# Patient Record
Sex: FEMALE | Race: WHITE | Employment: OTHER | URBAN - METROPOLITAN AREA
[De-identification: names, ages, dates, MRNs, and addresses within clinical notes are randomized per-mention and may not be internally consistent; named-entity substitution may affect disease eponyms.]

---

## 2017-06-16 ENCOUNTER — HOSPITAL ENCOUNTER (OUTPATIENT)
Dept: MAMMOGRAPHY | Age: 72
Discharge: HOME | End: 2017-06-16
Payer: MEDICARE

## 2017-06-16 DIAGNOSIS — Z12.31 VISIT FOR SCREENING MAMMOGRAM: ICD-10-CM

## 2017-06-16 PROCEDURE — G0202 SCR MAMMO BI INCL CAD: HCPCS

## 2017-11-01 ENCOUNTER — HOSPITAL ENCOUNTER (OUTPATIENT)
Dept: GENERAL RADIOLOGY | Age: 72
Discharge: HOME | End: 2017-11-01
Payer: MEDICARE

## 2017-11-01 DIAGNOSIS — M54.9 BACK PAIN: ICD-10-CM

## 2017-11-01 PROCEDURE — 72110 X-RAY EXAM L-2 SPINE 4/>VWS: CPT

## 2018-12-03 ENCOUNTER — HOSPITAL ENCOUNTER (OUTPATIENT)
Dept: GENERAL RADIOLOGY | Age: 73
Discharge: HOME | End: 2018-12-03
Payer: MEDICARE

## 2018-12-03 DIAGNOSIS — M20.091 ULNAR DEVIATION OF FINGER OF RIGHT HAND: ICD-10-CM

## 2018-12-03 DIAGNOSIS — M65.9 SYNOVITIS OF WRIST: ICD-10-CM

## 2018-12-03 DIAGNOSIS — M11.20 CALCIUM PYROPHOSPHATE DEPOSITION DISEASE: ICD-10-CM

## 2018-12-03 PROCEDURE — 73130 X-RAY EXAM OF HAND: CPT

## 2018-12-03 PROCEDURE — 73110 X-RAY EXAM OF WRIST: CPT

## 2019-04-30 ENCOUNTER — HOSPITAL ENCOUNTER (OUTPATIENT)
Dept: MAMMOGRAPHY | Age: 74
Discharge: HOME | End: 2019-04-30
Payer: MEDICARE

## 2019-04-30 ENCOUNTER — HOSPITAL ENCOUNTER (OUTPATIENT)
Dept: GENERAL RADIOLOGY | Age: 74
Discharge: HOME | End: 2019-04-30
Payer: MEDICARE

## 2019-04-30 DIAGNOSIS — R06.02 SOB (SHORTNESS OF BREATH): ICD-10-CM

## 2019-04-30 DIAGNOSIS — Z12.39 SCREENING FOR BREAST CANCER: ICD-10-CM

## 2019-04-30 PROCEDURE — 71046 X-RAY EXAM CHEST 2 VIEWS: CPT

## 2019-04-30 PROCEDURE — 77067 SCR MAMMO BI INCL CAD: CPT

## 2021-01-18 ENCOUNTER — HOSPITAL ENCOUNTER (OUTPATIENT)
Dept: GENERAL RADIOLOGY | Age: 76
Discharge: HOME | End: 2021-01-18
Payer: MEDICARE

## 2021-01-18 ENCOUNTER — TRANSCRIBE ORDER (OUTPATIENT)
Dept: GENERAL RADIOLOGY | Age: 76
End: 2021-01-18

## 2021-01-18 DIAGNOSIS — M25.551 RIGHT HIP PAIN: Primary | ICD-10-CM

## 2021-01-18 DIAGNOSIS — M25.551 RIGHT HIP PAIN: ICD-10-CM

## 2021-01-18 PROCEDURE — 73502 X-RAY EXAM HIP UNI 2-3 VIEWS: CPT | Performed by: FAMILY MEDICINE

## 2021-02-08 ENCOUNTER — APPOINTMENT (OUTPATIENT)
Dept: CT IMAGING | Age: 76
DRG: 202 | End: 2021-02-08
Attending: EMERGENCY MEDICINE
Payer: MEDICARE

## 2021-02-08 ENCOUNTER — HOSPITAL ENCOUNTER (INPATIENT)
Age: 76
LOS: 7 days | Discharge: HOME HEALTH | DRG: 202 | End: 2021-02-15
Attending: EMERGENCY MEDICINE | Admitting: INTERNAL MEDICINE
Payer: MEDICARE

## 2021-02-08 ENCOUNTER — APPOINTMENT (OUTPATIENT)
Dept: GENERAL RADIOLOGY | Age: 76
DRG: 202 | End: 2021-02-08
Attending: EMERGENCY MEDICINE
Payer: MEDICARE

## 2021-02-08 DIAGNOSIS — J45.901 MODERATE ASTHMA WITH ACUTE EXACERBATION, UNSPECIFIED WHETHER PERSISTENT: Primary | ICD-10-CM

## 2021-02-08 DIAGNOSIS — R05.9 COUGH: ICD-10-CM

## 2021-02-08 DIAGNOSIS — R06.02 SOB (SHORTNESS OF BREATH): ICD-10-CM

## 2021-02-08 DIAGNOSIS — R09.02 HYPOXIA: ICD-10-CM

## 2021-02-08 PROBLEM — J45.41 MODERATE PERSISTENT ASTHMA WITH ACUTE EXACERBATION: Status: ACTIVE | Noted: 2021-02-08

## 2021-02-08 PROBLEM — J45.902 STATUS ASTHMATICUS: Status: ACTIVE | Noted: 2021-02-08

## 2021-02-08 LAB
ALBUMIN SERPL-MCNC: 3.3 G/DL (ref 3.5–5)
ALBUMIN/GLOB SERPL: 0.9 {RATIO} (ref 1.1–2.2)
ALP SERPL-CCNC: 146 U/L (ref 45–117)
ALT SERPL-CCNC: 43 U/L (ref 12–78)
ANION GAP SERPL CALC-SCNC: 11 MMOL/L (ref 5–15)
AST SERPL-CCNC: 36 U/L (ref 15–37)
BASOPHILS # BLD: 0 K/UL (ref 0–0.1)
BASOPHILS NFR BLD: 0 % (ref 0–1)
BILIRUB SERPL-MCNC: 0.6 MG/DL (ref 0.2–1)
BUN SERPL-MCNC: 16 MG/DL (ref 6–20)
BUN/CREAT SERPL: 15 (ref 12–20)
CALCIUM SERPL-MCNC: 10.2 MG/DL (ref 8.5–10.1)
CHLORIDE SERPL-SCNC: 97 MMOL/L (ref 97–108)
CO2 SERPL-SCNC: 28 MMOL/L (ref 21–32)
CREAT SERPL-MCNC: 1.1 MG/DL (ref 0.55–1.02)
CRP SERPL-MCNC: 11.48 MG/DL
DIFFERENTIAL METHOD BLD: ABNORMAL
EOSINOPHIL # BLD: 1.2 K/UL (ref 0–0.4)
EOSINOPHIL NFR BLD: 9 % (ref 0–7)
ERYTHROCYTE [DISTWIDTH] IN BLOOD BY AUTOMATED COUNT: 14.2 % (ref 11.5–14.5)
GLOBULIN SER CALC-MCNC: 3.5 G/DL (ref 2–4)
GLUCOSE SERPL-MCNC: 134 MG/DL (ref 65–100)
HCT VFR BLD AUTO: 40.3 % (ref 35–47)
HGB BLD-MCNC: 12.8 G/DL (ref 11.5–16)
IMM GRANULOCYTES # BLD AUTO: 0 K/UL
IMM GRANULOCYTES NFR BLD AUTO: 0 %
LYMPHOCYTES # BLD: 1.4 K/UL (ref 0.8–3.5)
LYMPHOCYTES NFR BLD: 10 % (ref 12–49)
MCH RBC QN AUTO: 29.8 PG (ref 26–34)
MCHC RBC AUTO-ENTMCNC: 31.8 G/DL (ref 30–36.5)
MCV RBC AUTO: 93.9 FL (ref 80–99)
MONOCYTES # BLD: 1.1 K/UL (ref 0–1)
MONOCYTES NFR BLD: 8 % (ref 5–13)
NEUTS SEG # BLD: 10 K/UL (ref 1.8–8)
NEUTS SEG NFR BLD: 73 % (ref 32–75)
NRBC # BLD: 0 K/UL (ref 0–0.01)
NRBC BLD-RTO: 0 PER 100 WBC
PLATELET # BLD AUTO: 246 K/UL (ref 150–400)
PMV BLD AUTO: 10.4 FL (ref 8.9–12.9)
POTASSIUM SERPL-SCNC: 3.7 MMOL/L (ref 3.5–5.1)
PROCALCITONIN SERPL-MCNC: 0.14 NG/ML
PROT SERPL-MCNC: 6.8 G/DL (ref 6.4–8.2)
RBC # BLD AUTO: 4.29 M/UL (ref 3.8–5.2)
RBC MORPH BLD: ABNORMAL
SARS-COV-2, COV2: NORMAL
SODIUM SERPL-SCNC: 136 MMOL/L (ref 136–145)
TROPONIN I SERPL-MCNC: <0.05 NG/ML
WBC # BLD AUTO: 13.7 K/UL (ref 3.6–11)

## 2021-02-08 PROCEDURE — 94640 AIRWAY INHALATION TREATMENT: CPT

## 2021-02-08 PROCEDURE — 86140 C-REACTIVE PROTEIN: CPT

## 2021-02-08 PROCEDURE — 84145 PROCALCITONIN (PCT): CPT

## 2021-02-08 PROCEDURE — 71275 CT ANGIOGRAPHY CHEST: CPT

## 2021-02-08 PROCEDURE — 65270000032 HC RM SEMIPRIVATE

## 2021-02-08 PROCEDURE — 84484 ASSAY OF TROPONIN QUANT: CPT

## 2021-02-08 PROCEDURE — 74011000250 HC RX REV CODE- 250: Performed by: EMERGENCY MEDICINE

## 2021-02-08 PROCEDURE — 74011636637 HC RX REV CODE- 636/637: Performed by: EMERGENCY MEDICINE

## 2021-02-08 PROCEDURE — 99285 EMERGENCY DEPT VISIT HI MDM: CPT

## 2021-02-08 PROCEDURE — 85025 COMPLETE CBC W/AUTO DIFF WBC: CPT

## 2021-02-08 PROCEDURE — 81001 URINALYSIS AUTO W/SCOPE: CPT

## 2021-02-08 PROCEDURE — 74011250636 HC RX REV CODE- 250/636: Performed by: EMERGENCY MEDICINE

## 2021-02-08 PROCEDURE — 80053 COMPREHEN METABOLIC PANEL: CPT

## 2021-02-08 PROCEDURE — 71045 X-RAY EXAM CHEST 1 VIEW: CPT

## 2021-02-08 PROCEDURE — 74011000636 HC RX REV CODE- 636: Performed by: EMERGENCY MEDICINE

## 2021-02-08 PROCEDURE — 36415 COLL VENOUS BLD VENIPUNCTURE: CPT

## 2021-02-08 PROCEDURE — A9270 NON-COVERED ITEM OR SERVICE: HCPCS | Performed by: EMERGENCY MEDICINE

## 2021-02-08 PROCEDURE — U0005 INFEC AGEN DETEC AMPLI PROBE: HCPCS

## 2021-02-08 PROCEDURE — 93005 ELECTROCARDIOGRAM TRACING: CPT

## 2021-02-08 RX ORDER — ALBUTEROL SULFATE 0.83 MG/ML
5 SOLUTION RESPIRATORY (INHALATION)
Status: COMPLETED | OUTPATIENT
Start: 2021-02-08 | End: 2021-02-08

## 2021-02-08 RX ORDER — HEPARIN SODIUM 5000 [USP'U]/ML
5000 INJECTION, SOLUTION INTRAVENOUS; SUBCUTANEOUS EVERY 8 HOURS
Status: DISCONTINUED | OUTPATIENT
Start: 2021-02-09 | End: 2021-02-15 | Stop reason: HOSPADM

## 2021-02-08 RX ORDER — POLYETHYLENE GLYCOL 3350 17 G/17G
17 POWDER, FOR SOLUTION ORAL DAILY PRN
Status: DISCONTINUED | OUTPATIENT
Start: 2021-02-08 | End: 2021-02-15 | Stop reason: HOSPADM

## 2021-02-08 RX ORDER — SODIUM CHLORIDE 9 MG/ML
50 INJECTION, SOLUTION INTRAVENOUS ONCE
Status: COMPLETED | OUTPATIENT
Start: 2021-02-08 | End: 2021-02-08

## 2021-02-08 RX ORDER — IPRATROPIUM BROMIDE AND ALBUTEROL SULFATE 2.5; .5 MG/3ML; MG/3ML
9 SOLUTION RESPIRATORY (INHALATION)
Status: COMPLETED | OUTPATIENT
Start: 2021-02-08 | End: 2021-02-08

## 2021-02-08 RX ORDER — IPRATROPIUM BROMIDE AND ALBUTEROL SULFATE 2.5; .5 MG/3ML; MG/3ML
3 SOLUTION RESPIRATORY (INHALATION)
Status: DISCONTINUED | OUTPATIENT
Start: 2021-02-08 | End: 2021-02-08 | Stop reason: CLARIF

## 2021-02-08 RX ORDER — PROMETHAZINE HYDROCHLORIDE 25 MG/1
12.5 TABLET ORAL
Status: DISCONTINUED | OUTPATIENT
Start: 2021-02-08 | End: 2021-02-15 | Stop reason: HOSPADM

## 2021-02-08 RX ORDER — ALBUTEROL SULFATE 90 UG/1
1 AEROSOL, METERED RESPIRATORY (INHALATION)
Status: DISCONTINUED | OUTPATIENT
Start: 2021-02-08 | End: 2021-02-15 | Stop reason: HOSPADM

## 2021-02-08 RX ORDER — TRAZODONE HYDROCHLORIDE 50 MG/1
75 TABLET ORAL
Status: DISCONTINUED | OUTPATIENT
Start: 2021-02-09 | End: 2021-02-15 | Stop reason: HOSPADM

## 2021-02-08 RX ORDER — GABAPENTIN 300 MG/1
300 CAPSULE ORAL
Status: DISCONTINUED | OUTPATIENT
Start: 2021-02-09 | End: 2021-02-15 | Stop reason: HOSPADM

## 2021-02-08 RX ORDER — LOSARTAN POTASSIUM 25 MG/1
25 TABLET ORAL DAILY
Status: DISCONTINUED | OUTPATIENT
Start: 2021-02-09 | End: 2021-02-15 | Stop reason: HOSPADM

## 2021-02-08 RX ORDER — GUAIFENESIN 100 MG/5ML
81 LIQUID (ML) ORAL
Status: DISCONTINUED | OUTPATIENT
Start: 2021-02-09 | End: 2021-02-09

## 2021-02-08 RX ORDER — BUPROPION HYDROCHLORIDE 100 MG/1
200 TABLET, EXTENDED RELEASE ORAL 2 TIMES DAILY
Status: DISCONTINUED | OUTPATIENT
Start: 2021-02-09 | End: 2021-02-15 | Stop reason: HOSPADM

## 2021-02-08 RX ORDER — ACETAMINOPHEN 650 MG/1
650 SUPPOSITORY RECTAL
Status: DISCONTINUED | OUTPATIENT
Start: 2021-02-08 | End: 2021-02-15 | Stop reason: HOSPADM

## 2021-02-08 RX ORDER — SODIUM CHLORIDE 0.9 % (FLUSH) 0.9 %
5-40 SYRINGE (ML) INJECTION AS NEEDED
Status: DISCONTINUED | OUTPATIENT
Start: 2021-02-08 | End: 2021-02-15 | Stop reason: HOSPADM

## 2021-02-08 RX ORDER — ONDANSETRON 2 MG/ML
4 INJECTION INTRAMUSCULAR; INTRAVENOUS
Status: DISCONTINUED | OUTPATIENT
Start: 2021-02-08 | End: 2021-02-15 | Stop reason: HOSPADM

## 2021-02-08 RX ORDER — LEVOTHYROXINE SODIUM 125 UG/1
125 TABLET ORAL EVERY OTHER DAY
Status: DISCONTINUED | OUTPATIENT
Start: 2021-02-09 | End: 2021-02-09

## 2021-02-08 RX ORDER — SODIUM CHLORIDE 0.9 % (FLUSH) 0.9 %
10 SYRINGE (ML) INJECTION ONCE
Status: COMPLETED | OUTPATIENT
Start: 2021-02-08 | End: 2021-02-08

## 2021-02-08 RX ORDER — MELATONIN
1000 DAILY
Status: DISCONTINUED | OUTPATIENT
Start: 2021-02-09 | End: 2021-02-15 | Stop reason: HOSPADM

## 2021-02-08 RX ORDER — SODIUM CHLORIDE 0.9 % (FLUSH) 0.9 %
5-40 SYRINGE (ML) INJECTION EVERY 8 HOURS
Status: DISCONTINUED | OUTPATIENT
Start: 2021-02-09 | End: 2021-02-15 | Stop reason: HOSPADM

## 2021-02-08 RX ORDER — ACETAMINOPHEN 325 MG/1
650 TABLET ORAL
Status: DISCONTINUED | OUTPATIENT
Start: 2021-02-08 | End: 2021-02-15 | Stop reason: HOSPADM

## 2021-02-08 RX ADMIN — IOPAMIDOL 80 ML: 755 INJECTION, SOLUTION INTRAVENOUS at 18:49

## 2021-02-08 RX ADMIN — SODIUM CHLORIDE 50 ML/HR: 900 INJECTION, SOLUTION INTRAVENOUS at 18:50

## 2021-02-08 RX ADMIN — PREDNISONE 50 MG: 20 TABLET ORAL at 19:58

## 2021-02-08 RX ADMIN — Medication 10 ML: at 18:50

## 2021-02-08 RX ADMIN — IPRATROPIUM BROMIDE AND ALBUTEROL SULFATE 9 ML: .5; 3 SOLUTION RESPIRATORY (INHALATION) at 17:22

## 2021-02-08 RX ADMIN — ALBUTEROL SULFATE 5 MG: 2.5 SOLUTION RESPIRATORY (INHALATION) at 19:58

## 2021-02-08 NOTE — ED PROVIDER NOTES
68-year-old female with history of asthma and fibromyalgia and GERD and hypertension presents to the emergency department today with chief complaint of shortness of breath. She completed a 12-day prednisone taper approximately 1-1/2 weeks ago. She denies any fever but endorses significant cough with shortness of breath. She has been using her home albuterol without significant relief. She denies any contact with Covid. The history is provided by the patient and medical records. Shortness of Breath  This is a recurrent problem. The current episode started more than 2 days ago. The problem has been gradually worsening. Associated symptoms include cough and wheezing. Pertinent negatives include no fever, no headaches, no sore throat, no neck pain, no sputum production, no hemoptysis, no chest pain, no syncope, no vomiting, no abdominal pain, no rash, no leg pain, no leg swelling and no claudication. She has tried beta-agonist inhalers for the symptoms. Associated medical issues include asthma. Past Medical History:   Diagnosis Date    Asthma     Depression     Fibromyalgia     GERD (gastroesophageal reflux disease)     Hypertension     Hypothyroid     Menopause 1995    Rotator cuff tear        Past Surgical History:   Procedure Laterality Date    HX CHOLECYSTECTOMY      HX GYN      HX ORTHOPAEDIC      HX TONSIL AND ADENOIDECTOMY           History reviewed. No pertinent family history.     Social History     Socioeconomic History    Marital status:      Spouse name: Not on file    Number of children: Not on file    Years of education: Not on file    Highest education level: Not on file   Occupational History    Not on file   Social Needs    Financial resource strain: Not on file    Food insecurity     Worry: Not on file     Inability: Not on file    Transportation needs     Medical: Not on file     Non-medical: Not on file   Tobacco Use    Smoking status: Former Smoker Packs/day: 1.00     Years: 15.00     Pack years: 15.00    Smokeless tobacco: Never Used   Substance and Sexual Activity    Alcohol use: No    Drug use: No    Sexual activity: Not on file   Lifestyle    Physical activity     Days per week: Not on file     Minutes per session: Not on file    Stress: Not on file   Relationships    Social connections     Talks on phone: Not on file     Gets together: Not on file     Attends Mosque service: Not on file     Active member of club or organization: Not on file     Attends meetings of clubs or organizations: Not on file     Relationship status: Not on file    Intimate partner violence     Fear of current or ex partner: Not on file     Emotionally abused: Not on file     Physically abused: Not on file     Forced sexual activity: Not on file   Other Topics Concern    Not on file   Social History Narrative    Not on file         ALLERGIES: Pcn [penicillins]    Review of Systems   Constitutional: Negative for fatigue and fever. HENT: Negative for sneezing and sore throat. Respiratory: Positive for cough, shortness of breath and wheezing. Negative for hemoptysis and sputum production. Cardiovascular: Negative for chest pain, claudication, leg swelling and syncope. Gastrointestinal: Negative for abdominal pain, diarrhea, nausea and vomiting. Genitourinary: Negative for difficulty urinating and dysuria. Musculoskeletal: Negative for arthralgias, myalgias and neck pain. Skin: Negative for color change and rash. Neurological: Negative for weakness and headaches. Psychiatric/Behavioral: Negative for agitation and behavioral problems. Vitals:    02/08/21 1700   BP: 131/82   Pulse: (!) 114   Resp: 24   Temp: 98.2 °F (36.8 °C)   SpO2: 91%   Weight: 74 kg (163 lb 2.3 oz)   Height: 5' (1.524 m)            Physical Exam  Vitals signs and nursing note reviewed. Constitutional:       General: She is not in acute distress.      Appearance: Normal appearance. She is normal weight. She is not ill-appearing, toxic-appearing or diaphoretic. HENT:      Head: Normocephalic and atraumatic. Nose: Nose normal.      Mouth/Throat:      Mouth: Mucous membranes are moist.      Pharynx: Oropharynx is clear. Neck:      Musculoskeletal: Normal range of motion and neck supple. No muscular tenderness. Cardiovascular:      Rate and Rhythm: Regular rhythm. Tachycardia present. Pulses: Normal pulses. Heart sounds: Normal heart sounds. Pulmonary:      Effort: Pulmonary effort is normal.      Breath sounds: Wheezing present. Chest:      Chest wall: No tenderness. Abdominal:      General: There is no distension. Palpations: Abdomen is soft. Tenderness: There is no abdominal tenderness. There is no guarding or rebound. Musculoskeletal: Normal range of motion. General: No swelling, tenderness, deformity or signs of injury. Right lower leg: No edema. Left lower leg: No edema. Skin:     General: Skin is warm and dry. Capillary Refill: Capillary refill takes less than 2 seconds. Neurological:      General: No focal deficit present. Mental Status: She is alert and oriented to person, place, and time. Psychiatric:         Mood and Affect: Mood normal.         Behavior: Behavior normal.          MDM  Number of Diagnoses or Management Options  Cough  Moderate asthma with acute exacerbation, unspecified whether persistent  SOB (shortness of breath)  Diagnosis management comments: 64-year-old female presents as above with shortness of breath in setting of asthma. She is hypoxic despite treatment in the emergency department and will be admitted for further management.        Amount and/or Complexity of Data Reviewed  Clinical lab tests: reviewed  Tests in the radiology section of CPT®: reviewed  Tests in the medicine section of CPT®: reviewed           Procedures                 ED EKG interpretation:  Rhythm: Sinus tachycardia at a of rate (approx.): 110. Axis: normal.  ST segment:  No concerning ST elevations or depressions. This EKG was interpreted by Surendra Orantes MD,ED Provider. 1930: Patient reexamined with significantly improved airflow with some persistent wheezes. She has ambulatory hypoxia despite treatment. Perfect Serve Consult for Admission  7:39 PM    ED Room Number: SER01/01  Patient Name and age:  Karolina Pina 66 y.o.  female  Working Diagnosis:   1. Moderate asthma with acute exacerbation, unspecified whether persistent    2. SOB (shortness of breath)    3. Cough    4.  Hypoxia        COVID-19 Suspicion:  yes  Sepsis present:  no  Reassessment needed: N/A  Code Status:  Full Code  Readmission: no  Isolation Requirements:  yes  Recommended Level of Care:  telemetry  Department:[facility] - [phone]  Other: Likely asthma exacerbation instead of Covid, will swab

## 2021-02-09 LAB
ALBUMIN SERPL-MCNC: 3.5 G/DL (ref 3.5–5)
ALBUMIN/GLOB SERPL: 0.9 {RATIO} (ref 1.1–2.2)
ALP SERPL-CCNC: 160 U/L (ref 45–117)
ALT SERPL-CCNC: 41 U/L (ref 12–78)
ANION GAP SERPL CALC-SCNC: 11 MMOL/L (ref 5–15)
APPEARANCE UR: ABNORMAL
AST SERPL-CCNC: 29 U/L (ref 15–37)
ATRIAL RATE: 110 BPM
BACTERIA URNS QL MICRO: NEGATIVE /HPF
BASOPHILS # BLD: 0 K/UL (ref 0–0.1)
BASOPHILS NFR BLD: 0 % (ref 0–1)
BILIRUB SERPL-MCNC: 0.5 MG/DL (ref 0.2–1)
BILIRUB UR QL: NEGATIVE
BNP SERPL-MCNC: 112 PG/ML
BUN SERPL-MCNC: 13 MG/DL (ref 6–20)
BUN/CREAT SERPL: 14 (ref 12–20)
CALCIUM SERPL-MCNC: 9.9 MG/DL (ref 8.5–10.1)
CALCULATED P AXIS, ECG09: 33 DEGREES
CALCULATED R AXIS, ECG10: -2 DEGREES
CALCULATED T AXIS, ECG11: -2 DEGREES
CAOX CRY URNS QL MICRO: ABNORMAL
CHLORIDE SERPL-SCNC: 98 MMOL/L (ref 97–108)
CO2 SERPL-SCNC: 26 MMOL/L (ref 21–32)
COLOR UR: ABNORMAL
CREAT SERPL-MCNC: 0.93 MG/DL (ref 0.55–1.02)
CRP SERPL-MCNC: 11.8 MG/DL (ref 0–0.6)
DIAGNOSIS, 93000: NORMAL
DIFFERENTIAL METHOD BLD: ABNORMAL
EOSINOPHIL # BLD: 0 K/UL (ref 0–0.4)
EOSINOPHIL NFR BLD: 0 % (ref 0–7)
EPITH CASTS URNS QL MICRO: ABNORMAL /LPF
ERYTHROCYTE [DISTWIDTH] IN BLOOD BY AUTOMATED COUNT: 13.9 % (ref 11.5–14.5)
EST. AVERAGE GLUCOSE BLD GHB EST-MCNC: 103 MG/DL
GLOBULIN SER CALC-MCNC: 3.9 G/DL (ref 2–4)
GLUCOSE SERPL-MCNC: 186 MG/DL (ref 65–100)
GLUCOSE UR STRIP.AUTO-MCNC: NEGATIVE MG/DL
HBA1C MFR BLD: 5.2 % (ref 4–5.6)
HCT VFR BLD AUTO: 38.9 % (ref 35–47)
HGB BLD-MCNC: 12.6 G/DL (ref 11.5–16)
HGB UR QL STRIP: ABNORMAL
IMM GRANULOCYTES # BLD AUTO: 0.1 K/UL (ref 0–0.04)
IMM GRANULOCYTES NFR BLD AUTO: 1 % (ref 0–0.5)
KETONES UR QL STRIP.AUTO: ABNORMAL MG/DL
LEUKOCYTE ESTERASE UR QL STRIP.AUTO: ABNORMAL
LYMPHOCYTES # BLD: 0.4 K/UL (ref 0.8–3.5)
LYMPHOCYTES NFR BLD: 3 % (ref 12–49)
MAGNESIUM SERPL-MCNC: 1.9 MG/DL (ref 1.6–2.4)
MCH RBC QN AUTO: 30.1 PG (ref 26–34)
MCHC RBC AUTO-ENTMCNC: 32.4 G/DL (ref 30–36.5)
MCV RBC AUTO: 92.8 FL (ref 80–99)
MONOCYTES # BLD: 0.1 K/UL (ref 0–1)
MONOCYTES NFR BLD: 1 % (ref 5–13)
NEUTS SEG # BLD: 12.1 K/UL (ref 1.8–8)
NEUTS SEG NFR BLD: 95 % (ref 32–75)
NITRITE UR QL STRIP.AUTO: NEGATIVE
NRBC # BLD: 0 K/UL (ref 0–0.01)
NRBC BLD-RTO: 0 PER 100 WBC
P-R INTERVAL, ECG05: 156 MS
PH UR STRIP: 6 [PH] (ref 5–8)
PHOSPHATE SERPL-MCNC: 3.2 MG/DL (ref 2.6–4.7)
PLATELET # BLD AUTO: 275 K/UL (ref 150–400)
PMV BLD AUTO: 10.2 FL (ref 8.9–12.9)
POTASSIUM SERPL-SCNC: 3.9 MMOL/L (ref 3.5–5.1)
PROCALCITONIN SERPL-MCNC: 0.21 NG/ML
PROT SERPL-MCNC: 7.4 G/DL (ref 6.4–8.2)
PROT UR STRIP-MCNC: ABNORMAL MG/DL
Q-T INTERVAL, ECG07: 324 MS
QRS DURATION, ECG06: 80 MS
QTC CALCULATION (BEZET), ECG08: 438 MS
RBC # BLD AUTO: 4.19 M/UL (ref 3.8–5.2)
RBC #/AREA URNS HPF: ABNORMAL /HPF (ref 0–5)
RBC MORPH BLD: ABNORMAL
SARS-COV-2, XPLCVT: NOT DETECTED
SODIUM SERPL-SCNC: 135 MMOL/L (ref 136–145)
SOURCE, COVRS: NORMAL
SP GR UR REFRACTOMETRY: 1.02 (ref 1–1.03)
TROPONIN I SERPL-MCNC: <0.05 NG/ML
TSH SERPL DL<=0.05 MIU/L-ACNC: 0.66 UIU/ML (ref 0.36–3.74)
UR CULT HOLD, URHOLD: NORMAL
UROBILINOGEN UR QL STRIP.AUTO: 1 EU/DL (ref 0.2–1)
VENTRICULAR RATE, ECG03: 110 BPM
WBC # BLD AUTO: 12.7 K/UL (ref 3.6–11)
WBC URNS QL MICRO: ABNORMAL /HPF (ref 0–4)

## 2021-02-09 PROCEDURE — 83036 HEMOGLOBIN GLYCOSYLATED A1C: CPT

## 2021-02-09 PROCEDURE — 94664 DEMO&/EVAL PT USE INHALER: CPT

## 2021-02-09 PROCEDURE — 77010033678 HC OXYGEN DAILY

## 2021-02-09 PROCEDURE — 80053 COMPREHEN METABOLIC PANEL: CPT

## 2021-02-09 PROCEDURE — 94760 N-INVAS EAR/PLS OXIMETRY 1: CPT

## 2021-02-09 PROCEDURE — 65660000000 HC RM CCU STEPDOWN

## 2021-02-09 PROCEDURE — 84484 ASSAY OF TROPONIN QUANT: CPT

## 2021-02-09 PROCEDURE — 74011000250 HC RX REV CODE- 250: Performed by: INTERNAL MEDICINE

## 2021-02-09 PROCEDURE — 85025 COMPLETE CBC W/AUTO DIFF WBC: CPT

## 2021-02-09 PROCEDURE — 84100 ASSAY OF PHOSPHORUS: CPT

## 2021-02-09 PROCEDURE — 77030012341 HC CHMB SPCR OPTC MDI VYRM -A

## 2021-02-09 PROCEDURE — 84443 ASSAY THYROID STIM HORMONE: CPT

## 2021-02-09 PROCEDURE — 74011250636 HC RX REV CODE- 250/636: Performed by: INTERNAL MEDICINE

## 2021-02-09 PROCEDURE — 84145 PROCALCITONIN (PCT): CPT

## 2021-02-09 PROCEDURE — 83735 ASSAY OF MAGNESIUM: CPT

## 2021-02-09 PROCEDURE — 83880 ASSAY OF NATRIURETIC PEPTIDE: CPT

## 2021-02-09 PROCEDURE — 36415 COLL VENOUS BLD VENIPUNCTURE: CPT

## 2021-02-09 PROCEDURE — 74011250637 HC RX REV CODE- 250/637: Performed by: INTERNAL MEDICINE

## 2021-02-09 PROCEDURE — 86140 C-REACTIVE PROTEIN: CPT

## 2021-02-09 PROCEDURE — 74011000250 HC RX REV CODE- 250: Performed by: NURSE PRACTITIONER

## 2021-02-09 PROCEDURE — 74011250636 HC RX REV CODE- 250/636: Performed by: NURSE PRACTITIONER

## 2021-02-09 PROCEDURE — 94640 AIRWAY INHALATION TREATMENT: CPT

## 2021-02-09 RX ORDER — IPRATROPIUM BROMIDE AND ALBUTEROL SULFATE 2.5; .5 MG/3ML; MG/3ML
3 SOLUTION RESPIRATORY (INHALATION)
Status: COMPLETED | OUTPATIENT
Start: 2021-02-09 | End: 2021-02-09

## 2021-02-09 RX ORDER — ALBUTEROL SULFATE 0.83 MG/ML
1 SOLUTION RESPIRATORY (INHALATION)
COMMUNITY
Start: 2020-12-01

## 2021-02-09 RX ORDER — IPRATROPIUM BROMIDE AND ALBUTEROL SULFATE 2.5; .5 MG/3ML; MG/3ML
3 SOLUTION RESPIRATORY (INHALATION)
Status: DISCONTINUED | OUTPATIENT
Start: 2021-02-09 | End: 2021-02-09

## 2021-02-09 RX ORDER — MONTELUKAST SODIUM 10 MG/1
10 TABLET ORAL AT BEDTIME
COMMUNITY

## 2021-02-09 RX ORDER — LEVOTHYROXINE SODIUM 125 UG/1
62.5 TABLET ORAL EVERY OTHER DAY
Status: DISCONTINUED | OUTPATIENT
Start: 2021-02-10 | End: 2021-02-15 | Stop reason: HOSPADM

## 2021-02-09 RX ORDER — LOSARTAN POTASSIUM 25 MG/1
25 TABLET ORAL DAILY
COMMUNITY
Start: 2021-01-08 | End: 2021-12-01

## 2021-02-09 RX ORDER — LOSARTAN POTASSIUM 50 MG/1
50 TABLET ORAL DAILY
Status: CANCELLED | OUTPATIENT
Start: 2021-02-10

## 2021-02-09 RX ORDER — DIPHENHYDRAMINE HCL 25 MG
25 CAPSULE ORAL
Status: DISCONTINUED | OUTPATIENT
Start: 2021-02-10 | End: 2021-02-15 | Stop reason: HOSPADM

## 2021-02-09 RX ORDER — IPRATROPIUM BROMIDE AND ALBUTEROL SULFATE 2.5; .5 MG/3ML; MG/3ML
3 SOLUTION RESPIRATORY (INHALATION)
Status: DISCONTINUED | OUTPATIENT
Start: 2021-02-10 | End: 2021-02-10

## 2021-02-09 RX ORDER — DIPHENHYDRAMINE HCL 25 MG
25 CAPSULE ORAL
Status: DISCONTINUED | OUTPATIENT
Start: 2021-02-09 | End: 2021-02-09

## 2021-02-09 RX ORDER — TRAZODONE HYDROCHLORIDE 50 MG/1
50 TABLET ORAL
COMMUNITY
Start: 2020-12-02 | End: 2021-12-01

## 2021-02-09 RX ADMIN — LOSARTAN POTASSIUM 25 MG: 50 TABLET, FILM COATED ORAL at 12:30

## 2021-02-09 RX ADMIN — BUPROPION HYDROCHLORIDE 200 MG: 100 TABLET, FILM COATED, EXTENDED RELEASE ORAL at 12:30

## 2021-02-09 RX ADMIN — AZITHROMYCIN MONOHYDRATE 500 MG: 500 INJECTION, POWDER, LYOPHILIZED, FOR SOLUTION INTRAVENOUS at 01:19

## 2021-02-09 RX ADMIN — IPRATROPIUM BROMIDE AND ALBUTEROL SULFATE 3 ML: .5; 3 SOLUTION RESPIRATORY (INHALATION) at 21:45

## 2021-02-09 RX ADMIN — Medication 1000 UNITS: at 09:01

## 2021-02-09 RX ADMIN — METHYLPREDNISOLONE SODIUM SUCCINATE 60 MG: 40 INJECTION, POWDER, FOR SOLUTION INTRAMUSCULAR; INTRAVENOUS at 21:14

## 2021-02-09 RX ADMIN — ASPIRIN 81 MG: 81 TABLET, CHEWABLE ORAL at 01:18

## 2021-02-09 RX ADMIN — ALBUTEROL SULFATE 1 PUFF: 90 AEROSOL, METERED RESPIRATORY (INHALATION) at 12:31

## 2021-02-09 RX ADMIN — Medication 10 ML: at 01:31

## 2021-02-09 RX ADMIN — Medication 10 ML: at 14:36

## 2021-02-09 RX ADMIN — GABAPENTIN 300 MG: 300 CAPSULE ORAL at 22:35

## 2021-02-09 RX ADMIN — HEPARIN SODIUM 5000 UNITS: 5000 INJECTION INTRAVENOUS; SUBCUTANEOUS at 16:21

## 2021-02-09 RX ADMIN — Medication 10 ML: at 22:44

## 2021-02-09 RX ADMIN — Medication 10 ML: at 06:00

## 2021-02-09 RX ADMIN — TRAZODONE HYDROCHLORIDE 75 MG: 50 TABLET ORAL at 22:35

## 2021-02-09 RX ADMIN — ACETAMINOPHEN 650 MG: 325 TABLET ORAL at 02:53

## 2021-02-09 RX ADMIN — HEPARIN SODIUM 5000 UNITS: 5000 INJECTION INTRAVENOUS; SUBCUTANEOUS at 01:19

## 2021-02-09 RX ADMIN — BUPROPION HYDROCHLORIDE 200 MG: 100 TABLET, FILM COATED, EXTENDED RELEASE ORAL at 16:21

## 2021-02-09 RX ADMIN — GABAPENTIN 300 MG: 300 CAPSULE ORAL at 01:18

## 2021-02-09 RX ADMIN — TRAZODONE HYDROCHLORIDE 75 MG: 50 TABLET ORAL at 01:18

## 2021-02-09 RX ADMIN — IPRATROPIUM BROMIDE AND ALBUTEROL SULFATE 3 ML: .5; 3 SOLUTION RESPIRATORY (INHALATION) at 14:11

## 2021-02-09 RX ADMIN — LEVOTHYROXINE SODIUM 125 MCG: 0.12 TABLET ORAL at 06:00

## 2021-02-09 NOTE — CONSULTS
Cardiology Consult Note    CC: SOB  Reason for consult:  SOB  Requesting MD:  Dr. Christine Joel     Subjective:      Date of  Admission: 2/8/2021  4:55 PM     Admission type:Emergency    Jose Welch is a 66 y.o. female admitted for Status asthmaticus [J45.902]. I was asked to see her as she is my patient that I follow for HTN and her family asked me to check her over from cardiac perspective. I reviewed her chart and especially her recent asthma exacerbation and treatment with steroid. She is now admitted with more SOB/wheezes/cough. Her COVID test just came back negative. Her troponin and BNP were negative. No CP or palpitations. Her past cardiac data include HTN and h/o syncope and some perior of orthostatic hypotension in 2011 due to dehydration. She is a former smoker. Her stress test and echo were negative in 2011 at my office. Patient Active Problem List    Diagnosis Date Noted    Status asthmaticus 02/08/2021    Moderate persistent asthma with acute exacerbation 02/08/2021    IBS (irritable bowel syndrome) 07/08/2011    HTN (hypertension) 07/08/2011    Asthma 07/08/2011    Unspecified hypothyroidism 07/08/2011    Syncope 07/07/2011      Sanjay Mars MD  Past Medical History:   Diagnosis Date    Asthma     Depression     Fibromyalgia     GERD (gastroesophageal reflux disease)     Hypertension     Hypothyroid     Menopause 1995    Rotator cuff tear       Past Surgical History:   Procedure Laterality Date    HX CHOLECYSTECTOMY      HX GYN      HX ORTHOPAEDIC      HX TONSIL AND ADENOIDECTOMY       Allergies   Allergen Reactions    Pcn [Penicillins] Hives      History reviewed. No pertinent family history.    Current Facility-Administered Medications   Medication Dose Route Frequency    [START ON 2/10/2021] levothyroxine (SYNTHROID) tablet 62.5 mcg  62.5 mcg Oral EVERY OTHER DAY    [START ON 2/10/2021] diphenhydrAMINE (BENADRYL) capsule 25 mg  25 mg Oral QHS PRN    albuterol-ipratropium (DUO-NEB) 2.5 MG-0.5 MG/3 ML  3 mL Nebulization Q6H PRN    aspirin chewable tablet 81 mg  81 mg Oral QHS    buPROPion SR (WELLBUTRIN SR) tablet 200 mg  200 mg Oral BID    cholecalciferol (VITAMIN D3) (1000 Units /25 mcg) tablet 1,000 Units  1,000 Units Oral DAILY    gabapentin (NEURONTIN) capsule 300 mg  300 mg Oral QHS    levothyroxine (SYNTHROID) tablet 125 mcg  125 mcg Oral EVERY OTHER DAY    losartan (COZAAR) tablet 25 mg  25 mg Oral DAILY    traZODone (DESYREL) tablet 75 mg  75 mg Oral QHS    sodium chloride (NS) flush 5-40 mL  5-40 mL IntraVENous Q8H    sodium chloride (NS) flush 5-40 mL  5-40 mL IntraVENous PRN    acetaminophen (TYLENOL) tablet 650 mg  650 mg Oral Q6H PRN    Or    acetaminophen (TYLENOL) suppository 650 mg  650 mg Rectal Q6H PRN    polyethylene glycol (MIRALAX) packet 17 g  17 g Oral DAILY PRN    promethazine (PHENERGAN) tablet 12.5 mg  12.5 mg Oral Q6H PRN    Or    ondansetron (ZOFRAN) injection 4 mg  4 mg IntraVENous Q6H PRN    heparin (porcine) injection 5,000 Units  5,000 Units SubCUTAneous Q8H    azithromycin (ZITHROMAX) 500 mg in 0.9% sodium chloride 250 mL (VIAL-MATE)  500 mg IntraVENous Q24H    albuterol (PROVENTIL HFA, VENTOLIN HFA, PROAIR HFA) inhaler 1 Puff  1 Puff Inhalation Q4H PRN    ipratropium (ATROVENT HFA) 17 mcg inhaler  1 Puff Inhalation Q4H PRN        Prior to Admission Medications:  Prior to Admission medications    Medication Sig Start Date End Date Taking? Authorizing Provider   HYDROcodone-acetaminophen (NORCO) 5-325 mg per tablet Take 1-2 Tabs by mouth every six (6) hours as needed for Pain. 9/14/12   Brayden Gottlieb PA-C   LOSARTAN PO Take 25 mg by mouth nightly. [place], MD Marlyn   gabapentin (NEURONTIN) 100 mg capsule Take 300 mg by mouth nightly. 7/8/11   Provider, Historical   levothyroxine (SYNTHROID) 125 mcg tablet Take 125 mcg by mouth every other day.  7/8/11   Provider, Historical   levothyroxine (SYNTHROID) 125 mcg tablet Take 62.5 mcg by mouth every other day. 7/8/11   Provider, Historical   albuterol (PROVENTIL HFA) 90 mcg/Actuation inhaler Take 1 Puff by inhalation two (2) times a day. 7/7/11   Provider, Historical   aspirin 81 mg tablet Take 81 mg by mouth nightly. 7/7/11   Provider, Historical   CALCIUM CARBONATE (CALCIUM 600 PO) Take 2 Tabs by mouth daily. 7/7/11   Provider, Historical   CHOLECALCIFEROL, VITAMIN D3, (VITAMIN D-3 PO) Take 1,200 Units by mouth daily. 7/7/11   Provider, Historical   cholecalciferol, vitamin d3, (VITAMIN D) 1,000 unit tablet Take 1,000 Units by mouth daily. Provider, Historical   VITAMIN B COMPLEX PO Take  by mouth daily. 7/7/11   Provider, Historical   polyethylene glycol (MIRALAX) 17 gram packet Take 8.5 g by mouth daily. Provider, Historical   ACETAMINOPHEN (TYLENOL PO) Take 2 g by mouth as needed. 7/7/11   Provider, Historical   PSEUDOEPHEDRINE HCL (SUDOPHED PO) Take 2 Tabs by mouth as needed. 7/7/11   Provider, Historical   pyridoxine (VITAMIN B-6) 200 mg tablet Take 200 mg by mouth daily. Provider, Historical   LOPERAMIDE HCL (IMODIUM PO) Take 3 Tabs by mouth as needed. 7/7/11   Provider, Historical   fexofenadine (ALLEGRA ALLERGY) 60 mg tablet Take 60 mg by mouth two (2) times a day. 6/10/11   Marlyn Linn MD   lansoprazole (PREVACID) 30 mg disintegrating tablet Take 30 mg by mouth daily. Marlyn Linn MD   BUPROPION HCL (WELLBUTRIN XL PO) Take 450 mg by mouth daily. 6/10/11   Marlyn Linn MD   TRAZODONE HCL (TRAZODONE PO) Take 75 mg by mouth nightly. 6/10/11   Marlyn Linn MD   zolpidem (AMBIEN) 5 mg tablet Take 5 mg by mouth nightly as needed. Marlyn Linn MD   budesonide-formoterol (SYMBICORT) 160-4.5 mcg/Actuation HFA inhaler Take 2 Puffs by inhalation two (2) times a day. Marlyn Linn MD   montelukast (SINGULAIR) 10 mg tablet Take 10 mg by mouth nightly. Marlyn Linn MD   cyclosporine (RESTASIS) 0.05 % ophthalmic emulsion Administer 1 Drop to both eyes two (2) times a day. Other, MD Marlyn        Review of Symptoms:  Except as noted in HPI, patient denies recent fever or chills, nausea, vomiting, diarrhea, hemoptysis, hematemesis, dysuria, myalgias, focal neurologic symptoms, ecchymosis, angioedema, odynophagia, dysphagia, sore throat, earache,rash, melena, hematochezia, depression, GERD, cold intolerance, petechia, bleeding gums, or significant weight loss. A comprehensive review of systems was negative except for that written in the HPI. Subjective:    24 hr VS reviewed, overall VSSAF  Temp (24hrs), Av.3 °F (36.8 °C), Min:98.2 °F (36.8 °C), Max:98.4 °F (36.9 °C)    Patient Vitals for the past 8 hrs:   Pulse   21 1230 98   21 0756 (!) 101    Patient Vitals for the past 8 hrs:   Resp   21 0756 18    Patient Vitals for the past 8 hrs:   BP   21 1230 (!) 154/96   21 0756 138/76        No intake or output data in the 24 hours ending 21 1353      Physical Exam (complete single organ system exam)    Cons: The patient is no distress. Appears stated age. HEENT: Normal conjunctivae and palate. No xanthelasma. Neck: Flat JVP without appreciable HJR. Resp: Normal respiratory effort with clear lungs bilaterally. CV: Regular rate and rhythm. PMI not palpated. Normal S1,S2  No gallop or rubs appreciated. No murmur apprciated. Intact carotid upstroke bilaterally without appreciated bruits. Abdominal aorta not palpated; no abdominal bruit noted. Normal femoral pulses without bruits. Intact pedal pulses. No peripheral edema. GI: No abd mass noted, soft; no organomegaly noted. Bowel sounds present. Muscular:  No significant kyphosis. Strength WNL for age. Ext: No cyanosis, clubbing, or stigmata of peripheral embolization. Derm: No ulcers or stasis dermatitis of lower extremities. Neuro: Alert and oriented x 3;  Grossly non-focal. Normal mood and affect.        Visit Vitals  BP (!) 154/96   Pulse 98   Temp 98.4 °F (36.9 °C)   Resp 18   Ht 5' (1.524 m)   Wt 74 kg (163 lb 2.3 oz)   SpO2 94%   BMI 31.86 kg/m²     General Appearance:  Well developed, well nourished,alert and oriented x 3, and individual in no acute distress. Ears/Nose/Mouth/Throat:   Hearing grossly normal.         Neck: Supple. Chest:   Lungs with rhonchi and scattered wheezes. Cardiovascular:  Regular rate and rhythm, S1, S2 normal, no murmur. Abdomen:   Soft, non-tender, bowel sounds are active. Extremities: No edema bilaterally. Skin: Warm and dry. Cardiographics    Telemetry: normal sinus rhythm  ECG: normal EKG, normal sinus rhythm, unchanged from previous tracings  Echocardiogram: Not done    Labs:   Recent Results (from the past 24 hour(s))   EKG, 12 LEAD, INITIAL    Collection Time: 02/08/21  5:08 PM   Result Value Ref Range    Ventricular Rate 110 BPM    Atrial Rate 110 BPM    P-R Interval 156 ms    QRS Duration 80 ms    Q-T Interval 324 ms    QTC Calculation (Bezet) 438 ms    Calculated P Axis 33 degrees    Calculated R Axis -2 degrees    Calculated T Axis -2 degrees    Diagnosis       Sinus tachycardia  Otherwise normal ECG  When compared with ECG of 07-JUL-2011 15:53,  No significant change was found  Confirmed by Misa Casillas M.D., Rudolph Becerra ([id number]) on 2/9/2021 7:42:58 AM     CBC WITH AUTOMATED DIFF    Collection Time: 02/08/21  5:13 PM   Result Value Ref Range    WBC 13.7 (H) 3.6 - 11.0 K/uL    RBC 4.29 3.80 - 5.20 M/uL    HGB 12.8 11.5 - 16.0 g/dL    HCT 40.3 35.0 - 47.0 %    MCV 93.9 80.0 - 99.0 FL    MCH 29.8 26.0 - 34.0 PG    MCHC 31.8 30.0 - 36.5 g/dL    RDW 14.2 11.5 - 14.5 %    PLATELET 735 922 - 318 K/uL    MPV 10.4 8.9 - 12.9 FL    NRBC 0.0 0  WBC    ABSOLUTE NRBC 0.00 0.00 - 0.01 K/uL    NEUTROPHILS 73 32 - 75 %    LYMPHOCYTES 10 (L) 12 - 49 %    MONOCYTES 8 5 - 13 %    EOSINOPHILS 9 (H) 0 - 7 %    BASOPHILS 0 0 - 1 %    IMMATURE GRANULOCYTES 0 %    ABS. NEUTROPHILS 10.0 (H) 1.8 - 8.0 K/UL    ABS.  LYMPHOCYTES 1.4 0.8 - 3.5 K/UL ABS. MONOCYTES 1.1 (H) 0.0 - 1.0 K/UL    ABS. EOSINOPHILS 1.2 (H) 0.0 - 0.4 K/UL    ABS. BASOPHILS 0.0 0.0 - 0.1 K/UL    ABS. IMM. GRANS. 0.0 K/UL    DF MANUAL      RBC COMMENTS NORMOCYTIC, NORMOCHROMIC     TROPONIN I    Collection Time: 02/08/21  5:13 PM   Result Value Ref Range    Troponin-I, Qt. <0.05 <8.58 ng/mL   METABOLIC PANEL, COMPREHENSIVE    Collection Time: 02/08/21  5:13 PM   Result Value Ref Range    Sodium 136 136 - 145 mmol/L    Potassium 3.7 3.5 - 5.1 mmol/L    Chloride 97 97 - 108 mmol/L    CO2 28 21 - 32 mmol/L    Anion gap 11 5 - 15 mmol/L    Glucose 134 (H) 65 - 100 mg/dL    BUN 16 6 - 20 MG/DL    Creatinine 1.10 (H) 0.55 - 1.02 MG/DL    BUN/Creatinine ratio 15 12 - 20      GFR est AA 58 (L) >60 ml/min/1.73m2    GFR est non-AA 48 (L) >60 ml/min/1.73m2    Calcium 10.2 (H) 8.5 - 10.1 MG/DL    Bilirubin, total 0.6 0.2 - 1.0 MG/DL    ALT (SGPT) 43 12 - 78 U/L    AST (SGOT) 36 15 - 37 U/L    Alk.  phosphatase 146 (H) 45 - 117 U/L    Protein, total 6.8 6.4 - 8.2 g/dL    Albumin 3.3 (L) 3.5 - 5.0 g/dL    Globulin 3.5 2.0 - 4.0 g/dL    A-G Ratio 0.9 (L) 1.1 - 2.2     PROCALCITONIN    Collection Time: 02/08/21  5:13 PM   Result Value Ref Range    Procalcitonin 0.14 ng/mL   C REACTIVE PROTEIN, QT    Collection Time: 02/08/21  5:13 PM   Result Value Ref Range    C-Reactive protein 11.48 (H) <0.60 mg/dL   SARS-COV-2    Collection Time: 02/08/21  8:05 PM   Result Value Ref Range    SARS-CoV-2 Please find results under separate order     SARS-COV-2    Collection Time: 02/08/21  8:05 PM   Result Value Ref Range    Specimen source Nasopharyngeal      SARS-CoV-2 Not detected NOTD     PROCALCITONIN    Collection Time: 02/09/21  2:49 AM   Result Value Ref Range    Procalcitonin 0.21 ng/mL   C REACTIVE PROTEIN, QT    Collection Time: 02/09/21  2:49 AM   Result Value Ref Range    C-Reactive protein 11.80 (H) 0.00 - 9.22 mg/dL   METABOLIC PANEL, COMPREHENSIVE    Collection Time: 02/09/21  2:49 AM   Result Value Ref Range    Sodium 135 (L) 136 - 145 mmol/L    Potassium 3.9 3.5 - 5.1 mmol/L    Chloride 98 97 - 108 mmol/L    CO2 26 21 - 32 mmol/L    Anion gap 11 5 - 15 mmol/L    Glucose 186 (H) 65 - 100 mg/dL    BUN 13 6 - 20 MG/DL    Creatinine 0.93 0.55 - 1.02 MG/DL    BUN/Creatinine ratio 14 12 - 20      GFR est AA >60 >60 ml/min/1.73m2    GFR est non-AA 58 (L) >60 ml/min/1.73m2    Calcium 9.9 8.5 - 10.1 MG/DL    Bilirubin, total 0.5 0.2 - 1.0 MG/DL    ALT (SGPT) 41 12 - 78 U/L    AST (SGOT) 29 15 - 37 U/L    Alk. phosphatase 160 (H) 45 - 117 U/L    Protein, total 7.4 6.4 - 8.2 g/dL    Albumin 3.5 3.5 - 5.0 g/dL    Globulin 3.9 2.0 - 4.0 g/dL    A-G Ratio 0.9 (L) 1.1 - 2.2     CBC WITH AUTOMATED DIFF    Collection Time: 02/09/21  2:49 AM   Result Value Ref Range    WBC 12.7 (H) 3.6 - 11.0 K/uL    RBC 4.19 3.80 - 5.20 M/uL    HGB 12.6 11.5 - 16.0 g/dL    HCT 38.9 35.0 - 47.0 %    MCV 92.8 80.0 - 99.0 FL    MCH 30.1 26.0 - 34.0 PG    MCHC 32.4 30.0 - 36.5 g/dL    RDW 13.9 11.5 - 14.5 %    PLATELET 180 205 - 155 K/uL    MPV 10.2 8.9 - 12.9 FL    NRBC 0.0 0  WBC    ABSOLUTE NRBC 0.00 0.00 - 0.01 K/uL    NEUTROPHILS 95 (H) 32 - 75 %    LYMPHOCYTES 3 (L) 12 - 49 %    MONOCYTES 1 (L) 5 - 13 %    EOSINOPHILS 0 0 - 7 %    BASOPHILS 0 0 - 1 %    IMMATURE GRANULOCYTES 1 (H) 0.0 - 0.5 %    ABS. NEUTROPHILS 12.1 (H) 1.8 - 8.0 K/UL    ABS. LYMPHOCYTES 0.4 (L) 0.8 - 3.5 K/UL    ABS. MONOCYTES 0.1 0.0 - 1.0 K/UL    ABS. EOSINOPHILS 0.0 0.0 - 0.4 K/UL    ABS. BASOPHILS 0.0 0.0 - 0.1 K/UL    ABS. IMM.  GRANS. 0.1 (H) 0.00 - 0.04 K/UL    DF SMEAR SCANNED      RBC COMMENTS NORMOCYTIC, NORMOCHROMIC     TSH 3RD GENERATION    Collection Time: 02/09/21  2:49 AM   Result Value Ref Range    TSH 0.66 0.36 - 3.74 uIU/mL   MAGNESIUM    Collection Time: 02/09/21  2:49 AM   Result Value Ref Range    Magnesium 1.9 1.6 - 2.4 mg/dL   PHOSPHORUS    Collection Time: 02/09/21  2:49 AM   Result Value Ref Range    Phosphorus 3.2 2.6 - 4.7 MG/DL   TROPONIN I Collection Time: 02/09/21  2:49 AM   Result Value Ref Range    Troponin-I, Qt. <0.05 <0.05 ng/mL   NT-PRO BNP    Collection Time: 02/09/21  2:49 AM   Result Value Ref Range    NT pro- <450 PG/ML   HEMOGLOBIN A1C WITH EAG    Collection Time: 02/09/21  2:49 AM   Result Value Ref Range    Hemoglobin A1c 5.2 4.0 - 5.6 %    Est. average glucose 103 mg/dL        Assessment:     Assessment:   SOB; due to asthma exacerbation   Asthma; with moderate exacerbation   Leukocytosis; due to recent steroid  HTN; uncontrolled     Plan:   Tele  No further cardiac evaluation needed  Will increase Losartan to 50 mg a day for better BP control.   Pulmonary care per hospitalist      Rasheed Lagunas MD

## 2021-02-09 NOTE — ED NOTES
Pt walked around the bed in her room and when she returned to bed her O2 was 88%, MD aware. Pt states that she continues to feel like she cant breathe.

## 2021-02-09 NOTE — ED NOTES
TRANSFER - OUT REPORT:    Verbal report given to DOMITILA Baumann RN(name) on Alan Castillo  being transferred to [facility](unit) for routine progression of care       Report consisted of patients Situation, Background, Assessment and   Recommendations(SBAR). Information from the following report(s) SBAR, Kardex, ED Summary, OR Summary, Procedure Summary and Intake/Output was reviewed with the receiving nurse. Lines:   Peripheral IV 02/08/21 Right Antecubital (Active)   Site Assessment Clean, dry, & intact 02/08/21 1712   Phlebitis Assessment 0 02/08/21 1712   Infiltration Assessment 0 02/08/21 1712   Dressing Status Clean, dry, & intact 02/08/21 1712   Dressing Type Transparent 02/08/21 1712   Hub Color/Line Status Pink 02/08/21 1712   Action Taken Blood drawn 02/08/21 1712        Opportunity for questions and clarification was provided.       Patient transported with:   O2 @ 2 liters

## 2021-02-09 NOTE — ROUTINE PROCESS
Bedside and Verbal shift change report given to Luisito (oncoming nurse) by [employer] (offgoing nurse). Report included the following information SBAR and ED Summary.

## 2021-02-09 NOTE — PROGRESS NOTES
MAE:    1. RUR: 13%   2. Patient may benefit from physical therapy. Pending with MD rounds for the need of therapy order. 3. Upon discharge, family (daughter) vs. BLS  4. Patient receiving breathing treatments and medications for asthma. Care Management Interventions  PCP Verified by CM: Yes(Last  Visit in January 2021)  Palliative Care Criteria Met (RRAT>21 & CHF Dx)?: No  Mode of Transport at Discharge: Other (see comment)(family vs bls)  MyChart Signup: Yes  Discharge Durable Medical Equipment: No  Health Maintenance Reviewed: Yes  Physical Therapy Consult: No  Occupational Therapy Consult: No  Speech Therapy Consult: No  Current Support Network: Lives Alone  Confirm Follow Up Transport: Family(family vs bls)  The Patient and/or Patient Representative was Provided with a Choice of Provider and Agrees with the Discharge Plan?: Yes  [employer] Information Provided?: No  Discharge Location  Discharge Placement: Unable to determine at this time     Reason for Admission:   Status asthmaticus                   RUR Score:  13%                    Plan for utilizing home health:   No indication at this time. PCP: First and Last name:  Dr. Rian Lazaro. [phone]   Name of Practice:    Are you a current patient: Yes/No: Yes   Approximate date of last visit: January 2021   Can you participate in a virtual visit with your PCP:  No                    Current Advanced Directive/Advance Care Plan: Yes                         Transition of Care Plan:                      CM spoke to patient via telephone. Patient is alert and oriented x4, lives alone at [facility] [address] Patient uses rollator walker in and out of home and is open to rehab servies if needed. Emergency contact is her daughter: Allison Barreto, phone: [phone]. Upon discharge, the daughter would be available to transport.     Joana Carcamo RN/MARVEL

## 2021-02-09 NOTE — H&P
[address]  HISTORY AND PHYSICAL    Name:  Catrachito Roberts  MR#:  [id number]  :  1945  ACCOUNT #:  [de-identified]  ADMIT DATE:  2021      The patient was seen, evaluated, and admitted by me on 2021. PRIMARY CARE PHYSICIAN:  Ben Alaniz MD    SOURCE OF INFORMATION:  Patient and review of ED and old electronic medical records. CHIEF COMPLAINT:  Shortness of breath. HISTORY OF PRESENT ILLNESS:  This is a 61-year-old woman with a past medical history significant for asthma, hypertension, hypothyroidism, fibromyalgia, depression, who was in her usual state of health until about 2 days ago when the patient developed shortness of breath which is progressive and getting worse. The shortness of breath is worse with exertion such as walking. No relief with home breathing treatment. The patient stated that she has had asthma for many years, and in 2021, she was started on a 12-day course of prednisone taper and she finished the course of the prednisone about 1-1/2 weeks ago and her symptoms got worse 2 days ago. The patient is not on oxygen at home. The shortness of breath is associated with cough which is nonproductive. The patient denies fever, rigors, or chills. The patient also denies sick contact or contact with any person with COVID-19 virus infection. She was taken to [facility] emergency room at [facility] for further evaluation. When the patient arrived at the emergency room, the patient received breathing treatment with improvement in her shortness of breath but the patient has significant desaturation in her oxygen level when an attempt was made to ambulate the patient in the emergency room. Because of that, the patient was referred to the hospitalist service for evaluation for admission. The patient was last admitted to the hospital.  She was admitted overnight from 2011 to 2011.   The patient was admitted with syncope attributed to dehydration. The patient was rehydrated and discharged to home. The patient has not been intubated or admitted to intensive care unit in the past as a result of asthma exacerbation. The patient denies history of congestive heart failure. The CTA of the chest done on arrival at the emergency room was negative for pulmonary embolism. PAST MEDICAL HISTORY:  Asthma, hypertension, hypothyroidism, depression, fibromyalgia. ALLERGIES:  THE PATIENT IS ALLERGIC TO PENICILLIN. MEDICATIONS:  Tylenol 2 g as needed, albuterol 90 mcg inhalation twice daily, aspirin 81 mg daily, Symbicort 160/4.5 two puffs by inhalation twice daily, Wellbutrin  mg daily, calcium carbonate 2 tablets daily, Allegra 60 mg twice daily, Neurontin 300 mg daily at bedtime, Norco 5/325 one to two tablets every 6 hours as needed for pain, Prevacid 30 mg daily, Synthroid dosage as directed, Imodium 3 tablets as needed for constipation, losartan dosage is not known, Singulair 10 mg daily, MiraLax 8.5 g daily, trazodone 75 mg daily at bedtime, and Ambien 5 mg daily as needed for sleep. FAMILY HISTORY:  This was reviewed. Mother had hypertension. PAST SURGICAL HISTORY:  This is significant for cholecystectomy, tonsillectomy. SOCIAL HISTORY:  The patient is a former smoker. No history of alcohol abuse. REVIEW OF SYSTEMS:  HEAD, EYES, EARS, NOSE, AND THROAT:  No headache, no dizziness, no blurring of vision, no photophobia. RESPIRATORY:  This is positive for cough and shortness of breath, no hemoptysis. CARDIOVASCULAR:  No chest pain, no orthopnea, no palpitation. GASTROINTESTINAL:  No nausea or vomiting, no diarrhea, no constipation. GENITOURINARY:  No dysuria, no urgency, and no frequency. All other systems are reviewed and they are negative. PHYSICAL EXAMINATION:  GENERAL APPEARANCE:  The patient appeared ill, in moderate distress.   VITAL SIGNS:  On arrival at the emergency room, temperature 98.2, pulse 114, respiratory rate 24, blood pressure 131/82, and oxygen saturation 91% on room air. HEAD:  Normocephalic, atraumatic. EYES:  Normal eye movement. No redness, no drainage, no discharge. EARS:  Normal external ears with no evidence of drainage. NOSE:  No deformity and no drainage. MOUTH AND THROAT:  No visible oral lesion. NECK:  Neck is supple. No JVD, no thyromegaly. CHEST:  Diffuse expiratory wheezing. No crackles. HEART:  Normal S1 and S2, regular. No clinically appreciable murmur. ABDOMEN:  Soft, obese, nontender. Normal bowel sounds. CENTRAL NERVOUS SYSTEM:  Alert and oriented x3. No gross focal neurological deficit. EXTREMITIES:  No edema. Pulses 2+ bilaterally. MUSCULOSKELETAL:  No evidence of joint deformity or swelling. SKIN:  No active skin lesions seen in the exposed part of the body. PSYCHIATRIC:  Normal mood and affect. LYMPHATIC:  No cervical lymphadenopathy. DIAGNOSTIC DATA:  EKG shows sinus tachycardia. No significant ST or T-wave abnormalities. Chest x-ray, no acute cardiopulmonary disease. CTA of the chest, negative for pulmonary embolism. LABORATORY DATA:  Chemistry, sodium 136, potassium 3.7, chloride 97, CO2 of 28, glucose 134, BUN 16, creatinine 1.10, calcium 10.2. Total bilirubin 0.6, ALT 43, AST 36, alkaline phosphatase 146, total protein 6.8, albumin level 3.3, globulin 3.5. Cardiac profile, troponin less than 0.05. Hematology, WBC 13.7, hemoglobin 12.8, hematocrit 40.3, platelets 375. ASSESSMENT:  1.  Moderate persistent asthma with acute exacerbation. 2.  Hypertension. 3.  Hypothyroidism. 4.  Fibromyalgia. 5.  Depression. 6.  Hyperglycemia. PLAN:  1. Moderate persistent asthma with acute exacerbation. We will admit the patient for further evaluation and treatment. We will start the patient on short course of prednisone, Zithromax for any underlying chest infection. We will continue with supplemental oxygen.   CTA of the chest is negative for pulmonary embolism. Pro-BNP level is within normal limits. We will check cardiac markers to rule out acute myocardial infarction as another possible cause of shortness of breath but the patient's shortness of breath is as a result of the asthma exacerbation. 2.  Hypertension. We will continue to monitor the patient's blood pressure closely. We will check a TSH level. The patient is not on any antihypertensive medication prior to admission. According to the patient, the blood pressure is well controlled off medication. 3.  Hypothyroidism. We will continue with Synthroid. We will check a TSH level. 4.  Fibromyalgia. We will continue supportive treatment and preadmission medication. 5.  Depression. We will also continue with preadmission medication. 6.  Hyperglycemia. We will check hemoglobin A1c level. The patient has no history of diabetes. 7.  Other issues:  Code status, the patient is a full code. We will place the patient on heparin for DVT prophylaxis. FUNCTIONAL STATUS PRIOR TO ADMISSION:  The patient came from home. The patient is ambulatory with a rollator. COVID PRECAUTION:  The patient was wearing a face mask. I was wearing a face mask and gloves for this patient's encounter. The patient presented with shortness of breath. The patient will be tested for COVID-19 virus infection.         Bezty Mercedes MD      [id number]  D:  02/09/2021 5:16  T:  02/09/2021 6:05  JOB #:  [id number]  CC:  Aria Godinez MD

## 2021-02-10 LAB
ANION GAP SERPL CALC-SCNC: 6 MMOL/L (ref 5–15)
BASOPHILS # BLD: 0 K/UL (ref 0–0.1)
BASOPHILS NFR BLD: 0 % (ref 0–1)
BUN SERPL-MCNC: 15 MG/DL (ref 6–20)
BUN/CREAT SERPL: 16 (ref 12–20)
CALCIUM SERPL-MCNC: 10.1 MG/DL (ref 8.5–10.1)
CHLORIDE SERPL-SCNC: 101 MMOL/L (ref 97–108)
CO2 SERPL-SCNC: 28 MMOL/L (ref 21–32)
COMMENT, HOLDF: NORMAL
CREAT SERPL-MCNC: 0.96 MG/DL (ref 0.55–1.02)
CRP SERPL-MCNC: 6.27 MG/DL (ref 0–0.6)
DIFFERENTIAL METHOD BLD: ABNORMAL
EOSINOPHIL # BLD: 0 K/UL (ref 0–0.4)
EOSINOPHIL NFR BLD: 0 % (ref 0–7)
ERYTHROCYTE [DISTWIDTH] IN BLOOD BY AUTOMATED COUNT: 14.1 % (ref 11.5–14.5)
GLUCOSE BLD STRIP.AUTO-MCNC: 130 MG/DL (ref 65–100)
GLUCOSE BLD STRIP.AUTO-MCNC: 136 MG/DL (ref 65–100)
GLUCOSE BLD STRIP.AUTO-MCNC: 168 MG/DL (ref 65–100)
GLUCOSE BLD STRIP.AUTO-MCNC: 184 MG/DL (ref 65–100)
GLUCOSE SERPL-MCNC: 173 MG/DL (ref 65–100)
HCT VFR BLD AUTO: 35.5 % (ref 35–47)
HGB BLD-MCNC: 11.4 G/DL (ref 11.5–16)
IMM GRANULOCYTES # BLD AUTO: 0.1 K/UL (ref 0–0.04)
IMM GRANULOCYTES NFR BLD AUTO: 1 % (ref 0–0.5)
LYMPHOCYTES # BLD: 0.4 K/UL (ref 0.8–3.5)
LYMPHOCYTES NFR BLD: 5 % (ref 12–49)
MCH RBC QN AUTO: 30.2 PG (ref 26–34)
MCHC RBC AUTO-ENTMCNC: 32.1 G/DL (ref 30–36.5)
MCV RBC AUTO: 93.9 FL (ref 80–99)
MONOCYTES # BLD: 0.1 K/UL (ref 0–1)
MONOCYTES NFR BLD: 1 % (ref 5–13)
NEUTS SEG # BLD: 7.4 K/UL (ref 1.8–8)
NEUTS SEG NFR BLD: 93 % (ref 32–75)
NRBC # BLD: 0 K/UL (ref 0–0.01)
NRBC BLD-RTO: 0 PER 100 WBC
PLATELET # BLD AUTO: 227 K/UL (ref 150–400)
PMV BLD AUTO: 10.3 FL (ref 8.9–12.9)
POTASSIUM SERPL-SCNC: 4.4 MMOL/L (ref 3.5–5.1)
RBC # BLD AUTO: 3.78 M/UL (ref 3.8–5.2)
RBC MORPH BLD: ABNORMAL
SAMPLES BEING HELD,HOLD: NORMAL
SERVICE CMNT-IMP: ABNORMAL
SODIUM SERPL-SCNC: 135 MMOL/L (ref 136–145)
WBC # BLD AUTO: 8 K/UL (ref 3.6–11)

## 2021-02-10 PROCEDURE — 86140 C-REACTIVE PROTEIN: CPT

## 2021-02-10 PROCEDURE — 74011000250 HC RX REV CODE- 250: Performed by: NURSE PRACTITIONER

## 2021-02-10 PROCEDURE — 74011250637 HC RX REV CODE- 250/637: Performed by: STUDENT IN AN ORGANIZED HEALTH CARE EDUCATION/TRAINING PROGRAM

## 2021-02-10 PROCEDURE — 74011250637 HC RX REV CODE- 250/637: Performed by: NURSE PRACTITIONER

## 2021-02-10 PROCEDURE — 77010033678 HC OXYGEN DAILY

## 2021-02-10 PROCEDURE — 74011250637 HC RX REV CODE- 250/637: Performed by: INTERNAL MEDICINE

## 2021-02-10 PROCEDURE — 85025 COMPLETE CBC W/AUTO DIFF WBC: CPT

## 2021-02-10 PROCEDURE — 94760 N-INVAS EAR/PLS OXIMETRY 1: CPT

## 2021-02-10 PROCEDURE — 74011636637 HC RX REV CODE- 636/637: Performed by: NURSE PRACTITIONER

## 2021-02-10 PROCEDURE — 86003 ALLG SPEC IGE CRUDE XTRC EA: CPT

## 2021-02-10 PROCEDURE — 36415 COLL VENOUS BLD VENIPUNCTURE: CPT

## 2021-02-10 PROCEDURE — 94664 DEMO&/EVAL PT USE INHALER: CPT

## 2021-02-10 PROCEDURE — 82785 ASSAY OF IGE: CPT

## 2021-02-10 PROCEDURE — 74011000250 HC RX REV CODE- 250: Performed by: FAMILY MEDICINE

## 2021-02-10 PROCEDURE — 86200 CCP ANTIBODY: CPT

## 2021-02-10 PROCEDURE — 82962 GLUCOSE BLOOD TEST: CPT

## 2021-02-10 PROCEDURE — 94640 AIRWAY INHALATION TREATMENT: CPT

## 2021-02-10 PROCEDURE — 65660000000 HC RM CCU STEPDOWN

## 2021-02-10 PROCEDURE — 87077 CULTURE AEROBIC IDENTIFY: CPT

## 2021-02-10 PROCEDURE — 87086 URINE CULTURE/COLONY COUNT: CPT

## 2021-02-10 PROCEDURE — 82164 ANGIOTENSIN I ENZYME TEST: CPT

## 2021-02-10 PROCEDURE — 74011250636 HC RX REV CODE- 250/636: Performed by: NURSE PRACTITIONER

## 2021-02-10 PROCEDURE — 87040 BLOOD CULTURE FOR BACTERIA: CPT

## 2021-02-10 PROCEDURE — 74011250636 HC RX REV CODE- 250/636: Performed by: INTERNAL MEDICINE

## 2021-02-10 PROCEDURE — 94762 N-INVAS EAR/PLS OXIMTRY CONT: CPT

## 2021-02-10 PROCEDURE — 80048 BASIC METABOLIC PNL TOTAL CA: CPT

## 2021-02-10 RX ORDER — FAMOTIDINE 20 MG/1
20 TABLET, FILM COATED ORAL DAILY
Status: DISCONTINUED | OUTPATIENT
Start: 2021-02-10 | End: 2021-02-15 | Stop reason: HOSPADM

## 2021-02-10 RX ORDER — INSULIN LISPRO 100 [IU]/ML
INJECTION, SOLUTION INTRAVENOUS; SUBCUTANEOUS
Status: DISCONTINUED | OUTPATIENT
Start: 2021-02-10 | End: 2021-02-15 | Stop reason: HOSPADM

## 2021-02-10 RX ORDER — METFORMIN HYDROCHLORIDE 500 MG/1
500 TABLET ORAL 2 TIMES DAILY WITH MEALS
Status: DISCONTINUED | OUTPATIENT
Start: 2021-02-10 | End: 2021-02-14

## 2021-02-10 RX ORDER — MAGNESIUM SULFATE 100 %
4 CRYSTALS MISCELLANEOUS AS NEEDED
Status: DISCONTINUED | OUTPATIENT
Start: 2021-02-10 | End: 2021-02-15 | Stop reason: HOSPADM

## 2021-02-10 RX ORDER — DEXTROSE 50 % IN WATER (D50W) INTRAVENOUS SYRINGE
25-50 AS NEEDED
Status: DISCONTINUED | OUTPATIENT
Start: 2021-02-10 | End: 2021-02-15 | Stop reason: HOSPADM

## 2021-02-10 RX ORDER — SODIUM CHLORIDE 9 MG/ML
50 INJECTION, SOLUTION INTRAVENOUS CONTINUOUS
Status: DISCONTINUED | OUTPATIENT
Start: 2021-02-10 | End: 2021-02-12

## 2021-02-10 RX ORDER — IPRATROPIUM BROMIDE AND ALBUTEROL SULFATE 2.5; .5 MG/3ML; MG/3ML
3 SOLUTION RESPIRATORY (INHALATION)
Status: DISCONTINUED | OUTPATIENT
Start: 2021-02-10 | End: 2021-02-11

## 2021-02-10 RX ORDER — LANOLIN ALCOHOL/MO/W.PET/CERES
6 CREAM (GRAM) TOPICAL
Status: DISCONTINUED | OUTPATIENT
Start: 2021-02-10 | End: 2021-02-15 | Stop reason: HOSPADM

## 2021-02-10 RX ADMIN — METHYLPREDNISOLONE SODIUM SUCCINATE 80 MG: 125 INJECTION, POWDER, FOR SOLUTION INTRAMUSCULAR; INTRAVENOUS at 21:52

## 2021-02-10 RX ADMIN — Medication 10 ML: at 16:38

## 2021-02-10 RX ADMIN — BUPROPION HYDROCHLORIDE 200 MG: 100 TABLET, FILM COATED, EXTENDED RELEASE ORAL at 07:13

## 2021-02-10 RX ADMIN — HEPARIN SODIUM 5000 UNITS: 5000 INJECTION INTRAVENOUS; SUBCUTANEOUS at 00:58

## 2021-02-10 RX ADMIN — SODIUM CHLORIDE 50 ML/HR: 9 INJECTION, SOLUTION INTRAVENOUS at 04:47

## 2021-02-10 RX ADMIN — IPRATROPIUM BROMIDE AND ALBUTEROL SULFATE 3 ML: .5; 3 SOLUTION RESPIRATORY (INHALATION) at 12:38

## 2021-02-10 RX ADMIN — Medication 1000 UNITS: at 08:56

## 2021-02-10 RX ADMIN — GUAIFENESIN AND DEXTROMETHORPHAN HYDROBROMIDE 1 TABLET: 600; 30 TABLET, EXTENDED RELEASE ORAL at 10:21

## 2021-02-10 RX ADMIN — AZITHROMYCIN MONOHYDRATE 500 MG: 500 INJECTION, POWDER, LYOPHILIZED, FOR SOLUTION INTRAVENOUS at 00:59

## 2021-02-10 RX ADMIN — GABAPENTIN 300 MG: 300 CAPSULE ORAL at 21:51

## 2021-02-10 RX ADMIN — HEPARIN SODIUM 5000 UNITS: 5000 INJECTION INTRAVENOUS; SUBCUTANEOUS at 07:12

## 2021-02-10 RX ADMIN — METHYLPREDNISOLONE SODIUM SUCCINATE 80 MG: 125 INJECTION, POWDER, FOR SOLUTION INTRAMUSCULAR; INTRAVENOUS at 04:48

## 2021-02-10 RX ADMIN — METHYLPREDNISOLONE SODIUM SUCCINATE 80 MG: 125 INJECTION, POWDER, FOR SOLUTION INTRAMUSCULAR; INTRAVENOUS at 15:15

## 2021-02-10 RX ADMIN — IPRATROPIUM BROMIDE AND ALBUTEROL SULFATE 3 ML: .5; 3 SOLUTION RESPIRATORY (INHALATION) at 21:06

## 2021-02-10 RX ADMIN — HEPARIN SODIUM 5000 UNITS: 5000 INJECTION INTRAVENOUS; SUBCUTANEOUS at 16:38

## 2021-02-10 RX ADMIN — LOSARTAN POTASSIUM 25 MG: 50 TABLET, FILM COATED ORAL at 08:56

## 2021-02-10 RX ADMIN — ACETAMINOPHEN 650 MG: 325 TABLET ORAL at 00:58

## 2021-02-10 RX ADMIN — FAMOTIDINE 20 MG: 20 TABLET, FILM COATED ORAL at 08:56

## 2021-02-10 RX ADMIN — BUPROPION HYDROCHLORIDE 200 MG: 100 TABLET, FILM COATED, EXTENDED RELEASE ORAL at 16:38

## 2021-02-10 RX ADMIN — IPRATROPIUM BROMIDE AND ALBUTEROL SULFATE 3 ML: .5; 3 SOLUTION RESPIRATORY (INHALATION) at 08:43

## 2021-02-10 RX ADMIN — GUAIFENESIN AND DEXTROMETHORPHAN HYDROBROMIDE 1 TABLET: 600; 30 TABLET, EXTENDED RELEASE ORAL at 18:26

## 2021-02-10 RX ADMIN — TRAZODONE HYDROCHLORIDE 75 MG: 50 TABLET ORAL at 21:51

## 2021-02-10 RX ADMIN — LEVOTHYROXINE SODIUM 62.5 MCG: 0.12 TABLET ORAL at 07:13

## 2021-02-10 NOTE — CONSULTS
Name: Vera Human: Dimitry Hurst   : 1945 Admit Date: 2021   Phone: [phone]  Room: [facility]   PCP: Daniella Alicia MD  MRN: [id number]   Date: 2/10/2021  Code: Full Code        HPI:    11:15 AM       History was obtained from patient. I was asked by Tahmina So MD to see Marion Feliz in consultation for a chief complaint of cough. History of Present Illness: 66year old female with past medical history of asthma followed in our office by Dr Joanie Leal presented to [facility] with increased cough productive of clear to light yellow sputum for the past of 2 days duration along with some wheezing. She denies chest pain. She denied contact with a COVD-19 patient. She lives by herself. Recently treated in 2021 with Prednisone taper by PCP for similar complaints. In the short pump Ed her breathing got a little better after bronchodilator treatment but desaturated on ambulation. CTa chest - no pe.calcified nodules and hilar LN. COVID-19 negative. Office records reviewed:  Normal kamille  Symbicort 160/4.5  Has hx of allergies. Takes prn meds. Past Medical History:   Diagnosis Date    Asthma     Depression     Fibromyalgia     GERD (gastroesophageal reflux disease)     Hypertension     Hypothyroid     Menopause     Rotator cuff tear        Past Surgical History:   Procedure Laterality Date    HX CHOLECYSTECTOMY      HX GYN      HX ORTHOPAEDIC      HX TONSIL AND ADENOIDECTOMY         History reviewed. No pertinent family history.     Social History     Tobacco Use    Smoking status: Former Smoker     Packs/day: 1.00     Years: 15.00     Pack years: 15.00    Smokeless tobacco: Never Used   Substance Use Topics    Alcohol use: No       Allergies   Allergen Reactions    Pcn [Penicillins] Hives       Current Facility-Administered Medications   Medication Dose Route Frequency    famotidine (PEPCID) tablet 20 mg  20 mg Oral DAILY    0.9% sodium chloride infusion 50 mL/hr IntraVENous CONTINUOUS    methylPREDNISolone (PF) (SOLU-MEDROL) injection 80 mg  80 mg IntraVENous Q8H    metFORMIN (GLUCOPHAGE) tablet 500 mg  500 mg Oral BID WITH MEALS    glucose chewable tablet 16 g  4 Tab Oral PRN    dextrose (D50W) injection syrg 12.5-25 g  25-50 mL IntraVENous PRN    glucagon (GLUCAGEN) injection 1 mg  1 mg IntraMUSCular PRN    insulin lispro (HUMALOG) injection   SubCUTAneous AC&HS    melatonin tablet 6 mg  6 mg Oral QHS PRN    guaiFENesin-dextromethorphan SR (HUMIBID DM) 600-30 mg tablet 1 Tab  1 Tab Oral BID    levothyroxine (SYNTHROID) tablet 62.5 mcg  62.5 mcg Oral EVERY OTHER DAY    diphenhydrAMINE (BENADRYL) capsule 25 mg  25 mg Oral QHS PRN    [Held by provider] arformoterol 15 mcg/budesonide 0.5 mg neb solution   Nebulization BID RT    albuterol-ipratropium (DUO-NEB) 2.5 MG-0.5 MG/3 ML  3 mL Nebulization Q4H RT    azithromycin (ZITHROMAX) 500 mg in 0.9% sodium chloride 250 mL (VIAL-MATE)  500 mg IntraVENous Q24H    buPROPion SR (WELLBUTRIN SR) tablet 200 mg  200 mg Oral BID    cholecalciferol (VITAMIN D3) (1000 Units /25 mcg) tablet 1,000 Units  1,000 Units Oral DAILY    gabapentin (NEURONTIN) capsule 300 mg  300 mg Oral QHS    losartan (COZAAR) tablet 25 mg  25 mg Oral DAILY    traZODone (DESYREL) tablet 75 mg  75 mg Oral QHS    sodium chloride (NS) flush 5-40 mL  5-40 mL IntraVENous Q8H    sodium chloride (NS) flush 5-40 mL  5-40 mL IntraVENous PRN    acetaminophen (TYLENOL) tablet 650 mg  650 mg Oral Q6H PRN    Or    acetaminophen (TYLENOL) suppository 650 mg  650 mg Rectal Q6H PRN    polyethylene glycol (MIRALAX) packet 17 g  17 g Oral DAILY PRN    promethazine (PHENERGAN) tablet 12.5 mg  12.5 mg Oral Q6H PRN    Or    ondansetron (ZOFRAN) injection 4 mg  4 mg IntraVENous Q6H PRN    heparin (porcine) injection 5,000 Units  5,000 Units SubCUTAneous Q8H    albuterol (PROVENTIL HFA, VENTOLIN HFA, PROAIR HFA) inhaler 1 Puff  1 Puff Inhalation Q4H PRN    ipratropium (ATROVENT HFA) 17 mcg inhaler  1 Puff Inhalation Q4H PRN         REVIEW OF SYSTEMS   Negative except as stated in the HPI. Physical Exam:   Visit Vitals  BP (!) 148/81 (BP 1 Location: Right upper arm, BP Patient Position: At rest)   Pulse 100   Temp 98.5 °F (36.9 °C)   Resp 17   Ht 5' (1.524 m)   Wt 74 kg (163 lb 2.3 oz)   SpO2 90%   BMI 31.86 kg/m²       General:  Alert. Head:  Normocephalic. Eyes:  Conjunctivae/corneas clear. Nose: Nares normal.   Throat: Lips, mucosa, and tongue normal.           Lungs:   Decreased air entry bilaterally. Heart:  Regular rate and rhythm. Abdomen:   Soft, non-tender. Extremities: Extremities normal, atraumatic, no cyanosis or edema. Pulses: 2+ and symmetric all extremities.    Skin: Skin color, texture, turgor normal. No rashes or lesions   Lymph nodes: Cervical, supraclavicular, and axillary nodes normal.   Neurologic: Grossly nonfocal       Lab Results   Component Value Date/Time    Sodium 135 (L) 02/10/2021 02:47 AM    Potassium 4.4 02/10/2021 02:47 AM    Chloride 101 02/10/2021 02:47 AM    CO2 28 02/10/2021 02:47 AM    BUN 15 02/10/2021 02:47 AM    Creatinine 0.96 02/10/2021 02:47 AM    Glucose 173 (H) 02/10/2021 02:47 AM    Calcium 10.1 02/10/2021 02:47 AM    Magnesium 1.9 02/09/2021 02:49 AM    Phosphorus 3.2 02/09/2021 02:49 AM       Lab Results   Component Value Date/Time    WBC 8.0 02/10/2021 02:47 AM    HGB 11.4 (L) 02/10/2021 02:47 AM    PLATELET 005 99/11/1681 02:47 AM    MCV 93.9 02/10/2021 02:47 AM       Lab Results   Component Value Date/Time    C-Reactive protein 6.27 (H) 02/10/2021 02:47 AM    TSH 0.66 02/09/2021 02:49 AM       Lab Results   Component Value Date/Time    Culture result: NO GROWTH AFTER 1 HOUR 02/10/2021 02:47 AM     Lab Results   Component Value Date/Time    Color YELLOW/STRAW 02/08/2021 06:45 PM    Appearance CLOUDY (A) 02/08/2021 06:45 PM    Specific gravity 1.020 02/08/2021 06:45 PM    pH (UA) 6.0 02/08/2021 06:45 PM    Protein TRACE (A) 02/08/2021 06:45 PM    Glucose Negative 02/08/2021 06:45 PM    Ketone TRACE (A) 02/08/2021 06:45 PM    Bilirubin Negative 02/08/2021 06:45 PM    Blood TRACE (A) 02/08/2021 06:45 PM    Urobilinogen 1.0 02/08/2021 06:45 PM    Nitrites Negative 02/08/2021 06:45 PM    Leukocyte Esterase SMALL (A) 02/08/2021 06:45 PM    WBC 0-4 02/08/2021 06:45 PM    RBC 0-5 02/08/2021 06:45 PM    Bacteria Negative 02/08/2021 06:45 PM       IMPRESSION:  ===========  -asthma exacerbation.  -acute bronchitis - likely viral.  -eosinophilia with elevated CRP. -Hypoxemic respiratory failure - on 2 lpm by nc. PLAN:  =====  -agree with solumedrol.  -check ige and rast.  -send anti ccp ab.  -O2 supplementation to keep [place] above 92%. -Bronchodilator.  -atypical abx coverage. Thank you for the consult.     Parul Valenzuela MD

## 2021-02-10 NOTE — CONSULTS
Palliative Medicine Consult  [place]: [phone] ([phone])            Consult received and chart reviewed. Will follow up with patient tomorrow for initial palliative medicine consult. MAUREEN AmezcuaC

## 2021-02-10 NOTE — PROGRESS NOTES
Hospitalist Progress Note          Randall Barrientos NP  Please call  and page for questions. Call physician on-call through the  7pm-7am    Daily Progress Note: 2/10/2021    Primary care provider:Lali Hull MD    Date of admission: 2/8/2021  4:55 PM    Admission Summary and Hospital Course:      From H&P 02/08/2021:  \" This is a 47-year-old woman with a past medical history significant for asthma, hypertension, hypothyroidism, fibromyalgia, depression, who was in her usual state of health until about 2 days ago when the patient developed shortness of breath which is progressive and getting worse. The shortness of breath is worse with exertion such as walking. No relief with home breathing treatment. The patient stated that she has had asthma for many years, and in 01/2021, she was started on a 12-day course of prednisone taper and she finished the course of the prednisone about 1-1/2 weeks ago and her symptoms got worse 2 days ago. The patient is not on oxygen at home. The shortness of breath is associated with cough which is nonproductive. The patient denies fever, rigors, or chills. The patient also denies sick contact or contact with any person with COVID-19 virus infection. She was taken to [facility] emergency room at [facility] for further evaluation. When the patient arrived at the emergency room, the patient received breathing treatment with improvement in her shortness of breath but the patient has significant desaturation in her oxygen level when an attempt was made to ambulate the patient in the emergency room. Because of that, the patient was referred to the hospitalist service for evaluation for admission. The patient was last admitted to the hospital.  She was admitted overnight from 07/07/2011 to 07/08/2011. The patient was admitted with syncope attributed to dehydration. The patient was rehydrated and discharged to home.   The patient has not been intubated or admitted to intensive care unit in the past as a result of asthma exacerbation. The patient denies history of congestive heart failure. The CTA of the chest done on arrival at the emergency room was negative for pulmonary embolism. \"    Subjective:     Pt seen in bed in NAD upon my arrival. Once patient noticed me, she appeared to start using some accessory muscles and coughing. Other than SOB, weakness, cough and general malaise, she denies any concerns or complaints. Assessment/Plan:        Mod-persistent asthma: w/ acute exacerbation  -Improving  -Cont solumedrol 125mg q8h  -De-escalate nebs to q6h  -Cont PRN nebs and inhaler  -Cont Azithromycin  -CXR and CT chest (02/08) neg for PE; w/ clear lungs other than left side calcified granuloma and right lobe nodule (chronic per pt)  -O2 to keep sats greater than 90%  -Appreciate pulm input  -Awaiting palliative care consult  -Supportive care  -Hold off on PT/OT until resp status stable; will need eval for HH    Leukocytosis  -Resolved    Steroid induced hyperglycemia: A1c 5.2  -sImproving  -Cont on IV steroids; likely further steroids as OP  -Cont metformin 500mg BID while on steroids  -Cont accuchecks AC/HS w/ SSI - change to insulin sensitive scale  -Monitor BS    LOREN: POA creat/gfr 1.10/48 (baseline normal renal fx)  -Resolved    Hyponatremia  -Mild, suspect 2/2 poor PO intake w/ sob  -Cont gentle IVF  -Monitor labs    HTN  -Stable, continue home losartan  -Monitor VS, trend BP    Hypothyroidism  -TSH normal limit  -Cont home dose levothyroxine: 62.5mcg daily    Fibromyalgia  -Cont home gabapentin    Depression  -Stable, continue home wellbutrin, trazodone    DVTppx: SCDs, heparin  Gippx: Pepcid  Code Status: Full code  Diet: Cardiac  Activity: OOB to chair TID and PRN  Discharge: Plan to discharge home with [place]; likely greater than 48h           Review of Systems:     Full ROS complete with pertinent positives and negatives as per HPI, otherwise negative  Objective:   Physical Exam:     Visit Vitals  BP (!) 148/81 (BP 1 Location: Right upper arm, BP Patient Position: At rest)   Pulse 100   Temp 98.5 °F (36.9 °C)   Resp 17   Ht 5' (1.524 m)   Wt 74 kg (163 lb 2.3 oz)   SpO2 90%   BMI 31.86 kg/m²    O2 Flow Rate (L/min): 2 l/min O2 Device: Nasal cannula    Temp (24hrs), Av.4 °F (36.9 °C), Min:98.1 °F (36.7 °C), Max:98.7 °F (37.1 °C)    No intake/output data recorded. No intake/output data recorded. General:  Alert, cooperative, mod resp distress, appears stated age, obese   Lungs:   Improved aeration b/l; exp wheeze b/l, +rhonchi R>L; some accessory muscle use when talking   Heart:  Regular rate and rhythm, S1, S2 normal, no murmur, click, rub or gallop. Abdomen:   Soft, non-tender, non-distended. Bowel sounds normal.    Extremities: Extremities normal, atraumatic, no cyanosis or edema. Skin: Skin color, texture, turgor normal. No rashes or lesions   Neurologic: CNII-XII intact. VILLALBA, A&Ox4     Data Review:       Recent Days:  Recent Labs     02/10/21  02421  02421  1713   WBC 8.0 12.7* 13.7*   HGB 11.4* 12.6 12.8   HCT 35.5 38.9 40.3    275 246     Recent Labs     02/10/21  02421  02421  1713   * 135* 136   K 4.4 3.9 3.7    98 97   CO2 28 26 28   * 186* 134*   BUN 15 13 16   CREA 0.96 0.93 1.10*   CA 10.1 9.9 10.2*   MG  --  1.9  --    PHOS  --  3.2  --    ALB  --  3.5 3.3*   ALT  --  41 43     No results for input(s): PH, PCO2, PO2, HCO3, FIO2 in the last 72 hours.     24 Hour Results:  Recent Results (from the past 24 hour(s))   C REACTIVE PROTEIN, QT    Collection Time: 02/10/21  2:47 AM   Result Value Ref Range    C-Reactive protein 6.27 (H) 0.00 - 0.60 mg/dL   CBC WITH AUTOMATED DIFF    Collection Time: 02/10/21  2:47 AM   Result Value Ref Range    WBC 8.0 3.6 - 11.0 K/uL    RBC 3.78 (L) 3.80 - 5.20 M/uL    HGB 11.4 (L) 11.5 - 16.0 g/dL    HCT 35.5 35.0 - 47.0 % MCV 93.9 80.0 - 99.0 FL    MCH 30.2 26.0 - 34.0 PG    MCHC 32.1 30.0 - 36.5 g/dL    RDW 14.1 11.5 - 14.5 %    PLATELET 889 862 - 296 K/uL    MPV 10.3 8.9 - 12.9 FL    NRBC 0.0 0  WBC    ABSOLUTE NRBC 0.00 0.00 - 0.01 K/uL    NEUTROPHILS 93 (H) 32 - 75 %    LYMPHOCYTES 5 (L) 12 - 49 %    MONOCYTES 1 (L) 5 - 13 %    EOSINOPHILS 0 0 - 7 %    BASOPHILS 0 0 - 1 %    IMMATURE GRANULOCYTES 1 (H) 0.0 - 0.5 %    ABS. NEUTROPHILS 7.4 1.8 - 8.0 K/UL    ABS. LYMPHOCYTES 0.4 (L) 0.8 - 3.5 K/UL    ABS. MONOCYTES 0.1 0.0 - 1.0 K/UL    ABS. EOSINOPHILS 0.0 0.0 - 0.4 K/UL    ABS. BASOPHILS 0.0 0.0 - 0.1 K/UL    ABS. IMM. GRANS. 0.1 (H) 0.00 - 0.04 K/UL    DF SMEAR SCANNED      RBC COMMENTS ANISOCYTOSIS  1+       METABOLIC PANEL, BASIC    Collection Time: 02/10/21  2:47 AM   Result Value Ref Range    Sodium 135 (L) 136 - 145 mmol/L    Potassium 4.4 3.5 - 5.1 mmol/L    Chloride 101 97 - 108 mmol/L    CO2 28 21 - 32 mmol/L    Anion gap 6 5 - 15 mmol/L    Glucose 173 (H) 65 - 100 mg/dL    BUN 15 6 - 20 MG/DL    Creatinine 0.96 0.55 - 1.02 MG/DL    BUN/Creatinine ratio 16 12 - 20      GFR est AA >60 >60 ml/min/1.73m2    GFR est non-AA 56 (L) >60 ml/min/1.73m2    Calcium 10.1 8.5 - 10.1 MG/DL   CULTURE, BLOOD, PAIRED    Collection Time: 02/10/21  2:47 AM    Specimen: Blood   Result Value Ref Range    Special Requests: NO SPECIAL REQUESTS      Culture result: NO GROWTH AFTER 1 HOUR     SAMPLES BEING HELD    Collection Time: 02/10/21  4:28 AM   Result Value Ref Range    SAMPLES BEING HELD 1UA     COMMENT        Add-on orders for these samples will be processed based on acceptable specimen integrity and analyte stability, which may vary by analyte.    GLUCOSE, POC    Collection Time: 02/10/21  6:13 AM   Result Value Ref Range    Glucose (POC) 168 (H) 65 - 100 mg/dL    Performed by Maribeth WINTER    GLUCOSE, POC    Collection Time: 02/10/21 11:13 AM   Result Value Ref Range    Glucose (POC) 184 (H) 65 - 100 mg/dL    Performed by [facility]        Problem List:  Problem List as of 2/10/2021 Date Reviewed: 2/8/2021          Codes Class Noted - Resolved    Status asthmaticus ICD-10-CM: J45.902  ICD-9-CM: 493.91  2/8/2021 - Present        * (Principal) Moderate persistent asthma with acute exacerbation ICD-10-CM: J45.41  ICD-9-CM: 493.92  2/8/2021 - Present        IBS (irritable bowel syndrome) (Chronic) ICD-10-CM: K58.9  ICD-9-CM: 564.1  7/8/2011 - Present        HTN (hypertension) (Chronic) ICD-10-CM: I10  ICD-9-CM: 401.9  7/8/2011 - Present        Asthma (Chronic) ICD-10-CM: J45.909  ICD-9-CM: 493.90  7/8/2011 - Present        Unspecified hypothyroidism (Chronic) ICD-10-CM: E03.9  ICD-9-CM: 244.9  7/8/2011 - Present        Syncope ICD-10-CM: R55  ICD-9-CM: 780.2  7/7/2011 - Present        RESOLVED: Dehydration ICD-10-CM: E86.0  ICD-9-CM: 276.51  7/7/2011 - 7/8/2011              Medications reviewed  Current Facility-Administered Medications   Medication Dose Route Frequency    famotidine (PEPCID) tablet 20 mg  20 mg Oral DAILY    0.9% sodium chloride infusion  50 mL/hr IntraVENous CONTINUOUS    methylPREDNISolone (PF) (SOLU-MEDROL) injection 80 mg  80 mg IntraVENous Q8H    metFORMIN (GLUCOPHAGE) tablet 500 mg  500 mg Oral BID WITH MEALS    glucose chewable tablet 16 g  4 Tab Oral PRN    dextrose (D50W) injection syrg 12.5-25 g  25-50 mL IntraVENous PRN    glucagon (GLUCAGEN) injection 1 mg  1 mg IntraMUSCular PRN    insulin lispro (HUMALOG) injection   SubCUTAneous AC&HS    melatonin tablet 6 mg  6 mg Oral QHS PRN    guaiFENesin-dextromethorphan SR (HUMIBID DM) 600-30 mg tablet 1 Tab  1 Tab Oral BID    albuterol-ipratropium (DUO-NEB) 2.5 MG-0.5 MG/3 ML  3 mL Nebulization Q6H RT    levothyroxine (SYNTHROID) tablet 62.5 mcg  62.5 mcg Oral EVERY OTHER DAY    diphenhydrAMINE (BENADRYL) capsule 25 mg  25 mg Oral QHS PRN    [Held by provider] arformoterol 15 mcg/budesonide 0.5 mg neb solution   Nebulization BID RT    azithromycin (ZITHROMAX) 500 mg in 0.9% sodium chloride 250 mL (VIAL-MATE)  500 mg IntraVENous Q24H    buPROPion SR (WELLBUTRIN SR) tablet 200 mg  200 mg Oral BID    cholecalciferol (VITAMIN D3) (1000 Units /25 mcg) tablet 1,000 Units  1,000 Units Oral DAILY    gabapentin (NEURONTIN) capsule 300 mg  300 mg Oral QHS    losartan (COZAAR) tablet 25 mg  25 mg Oral DAILY    traZODone (DESYREL) tablet 75 mg  75 mg Oral QHS    sodium chloride (NS) flush 5-40 mL  5-40 mL IntraVENous Q8H    sodium chloride (NS) flush 5-40 mL  5-40 mL IntraVENous PRN    acetaminophen (TYLENOL) tablet 650 mg  650 mg Oral Q6H PRN    Or    acetaminophen (TYLENOL) suppository 650 mg  650 mg Rectal Q6H PRN    polyethylene glycol (MIRALAX) packet 17 g  17 g Oral DAILY PRN    promethazine (PHENERGAN) tablet 12.5 mg  12.5 mg Oral Q6H PRN    Or    ondansetron (ZOFRAN) injection 4 mg  4 mg IntraVENous Q6H PRN    heparin (porcine) injection 5,000 Units  5,000 Units SubCUTAneous Q8H    albuterol (PROVENTIL HFA, VENTOLIN HFA, PROAIR HFA) inhaler 1 Puff  1 Puff Inhalation Q4H PRN    ipratropium (ATROVENT HFA) 17 mcg inhaler  1 Puff Inhalation Q4H PRN       Care Plan discussed with: Patient/family, nurse      González Garcia NP  Hospitalist  Providers can reach me on Perfect[employer]erve

## 2021-02-10 NOTE — PROGRESS NOTES
TRANSFER - OUT REPORT:    Verbal report given to Aubree(name) on [employer]  being transferred to (unit) for routine progression of care       Report consisted of patients Situation, Background, Assessment and   Recommendations(SBAR). Information from the following report(s) SBAR, [facility], [facility] and Recent Results was reviewed with the receiving nurse. Lines:   Peripheral IV 02/09/21 Anterior;Proximal;Left Forearm (Active)   Site Assessment Clean, dry, & intact 02/09/21 1230   Phlebitis Assessment 0 02/09/21 1230   Infiltration Assessment 0 02/09/21 1230   Dressing Status Clean, dry, & intact 02/09/21 1230   Dressing Type Transparent 02/09/21 1230   Hub Color/Line Status Blue;Capped 02/09/21 1230   Action Taken Open ports on tubing capped 02/09/21 1230   Alcohol Cap Used Yes 02/09/21 1230        Opportunity for questions and clarification was provided.       Patient transported with:   O2 @ 2 liters

## 2021-02-10 NOTE — PROGRESS NOTES
Hospitalist Progress Note          Noreen President, NP  Please call  and page for questions. Call physician on-call through the  7pm-7am    Daily Progress Note: 2/9/2021    Primary care provider:Moe Hull MD    Date of admission: 2/8/2021  4:55 PM    Admission Summary and Hospital Course:      From H&P 02/08/2021:  \" This is a 61-year-old woman with a past medical history significant for asthma, hypertension, hypothyroidism, fibromyalgia, depression, who was in her usual state of health until about 2 days ago when the patient developed shortness of breath which is progressive and getting worse. The shortness of breath is worse with exertion such as walking. No relief with home breathing treatment. The patient stated that she has had asthma for many years, and in 01/2021, she was started on a 12-day course of prednisone taper and she finished the course of the prednisone about 1-1/2 weeks ago and her symptoms got worse 2 days ago. The patient is not on oxygen at home. The shortness of breath is associated with cough which is nonproductive. The patient denies fever, rigors, or chills. The patient also denies sick contact or contact with any person with COVID-19 virus infection. She was taken to [facility] emergency room at [facility] for further evaluation. When the patient arrived at the emergency room, the patient received breathing treatment with improvement in her shortness of breath but the patient has significant desaturation in her oxygen level when an attempt was made to ambulate the patient in the emergency room. Because of that, the patient was referred to the hospitalist service for evaluation for admission. The patient was last admitted to the hospital.  She was admitted overnight from 07/07/2011 to 07/08/2011. The patient was admitted with syncope attributed to dehydration. The patient was rehydrated and discharged to home.   The patient has not been intubated or admitted to intensive care unit in the past as a result of asthma exacerbation. The patient denies history of congestive heart failure. The CTA of the chest done on arrival at the emergency room was negative for pulmonary embolism. \"    Subjective:   Pt seen today in moderate respiratory distress with coughing. Oxygen via NC in place. Patient with notable SOB and coughing with talking. She had a 13 day course of prednisone that started 01/13 and she stated she felt extremely better after that, including her back pain which she feels was improved because of the prednisone. She has not been able to get nebulizers here as she was being r/o for COVID and nebulizers are contraindicated in this case. She was finally r/o for covid and can resume nebs. Patient reports SOB, cough, some discomfort to chest area with excessive coughing but otherwise has no complaints. Spoke with daughter via speaker phone with patient and updated her on current condition, POC including transfer off 2N. Assessment/Plan:        Mod-persistent asthma: w/ acute exacerbation  -Last dose steroids 01/13-01/26  -Rec'd Prednisone 50mg PO x1  -Start solumedrol 125mg q8h - pending pulmonary consult  -Start q4h duonebs  -Cont PRN nebs and inhaler  -Cont Azithromycin  -CXR and CT chest (02/08) neg for PE; w/ clear lungs other than left side calcified granuloma and right lobe nodule (chronic per pt)  -O2 to keep sats greater than 90%  -Pulmonary and palliative care consults for tomorrow    Leukocytosis  -improving  -less likely d/t steroids as she has been off since 01/26  -CRP 11.8  -check paired BC  -UA inconclusive-check urine culture  -Cont azithromycin IV    Steroid induced hyperglycemia: A1c 5.2  -suspect d/t multiple courses of steroids recently  -Currently on IV steroids; likely further steroids as OP  -Start metformin 500mg BID   -Start accuchecks AC/HS w/ SSI  -Monitor BS    LOREN: POA creat/gfr 1.10/48 (baseline normal renal fx)  -Resolved    Hyponatremia  -Mild, suspect 2/2 poor PO intake recently  -Start gentle IVF  -Monitor labs    HTN  -Stable, continue home losartan  -Monitor VS, trend BP    Hypothyroidism  TSH normal limit  -Cont home dose levothyroxine: now 62.5mcg daily    Fibromyalgia  -Cont home gabapentin    Depression  -Stable, continue home wellbutrin, trazodone    DVTppx: SCDs, heparin  Gippx: Pepcid  Code Status: Full code  Diet: Cardiac  Activity: OOB to chair TID and PRN  Discharge: Plan to discharge home with [facility]; likely greater than 48h           Review of Systems:     Full ROS complete with pertinent positives and negatives as per HPI, otherwise negative  Objective:   Physical Exam:     Visit Vitals  BP (!) 146/82 (BP 1 Location: Left upper arm, BP Patient Position: At rest)   Pulse 93   Temp 98.1 °F (36.7 °C)   Resp 18   Ht 5' (1.524 m)   Wt 74 kg (163 lb 2.3 oz)   SpO2 97%   BMI 31.86 kg/m²    O2 Flow Rate (L/min): 3 l/min O2 Device: Nasal cannula    Temp (24hrs), Av.3 °F (36.8 °C), Min:98.1 °F (36.7 °C), Max:98.4 °F (36.9 °C)    No intake/output data recorded. No intake/output data recorded. General:  Alert, cooperative, mod resp distress, appears stated age, obese   Lungs:   Scattered rhonchi and expiratory wheezing bilaterally; on O2, using accessory muscles   Heart:  Regular rate and rhythm, S1, S2 normal, no murmur, click, rub or gallop. Abdomen:   Soft, non-tender, non-distended. Bowel sounds normal.    Extremities: Extremities normal, atraumatic, no cyanosis or edema. Skin: Skin color, texture, turgor normal. No rashes or lesions   Neurologic: CNII-XII intact.  VILLALBA, A&Ox4     Data Review:       Recent Days:  Recent Labs     21  0249 21  1713   WBC 12.7* 13.7*   HGB 12.6 12.8   HCT 38.9 40.3    246     Recent Labs     21  0249 21  1713   * 136   K 3.9 3.7   CL 98 97   CO2 26 28   * 134*   BUN 13 16   CREA 0.93 1.10*   CA 9.9 10.2* MG 1.9  --    PHOS 3.2  --    ALB 3.5 3.3*   ALT 41 43     No results for input(s): PH, PCO2, PO2, HCO3, FIO2 in the last 72 hours. 24 Hour Results:  Recent Results (from the past 24 hour(s))   SARS-COV-2    Collection Time: 02/08/21  8:05 PM   Result Value Ref Range    SARS-CoV-2 Please find results under separate order     SARS-COV-2    Collection Time: 02/08/21  8:05 PM   Result Value Ref Range    Specimen source Nasopharyngeal      SARS-CoV-2 Not detected NOTD     PROCALCITONIN    Collection Time: 02/09/21  2:49 AM   Result Value Ref Range    Procalcitonin 0.21 ng/mL   C REACTIVE PROTEIN, QT    Collection Time: 02/09/21  2:49 AM   Result Value Ref Range    C-Reactive protein 11.80 (H) 0.00 - 2.21 mg/dL   METABOLIC PANEL, COMPREHENSIVE    Collection Time: 02/09/21  2:49 AM   Result Value Ref Range    Sodium 135 (L) 136 - 145 mmol/L    Potassium 3.9 3.5 - 5.1 mmol/L    Chloride 98 97 - 108 mmol/L    CO2 26 21 - 32 mmol/L    Anion gap 11 5 - 15 mmol/L    Glucose 186 (H) 65 - 100 mg/dL    BUN 13 6 - 20 MG/DL    Creatinine 0.93 0.55 - 1.02 MG/DL    BUN/Creatinine ratio 14 12 - 20      GFR est AA >60 >60 ml/min/1.73m2    GFR est non-AA 58 (L) >60 ml/min/1.73m2    Calcium 9.9 8.5 - 10.1 MG/DL    Bilirubin, total 0.5 0.2 - 1.0 MG/DL    ALT (SGPT) 41 12 - 78 U/L    AST (SGOT) 29 15 - 37 U/L    Alk.  phosphatase 160 (H) 45 - 117 U/L    Protein, total 7.4 6.4 - 8.2 g/dL    Albumin 3.5 3.5 - 5.0 g/dL    Globulin 3.9 2.0 - 4.0 g/dL    A-G Ratio 0.9 (L) 1.1 - 2.2     CBC WITH AUTOMATED DIFF    Collection Time: 02/09/21  2:49 AM   Result Value Ref Range    WBC 12.7 (H) 3.6 - 11.0 K/uL    RBC 4.19 3.80 - 5.20 M/uL    HGB 12.6 11.5 - 16.0 g/dL    HCT 38.9 35.0 - 47.0 %    MCV 92.8 80.0 - 99.0 FL    MCH 30.1 26.0 - 34.0 PG    MCHC 32.4 30.0 - 36.5 g/dL    RDW 13.9 11.5 - 14.5 %    PLATELET 378 942 - 727 K/uL    MPV 10.2 8.9 - 12.9 FL    NRBC 0.0 0  WBC    ABSOLUTE NRBC 0.00 0.00 - 0.01 K/uL    NEUTROPHILS 95 (H) 32 - 75 %    LYMPHOCYTES 3 (L) 12 - 49 %    MONOCYTES 1 (L) 5 - 13 %    EOSINOPHILS 0 0 - 7 %    BASOPHILS 0 0 - 1 %    IMMATURE GRANULOCYTES 1 (H) 0.0 - 0.5 %    ABS. NEUTROPHILS 12.1 (H) 1.8 - 8.0 K/UL    ABS. LYMPHOCYTES 0.4 (L) 0.8 - 3.5 K/UL    ABS. MONOCYTES 0.1 0.0 - 1.0 K/UL    ABS. EOSINOPHILS 0.0 0.0 - 0.4 K/UL    ABS. BASOPHILS 0.0 0.0 - 0.1 K/UL    ABS. IMM.  GRANS. 0.1 (H) 0.00 - 0.04 K/UL    DF SMEAR SCANNED      RBC COMMENTS NORMOCYTIC, NORMOCHROMIC     TSH 3RD GENERATION    Collection Time: 02/09/21  2:49 AM   Result Value Ref Range    TSH 0.66 0.36 - 3.74 uIU/mL   MAGNESIUM    Collection Time: 02/09/21  2:49 AM   Result Value Ref Range    Magnesium 1.9 1.6 - 2.4 mg/dL   PHOSPHORUS    Collection Time: 02/09/21  2:49 AM   Result Value Ref Range    Phosphorus 3.2 2.6 - 4.7 MG/DL   TROPONIN I    Collection Time: 02/09/21  2:49 AM   Result Value Ref Range    Troponin-I, Qt. <0.05 <0.05 ng/mL   NT-PRO BNP    Collection Time: 02/09/21  2:49 AM   Result Value Ref Range    NT pro- <450 PG/ML   HEMOGLOBIN A1C WITH EAG    Collection Time: 02/09/21  2:49 AM   Result Value Ref Range    Hemoglobin A1c 5.2 4.0 - 5.6 %    Est. average glucose 103 mg/dL       Problem List:  Problem List as of 2/9/2021 Date Reviewed: 2/8/2021          Codes Class Noted - Resolved    Status asthmaticus ICD-10-CM: J45.902  ICD-9-CM: 493.91  2/8/2021 - Present        * (Principal) Moderate persistent asthma with acute exacerbation ICD-10-CM: J45.41  ICD-9-CM: 493.92  2/8/2021 - Present        IBS (irritable bowel syndrome) (Chronic) ICD-10-CM: K58.9  ICD-9-CM: 564.1  7/8/2011 - Present        HTN (hypertension) (Chronic) ICD-10-CM: I10  ICD-9-CM: 401.9  7/8/2011 - Present        Asthma (Chronic) ICD-10-CM: J45.909  ICD-9-CM: 493.90  7/8/2011 - Present        Unspecified hypothyroidism (Chronic) ICD-10-CM: E03.9  ICD-9-CM: 244.9  7/8/2011 - Present        Syncope ICD-10-CM: R55  ICD-9-CM: 780.2  7/7/2011 - Present        RESOLVED: Dehydration ICD-10-CM: E86.0  ICD-9-CM: 276.51  7/7/2011 - 7/8/2011              Medications reviewed  Current Facility-Administered Medications   Medication Dose Route Frequency    [START ON 2/10/2021] levothyroxine (SYNTHROID) tablet 62.5 mcg  62.5 mcg Oral EVERY OTHER DAY    [START ON 2/10/2021] diphenhydrAMINE (BENADRYL) capsule 25 mg  25 mg Oral QHS PRN    albuterol-ipratropium (DUO-NEB) 2.5 MG-0.5 MG/3 ML  3 mL Nebulization Q6H PRN    aspirin chewable tablet 81 mg  81 mg Oral QHS    buPROPion SR (WELLBUTRIN SR) tablet 200 mg  200 mg Oral BID    cholecalciferol (VITAMIN D3) (1000 Units /25 mcg) tablet 1,000 Units  1,000 Units Oral DAILY    gabapentin (NEURONTIN) capsule 300 mg  300 mg Oral QHS    levothyroxine (SYNTHROID) tablet 125 mcg  125 mcg Oral EVERY OTHER DAY    losartan (COZAAR) tablet 25 mg  25 mg Oral DAILY    traZODone (DESYREL) tablet 75 mg  75 mg Oral QHS    sodium chloride (NS) flush 5-40 mL  5-40 mL IntraVENous Q8H    sodium chloride (NS) flush 5-40 mL  5-40 mL IntraVENous PRN    acetaminophen (TYLENOL) tablet 650 mg  650 mg Oral Q6H PRN    Or    acetaminophen (TYLENOL) suppository 650 mg  650 mg Rectal Q6H PRN    polyethylene glycol (MIRALAX) packet 17 g  17 g Oral DAILY PRN    promethazine (PHENERGAN) tablet 12.5 mg  12.5 mg Oral Q6H PRN    Or    ondansetron (ZOFRAN) injection 4 mg  4 mg IntraVENous Q6H PRN    heparin (porcine) injection 5,000 Units  5,000 Units SubCUTAneous Q8H    azithromycin (ZITHROMAX) 500 mg in 0.9% sodium chloride 250 mL (VIAL-MATE)  500 mg IntraVENous Q24H    albuterol (PROVENTIL HFA, VENTOLIN HFA, PROAIR HFA) inhaler 1 Puff  1 Puff Inhalation Q4H PRN    ipratropium (ATROVENT HFA) 17 mcg inhaler  1 Puff Inhalation Q4H PRN       Care Plan discussed with: Patient/family, nurse      Dawn Ortiz, NP  Hospitalist  Providers can reach me on PerfectServe

## 2021-02-10 NOTE — PROGRESS NOTES
Spiritual Care Assessment/Progress Note  [facility]      NAME: Mandy Morse      MRN: [id number]  AGE: 66 y.o.  SEX: female  Zoroastrian Affiliation: Presbyterian   Language: English     2/10/2021     Total Time (in minutes): 23     Spiritual Assessment begun in [facility] [phone] through conversation with:         [x]Patient        [] Family    [] Friend(s)        Reason for Consult: Palliative Care, Initial/Spiritual Assessment     Spiritual beliefs: (Please include comment if needed)     [x] Identifies with a jelani tradition: Not active currently       [] Supported by a jelani community:            [] Claims no spiritual orientation:           [] Seeking spiritual identity:                [] Adheres to an individual form of spirituality:           [] Not able to assess:                           Identified resources for coping:      [] Prayer                               [] Music                  [] Guided Imagery     [x] Family/friends                 [] Pet visits     [] Devotional reading                         [] Unknown     [] Other:                                              Interventions offered during this visit: (See comments for more details)    Patient Interventions: Affirmation of emotions/emotional suffering, Affirmation of jelani, Coping skills reviewed/reinforced, Normalization of emotional/spiritual concerns           Plan of Care:     [] Support spiritual and/or cultural needs    [] Support AMD and/or advance care planning process      [] Support grieving process   [] Coordinate Rites and/or Rituals    [] Coordination with community clergy   [] No spiritual needs identified at this time   [] Detailed Plan of Care below (See Comments)  [] Make referral to Music Therapy  [] Make referral to Pet Therapy     [] Make referral to Addiction services  [] Make referral to [facility]  [] Make referral to Spiritual Care Partner  [] No future visits requested        [x] Follow up visits as needed     At time of visit pt in bed. She presented as engaging and readily engaged conversation. She lives alone and appreciates her independence. Her daughter lives in the area and is supportive of her. Her daughter and family were leaving for a vacation in [place] yesterday. The daughter remained to be supportive of the pt and plans to be here to visit with her this afternoon.     Chaplain Caballero MDiv, MS, [facility]  [phone])

## 2021-02-10 NOTE — PROGRESS NOTES
Pt refuses to wear hospital socks due to the grippers on the bottom saying they can \"throw her off balance\". Pt did not want to take insulin or metformin this morning because they are not and do not have a history of diabetes. Pt also received insulin within the last 48 hours so metformin was held. Pt also said that they wanted to talk about it with their daughter today. Bedside shift change report given to Tin Cervantes (oncoming nurse) by Urvashi Rico RN (offgoing nurse). Report included the following information SBAR, Procedure Summary, Intake/Output, MAR, Recent Results and Med Rec Status.

## 2021-02-11 LAB
ACE SERPL-CCNC: 35 U/L (ref 14–82)
ANION GAP SERPL CALC-SCNC: 4 MMOL/L (ref 5–15)
BASOPHILS # BLD: 0 K/UL (ref 0–0.1)
BASOPHILS NFR BLD: 0 % (ref 0–1)
BUN SERPL-MCNC: 16 MG/DL (ref 6–20)
BUN/CREAT SERPL: 19 (ref 12–20)
CALCIUM SERPL-MCNC: 9.1 MG/DL (ref 8.5–10.1)
CCP IGA+IGG SERPL IA-ACNC: 5 UNITS (ref 0–19)
CHLORIDE SERPL-SCNC: 104 MMOL/L (ref 97–108)
CO2 SERPL-SCNC: 29 MMOL/L (ref 21–32)
CREAT SERPL-MCNC: 0.85 MG/DL (ref 0.55–1.02)
DIFFERENTIAL METHOD BLD: ABNORMAL
EOSINOPHIL # BLD: 0 K/UL (ref 0–0.4)
EOSINOPHIL NFR BLD: 0 % (ref 0–7)
ERYTHROCYTE [DISTWIDTH] IN BLOOD BY AUTOMATED COUNT: 13.9 % (ref 11.5–14.5)
GLUCOSE BLD STRIP.AUTO-MCNC: 111 MG/DL (ref 65–100)
GLUCOSE BLD STRIP.AUTO-MCNC: 127 MG/DL (ref 65–100)
GLUCOSE BLD STRIP.AUTO-MCNC: 137 MG/DL (ref 65–100)
GLUCOSE BLD STRIP.AUTO-MCNC: 196 MG/DL (ref 65–100)
GLUCOSE SERPL-MCNC: 146 MG/DL (ref 65–100)
HCT VFR BLD AUTO: 34.2 % (ref 35–47)
HGB BLD-MCNC: 11 G/DL (ref 11.5–16)
IMM GRANULOCYTES # BLD AUTO: 0.1 K/UL (ref 0–0.04)
IMM GRANULOCYTES NFR BLD AUTO: 1 % (ref 0–0.5)
LYMPHOCYTES # BLD: 0.7 K/UL (ref 0.8–3.5)
LYMPHOCYTES NFR BLD: 8 % (ref 12–49)
MCH RBC QN AUTO: 29.9 PG (ref 26–34)
MCHC RBC AUTO-ENTMCNC: 32.2 G/DL (ref 30–36.5)
MCV RBC AUTO: 92.9 FL (ref 80–99)
MONOCYTES # BLD: 0.3 K/UL (ref 0–1)
MONOCYTES NFR BLD: 3 % (ref 5–13)
NEUTS SEG # BLD: 7.9 K/UL (ref 1.8–8)
NEUTS SEG NFR BLD: 88 % (ref 32–75)
NRBC # BLD: 0 K/UL (ref 0–0.01)
NRBC BLD-RTO: 0 PER 100 WBC
PLATELET # BLD AUTO: 264 K/UL (ref 150–400)
PMV BLD AUTO: 9.9 FL (ref 8.9–12.9)
POTASSIUM SERPL-SCNC: 4.4 MMOL/L (ref 3.5–5.1)
RBC # BLD AUTO: 3.68 M/UL (ref 3.8–5.2)
RBC MORPH BLD: ABNORMAL
SERVICE CMNT-IMP: ABNORMAL
SODIUM SERPL-SCNC: 137 MMOL/L (ref 136–145)
WBC # BLD AUTO: 9 K/UL (ref 3.6–11)

## 2021-02-11 PROCEDURE — 74011250636 HC RX REV CODE- 250/636: Performed by: PHYSICIAN ASSISTANT

## 2021-02-11 PROCEDURE — 74011250637 HC RX REV CODE- 250/637: Performed by: NURSE PRACTITIONER

## 2021-02-11 PROCEDURE — 74011250637 HC RX REV CODE- 250/637: Performed by: INTERNAL MEDICINE

## 2021-02-11 PROCEDURE — 36415 COLL VENOUS BLD VENIPUNCTURE: CPT

## 2021-02-11 PROCEDURE — 94664 DEMO&/EVAL PT USE INHALER: CPT

## 2021-02-11 PROCEDURE — 74011000250 HC RX REV CODE- 250: Performed by: FAMILY MEDICINE

## 2021-02-11 PROCEDURE — 94640 AIRWAY INHALATION TREATMENT: CPT

## 2021-02-11 PROCEDURE — 65660000000 HC RM CCU STEPDOWN

## 2021-02-11 PROCEDURE — 74011000250 HC RX REV CODE- 250: Performed by: INTERNAL MEDICINE

## 2021-02-11 PROCEDURE — 74011250636 HC RX REV CODE- 250/636: Performed by: INTERNAL MEDICINE

## 2021-02-11 PROCEDURE — 82962 GLUCOSE BLOOD TEST: CPT

## 2021-02-11 PROCEDURE — 85025 COMPLETE CBC W/AUTO DIFF WBC: CPT

## 2021-02-11 PROCEDURE — 80048 BASIC METABOLIC PNL TOTAL CA: CPT

## 2021-02-11 PROCEDURE — 74011250636 HC RX REV CODE- 250/636: Performed by: NURSE PRACTITIONER

## 2021-02-11 PROCEDURE — 77010033678 HC OXYGEN DAILY

## 2021-02-11 PROCEDURE — 99223 1ST HOSP IP/OBS HIGH 75: CPT | Performed by: NURSE PRACTITIONER

## 2021-02-11 RX ORDER — IPRATROPIUM BROMIDE AND ALBUTEROL SULFATE 2.5; .5 MG/3ML; MG/3ML
3 SOLUTION RESPIRATORY (INHALATION)
Status: DISCONTINUED | OUTPATIENT
Start: 2021-02-11 | End: 2021-02-13

## 2021-02-11 RX ADMIN — LOSARTAN POTASSIUM 25 MG: 50 TABLET, FILM COATED ORAL at 11:29

## 2021-02-11 RX ADMIN — TRAZODONE HYDROCHLORIDE 75 MG: 50 TABLET ORAL at 23:12

## 2021-02-11 RX ADMIN — BUPROPION HYDROCHLORIDE 200 MG: 100 TABLET, FILM COATED, EXTENDED RELEASE ORAL at 07:10

## 2021-02-11 RX ADMIN — FAMOTIDINE 20 MG: 20 TABLET, FILM COATED ORAL at 11:29

## 2021-02-11 RX ADMIN — Medication 10 ML: at 14:00

## 2021-02-11 RX ADMIN — AZITHROMYCIN MONOHYDRATE 500 MG: 500 INJECTION, POWDER, LYOPHILIZED, FOR SOLUTION INTRAVENOUS at 01:47

## 2021-02-11 RX ADMIN — GABAPENTIN 300 MG: 300 CAPSULE ORAL at 23:12

## 2021-02-11 RX ADMIN — HEPARIN SODIUM 5000 UNITS: 5000 INJECTION INTRAVENOUS; SUBCUTANEOUS at 17:10

## 2021-02-11 RX ADMIN — HEPARIN SODIUM 5000 UNITS: 5000 INJECTION INTRAVENOUS; SUBCUTANEOUS at 23:12

## 2021-02-11 RX ADMIN — SODIUM CHLORIDE 50 ML/HR: 9 INJECTION, SOLUTION INTRAVENOUS at 00:44

## 2021-02-11 RX ADMIN — IPRATROPIUM BROMIDE AND ALBUTEROL SULFATE 3 ML: .5; 3 SOLUTION RESPIRATORY (INHALATION) at 08:51

## 2021-02-11 RX ADMIN — METHYLPREDNISOLONE SODIUM SUCCINATE 40 MG: 40 INJECTION, POWDER, FOR SOLUTION INTRAMUSCULAR; INTRAVENOUS at 12:22

## 2021-02-11 RX ADMIN — METHYLPREDNISOLONE SODIUM SUCCINATE 80 MG: 125 INJECTION, POWDER, FOR SOLUTION INTRAMUSCULAR; INTRAVENOUS at 05:11

## 2021-02-11 RX ADMIN — IPRATROPIUM BROMIDE AND ALBUTEROL SULFATE 3 ML: .5; 3 SOLUTION RESPIRATORY (INHALATION) at 20:19

## 2021-02-11 RX ADMIN — METHYLPREDNISOLONE SODIUM SUCCINATE 40 MG: 40 INJECTION, POWDER, FOR SOLUTION INTRAMUSCULAR; INTRAVENOUS at 23:12

## 2021-02-11 RX ADMIN — IPRATROPIUM BROMIDE AND ALBUTEROL SULFATE 3 ML: .5; 3 SOLUTION RESPIRATORY (INHALATION) at 16:02

## 2021-02-11 RX ADMIN — IPRATROPIUM BROMIDE AND ALBUTEROL SULFATE 3 ML: .5; 3 SOLUTION RESPIRATORY (INHALATION) at 02:53

## 2021-02-11 RX ADMIN — Medication 1000 UNITS: at 11:29

## 2021-02-11 RX ADMIN — GUAIFENESIN AND DEXTROMETHORPHAN HYDROBROMIDE 1 TABLET: 600; 30 TABLET, EXTENDED RELEASE ORAL at 17:10

## 2021-02-11 RX ADMIN — BUPROPION HYDROCHLORIDE 200 MG: 100 TABLET, FILM COATED, EXTENDED RELEASE ORAL at 17:10

## 2021-02-11 RX ADMIN — HEPARIN SODIUM 5000 UNITS: 5000 INJECTION INTRAVENOUS; SUBCUTANEOUS at 01:47

## 2021-02-11 RX ADMIN — ACETAMINOPHEN 650 MG: 325 TABLET ORAL at 23:23

## 2021-02-11 RX ADMIN — ACETAMINOPHEN 650 MG: 325 TABLET ORAL at 11:29

## 2021-02-11 RX ADMIN — GUAIFENESIN AND DEXTROMETHORPHAN HYDROBROMIDE 1 TABLET: 600; 30 TABLET, EXTENDED RELEASE ORAL at 11:30

## 2021-02-11 RX ADMIN — HEPARIN SODIUM 5000 UNITS: 5000 INJECTION INTRAVENOUS; SUBCUTANEOUS at 07:10

## 2021-02-11 NOTE — PROGRESS NOTES
Pt walked a lap around 5E with assistance. She had no pain. Pt refused to wear hospital socks throughout the day. She also refused to take Metformin and insulin because she is not a diabetic. She wanted to wait for her daughter to arrive to talk about the insulin. She agreed to take insulin if her BG was over 200. Bedside shift change report given to Erin Juárez (oncoming nurse) by Eileen CHOW (offgoing nurse). Report included the following information SBAR, Intake/Output, MAR and Recent Results.

## 2021-02-11 NOTE — ROUTINE PROCESS
Bedside shift change report given to Charito Mccall RN (oncoming nurse) by [address] [place] [address], RN (offgoing nurse). Report included the following information SBAR, Kardex, Intake/Output, MAR and Recent Results.

## 2021-02-11 NOTE — PROGRESS NOTES
Physician Progress Note      Lamonte Viramontes  [facility] #:                  [id number]  :                       1945  ADMIT DATE:       2021 4:55 PM  [address] [place] DATE:  RESPONDING  PROVIDER #:        Reyes Hankins NP          QUERY TEXT:    Dear Attending,    Pt admitted with asthma exacerbation. Pt noted to have RA saturation of 88 percent and 89 percent with initial RR of 20, calculated P/F ratio less than 300 on RA and O2, and documented to be in moderate distress in the H&P. Moderate respiratory distress, use of accessory muscles and coughing with talking is further noted in the subsequent Hospitalist progress note from 21. The ED Physician also noted that the patient had shortness of breath and was hypoxic despite treatment in the Emergency Department. If possible, please document in the progress notes and discharge summary if you are evaluating and/or treating any of the following: The medical record reflects the following:  Risk Factors: 75F hx/o asthma being treated of asthma exacerbation, former smoker    Clinical Indicators:  - 1700 91% RA, RR 24 - at time of presentation  -  170 88% RA - calculated P/F ratio = 270  -  1915 89% RA  -  2228 93% 3 LPM O2 NC - calculated P/F ratio = 212.5  - RR = 17-20 during admission  - Hospitalist PN  - Pt seen today in moderate respiratory distress with coughing. Oxygen via NC in place. Patient with notable SOB and coughing with talking. - Physical exam - General: mod resp distress - Lungs - Scattered rhonchi and expiratory wheezing bilaterally; on O2, using accessory muscles  - Cardiology CN - now admitted with more SOB/wheezes/cough  - ED Physician note - Diagnosis management comments: 73-year-old female presents as above with shortness of breath in setting of asthma.   She is hypoxic despite treatment in the emergency department  - ED RN PN  - Pt walked around the bed in her room and when she returned to bed her O2 was 88%, MD aware. Pt states that she continues to feel like she cant breathe. Treatment: Hospital admission, supplemental O2 @ 2-3 LPM, Zithromax, Albuterol Nebs, Albuterol inhaler, IV Solu-Medrol, Prednisone x1 dose, CXR, CTA, pulse oximetry and vital signs monitoring    Thank you,  Lavinia Arvizu BSN RN CRCR  Clinical Documentation Improvement    Options provided:  -- Acute respiratory failure with hypoxia  -- Other - I will add my own diagnosis  -- Disagree - Not applicable / Not valid  -- Disagree - Clinically unable to determine / Unknown  -- Refer to Clinical Documentation Reviewer    PROVIDER RESPONSE TEXT:    This patient is in acute respiratory failure with hypoxia.     Query created by: Vaughn Osullivan on 2/10/2021 10:20 AM      Electronically signed by:  Lucia Vazquez NP 2/10/2021 11:21 PM

## 2021-02-11 NOTE — CONSULTS
Palliative Medicine Consult  [place]: [phone])    Patient Name: Kathe Brown  YOB: 1945    Date of Initial Consult: 21  Reason for Consult: Overwhelming symptoms  Requesting Provider: Tisha Clark NP  Primary Care Physician: Mich Do MD     SUMMARY:   Kathe Brown is a 66 y.o. female with a past history of asthma, hypertension, hypothyroid, fibromyalgia, and depression, who was admitted on 2021 from home with a diagnosis of acute asthma exacerbation. She presented to the ED with complaints of shortness of breath and cough. She was given a breathing treatment and felt better, but became hypoxic with ambulation. She was admitted for further management of an acute asthma exacerbation. Of note, she has not been hospitalized since  and has never been intubated or admitted to ICU for an asthma exacerbation. Current medical issues leading to Palliative Medicine involvement include: overwhelming symptoms, decline in quality of life, chronic pain. Social: She lives alone in a one level apartment. Her daughter Merlyn Henriquez lives nearby and is her main support. Her sister Davis Mims is also involved. Her son  suddenly last year from a heart attack. PALLIATIVE DIAGNOSES:   1. Goals of care  2. Chronic joint and back pain  3. Shortness of breath  4. Cough  5. Moderate persistent asthma with acute exacerbation  6. Impaired mobility  7. Debility     PLAN:   1. Prior to seeing patient, the medical record was reviewed. 2. Patient seen at the bedside with her daughter Merlyn Henriquez present. Palliative medicine services introduced. 3. We talked about the last year of her life and the decline in her quality of life. She moved out of her home of 40 years last March and into a one level apartment. Shortly after this, the lock downs started and she has not been able to meet a lot of people in her apartment complex.  She also had to stop going to [facility] for her water therapy due to the lock downs. She has mostly been at home alone. Due to this, she has gained weight, lost mobility, and become more debilitated. She also lost her 48year old son suddenly to a heart attack last year. All of this has taken a toll on her physical and mental health. 4. The patient and her daughter report that her chronic joint and back pain really limit her mobility, which makes it difficult for her to do physical therapy or exercise to increase her strength. They have noticed that she tends to feel better when she is on a course of prednisone for lung related issues as this helps with her breathing and her chronic pain. They would like to look into starting her on a low dose of daily prednisone to help her feel better. We talked about this coming with risks, including immune suppression, greater risk of infection, and hyperglycemia that could lead to the development of diabetes. The patient and daughter say they are willing to accept these risks for her to feel better. 5. During our meeting, she had a severe coughing episode. We talked about this symptom and I offered to order medication to help with it. We talked about Tessalon perles or Robitussin AC. She says she is currently on Mucinex and would prefer to just continue this and use throat lozenges. 6. We also talked about her goals for her care. She would like to remain independent as long as possible. She would like to get strong and mobile enough to be able to walk to and from her outdoor trash can at her apartment complex. She would like to get some help with house keeping. 7. We also talked about her wishes regarding code status and intubation. She elects to remain a full code at this time. She is okay with intubation and placement on the ventilator if it is a temporary measure. She would not want to be prolonged on machines. They report that she does have an advanced medical directive in place.  This names daughter Mignon Collazo as the primary medical POA and the patient's sister Padmaja as the secondary mPOA. The patient and daughter both report that they have discussed her wishes at length in the past.  8. I think the patient would definitely benefit from some rehab therapy, either outpatient or HH, in order to meet her goal for improved mobility and continued independence with living and ADLs. She is open to [facility] and would like to go back to her water therapy at [address] [place] [address] when able. 9. I think that she would benefit from being on low dose prednisone to help with her chronic pain and breathing, however, this is not something that the palliative medicine team can initiate or manage. She does not have any end stage disease diagnoses and her pain is chronic and related to arthritis and fibromyalgia. Case discussed with Dr. Darcy Carlson and she agrees that this is not an appropriate patient for our outpatient palliative medicine clinic or for home based palliative medicine at this time. 10. Would recommend that the patient discuss the initiation of chronic steroid therapy with her pulmonologist Dr. Jo Hernandez or her PCP Dr. Roxana Garrido. Also, she does mention that she may have a new diagnosis of rheumatoid arthritis. I would recommend that she follow up with a rheumatologist for further testing and diagnosis. If she does have RA, then rheumatology may be able to offer further helpful treatment and/or manage her on chronic steroid therapy. I will follow up with the patient and family tomorrow to discuss these recommendations with them. 11. Initial consult note routed to primary continuity provider and/or primary health care team members  12. Communicated plan of care with: Palliative [facility] Team     GOALS OF CARE / TREATMENT PREFERENCES:     GOALS OF CARE:  Patient/Health Care Proxy Stated Goals:  Other (comment)(remain independent, be able to walk to outside trash can, start exercising again)    TREATMENT PREFERENCES:   Code Status: Full Code    Advance Care Planning:  [x] The [facility] Interdisciplinary Team has updated the ACP Navigator with Health Care Decision Maker and Patient Capacity      Primary Decision Maker: Rosa Maria De La Rosa - Child - [phone]    No flowsheet data found. Medical Interventions: Full interventions     Other Instructions: Other:    As far as possible, the palliative care team has discussed with patient / health care proxy about goals of care / treatment preferences for patient. HISTORY:     History obtained from: patient, daughter, chart    CHIEF COMPLAINT: Shortness of breath, cough    HPI/SUBJECTIVE:    The patient is:   [x] Verbal and participatory  [] Non-participatory due to: She reports pain in most of her joints, which limits her mobility. She also has low back pain. She has intermittent shortness of breath, worse with talking, coughing, or activity. She has multiple coughing spells and is finally starting to bring up some sputum. She reports a poor appetite, especially while here at the hospital.    She walks with a rollator.     Clinical Pain Assessment (nonverbal scale for severity on nonverbal patients):   Clinical Pain Assessment  Severity: 0          Duration: for how long has pt been experiencing pain (e.g., 2 days, 1 month, years)  Frequency: how often pain is an issue (e.g., several times per day, once every few days, constant)     FUNCTIONAL ASSESSMENT:     Palliative Performance Scale (PPS):  PPS: 70       PSYCHOSOCIAL/SPIRITUAL SCREENING:     Palliative IDT has assessed this patient for cultural preferences / practices and a referral made as appropriate to needs (Cultural Services, Patient Advocacy, Ethics, etc.)    Any spiritual / Faith concerns:  [] Yes /  [x] No    Caregiver Burnout:  [] Yes /  [x] No /  [] No Caregiver Present      Anticipatory grief assessment:   [x] Normal  / [] Maladaptive       ESAS Anxiety: Anxiety: 0    ESAS Depression:          REVIEW OF SYSTEMS:     Positive and pertinent negative findings in ROS are noted above in HPI. The following systems were [x] reviewed / [] unable to be reviewed as noted in HPI  Other findings are noted below. Systems: constitutional, ears/nose/mouth/throat, respiratory, gastrointestinal, genitourinary, musculoskeletal, integumentary, neurologic, psychiatric, endocrine. Positive findings noted below. Modified ESAS Completed by: provider   Fatigue: 5 Drowsiness: 0     Pain: 0   Anxiety: 0 Nausea: 0   Anorexia: 4 Dyspnea: 3           Stool Occurrence(s): 1        PHYSICAL EXAM:     From RN flowsheet:  Wt Readings from Last 3 Encounters:   02/08/21 163 lb 2.3 oz (74 kg)   09/13/12 175 lb (79.4 kg)   07/08/11 183 lb (83 kg)     Blood pressure (!) 158/64, pulse 88, temperature 98.4 °F (36.9 °C), resp. rate 20, height 5' (1.524 m), weight 163 lb 2.3 oz (74 kg), SpO2 91 %. Pain Scale 1: Numeric (0 - 10)  Pain Intensity 1: 4     Pain Location 1: Neck  Pain Orientation 1: Anterior  Pain Description 1: Aching  Pain Intervention(s) 1: Medication (see MAR)  Last bowel movement, if known:     Constitutional: alert, awake, sitting up in bed, pleasant  Eyes: pupils equal, anicteric  ENMT: no nasal discharge, moist mucous membranes  Respiratory: breathing not labored, coughing, symmetric  Musculoskeletal: no deformity, no tenderness to palpation  Skin: warm, dry  Neurologic: following commands, moving all extremities  Psychiatric: full affect, no hallucinations       HISTORY:     Principal Problem:     Moderate persistent asthma with acute exacerbation (2/8/2021)    Active Problems:    Status asthmaticus (2/8/2021)      Goals of care, counseling/discussion ()      Other chronic pain ()      Cough ()      Impaired mobility ()      Past Medical History:   Diagnosis Date    Asthma     Depression     Fibromyalgia     GERD (gastroesophageal reflux disease)     Hypertension     Hypothyroid     Menopause 1995    Rotator cuff tear       Past Surgical History: Procedure Laterality Date    HX CHOLECYSTECTOMY      HX GYN      HX ORTHOPAEDIC      HX TONSIL AND ADENOIDECTOMY        History reviewed. No pertinent family history. History reviewed, no pertinent family history.   Social History     Tobacco Use    Smoking status: Former Smoker     Packs/day: 1.00     Years: 15.00     Pack years: 15.00    Smokeless tobacco: Never Used   Substance Use Topics    Alcohol use: No     Allergies   Allergen Reactions    Pcn [Penicillins] Hives      Current Facility-Administered Medications   Medication Dose Route Frequency    methylPREDNISolone (PF) (Solu-MEDROL) injection 40 mg  40 mg IntraVENous Q8H    albuterol-ipratropium (DUO-NEB) 2.5 MG-0.5 MG/3 ML  3 mL Nebulization Q4H RT    famotidine (PEPCID) tablet 20 mg  20 mg Oral DAILY    0.9% sodium chloride infusion  50 mL/hr IntraVENous CONTINUOUS    metFORMIN (GLUCOPHAGE) tablet 500 mg  500 mg Oral BID WITH MEALS    glucose chewable tablet 16 g  4 Tab Oral PRN    dextrose (D50W) injection syrg 12.5-25 g  25-50 mL IntraVENous PRN    glucagon (GLUCAGEN) injection 1 mg  1 mg IntraMUSCular PRN    insulin lispro (HUMALOG) injection   SubCUTAneous AC&HS    melatonin tablet 6 mg  6 mg Oral QHS PRN    guaiFENesin-dextromethorphan SR (HUMIBID DM) 600-30 mg tablet 1 Tab  1 Tab Oral BID    levothyroxine (SYNTHROID) tablet 62.5 mcg  62.5 mcg Oral EVERY OTHER DAY    diphenhydrAMINE (BENADRYL) capsule 25 mg  25 mg Oral QHS PRN    [Held by provider] arformoterol 15 mcg/budesonide 0.5 mg neb solution   Nebulization BID RT    azithromycin (ZITHROMAX) 500 mg in 0.9% sodium chloride 250 mL (VIAL-MATE)  500 mg IntraVENous Q24H    buPROPion SR (WELLBUTRIN SR) tablet 200 mg  200 mg Oral BID    cholecalciferol (VITAMIN D3) (1000 Units /25 mcg) tablet 1,000 Units  1,000 Units Oral DAILY    gabapentin (NEURONTIN) capsule 300 mg  300 mg Oral QHS    losartan (COZAAR) tablet 25 mg  25 mg Oral DAILY    traZODone (DESYREL) tablet 75 mg 75 mg Oral QHS    sodium chloride (NS) flush 5-40 mL  5-40 mL IntraVENous Q8H    sodium chloride (NS) flush 5-40 mL  5-40 mL IntraVENous PRN    acetaminophen (TYLENOL) tablet 650 mg  650 mg Oral Q6H PRN    Or    acetaminophen (TYLENOL) suppository 650 mg  650 mg Rectal Q6H PRN    polyethylene glycol (MIRALAX) packet 17 g  17 g Oral DAILY PRN    promethazine (PHENERGAN) tablet 12.5 mg  12.5 mg Oral Q6H PRN    Or    ondansetron (ZOFRAN) injection 4 mg  4 mg IntraVENous Q6H PRN    heparin (porcine) injection 5,000 Units  5,000 Units SubCUTAneous Q8H    albuterol (PROVENTIL HFA, VENTOLIN HFA, PROAIR HFA) inhaler 1 Puff  1 Puff Inhalation Q4H PRN    ipratropium (ATROVENT HFA) 17 mcg inhaler  1 Puff Inhalation Q4H PRN          LAB AND IMAGING FINDINGS:     Lab Results   Component Value Date/Time    WBC 9.0 02/11/2021 05:13 AM    HGB 11.0 (L) 02/11/2021 05:13 AM    PLATELET 554 42/88/5433 05:13 AM     Lab Results   Component Value Date/Time    Sodium 137 02/11/2021 05:13 AM    Potassium 4.4 02/11/2021 05:13 AM    Chloride 104 02/11/2021 05:13 AM    CO2 29 02/11/2021 05:13 AM    BUN 16 02/11/2021 05:13 AM    Creatinine 0.85 02/11/2021 05:13 AM    Calcium 9.1 02/11/2021 05:13 AM    Magnesium 1.9 02/09/2021 02:49 AM    Phosphorus 3.2 02/09/2021 02:49 AM      Lab Results   Component Value Date/Time    Alk.  phosphatase 160 (H) 02/09/2021 02:49 AM    Protein, total 7.4 02/09/2021 02:49 AM    Albumin 3.5 02/09/2021 02:49 AM    Globulin 3.9 02/09/2021 02:49 AM     Lab Results   Component Value Date/Time    INR 1.1 07/07/2011 04:30 PM    Prothrombin time 11.2 (H) 07/07/2011 04:30 PM    aPTT 24.7 07/07/2011 04:30 PM      No results found for: IRON, FE, TIBC, IBCT, PSAT, FERR   No results found for: PH, PCO2, PO2  No components found for: [place]   Lab Results   Component Value Date/Time    CK 56 07/07/2011 04:30 PM    CK - MB 1.9 07/07/2011 04:30 PM                Total time: 70 min  Counseling / coordination time, spent as noted above: 65 min  > 50% counseling / coordination?: yes    Prolonged service was provided for  []30 min   []75 min in face to face time in the presence of the patient, spent as noted above. Time Start:   Time End:   Note: this can only be billed with 13160 (initial) or 81903 (follow up). If multiple start / stop times, list each separately.

## 2021-02-11 NOTE — PROGRESS NOTES
Name: Alina Celeste: Dimitry Hurst   : 1945 Admit Date: 2021   Phone: [phone]  Room: [facility] [place]   PCP: Naaman Duane, MD  MRN: [id number]   Date: 2021  Code: Full Code        HPI:      Still on NC  Feeling better    2/10  11:15 AM       History was obtained from patient. I was asked by Christine Morales MD to see Tree Lopes in consultation for a chief complaint of cough. History of Present Illness: 66year old female with past medical history of asthma followed in our office by Dr Francisco Javier Rubio presented to [facility] with increased cough productive of clear to light yellow sputum for the past of 2 days duration along with some wheezing. She denies chest pain. She denied contact with a COVD-19 patient. She lives by herself. Recently treated in 2021 with Prednisone taper by PCP for similar complaints. In the short pump Ed her breathing got a little better after bronchodilator treatment but desaturated on ambulation. CTa chest - no pe.calcified nodules and hilar LN. COVID-19 negative. Office records reviewed:  Normal kamille  Symbicort 160/4.5  Has hx of allergies. Takes prn meds. Past Medical History:   Diagnosis Date    Asthma     Depression     Fibromyalgia     GERD (gastroesophageal reflux disease)     Hypertension     Hypothyroid     Menopause     Rotator cuff tear        Past Surgical History:   Procedure Laterality Date    HX CHOLECYSTECTOMY      HX GYN      HX ORTHOPAEDIC      HX TONSIL AND ADENOIDECTOMY         History reviewed. No pertinent family history.     Social History     Tobacco Use    Smoking status: Former Smoker     Packs/day: 1.00     Years: 15.00     Pack years: 15.00    Smokeless tobacco: Never Used   Substance Use Topics    Alcohol use: No       Allergies   Allergen Reactions    Pcn [Penicillins] Hives       Current Facility-Administered Medications   Medication Dose Route Frequency    famotidine (PEPCID) tablet 20 mg  20 mg Oral DAILY    0.9% sodium chloride infusion  50 mL/hr IntraVENous CONTINUOUS    methylPREDNISolone (PF) (SOLU-MEDROL) injection 80 mg  80 mg IntraVENous Q8H    metFORMIN (GLUCOPHAGE) tablet 500 mg  500 mg Oral BID WITH MEALS    glucose chewable tablet 16 g  4 Tab Oral PRN    dextrose (D50W) injection syrg 12.5-25 g  25-50 mL IntraVENous PRN    glucagon (GLUCAGEN) injection 1 mg  1 mg IntraMUSCular PRN    insulin lispro (HUMALOG) injection   SubCUTAneous AC&HS    melatonin tablet 6 mg  6 mg Oral QHS PRN    guaiFENesin-dextromethorphan SR (HUMIBID DM) 600-30 mg tablet 1 Tab  1 Tab Oral BID    albuterol-ipratropium (DUO-NEB) 2.5 MG-0.5 MG/3 ML  3 mL Nebulization Q6H RT    levothyroxine (SYNTHROID) tablet 62.5 mcg  62.5 mcg Oral EVERY OTHER DAY    diphenhydrAMINE (BENADRYL) capsule 25 mg  25 mg Oral QHS PRN    [Held by provider] arformoterol 15 mcg/budesonide 0.5 mg neb solution   Nebulization BID RT    azithromycin (ZITHROMAX) 500 mg in 0.9% sodium chloride 250 mL (VIAL-MATE)  500 mg IntraVENous Q24H    buPROPion SR (WELLBUTRIN SR) tablet 200 mg  200 mg Oral BID    cholecalciferol (VITAMIN D3) (1000 Units /25 mcg) tablet 1,000 Units  1,000 Units Oral DAILY    gabapentin (NEURONTIN) capsule 300 mg  300 mg Oral QHS    losartan (COZAAR) tablet 25 mg  25 mg Oral DAILY    traZODone (DESYREL) tablet 75 mg  75 mg Oral QHS    sodium chloride (NS) flush 5-40 mL  5-40 mL IntraVENous Q8H    sodium chloride (NS) flush 5-40 mL  5-40 mL IntraVENous PRN    acetaminophen (TYLENOL) tablet 650 mg  650 mg Oral Q6H PRN    Or    acetaminophen (TYLENOL) suppository 650 mg  650 mg Rectal Q6H PRN    polyethylene glycol (MIRALAX) packet 17 g  17 g Oral DAILY PRN    promethazine (PHENERGAN) tablet 12.5 mg  12.5 mg Oral Q6H PRN    Or    ondansetron (ZOFRAN) injection 4 mg  4 mg IntraVENous Q6H PRN    heparin (porcine) injection 5,000 Units  5,000 Units SubCUTAneous Q8H    albuterol (PROVENTIL HFA, VENTOLIN HFA, PROAIR HFA) inhaler 1 Puff  1 Puff Inhalation Q4H PRN    ipratropium (ATROVENT HFA) 17 mcg inhaler  1 Puff Inhalation Q4H PRN         REVIEW OF SYSTEMS   Negative except as stated in the HPI. Physical Exam:   Visit Vitals  BP (!) 154/94   Pulse 99   Temp 98.4 °F (36.9 °C)   Resp 20   Ht 5' (1.524 m)   Wt 74 kg (163 lb 2.3 oz)   SpO2 91%   BMI 31.86 kg/m²       General:  Alert. Head:  Normocephalic. Eyes:  Conjunctivae/corneas clear. Nose: Nares normal.   Throat: Lips, mucosa, and tongue normal.           Lungs:   Mild wheeze noted        Heart:  Regular rate and rhythm. Abdomen:   Soft, non-tender. Extremities: Extremities normal, atraumatic, no cyanosis or edema. Pulses: 2+ and symmetric all extremities.    Skin: Skin color, texture, turgor normal. No rashes or lesions   Lymph nodes: Cervical, supraclavicular, and axillary nodes normal.   Neurologic: Grossly nonfocal       Lab Results   Component Value Date/Time    Sodium 137 02/11/2021 05:13 AM    Potassium 4.4 02/11/2021 05:13 AM    Chloride 104 02/11/2021 05:13 AM    CO2 29 02/11/2021 05:13 AM    BUN 16 02/11/2021 05:13 AM    Creatinine 0.85 02/11/2021 05:13 AM    Glucose 146 (H) 02/11/2021 05:13 AM    Calcium 9.1 02/11/2021 05:13 AM    Magnesium 1.9 02/09/2021 02:49 AM    Phosphorus 3.2 02/09/2021 02:49 AM       Lab Results   Component Value Date/Time    WBC 9.0 02/11/2021 05:13 AM    HGB 11.0 (L) 02/11/2021 05:13 AM    PLATELET 910 53/87/9310 05:13 AM    MCV 92.9 02/11/2021 05:13 AM       Lab Results   Component Value Date/Time    C-Reactive protein 6.27 (H) 02/10/2021 02:47 AM    TSH 0.66 02/09/2021 02:49 AM       Lab Results   Component Value Date/Time    Culture result: NO GROWTH AFTER 22 HOURS 02/10/2021 02:47 AM     Lab Results   Component Value Date/Time    Color YELLOW/STRAW 02/08/2021 06:45 PM    Appearance CLOUDY (A) 02/08/2021 06:45 PM    Specific gravity 1.020 02/08/2021 06:45 PM    pH (UA) 6.0 02/08/2021 06:45 PM    Protein TRACE (A) 02/08/2021 06:45 PM    Glucose Negative 02/08/2021 06:45 PM    Ketone TRACE (A) 02/08/2021 06:45 PM    Bilirubin Negative 02/08/2021 06:45 PM    Blood TRACE (A) 02/08/2021 06:45 PM    Urobilinogen 1.0 02/08/2021 06:45 PM    Nitrites Negative 02/08/2021 06:45 PM    Leukocyte Esterase SMALL (A) 02/08/2021 06:45 PM    WBC 0-4 02/08/2021 06:45 PM    RBC 0-5 02/08/2021 06:45 PM    Bacteria Negative 02/08/2021 06:45 PM       IMPRESSION:  ===========  -asthma exacerbation.  -acute bronchitis - likely viral.  -eosinophilia with elevated CRP. -Hypoxemic respiratory failure - on 2 lpm by nc. PLAN:  =====  -agree with solumedrol.  -check ige and rast; pending   -send anti ccp ab; pending   -O2 supplementation to keep [place] above 92%. -Bronchodilator.  -atypical abx coverage.   -IV steroids, we can reduce  -will need outpatient follow up; Dr Rito Williamson, PA-C

## 2021-02-11 NOTE — ROUTINE PROCESS
RN spoke with ELOISA Mena about the patient's increased BP. She stated she would add hydralazine. Patient stated that she would not take her insulin or her metformin since she is not a diabetic. She stated she is going to talk with her daughter about it since she is a diabetic and knows more about it. RN educated the patient that she is taking this due to her taking steroids and that a side effect could be very high blood sugar. Patient stated that she knew and understood this but still did not want insulin or metformin. ELOISA Mena aware and she educated the patient on this issue as well. Patient agreed that if her blood sugar reached above 200 that she would take insulin but may take less than the prescribed amount.

## 2021-02-12 LAB
ANION GAP SERPL CALC-SCNC: 6 MMOL/L (ref 5–15)
BACTERIA SPEC CULT: ABNORMAL
BASOPHILS # BLD: 0 K/UL (ref 0–0.1)
BASOPHILS NFR BLD: 0 % (ref 0–1)
BUN SERPL-MCNC: 27 MG/DL (ref 6–20)
BUN/CREAT SERPL: 32 (ref 12–20)
CALCIUM SERPL-MCNC: 9.3 MG/DL (ref 8.5–10.1)
CC UR VC: ABNORMAL
CHLORIDE SERPL-SCNC: 105 MMOL/L (ref 97–108)
CO2 SERPL-SCNC: 29 MMOL/L (ref 21–32)
CREAT SERPL-MCNC: 0.85 MG/DL (ref 0.55–1.02)
DIFFERENTIAL METHOD BLD: ABNORMAL
EOSINOPHIL # BLD: 0 K/UL (ref 0–0.4)
EOSINOPHIL NFR BLD: 0 % (ref 0–7)
ERYTHROCYTE [DISTWIDTH] IN BLOOD BY AUTOMATED COUNT: 14 % (ref 11.5–14.5)
GLUCOSE BLD STRIP.AUTO-MCNC: 108 MG/DL (ref 65–100)
GLUCOSE BLD STRIP.AUTO-MCNC: 113 MG/DL (ref 65–100)
GLUCOSE BLD STRIP.AUTO-MCNC: 130 MG/DL (ref 65–100)
GLUCOSE SERPL-MCNC: 124 MG/DL (ref 65–100)
HCT VFR BLD AUTO: 35.5 % (ref 35–47)
HGB BLD-MCNC: 11.1 G/DL (ref 11.5–16)
IGE SERPL-ACNC: 572 IU/ML (ref 6–495)
IMM GRANULOCYTES # BLD AUTO: 0.1 K/UL (ref 0–0.04)
IMM GRANULOCYTES NFR BLD AUTO: 1 % (ref 0–0.5)
LYMPHOCYTES # BLD: 0.7 K/UL (ref 0.8–3.5)
LYMPHOCYTES NFR BLD: 8 % (ref 12–49)
MCH RBC QN AUTO: 29.7 PG (ref 26–34)
MCHC RBC AUTO-ENTMCNC: 31.3 G/DL (ref 30–36.5)
MCV RBC AUTO: 94.9 FL (ref 80–99)
MONOCYTES # BLD: 0.3 K/UL (ref 0–1)
MONOCYTES NFR BLD: 3 % (ref 5–13)
NEUTS SEG # BLD: 8.2 K/UL (ref 1.8–8)
NEUTS SEG NFR BLD: 88 % (ref 32–75)
NRBC # BLD: 0 K/UL (ref 0–0.01)
NRBC BLD-RTO: 0 PER 100 WBC
PLATELET # BLD AUTO: 267 K/UL (ref 150–400)
PLATELET COMMENTS,PCOM: ABNORMAL
PMV BLD AUTO: 10 FL (ref 8.9–12.9)
POTASSIUM SERPL-SCNC: 4.3 MMOL/L (ref 3.5–5.1)
RBC # BLD AUTO: 3.74 M/UL (ref 3.8–5.2)
RBC MORPH BLD: ABNORMAL
SERVICE CMNT-IMP: ABNORMAL
SODIUM SERPL-SCNC: 140 MMOL/L (ref 136–145)
WBC # BLD AUTO: 9.3 K/UL (ref 3.6–11)

## 2021-02-12 PROCEDURE — 74011250636 HC RX REV CODE- 250/636: Performed by: INTERNAL MEDICINE

## 2021-02-12 PROCEDURE — 74011250637 HC RX REV CODE- 250/637: Performed by: NURSE PRACTITIONER

## 2021-02-12 PROCEDURE — 97116 GAIT TRAINING THERAPY: CPT | Performed by: PHYSICAL THERAPIST

## 2021-02-12 PROCEDURE — 94760 N-INVAS EAR/PLS OXIMETRY 1: CPT

## 2021-02-12 PROCEDURE — 74011250636 HC RX REV CODE- 250/636: Performed by: PHYSICIAN ASSISTANT

## 2021-02-12 PROCEDURE — 74011250636 HC RX REV CODE- 250/636: Performed by: NURSE PRACTITIONER

## 2021-02-12 PROCEDURE — 36415 COLL VENOUS BLD VENIPUNCTURE: CPT

## 2021-02-12 PROCEDURE — 74011250637 HC RX REV CODE- 250/637: Performed by: INTERNAL MEDICINE

## 2021-02-12 PROCEDURE — 85025 COMPLETE CBC W/AUTO DIFF WBC: CPT

## 2021-02-12 PROCEDURE — 74011250637 HC RX REV CODE- 250/637: Performed by: STUDENT IN AN ORGANIZED HEALTH CARE EDUCATION/TRAINING PROGRAM

## 2021-02-12 PROCEDURE — 65270000029 HC RM PRIVATE

## 2021-02-12 PROCEDURE — 97535 SELF CARE MNGMENT TRAINING: CPT

## 2021-02-12 PROCEDURE — 80048 BASIC METABOLIC PNL TOTAL CA: CPT

## 2021-02-12 PROCEDURE — 74011000250 HC RX REV CODE- 250: Performed by: INTERNAL MEDICINE

## 2021-02-12 PROCEDURE — 77010033678 HC OXYGEN DAILY

## 2021-02-12 PROCEDURE — 82962 GLUCOSE BLOOD TEST: CPT

## 2021-02-12 PROCEDURE — 97165 OT EVAL LOW COMPLEX 30 MIN: CPT

## 2021-02-12 PROCEDURE — 97161 PT EVAL LOW COMPLEX 20 MIN: CPT | Performed by: PHYSICAL THERAPIST

## 2021-02-12 PROCEDURE — 94640 AIRWAY INHALATION TREATMENT: CPT

## 2021-02-12 RX ADMIN — GABAPENTIN 300 MG: 300 CAPSULE ORAL at 21:34

## 2021-02-12 RX ADMIN — Medication 10 ML: at 18:22

## 2021-02-12 RX ADMIN — Medication 10 ML: at 21:36

## 2021-02-12 RX ADMIN — HEPARIN SODIUM 5000 UNITS: 5000 INJECTION INTRAVENOUS; SUBCUTANEOUS at 07:28

## 2021-02-12 RX ADMIN — AZITHROMYCIN MONOHYDRATE 500 MG: 500 INJECTION, POWDER, LYOPHILIZED, FOR SOLUTION INTRAVENOUS at 02:36

## 2021-02-12 RX ADMIN — METHYLPREDNISOLONE SODIUM SUCCINATE 40 MG: 40 INJECTION, POWDER, FOR SOLUTION INTRAMUSCULAR; INTRAVENOUS at 04:20

## 2021-02-12 RX ADMIN — BUPROPION HYDROCHLORIDE 200 MG: 100 TABLET, FILM COATED, EXTENDED RELEASE ORAL at 18:18

## 2021-02-12 RX ADMIN — LOSARTAN POTASSIUM 25 MG: 50 TABLET, FILM COATED ORAL at 13:53

## 2021-02-12 RX ADMIN — METFORMIN HYDROCHLORIDE 500 MG: 500 TABLET ORAL at 07:28

## 2021-02-12 RX ADMIN — IPRATROPIUM BROMIDE AND ALBUTEROL SULFATE 3 ML: .5; 3 SOLUTION RESPIRATORY (INHALATION) at 11:16

## 2021-02-12 RX ADMIN — LEVOTHYROXINE SODIUM 62.5 MCG: 0.12 TABLET ORAL at 07:29

## 2021-02-12 RX ADMIN — GUAIFENESIN AND DEXTROMETHORPHAN HYDROBROMIDE 1 TABLET: 600; 30 TABLET, EXTENDED RELEASE ORAL at 13:44

## 2021-02-12 RX ADMIN — IPRATROPIUM BROMIDE AND ALBUTEROL SULFATE 3 ML: .5; 3 SOLUTION RESPIRATORY (INHALATION) at 20:43

## 2021-02-12 RX ADMIN — IPRATROPIUM BROMIDE AND ALBUTEROL SULFATE 3 ML: .5; 3 SOLUTION RESPIRATORY (INHALATION) at 07:27

## 2021-02-12 RX ADMIN — Medication 1000 UNITS: at 13:44

## 2021-02-12 RX ADMIN — IPRATROPIUM BROMIDE AND ALBUTEROL SULFATE 3 ML: .5; 3 SOLUTION RESPIRATORY (INHALATION) at 00:00

## 2021-02-12 RX ADMIN — IPRATROPIUM BROMIDE AND ALBUTEROL SULFATE 3 ML: .5; 3 SOLUTION RESPIRATORY (INHALATION) at 03:33

## 2021-02-12 RX ADMIN — ACETAMINOPHEN 650 MG: 325 TABLET ORAL at 20:00

## 2021-02-12 RX ADMIN — METHYLPREDNISOLONE SODIUM SUCCINATE 40 MG: 40 INJECTION, POWDER, FOR SOLUTION INTRAMUSCULAR; INTRAVENOUS at 21:34

## 2021-02-12 RX ADMIN — FAMOTIDINE 20 MG: 20 TABLET, FILM COATED ORAL at 13:44

## 2021-02-12 RX ADMIN — HEPARIN SODIUM 5000 UNITS: 5000 INJECTION INTRAVENOUS; SUBCUTANEOUS at 18:18

## 2021-02-12 RX ADMIN — IPRATROPIUM BROMIDE AND ALBUTEROL SULFATE 3 ML: .5; 3 SOLUTION RESPIRATORY (INHALATION) at 15:13

## 2021-02-12 RX ADMIN — METHYLPREDNISOLONE SODIUM SUCCINATE 40 MG: 40 INJECTION, POWDER, FOR SOLUTION INTRAMUSCULAR; INTRAVENOUS at 13:44

## 2021-02-12 RX ADMIN — METFORMIN HYDROCHLORIDE 500 MG: 500 TABLET ORAL at 18:18

## 2021-02-12 RX ADMIN — TRAZODONE HYDROCHLORIDE 75 MG: 50 TABLET ORAL at 21:34

## 2021-02-12 RX ADMIN — BUPROPION HYDROCHLORIDE 200 MG: 100 TABLET, FILM COATED, EXTENDED RELEASE ORAL at 07:28

## 2021-02-12 NOTE — PROGRESS NOTES
Bedside shift change report given to Autumn Wilson RN (oncoming nurse) by Neida Wyman (offgoing nurse). Report included the following information SBAR and Kardex.

## 2021-02-12 NOTE — PROGRESS NOTES
Hospitalist Progress Note          Hal Greene NP  Please call  and page for questions. Call physician on-call through the  7pm-7am    Daily Progress Note: 2/11/2021    Primary care provider:Yamil Hull MD    Date of admission: 2/8/2021  4:55 PM    Admission Summary and Hospital Course:      From H&P 02/08/2021:  \" This is a 49-year-old woman with a past medical history significant for asthma, hypertension, hypothyroidism, fibromyalgia, depression, who was in her usual state of health until about 2 days ago when the patient developed shortness of breath which is progressive and getting worse. The shortness of breath is worse with exertion such as walking. No relief with home breathing treatment. The patient stated that she has had asthma for many years, and in 01/2021, she was started on a 12-day course of prednisone taper and she finished the course of the prednisone about 1-1/2 weeks ago and her symptoms got worse 2 days ago. The patient is not on oxygen at home. The shortness of breath is associated with cough which is nonproductive. The patient denies fever, rigors, or chills. The patient also denies sick contact or contact with any person with COVID-19 virus infection. She was taken to [facility] emergency room at [facility] for further evaluation. When the patient arrived at the emergency room, the patient received breathing treatment with improvement in her shortness of breath but the patient has significant desaturation in her oxygen level when an attempt was made to ambulate the patient in the emergency room. Because of that, the patient was referred to the hospitalist service for evaluation for admission. The patient was last admitted to the hospital.  She was admitted overnight from 07/07/2011 to 07/08/2011. The patient was admitted with syncope attributed to dehydration. The patient was rehydrated and discharged to home.   The patient has not been intubated or admitted to intensive care unit in the past as a result of asthma exacerbation. The patient denies history of congestive heart failure. The CTA of the chest done on arrival at the emergency room was negative for pulmonary embolism. \"    Subjective: Follow up SOB, asthma exacerbation  Patient seen and examined at the bedside. Labs, images and notes reviewed  Discussed with nursing staff, orders reviewed. Plan discussed with patient/Family  Feeling better with breathing. No SOB at rest. Some wheezing centrally per pt but not audible on ausculation. Uses albuterol inhaler/ neb and Symbicort along with Singulair. Follows with Dr. Erwin Mortensen. Outpatient. No fever, chills. No swellling. No other concerns. On IV solumedrol 80mg IV Q8h this am.     Assessment/Plan: Mod-persistent asthma: w/ acute exacerbation  -Improving slowly  -High dose solumedrol 125mg q8h--> 80mg q8h. Will taper further and also as recommended by Pulm. Down to 40mg q8h.   -Likely transition to PO Prednisone 40mg daily from 2/12  -Continue Duonebs, up to q4h prn  -Cont PRN nebs and inhaler  -Consider adding Spiriva or Duonebs on DC, as ok with Pulm  -Cont Azithromycin  -CXR and CT chest (02/08) neg for PE; w/ clear lungs other than left side calcified granuloma and right lobe nodule (chronic per pt)  -O2 to keep sats greater than 90%  -Appreciate pulm input  -Awaiting palliative care consult - requested by daughter  -Supportive care  -PT/OT as now likely be able to tolerate    Leukocytosis  -Resolved  -Potential for steroid driven leukocytosis    Steroid induced hyperglycemia: A1c 5.2  -sImproving  -Cont on IV steroids; likely further steroids as OP  -Cont metformin 500mg BID while on steroids  -Cont accuchecks AC/HS w/ SSI - change to insulin sensitive scale  -Monitor BS    LOREN: POA creat/gfr 1.10/48 (baseline normal renal fx)  -Resolved    Hyponatremia  -Mild, suspect 2/2 poor PO intake w/ sob  -Cont gentle IVF  -Monitor labs    HTN  -Stable, continue home losartan  -Monitor VS, trend BP    Hypothyroidism  -TSH normal limit  -Cont home dose levothyroxine: 62.5mcg daily    Fibromyalgia  -Cont home gabapentin    Depression  -Stable, continue home wellbutrin, trazodone    DVTppx: SCDs, heparin  Gippx: Pepcid  Code Status: Full code  Diet: Cardiac  Activity: OOB to chair TID and PRN  Discharge: Plan to discharge home with [facility]; 24-48hrs           Review of Systems:     Full ROS complete with pertinent positives and negatives as per HPI, otherwise negative  Objective:   Physical Exam:     Visit Vitals  BP (!) 158/64   Pulse 88   Temp 98.4 °F (36.9 °C)   Resp 20   Ht 5' (1.524 m)   Wt 74 kg (163 lb 2.3 oz)   SpO2 91%   BMI 31.86 kg/m²    O2 Flow Rate (L/min): 3 l/min O2 Device: Nasal cannula    Temp (24hrs), Av.4 °F (36.9 °C), Min:98 °F (36.7 °C), Max:98.7 °F (37.1 °C)    No intake/output data recorded. 02/10 0701 -  1900  In: 36 [P.O.:730]  Out: 800 [Urine:800]      General:  Alert, cooperative, no respiratory distress, appears stated age, obese   Lungs:   IClear with improved AE except bases bilaterally. No wheezing. Minimal to no use of accessory muscles on my eval   Heart:  RRR, S1, S2 normal, no murmur, click, rub or gallop. Abdomen:   Soft, non-tender, non-distended. Bowel sounds normal.    Extremities: Extremities normal, atraumatic, no cyanosis or edema. Skin: Skin color, texture, turgor normal. No rashes or lesions   Neurologic: CNII-XII intact.  VILLALBA, A&Ox4     Data Review:       Recent Days:  Recent Labs     21  0513 02/10/21  0247 02/09/21  0249   WBC 9.0 8.0 12.7*   HGB 11.0* 11.4* 12.6   HCT 34.2* 35.5 38.9    227 275     Recent Labs     21  0513 02/10/21  0247 21  0249    135* 135*   K 4.4 4.4 3.9    101 98   CO2 29 28 26   * 173* 186*   BUN 16 15 13   CREA 0.85 0.96 0.93   CA 9.1 10.1 9.9   MG  --   --  1.9   PHOS  --   --  3.2   ALB  --   --  3.5 ALT  --   --  41     No results for input(s): PH, PCO2, PO2, HCO3, FIO2 in the last 72 hours. 24 Hour Results:  Recent Results (from the past 24 hour(s))   GLUCOSE, POC    Collection Time: 02/10/21  9:49 PM   Result Value Ref Range    Glucose (POC) 130 (H) 65 - 100 mg/dL    Performed by Azael Montero    CBC WITH AUTOMATED DIFF    Collection Time: 02/11/21  5:13 AM   Result Value Ref Range    WBC 9.0 3.6 - 11.0 K/uL    RBC 3.68 (L) 3.80 - 5.20 M/uL    HGB 11.0 (L) 11.5 - 16.0 g/dL    HCT 34.2 (L) 35.0 - 47.0 %    MCV 92.9 80.0 - 99.0 FL    MCH 29.9 26.0 - 34.0 PG    MCHC 32.2 30.0 - 36.5 g/dL    RDW 13.9 11.5 - 14.5 %    PLATELET 384 614 - 691 K/uL    MPV 9.9 8.9 - 12.9 FL    NRBC 0.0 0  WBC    ABSOLUTE NRBC 0.00 0.00 - 0.01 K/uL    NEUTROPHILS 88 (H) 32 - 75 %    LYMPHOCYTES 8 (L) 12 - 49 %    MONOCYTES 3 (L) 5 - 13 %    EOSINOPHILS 0 0 - 7 %    BASOPHILS 0 0 - 1 %    IMMATURE GRANULOCYTES 1 (H) 0.0 - 0.5 %    ABS. NEUTROPHILS 7.9 1.8 - 8.0 K/UL    ABS. LYMPHOCYTES 0.7 (L) 0.8 - 3.5 K/UL    ABS. MONOCYTES 0.3 0.0 - 1.0 K/UL    ABS. EOSINOPHILS 0.0 0.0 - 0.4 K/UL    ABS. BASOPHILS 0.0 0.0 - 0.1 K/UL    ABS. IMM.  GRANS. 0.1 (H) 0.00 - 0.04 K/UL    DF SMEAR SCANNED      RBC COMMENTS        ATYPICAL LYMPHOCYTES PRESENT  NORMOCYTIC, NORMOCHROMIC     METABOLIC PANEL, BASIC    Collection Time: 02/11/21  5:13 AM   Result Value Ref Range    Sodium 137 136 - 145 mmol/L    Potassium 4.4 3.5 - 5.1 mmol/L    Chloride 104 97 - 108 mmol/L    CO2 29 21 - 32 mmol/L    Anion gap 4 (L) 5 - 15 mmol/L    Glucose 146 (H) 65 - 100 mg/dL    BUN 16 6 - 20 MG/DL    Creatinine 0.85 0.55 - 1.02 MG/DL    BUN/Creatinine ratio 19 12 - 20      GFR est AA >60 >60 ml/min/1.73m2    GFR est non-AA >60 >60 ml/min/1.73m2    Calcium 9.1 8.5 - 10.1 MG/DL   GLUCOSE, POC    Collection Time: 02/11/21  6:11 AM   Result Value Ref Range    Glucose (POC) 137 (H) 65 - 100 mg/dL    Performed by ARSALAN Martinez   PCT    GLUCOSE, POC    Collection Time: 02/11/21 11:20 AM   Result Value Ref Range    Glucose (POC) 196 (H) 65 - 100 mg/dL    Performed by Bandar Garibay    GLUCOSE, POC    Collection Time: 02/11/21  4:22 PM   Result Value Ref Range    Glucose (POC) 127 (H) 65 - 100 mg/dL    Performed by Bandar Garibay        Problem List:  Problem List as of 2/11/2021 Date Reviewed: 2/8/2021          Codes Class Noted - Resolved    Goals of care, counseling/discussion ICD-10-CM: Z71.89  ICD-9-CM: V65.49  Unknown - Present        Other chronic pain ICD-10-CM: G89.29  ICD-9-CM: 338.29  Unknown - Present        Cough ICD-10-CM: R05  ICD-9-CM: 786.2  Unknown - Present        Impaired mobility ICD-10-CM: Z74.09  ICD-9-CM: 799.89  Unknown - Present        Status asthmaticus ICD-10-CM: J45.902  ICD-9-CM: 493.91  2/8/2021 - Present        * (Principal) Moderate persistent asthma with acute exacerbation ICD-10-CM: J45.41  ICD-9-CM: 493.92  2/8/2021 - Present        IBS (irritable bowel syndrome) (Chronic) ICD-10-CM: K58.9  ICD-9-CM: 564.1  7/8/2011 - Present        HTN (hypertension) (Chronic) ICD-10-CM: I10  ICD-9-CM: 401.9  7/8/2011 - Present        Asthma (Chronic) ICD-10-CM: J45.909  ICD-9-CM: 493.90  7/8/2011 - Present        Unspecified hypothyroidism (Chronic) ICD-10-CM: E03.9  ICD-9-CM: 244.9  7/8/2011 - Present        Syncope ICD-10-CM: R55  ICD-9-CM: 780.2  7/7/2011 - Present        RESOLVED: Dehydration ICD-10-CM: E86.0  ICD-9-CM: 276.51  7/7/2011 - 7/8/2011              Medications reviewed  Current Facility-Administered Medications   Medication Dose Route Frequency    methylPREDNISolone (PF) (Solu-MEDROL) injection 40 mg  40 mg IntraVENous Q8H    albuterol-ipratropium (DUO-NEB) 2.5 MG-0.5 MG/3 ML  3 mL Nebulization Q4H RT    famotidine (PEPCID) tablet 20 mg  20 mg Oral DAILY    0.9% sodium chloride infusion  50 mL/hr IntraVENous CONTINUOUS    metFORMIN (GLUCOPHAGE) tablet 500 mg  500 mg Oral BID WITH MEALS    glucose chewable tablet 16 g  4 Tab Oral PRN    dextrose (D50W) injection syrg 12.5-25 g  25-50 mL IntraVENous PRN    glucagon (GLUCAGEN) injection 1 mg  1 mg IntraMUSCular PRN    insulin lispro (HUMALOG) injection   SubCUTAneous AC&HS    melatonin tablet 6 mg  6 mg Oral QHS PRN    guaiFENesin-dextromethorphan SR (HUMIBID DM) 600-30 mg tablet 1 Tab  1 Tab Oral BID    levothyroxine (SYNTHROID) tablet 62.5 mcg  62.5 mcg Oral EVERY OTHER DAY    diphenhydrAMINE (BENADRYL) capsule 25 mg  25 mg Oral QHS PRN    [Held by provider] arformoterol 15 mcg/budesonide 0.5 mg neb solution   Nebulization BID RT    azithromycin (ZITHROMAX) 500 mg in 0.9% sodium chloride 250 mL (VIAL-MATE)  500 mg IntraVENous Q24H    buPROPion SR (WELLBUTRIN SR) tablet 200 mg  200 mg Oral BID    cholecalciferol (VITAMIN D3) (1000 Units /25 mcg) tablet 1,000 Units  1,000 Units Oral DAILY    gabapentin (NEURONTIN) capsule 300 mg  300 mg Oral QHS    losartan (COZAAR) tablet 25 mg  25 mg Oral DAILY    traZODone (DESYREL) tablet 75 mg  75 mg Oral QHS    sodium chloride (NS) flush 5-40 mL  5-40 mL IntraVENous Q8H    sodium chloride (NS) flush 5-40 mL  5-40 mL IntraVENous PRN    acetaminophen (TYLENOL) tablet 650 mg  650 mg Oral Q6H PRN    Or    acetaminophen (TYLENOL) suppository 650 mg  650 mg Rectal Q6H PRN    polyethylene glycol (MIRALAX) packet 17 g  17 g Oral DAILY PRN    promethazine (PHENERGAN) tablet 12.5 mg  12.5 mg Oral Q6H PRN    Or    ondansetron (ZOFRAN) injection 4 mg  4 mg IntraVENous Q6H PRN    heparin (porcine) injection 5,000 Units  5,000 Units SubCUTAneous Q8H    albuterol (PROVENTIL HFA, VENTOLIN HFA, PROAIR HFA) inhaler 1 Puff  1 Puff Inhalation Q4H PRN    ipratropium (ATROVENT HFA) 17 mcg inhaler  1 Puff Inhalation Q4H PRN       Care Plan discussed with: Patient/family, nurse      Jaime Woods NP  Hospitalist  Providers can reach me on Perfect[employer]erve

## 2021-02-12 NOTE — PROGRESS NOTES
Problem: Mobility Impaired (Adult and Pediatric)  Goal: *Acute Goals and Plan of Care (Insert Text)  Description: FUNCTIONAL STATUS PRIOR TO ADMISSION: Patient was independent and active with rollator    HOME SUPPORT PRIOR TO ADMISSION: The patient lived alone with daughter in the area to provide assistance. Physical Therapy Goals  Initiated 2/12/2021  1. Patient will move from supine to sit and sit to supine  in bed with modified independence within 7 day(s). 2.  Patient will transfer from bed to chair and chair to bed with modified independence using the least restrictive device within 7 day(s). 3.  Patient will perform sit to stand with modified independence within 7 day(s). 4.  Patient will ambulate with modified independence for 100 feet with the least restrictive device within 7 day(s). Outcome: Progressing Towards Goal   PHYSICAL THERAPY EVALUATION  Patient: Isabell Zelaya (43 y.o. female)  Date: 2/12/2021  Primary Diagnosis: Status asthmaticus [J45.902]        Precautions:   Fall    ASSESSMENT  Based on the objective data described below, the patient presents with decreased functional mobility from baseline level of function. Patient currently limited by SOB, gait instability, and decreased activity tolerance. Currently needing SBA for bed mobility and CGA/SBA for transfers. Amb approx 25 feet with rollator and CGA-SBA. SpO2 93% on 3 L O2 with ambulation and patient reporting SOB. She is below her functional baseline and [facility] [place]. Will continue to assess O2 needs with activity as she does not use O2 at home. Other factors to consider for discharge: at risk for falls     Patient will benefit from skilled therapy intervention to address the above noted impairments.        PLAN :  Recommendations and Planned Interventions: bed mobility training, transfer training, gait training, therapeutic exercises, patient and family training/education, and therapeutic activities      Frequency/Duration: Patient will be followed by physical therapy:  5 times a week to address goals. Recommendation for discharge: (in order for the patient to meet his/her long term goals)  Physical therapy at least 2 days/week in the home         IF patient discharges home will need the following DME: patient owns DME required for discharge         SUBJECTIVE:   Patient stated I can try to sit up for a little bit.     OBJECTIVE DATA SUMMARY:   HISTORY:    Past Medical History:   Diagnosis Date    Asthma     Depression     Fibromyalgia     GERD (gastroesophageal reflux disease)     Hypertension     Hypothyroid     Menopause 1995    Rotator cuff tear      Past Surgical History:   Procedure Laterality Date    HX CHOLECYSTECTOMY      HX GYN      HX ORTHOPAEDIC      HX TONSIL AND ADENOIDECTOMY         Personal factors and/or comorbidities impacting plan of care:     Home Situation  Home Environment: Apartment  # Steps to Enter: 0  One/Two Story Residence: One story  Living Alone: Yes  Support Systems: Child(baylee)  Patient Expects to be Discharged to[de-identified] Apartment  Current DME Used/Available at Home: Cane, straight, Grab bars, Walker, rollator  Tub or Shower Type: Tub/Shower combination    EXAMINATION/PRESENTATION/DECISION MAKING:   Critical Behavior:  Neurologic State: Alert  Orientation Level: Oriented X4        Hearing: Auditory  Auditory Impairment: None    Range Of Motion:  AROM: Generally decreased, functional                       Strength:    Strength: Generally decreased, functional       Functional Mobility:  Bed Mobility:  Rolling: Supervision  Supine to Sit: Supervision     Scooting: Supervision  Transfers:  Sit to Stand: Contact guard assistance  Stand to Sit: Contact guard assistance                       Balance:   Sitting: Intact  Standing: Impaired  Standing - Static: Constant support;Good  Standing - Dynamic : Constant support; Fair  Ambulation/Gait Training:  Distance (ft): 25 Feet (ft)  Assistive Device: Gait belt;Walker, rollator  Ambulation - Level of Assistance: Contact guard assistance;Assist x2     Gait Description (WDL): Exceptions to WDL  Gait Abnormalities: Decreased step clearance;Shuffling gait        Base of Support: Widened     Speed/[place]: Pace decreased (<100 feet/min); Shuffled; Slow  Step Length: Left shortened;Right shortened       Pain Rating:  No c/o pain    Activity Tolerance:   Fair and requires rest breaks    After treatment patient left in no apparent distress:   Sitting in chair and Call bell within reach    COMMUNICATION/EDUCATION:   The patients plan of care was discussed with: Physical therapist, Occupational therapist, and Registered nurse. Fall prevention education was provided and the patient/caregiver indicated understanding., Patient/family have participated as able in goal setting and plan of care. , and Patient/family agree to work toward stated goals and plan of care.     Thank you for this referral.  Ines Sparks, PT, DPT   Time Calculation: 21 mins

## 2021-02-12 NOTE — PROGRESS NOTES
Hospitalist Progress Note          Nils Mancilla NP  Please call  and page for questions. Call physician on-call through the  7pm-7am    Daily Progress Note: 2/12/2021    Primary care provider:Hudson Hull MD    Date of admission: 2/8/2021  4:55 PM    Admission Summary and Hospital Course:      From H&P 02/08/2021:  \" This is a 77-year-old woman with a past medical history significant for asthma, hypertension, hypothyroidism, fibromyalgia, depression, who was in her usual state of health until about 2 days ago when the patient developed shortness of breath which is progressive and getting worse. The shortness of breath is worse with exertion such as walking. No relief with home breathing treatment. The patient stated that she has had asthma for many years, and in 01/2021, she was started on a 12-day course of prednisone taper and she finished the course of the prednisone about 1-1/2 weeks ago and her symptoms got worse 2 days ago. The patient is not on oxygen at home. The shortness of breath is associated with cough which is nonproductive. The patient denies fever, rigors, or chills. The patient also denies sick contact or contact with any person with COVID-19 virus infection. She was taken to [facility] emergency room at [facility] for further evaluation. When the patient arrived at the emergency room, the patient received breathing treatment with improvement in her shortness of breath but the patient has significant desaturation in her oxygen level when an attempt was made to ambulate the patient in the emergency room. Because of that, the patient was referred to the hospitalist service for evaluation for admission. The patient was last admitted to the hospital.  She was admitted overnight from 07/07/2011 to 07/08/2011. The patient was admitted with syncope attributed to dehydration. The patient was rehydrated and discharged to home.   The patient has not been intubated or admitted to intensive care unit in the past as a result of asthma exacerbation. The patient denies history of congestive heart failure. The CTA of the chest done on arrival at the emergency room was negative for pulmonary embolism. \"    Subjective: Follow up SOB, asthma exacerbation  Patient seen and examined at the bedside. Labs, images and notes reviewed  Discussed with nursing staff, orders reviewed. Plan discussed with patient/Family  Continue to feel better with breathing. Feels somewhat better than yesterday as well. Still some wheezing. Cough is better. No fever or chills. No other concerns or overnight events. Assessment/Plan: Mod-persistent asthma: w/ acute exacerbation  -Improving slowly  -High dose solumedrol 125mg q8h--> 80mg q8h. -Tapered to Solu-Medrol 40 mg q8h on 2/11.   Continue the same on 2/12  -Likely transition to PO Prednisone 40mg daily over the weekend, likely tomorrow 2/13  -Continue Duonebs, up to q4h prn  -Cont PRN nebs and inhaler  -Consider adding Spiriva or Duonebs on DC, as ok with Pulm  -Cont Azithromycin  -CXR and CT chest (02/08) neg for PE; w/ clear lungs other than left side calcified granuloma and right lobe nodule (chronic per pt)  -O2 to keep sats greater than 90%  -Appreciate pulm input  -Appreciate palliative care consult - requested by daughter  -Supportive care  -PT/OT as now likely be able to tolerate    Leukocytosis  -Resolved  -Potential for steroid driven leukocytosis    Steroid induced hyperglycemia: A1c 5.2  -sImproving  -Cont on IV steroids; likely further steroids as OP  -Cont metformin 500mg BID while on steroids  -Cont accuchecks AC/HS w/ SSI - change to insulin sensitive scale  -Monitor BS    LOREN: POA creat/gfr 1.10/48 (baseline normal renal fx)  -Resolved    Hyponatremia  -Mild, suspect 2/2 poor PO intake w/ sob  -Cont gentle IVF  -Monitor labs    HTN  -Stable, continue home losartan  -Monitor VS, trend BP    Hypothyroidism  -TSH normal limit  -Cont home dose levothyroxine: 62.5mcg daily    Fibromyalgia  -Cont home gabapentin    Depression  -Stable, continue home wellbutrin, trazodone    DVTppx: SCDs, heparin  Gippx: Pepcid  Code Status: Full code  Diet: Cardiac  Activity: OOB to chair TID and PRN  Discharge: Plan to discharge home with [facility]; 24-48hrs           Review of Systems:     Full ROS complete with pertinent positives and negatives as per HPI, otherwise negative  Objective:   Physical Exam:     Visit Vitals  /82   Pulse 94   Temp 97.7 °F (36.5 °C)   Resp 18   Ht 5' (1.524 m)   Wt 74 kg (163 lb 2.3 oz)   SpO2 94%   BMI 31.86 kg/m²    O2 Flow Rate (L/min): 3 l/min O2 Device: Nasal cannula    Temp (24hrs), Av.1 °F (36.7 °C), Min:97.7 °F (36.5 °C), Max:98.4 °F (36.9 °C)    No intake/output data recorded. 02/10 1901 -  0700  In: 36 [P.O.:730]  Out: 800 [Urine:800]      General:  Alert, cooperative, no respiratory distress, appears stated age, obese   Lungs:    Clear to auscultation bilaterally except with minimal squeaky wheezing bibasilar areas, good air movement bilaterally. Heart:  RRR, S1, S2 normal, no murmur, click, rub or gallop. Abdomen:   Soft, non-tender, non-distended. Bowel sounds normal.    Extremities: Extremities normal, atraumatic, no cyanosis or edema. Skin: Skin color, texture, turgor normal. No rashes or lesions   Neurologic: CNII-XII intact. VILLALBA, A&Ox4     Data Review:       Recent Days:  Recent Labs     21  0513 02/10/21  024   WBC 9.3 9.0 8.0   HGB 11.1* 11.0* 11.4*   HCT 35.5 34.2* 35.5    264 227     Recent Labs     21  0513 02/10/21  0247    137 135*   K 4.3 4.4 4.4    104 101   CO2 29 29 28   * 146* 173*   BUN 27* 16 15   CREA 0.85 0.85 0.96   CA 9.3 9.1 10.1     No results for input(s): PH, PCO2, PO2, HCO3, FIO2 in the last 72 hours.     24 Hour Results:  Recent Results (from the past 24 hour(s)) GLUCOSE, POC    Collection Time: 02/11/21 11:20 AM   Result Value Ref Range    Glucose (POC) 196 (H) 65 - 100 mg/dL    Performed by Francheska Marie    GLUCOSE, POC    Collection Time: 02/11/21  4:22 PM   Result Value Ref Range    Glucose (POC) 127 (H) 65 - 100 mg/dL    Performed by Francheska Marie    GLUCOSE, POC    Collection Time: 02/11/21  9:26 PM   Result Value Ref Range    Glucose (POC) 111 (H) 65 - 100 mg/dL    Performed by Samanta Shoes    CBC WITH AUTOMATED DIFF    Collection Time: 02/12/21  4:11 AM   Result Value Ref Range    WBC 9.3 3.6 - 11.0 K/uL    RBC 3.74 (L) 3.80 - 5.20 M/uL    HGB 11.1 (L) 11.5 - 16.0 g/dL    HCT 35.5 35.0 - 47.0 %    MCV 94.9 80.0 - 99.0 FL    MCH 29.7 26.0 - 34.0 PG    MCHC 31.3 30.0 - 36.5 g/dL    RDW 14.0 11.5 - 14.5 %    PLATELET 022 060 - 658 K/uL    MPV 10.0 8.9 - 12.9 FL    NRBC 0.0 0  WBC    ABSOLUTE NRBC 0.00 0.00 - 0.01 K/uL    NEUTROPHILS 88 (H) 32 - 75 %    LYMPHOCYTES 8 (L) 12 - 49 %    MONOCYTES 3 (L) 5 - 13 %    EOSINOPHILS 0 0 - 7 %    BASOPHILS 0 0 - 1 %    IMMATURE GRANULOCYTES 1 (H) 0.0 - 0.5 %    ABS. NEUTROPHILS 8.2 (H) 1.8 - 8.0 K/UL    ABS. LYMPHOCYTES 0.7 (L) 0.8 - 3.5 K/UL    ABS. MONOCYTES 0.3 0.0 - 1.0 K/UL    ABS. EOSINOPHILS 0.0 0.0 - 0.4 K/UL    ABS. BASOPHILS 0.0 0.0 - 0.1 K/UL    ABS. IMM.  GRANS. 0.1 (H) 0.00 - 0.04 K/UL    DF SMEAR SCANNED      PLATELET COMMENTS Large Platelets      RBC COMMENTS MACROCYTOSIS  1+       METABOLIC PANEL, BASIC    Collection Time: 02/12/21  4:11 AM   Result Value Ref Range    Sodium 140 136 - 145 mmol/L    Potassium 4.3 3.5 - 5.1 mmol/L    Chloride 105 97 - 108 mmol/L    CO2 29 21 - 32 mmol/L    Anion gap 6 5 - 15 mmol/L    Glucose 124 (H) 65 - 100 mg/dL    BUN 27 (H) 6 - 20 MG/DL    Creatinine 0.85 0.55 - 1.02 MG/DL    BUN/Creatinine ratio 32 (H) 12 - 20      GFR est AA >60 >60 ml/min/1.73m2    GFR est non-AA >60 >60 ml/min/1.73m2    Calcium 9.3 8.5 - 10.1 MG/DL   GLUCOSE, POC    Collection Time: 02/12/21  6:27 AM Result Value Ref Range    Glucose (POC) 130 (H) 65 - 100 mg/dL    Performed by Dee Arnold        Problem List:  Problem List as of 2/12/2021 Date Reviewed: 2/8/2021          Codes Class Noted - Resolved    Goals of care, counseling/discussion ICD-10-CM: Z71.89  ICD-9-CM: V65.49  Unknown - Present        Other chronic pain ICD-10-CM: G89.29  ICD-9-CM: 338.29  Unknown - Present        Cough ICD-10-CM: R05  ICD-9-CM: 786.2  Unknown - Present        Impaired mobility ICD-10-CM: Z74.09  ICD-9-CM: 799.89  Unknown - Present        Status asthmaticus ICD-10-CM: J45.902  ICD-9-CM: 493.91  2/8/2021 - Present        * (Principal) Moderate persistent asthma with acute exacerbation ICD-10-CM: J45.41  ICD-9-CM: 493.92  2/8/2021 - Present        IBS (irritable bowel syndrome) (Chronic) ICD-10-CM: K58.9  ICD-9-CM: 564.1  7/8/2011 - Present        HTN (hypertension) (Chronic) ICD-10-CM: I10  ICD-9-CM: 401.9  7/8/2011 - Present        Asthma (Chronic) ICD-10-CM: J45.909  ICD-9-CM: 493.90  7/8/2011 - Present        Unspecified hypothyroidism (Chronic) ICD-10-CM: E03.9  ICD-9-CM: 244.9  7/8/2011 - Present        Syncope ICD-10-CM: R55  ICD-9-CM: 780.2  7/7/2011 - Present        RESOLVED: Dehydration ICD-10-CM: E86.0  ICD-9-CM: 276.51  7/7/2011 - 7/8/2011              Medications reviewed  Current Facility-Administered Medications   Medication Dose Route Frequency    methylPREDNISolone (PF) (Solu-MEDROL) injection 40 mg  40 mg IntraVENous Q8H    albuterol-ipratropium (DUO-NEB) 2.5 MG-0.5 MG/3 ML  3 mL Nebulization Q4H RT    famotidine (PEPCID) tablet 20 mg  20 mg Oral DAILY    0.9% sodium chloride infusion  50 mL/hr IntraVENous CONTINUOUS    metFORMIN (GLUCOPHAGE) tablet 500 mg  500 mg Oral BID WITH MEALS    glucose chewable tablet 16 g  4 Tab Oral PRN    dextrose (D50W) injection syrg 12.5-25 g  25-50 mL IntraVENous PRN    glucagon (GLUCAGEN) injection 1 mg  1 mg IntraMUSCular PRN    insulin lispro (HUMALOG) injection SubCUTAneous AC&HS    melatonin tablet 6 mg  6 mg Oral QHS PRN    guaiFENesin-dextromethorphan SR (HUMIBID DM) 600-30 mg tablet 1 Tab  1 Tab Oral BID    levothyroxine (SYNTHROID) tablet 62.5 mcg  62.5 mcg Oral EVERY OTHER DAY    diphenhydrAMINE (BENADRYL) capsule 25 mg  25 mg Oral QHS PRN    [Held by provider] arformoterol 15 mcg/budesonide 0.5 mg neb solution   Nebulization BID RT    azithromycin (ZITHROMAX) 500 mg in 0.9% sodium chloride 250 mL (VIAL-MATE)  500 mg IntraVENous Q24H    buPROPion SR (WELLBUTRIN SR) tablet 200 mg  200 mg Oral BID    cholecalciferol (VITAMIN D3) (1000 Units /25 mcg) tablet 1,000 Units  1,000 Units Oral DAILY    gabapentin (NEURONTIN) capsule 300 mg  300 mg Oral QHS    losartan (COZAAR) tablet 25 mg  25 mg Oral DAILY    traZODone (DESYREL) tablet 75 mg  75 mg Oral QHS    sodium chloride (NS) flush 5-40 mL  5-40 mL IntraVENous Q8H    sodium chloride (NS) flush 5-40 mL  5-40 mL IntraVENous PRN    acetaminophen (TYLENOL) tablet 650 mg  650 mg Oral Q6H PRN    Or    acetaminophen (TYLENOL) suppository 650 mg  650 mg Rectal Q6H PRN    polyethylene glycol (MIRALAX) packet 17 g  17 g Oral DAILY PRN    promethazine (PHENERGAN) tablet 12.5 mg  12.5 mg Oral Q6H PRN    Or    ondansetron (ZOFRAN) injection 4 mg  4 mg IntraVENous Q6H PRN    heparin (porcine) injection 5,000 Units  5,000 Units SubCUTAneous Q8H    albuterol (PROVENTIL HFA, VENTOLIN HFA, PROAIR HFA) inhaler 1 Puff  1 Puff Inhalation Q4H PRN    ipratropium (ATROVENT HFA) 17 mcg inhaler  1 Puff Inhalation Q4H PRN       Care Plan discussed with: Patient/family, nurse      Dawn Ortiz, NP  Hospitalist  Providers can reach me on Perfect[employer]

## 2021-02-12 NOTE — PROGRESS NOTES
Name: Matias Conway: Dimitry Long 53   : 1945 Admit Date: 2021   Phone: [phone]  Room: [facility]   PCP: Darrel Keys MD  MRN: [id number]   Date: 2021  Code: Full Code        HPI:      Had a rough day yesterday, but better today  Still on supplemental O2       Still on NC  Feeling better    2/10  11:15 AM       History was obtained from patient. I was asked by Bradley Stubbs MD to see Deloris Honeycutt in consultation for a chief complaint of cough. History of Present Illness: 66year old female with past medical history of asthma followed in our office by Dr Darryle Shave presented to [facility] with increased cough productive of clear to light yellow sputum for the past of 2 days duration along with some wheezing. She denies chest pain. She denied contact with a COVD-19 patient. She lives by herself. Recently treated in 2021 with Prednisone taper by PCP for similar complaints. In the short pump Ed her breathing got a little better after bronchodilator treatment but desaturated on ambulation. CTa chest - no pe.calcified nodules and hilar LN. COVID-19 negative. Office records reviewed:  Normal kamille  Symbicort 160/4.5  Has hx of allergies. Takes prn meds. Past Medical History:   Diagnosis Date    Asthma     Depression     Fibromyalgia     GERD (gastroesophageal reflux disease)     Hypertension     Hypothyroid     Menopause     Rotator cuff tear        Past Surgical History:   Procedure Laterality Date    HX CHOLECYSTECTOMY      HX GYN      HX ORTHOPAEDIC      HX TONSIL AND ADENOIDECTOMY         History reviewed. No pertinent family history.     Social History     Tobacco Use    Smoking status: Former Smoker     Packs/day: 1.00     Years: 15.00     Pack years: 15.00    Smokeless tobacco: Never Used   Substance Use Topics    Alcohol use: No       Allergies   Allergen Reactions    Pcn [Penicillins] Hives       Current Facility-Administered Medications Medication Dose Route Frequency    methylPREDNISolone (PF) (Solu-MEDROL) injection 40 mg  40 mg IntraVENous Q8H    albuterol-ipratropium (DUO-NEB) 2.5 MG-0.5 MG/3 ML  3 mL Nebulization Q4H RT    famotidine (PEPCID) tablet 20 mg  20 mg Oral DAILY    0.9% sodium chloride infusion  50 mL/hr IntraVENous CONTINUOUS    metFORMIN (GLUCOPHAGE) tablet 500 mg  500 mg Oral BID WITH MEALS    glucose chewable tablet 16 g  4 Tab Oral PRN    dextrose (D50W) injection syrg 12.5-25 g  25-50 mL IntraVENous PRN    glucagon (GLUCAGEN) injection 1 mg  1 mg IntraMUSCular PRN    insulin lispro (HUMALOG) injection   SubCUTAneous AC&HS    melatonin tablet 6 mg  6 mg Oral QHS PRN    guaiFENesin-dextromethorphan SR (HUMIBID DM) 600-30 mg tablet 1 Tab  1 Tab Oral BID    levothyroxine (SYNTHROID) tablet 62.5 mcg  62.5 mcg Oral EVERY OTHER DAY    diphenhydrAMINE (BENADRYL) capsule 25 mg  25 mg Oral QHS PRN    [Held by provider] arformoterol 15 mcg/budesonide 0.5 mg neb solution   Nebulization BID RT    azithromycin (ZITHROMAX) 500 mg in 0.9% sodium chloride 250 mL (VIAL-MATE)  500 mg IntraVENous Q24H    buPROPion SR (WELLBUTRIN SR) tablet 200 mg  200 mg Oral BID    cholecalciferol (VITAMIN D3) (1000 Units /25 mcg) tablet 1,000 Units  1,000 Units Oral DAILY    gabapentin (NEURONTIN) capsule 300 mg  300 mg Oral QHS    losartan (COZAAR) tablet 25 mg  25 mg Oral DAILY    traZODone (DESYREL) tablet 75 mg  75 mg Oral QHS    sodium chloride (NS) flush 5-40 mL  5-40 mL IntraVENous Q8H    sodium chloride (NS) flush 5-40 mL  5-40 mL IntraVENous PRN    acetaminophen (TYLENOL) tablet 650 mg  650 mg Oral Q6H PRN    Or    acetaminophen (TYLENOL) suppository 650 mg  650 mg Rectal Q6H PRN    polyethylene glycol (MIRALAX) packet 17 g  17 g Oral DAILY PRN    promethazine (PHENERGAN) tablet 12.5 mg  12.5 mg Oral Q6H PRN    Or    ondansetron (ZOFRAN) injection 4 mg  4 mg IntraVENous Q6H PRN    heparin (porcine) injection 5,000 Units 5,000 Units SubCUTAneous Q8H    albuterol (PROVENTIL HFA, VENTOLIN HFA, PROAIR HFA) inhaler 1 Puff  1 Puff Inhalation Q4H PRN    ipratropium (ATROVENT HFA) 17 mcg inhaler  1 Puff Inhalation Q4H PRN         REVIEW OF SYSTEMS   Negative except as stated in the HPI. Physical Exam:   Visit Vitals  /82   Pulse 94   Temp 97.7 °F (36.5 °C)   Resp 18   Ht 5' (1.524 m)   Wt 74 kg (163 lb 2.3 oz)   SpO2 94%   BMI 31.86 kg/m²       General:  Alert. Head:  Normocephalic. Eyes:  Conjunctivae/corneas clear. Nose: Nares normal.   Throat: Lips, mucosa, and tongue normal.           Lungs:   Mostly clear        Heart:  Regular rate and rhythm. Abdomen:   Soft, non-tender. Extremities: Extremities normal, atraumatic, no cyanosis or edema. Pulses: 2+ and symmetric all extremities.    Skin: Skin color, texture, turgor normal. No rashes or lesions   Lymph nodes: Cervical, supraclavicular, and axillary nodes normal.   Neurologic: Grossly nonfocal       Lab Results   Component Value Date/Time    Sodium 140 02/12/2021 04:11 AM    Potassium 4.3 02/12/2021 04:11 AM    Chloride 105 02/12/2021 04:11 AM    CO2 29 02/12/2021 04:11 AM    BUN 27 (H) 02/12/2021 04:11 AM    Creatinine 0.85 02/12/2021 04:11 AM    Glucose 124 (H) 02/12/2021 04:11 AM    Calcium 9.3 02/12/2021 04:11 AM    Magnesium 1.9 02/09/2021 02:49 AM    Phosphorus 3.2 02/09/2021 02:49 AM       Lab Results   Component Value Date/Time    WBC 9.3 02/12/2021 04:11 AM    HGB 11.1 (L) 02/12/2021 04:11 AM    PLATELET 247 61/42/2835 04:11 AM    MCV 94.9 02/12/2021 04:11 AM       Lab Results   Component Value Date/Time    C-Reactive protein 6.27 (H) 02/10/2021 02:47 AM    TSH 0.66 02/09/2021 02:49 AM       Lab Results   Component Value Date/Time    Culture result: PROBABLE ENTEROCOCCUS SPECIES (A) 02/10/2021 04:28 AM    Culture result: NO GROWTH 2 DAYS 02/10/2021 02:47 AM     Lab Results   Component Value Date/Time    Color YELLOW/STRAW 02/08/2021 06:45 PM Appearance CLOUDY (A) 02/08/2021 06:45 PM    Specific gravity 1.020 02/08/2021 06:45 PM    pH (UA) 6.0 02/08/2021 06:45 PM    Protein TRACE (A) 02/08/2021 06:45 PM    Glucose Negative 02/08/2021 06:45 PM    Ketone TRACE (A) 02/08/2021 06:45 PM    Bilirubin Negative 02/08/2021 06:45 PM    Blood TRACE (A) 02/08/2021 06:45 PM    Urobilinogen 1.0 02/08/2021 06:45 PM    Nitrites Negative 02/08/2021 06:45 PM    Leukocyte Esterase SMALL (A) 02/08/2021 06:45 PM    WBC 0-4 02/08/2021 06:45 PM    RBC 0-5 02/08/2021 06:45 PM    Bacteria Negative 02/08/2021 06:45 PM       IMPRESSION:  ===========  -asthma exacerbation.  -acute bronchitis - likely viral.  -eosinophilia with elevated CRP. -Hypoxemic respiratory failure     PLAN:  =====  -agree with solumedrol.  -check ige and rast; pending   -anti ccp ab; normal    -O2 supplementation to keep [place] above 92%. -Bronchodilator.  -atypical abx coverage.   -IV steroids, transition to PO over the weekend  -OOB as tolerated; suggest PT/OT  -will need outpatient follow up; Dr Adonis Carter   -PRN over the weekend     Rasheed Gutiérrez PA-C

## 2021-02-12 NOTE — PROGRESS NOTES
Problem: Self Care Deficits Care Plan (Adult)  Goal: *Acute Goals and Plan of Care (Insert Text)  Description:   FUNCTIONAL STATUS PRIOR TO ADMISSION: Patient was modified independent using a single point cane for functional mobility. HOME SUPPORT: The patient lived alone with daughter to provide assistance. Occupational Therapy Goals  Initiated 2/12/2021  1. Patient will perform lower body dressing with modified independence within 7 day(s). 2.  Patient will perform bathing with modified independence within 7 day(s). 3.  Patient will perform grooming standing at sink with modified independence within 7 day(s). 4.  Patient will perform toilet transfers with modified independence within 7 day(s). 5.  Patient will perform all aspects of toileting with modified independence within 7 day(s). 6.  Patient will participate in upper extremity therapeutic exercise/activities with modified independence for 8 minutes within 7 day(s). 7.  Patient will utilize energy conservation techniques during functional activities with verbal cues within 7 day(s). Outcome: Progressing Towards Goal   OCCUPATIONAL THERAPY EVALUATION  Patient: Marion Feliz (1 y.o. female)  Date: 2/12/2021  Primary Diagnosis: Status asthmaticus [J45.902]        Precautions:  Fall    ASSESSMENT  Based on the objective data described below, the patient presents with impaired activity tolerance with DEWITT and coughing impacting her ability to complete ADL tasks with admission for asthma. Hx of orthopedic surgeries and arthritis. Received and left on 3 L O2 through out session. Initial SpO2 on 3 L 85% (questionable pleth) with supine > sit with increase to >90% and maintained at >90% through out session. She complete toileting at rollator level with CGA with mild DEWITT. Anticipate good progression with pulmonary care with plans to return home with support from daughter.     Current Level of Function Impacting Discharge (ADLs/self-care): CGA- min A at rollator level, limited standing tolerance for grooming/bathing tasks    Functional Outcome Measure: The patient scored 60/100 on the Barthel Index outcome measure which is indicative of mild impairment with ADL tasks. Other factors to consider for discharge: Patient lives at home alone with support from daughter and sister. She is in one bedroom apartment. Patient will benefit from skilled therapy intervention to address the above noted impairments. PLAN :  Recommendations and Planned Interventions: self care training, functional mobility training, therapeutic exercise, balance training, therapeutic activities, endurance activities, patient education, home safety training, and family training/education    Frequency/Duration: Patient will be followed by occupational therapy 5 times a week to address goals. Recommendation for discharge: (in order for the patient to meet his/her long term goals)  Occupational therapy at least 2 days/week in the home AND ensure assist and/or supervision for safety with higher level ADL/IADL tasks    This discharge recommendation:  Has not yet been discussed the attending provider and/or case management    IF patient discharges home will need the following DME: patient owns DME required for discharge       SUBJECTIVE:   Patient stated I am tired of coughing.     OBJECTIVE DATA SUMMARY:   HISTORY:   Past Medical History:   Diagnosis Date    Asthma     Depression     Fibromyalgia     GERD (gastroesophageal reflux disease)     Hypertension     Hypothyroid     Menopause 1995    Rotator cuff tear      Past Surgical History:   Procedure Laterality Date    HX CHOLECYSTECTOMY      HX GYN      HX ORTHOPAEDIC      HX TONSIL AND ADENOIDECTOMY         Expanded or extensive additional review of patient history:     Home Situation  Home Environment: Apartment  # Steps to Enter: 0  One/Two Story Residence: One story  Living Alone: Yes  Support Systems: Child(baylee)  Patient Expects to be Discharged to[de-identified] Apartment  Current DME Used/Available at Home: Cane, straight, Grab bars, Walker, rollator  Tub or Shower Type: Tub/Shower combination    Hand dominance: Right    EXAMINATION OF PERFORMANCE DEFICITS:  Cognitive/Behavioral Status:  Neurologic State: Alert; Appropriate for age                     Hearing: Auditory  Auditory Impairment: None    Vision/Perceptual:                                     Range of Motion:    AROM: Generally decreased, functional                         Strength:    Strength: Generally decreased, functional                Coordination:     Fine Motor Skills-Upper: Left Intact; Right Intact(arthiritis in fingers)    Gross Motor Skills-Upper: Left Intact; Right Intact    Tone & Sensation:                              Balance:  Sitting: Intact  Standing: Impaired  Standing - Static: Constant support;Good  Standing - Dynamic : Constant support; Fair    Functional Mobility and Transfers for ADLs:  Bed Mobility:  Rolling: Supervision  Supine to Sit: Supervision  Scooting: Supervision    Transfers:  Sit to Stand: Contact guard assistance  Stand to Sit: Contact guard assistance  Bed to Chair: Contact guard assistance  Bathroom Mobility: Contact guard assistance(rollator)  Toilet Transfer : Contact guard assistance(rollator and grab bar)    ADL Assessment:  Feeding: Independent    Oral Facial Hygiene/Grooming: Supervision;Setup(seated)    Bathing: Contact guard assistance; Additional time    Upper Body Dressing: Setup    Lower Body Dressing: Contact guard assistance    Toileting: Contact guard assistance                ADL Intervention and task modifications:   Patient instructed and indicated understanding the benefits of maintaining activity tolerance, functional mobility, and independence with self care tasks during acute stay  to ensure safe return home and to baseline.  Encouraged patient to increase frequency and duration OOB, be out of bed for all meals, perform daily ADLs (as approved by RN/MD regarding bathing etc), and performing functional mobility to/from bathroom. Provided education with patient on fall prevention for hospital and at home. This includes not getting OOB/chair/toilet without staff assistance, good lighting, good footwear, and recommended AD use. Patient with good understanding. Grooming  Position Performed: Seated in chair  Washing Hands: Supervision;Set-up    Lower Body Dressing Assistance  Slip on Shoes with Back: Supervision;Set-up    Toileting  Bladder Hygiene: Supervision  Clothing Management: Stand-by assistance    Functional Measure:  Barthel Index:    Bathin  Bladder: 10  Bowels: 10  Groomin  Dressin  Feeding: 10  Mobility: 5  Stairs: 0  Toilet Use: 5  Transfer (Bed to Chair and Back): 10  Total: 60/100        The Barthel ADL Index: Guidelines  1. The index should be used as a record of what a patient does, not as a record of what a patient could do. 2. The main aim is to establish degree of independence from any help, physical or verbal, however minor and for whatever reason. 3. The need for supervision renders the patient not independent. 4. A patient's performance should be established using the best available evidence. Asking the patient, friends/relatives and nurses are the usual sources, but direct observation and common sense are also important. However direct testing is not needed. 5. Usually the patient's performance over the preceding 24-48 hours is important, but occasionally longer periods will be relevant. 6. Middle categories imply that the patient supplies over 50 per cent of the effort. 7. Use of aids to be independent is allowed. Evelyn Haywood, Barthel, D.W. (5838). Functional evaluation: the Barthel Index. [address] [facility] (14)2. SHIMA Guerrier, Nai Castle., Sonja Chou., [place], [address] ().  Measuring the change indisability after inpatient rehabilitation; comparison of the responsiveness of the Barthel Index and Functional [place] Measure. Journal of Neurology, Neurosurgery, and Psychiatry, 66(4), 784-922. CRISPIN Mccarthy, ERICA Weir, & Pratik Norwood M.A. (2004.) Assessment of post-stroke quality of life in cost-effectiveness studies: The usefulness of the Barthel Index and the EuroQoL-5D. Quality of Life Research, 15, 561-12         Occupational Therapy Evaluation Charge Determination   History Examination Decision-Making   HIGH Complexity : Extensive review of history including physical, cognitive and psychosocial history  HIGH Complexity : 5 or more performance deficits relating to physical, cognitive , or psychosocial skils that result in activity limitations and / or participation restrictions HIGH Complexity : Patient presents with comorbidities that affect occupational performance. Signifigant modification of tasks or assistance (eg, physical or verbal) with assessment (s) is necessary to enable patient to complete evaluation       Based on the above components, the patient evaluation is determined to be of the following complexity level: HIGH   Pain Rating:  No c/o pain    Activity Tolerance:   Fair    After treatment patient left in no apparent distress:    Sitting in chair and Call bell within reach    COMMUNICATION/EDUCATION:   The patients plan of care was discussed with: Physical therapist and Registered nurse. Home safety education was provided and the patient/caregiver indicated understanding. and Patient/family agree to work toward stated goals and plan of care. This patients plan of care is appropriate for delegation to [facility].     Thank you for this referral.  Negrito Syed OT  Time Calculation: 21 mins

## 2021-02-13 LAB
ANION GAP SERPL CALC-SCNC: 3 MMOL/L (ref 5–15)
BASOPHILS # BLD: 0 K/UL (ref 0–0.1)
BASOPHILS NFR BLD: 0 % (ref 0–1)
BUN SERPL-MCNC: 23 MG/DL (ref 6–20)
BUN/CREAT SERPL: 26 (ref 12–20)
CALCIUM SERPL-MCNC: 9.1 MG/DL (ref 8.5–10.1)
CHLORIDE SERPL-SCNC: 104 MMOL/L (ref 97–108)
CO2 SERPL-SCNC: 31 MMOL/L (ref 21–32)
CREAT SERPL-MCNC: 0.89 MG/DL (ref 0.55–1.02)
DIFFERENTIAL METHOD BLD: ABNORMAL
EOSINOPHIL # BLD: 0 K/UL (ref 0–0.4)
EOSINOPHIL NFR BLD: 0 % (ref 0–7)
ERYTHROCYTE [DISTWIDTH] IN BLOOD BY AUTOMATED COUNT: 13.7 % (ref 11.5–14.5)
GLUCOSE BLD STRIP.AUTO-MCNC: 107 MG/DL (ref 65–100)
GLUCOSE BLD STRIP.AUTO-MCNC: 109 MG/DL (ref 65–100)
GLUCOSE BLD STRIP.AUTO-MCNC: 126 MG/DL (ref 65–100)
GLUCOSE BLD STRIP.AUTO-MCNC: 95 MG/DL (ref 65–100)
GLUCOSE SERPL-MCNC: 112 MG/DL (ref 65–100)
HCT VFR BLD AUTO: 34.5 % (ref 35–47)
HGB BLD-MCNC: 10.9 G/DL (ref 11.5–16)
IMM GRANULOCYTES # BLD AUTO: 0.1 K/UL (ref 0–0.04)
IMM GRANULOCYTES NFR BLD AUTO: 2 % (ref 0–0.5)
LYMPHOCYTES # BLD: 1.1 K/UL (ref 0.8–3.5)
LYMPHOCYTES NFR BLD: 13 % (ref 12–49)
MCH RBC QN AUTO: 29.5 PG (ref 26–34)
MCHC RBC AUTO-ENTMCNC: 31.6 G/DL (ref 30–36.5)
MCV RBC AUTO: 93.5 FL (ref 80–99)
MONOCYTES # BLD: 0.7 K/UL (ref 0–1)
MONOCYTES NFR BLD: 8 % (ref 5–13)
NEUTS SEG # BLD: 6.8 K/UL (ref 1.8–8)
NEUTS SEG NFR BLD: 77 % (ref 32–75)
NRBC # BLD: 0 K/UL (ref 0–0.01)
NRBC BLD-RTO: 0 PER 100 WBC
PLATELET # BLD AUTO: 290 K/UL (ref 150–400)
PMV BLD AUTO: 9.7 FL (ref 8.9–12.9)
POTASSIUM SERPL-SCNC: 4.3 MMOL/L (ref 3.5–5.1)
RBC # BLD AUTO: 3.69 M/UL (ref 3.8–5.2)
SERVICE CMNT-IMP: ABNORMAL
SERVICE CMNT-IMP: NORMAL
SODIUM SERPL-SCNC: 138 MMOL/L (ref 136–145)
WBC # BLD AUTO: 8.8 K/UL (ref 3.6–11)

## 2021-02-13 PROCEDURE — 74011000250 HC RX REV CODE- 250: Performed by: INTERNAL MEDICINE

## 2021-02-13 PROCEDURE — 74011250637 HC RX REV CODE- 250/637: Performed by: INTERNAL MEDICINE

## 2021-02-13 PROCEDURE — 80048 BASIC METABOLIC PNL TOTAL CA: CPT

## 2021-02-13 PROCEDURE — 74011250637 HC RX REV CODE- 250/637: Performed by: NURSE PRACTITIONER

## 2021-02-13 PROCEDURE — 94640 AIRWAY INHALATION TREATMENT: CPT

## 2021-02-13 PROCEDURE — 94760 N-INVAS EAR/PLS OXIMETRY 1: CPT

## 2021-02-13 PROCEDURE — 82962 GLUCOSE BLOOD TEST: CPT

## 2021-02-13 PROCEDURE — 36415 COLL VENOUS BLD VENIPUNCTURE: CPT

## 2021-02-13 PROCEDURE — 74011250636 HC RX REV CODE- 250/636: Performed by: INTERNAL MEDICINE

## 2021-02-13 PROCEDURE — 74011250636 HC RX REV CODE- 250/636: Performed by: NURSE PRACTITIONER

## 2021-02-13 PROCEDURE — 77010033678 HC OXYGEN DAILY

## 2021-02-13 PROCEDURE — 74011250636 HC RX REV CODE- 250/636: Performed by: PHYSICIAN ASSISTANT

## 2021-02-13 PROCEDURE — 85025 COMPLETE CBC W/AUTO DIFF WBC: CPT

## 2021-02-13 PROCEDURE — 65270000029 HC RM PRIVATE

## 2021-02-13 RX ORDER — IPRATROPIUM BROMIDE AND ALBUTEROL SULFATE 2.5; .5 MG/3ML; MG/3ML
3 SOLUTION RESPIRATORY (INHALATION)
Status: DISCONTINUED | OUTPATIENT
Start: 2021-02-13 | End: 2021-02-15 | Stop reason: HOSPADM

## 2021-02-13 RX ORDER — IPRATROPIUM BROMIDE AND ALBUTEROL SULFATE 2.5; .5 MG/3ML; MG/3ML
3 SOLUTION RESPIRATORY (INHALATION)
Status: DISCONTINUED | OUTPATIENT
Start: 2021-02-13 | End: 2021-02-13

## 2021-02-13 RX ORDER — PREDNISONE 20 MG/1
40 TABLET ORAL
Status: DISCONTINUED | OUTPATIENT
Start: 2021-02-14 | End: 2021-02-15 | Stop reason: HOSPADM

## 2021-02-13 RX ORDER — AMLODIPINE BESYLATE 5 MG/1
5 TABLET ORAL DAILY
Status: DISCONTINUED | OUTPATIENT
Start: 2021-02-13 | End: 2021-02-14

## 2021-02-13 RX ADMIN — TRAZODONE HYDROCHLORIDE 75 MG: 50 TABLET ORAL at 21:07

## 2021-02-13 RX ADMIN — IPRATROPIUM BROMIDE AND ALBUTEROL SULFATE 3 ML: .5; 3 SOLUTION RESPIRATORY (INHALATION) at 00:42

## 2021-02-13 RX ADMIN — BUPROPION HYDROCHLORIDE 200 MG: 100 TABLET, FILM COATED, EXTENDED RELEASE ORAL at 08:21

## 2021-02-13 RX ADMIN — METHYLPREDNISOLONE SODIUM SUCCINATE 40 MG: 40 INJECTION, POWDER, FOR SOLUTION INTRAMUSCULAR; INTRAVENOUS at 05:22

## 2021-02-13 RX ADMIN — GABAPENTIN 300 MG: 300 CAPSULE ORAL at 21:07

## 2021-02-13 RX ADMIN — METHYLPREDNISOLONE SODIUM SUCCINATE 40 MG: 40 INJECTION, POWDER, FOR SOLUTION INTRAMUSCULAR; INTRAVENOUS at 12:22

## 2021-02-13 RX ADMIN — IPRATROPIUM BROMIDE AND ALBUTEROL SULFATE 3 ML: .5; 3 SOLUTION RESPIRATORY (INHALATION) at 08:57

## 2021-02-13 RX ADMIN — BUPROPION HYDROCHLORIDE 200 MG: 100 TABLET, FILM COATED, EXTENDED RELEASE ORAL at 16:35

## 2021-02-13 RX ADMIN — Medication 1000 UNITS: at 09:00

## 2021-02-13 RX ADMIN — METFORMIN HYDROCHLORIDE 500 MG: 500 TABLET ORAL at 16:35

## 2021-02-13 RX ADMIN — ACETAMINOPHEN 650 MG: 325 TABLET ORAL at 15:45

## 2021-02-13 RX ADMIN — Medication 10 ML: at 05:23

## 2021-02-13 RX ADMIN — HEPARIN SODIUM 5000 UNITS: 5000 INJECTION INTRAVENOUS; SUBCUTANEOUS at 01:18

## 2021-02-13 RX ADMIN — FAMOTIDINE 20 MG: 20 TABLET, FILM COATED ORAL at 09:03

## 2021-02-13 RX ADMIN — AZITHROMYCIN MONOHYDRATE 500 MG: 500 INJECTION, POWDER, LYOPHILIZED, FOR SOLUTION INTRAVENOUS at 01:18

## 2021-02-13 RX ADMIN — AMLODIPINE BESYLATE 5 MG: 5 TABLET ORAL at 17:04

## 2021-02-13 RX ADMIN — HEPARIN SODIUM 5000 UNITS: 5000 INJECTION INTRAVENOUS; SUBCUTANEOUS at 08:21

## 2021-02-13 RX ADMIN — Medication 10 ML: at 21:07

## 2021-02-13 RX ADMIN — LOSARTAN POTASSIUM 25 MG: 50 TABLET, FILM COATED ORAL at 09:03

## 2021-02-13 RX ADMIN — Medication 10 ML: at 14:00

## 2021-02-13 RX ADMIN — HEPARIN SODIUM 5000 UNITS: 5000 INJECTION INTRAVENOUS; SUBCUTANEOUS at 16:35

## 2021-02-13 RX ADMIN — METFORMIN HYDROCHLORIDE 500 MG: 500 TABLET ORAL at 08:21

## 2021-02-13 NOTE — PROGRESS NOTES
ADULT PROTOCOL: JET AEROSOL ASSESSMENT    Patient  Pia Purvis     66 y.o.   female     2/13/2021  1:14 PM    Breath Sounds Pre Procedure: Right Breath Sounds: Diminished                               Left Breath Sounds: Diminished    Breath Sounds Post Procedure:                                        Breathing pattern: Pre procedure Breathing Pattern: Regular          Post procedure Breathing Pattern: Regular    Heart Rate: Pre procedure Pulse: 89           Post procedure Pulse: 95    Resp Rate: Pre procedure Respirations: 20           Post procedure      Peak Flow: Pre bronchodilator             Post bronchodilator       FVC/FEV1:      Incentive Spirometry:  Actual Volume (ml): 500 ml          Cough: Pre procedure Cough: Non-productive, Strong               Post procedure      Suctioned: NO    Sputum: Pre procedure                   Post procedure      Oxygen: O2 Device: Nasal cannula        Changed: NO    SpO2: Pre procedure SpO2: 95 %   with oxygen              Post procedure SpO2: 98 %  with oxygen    Nebulizer Therapy: Current medications Aerosolized Medications: DuoNeb      Changed: NO      Problem List:   Patient Active Problem List   Diagnosis Code    Syncope R55    IBS (irritable bowel syndrome) K58.9    HTN (hypertension) I10    Asthma J45.909    Unspecified hypothyroidism E03.9    Status asthmaticus J45.902    Moderate persistent asthma with acute exacerbation J45.41    Goals of care, counseling/discussion Z71.89    Other chronic pain G89.29    Cough R05    Impaired mobility Z74.09       Respiratory Therapist: Kirill Reddy

## 2021-02-13 NOTE — PROGRESS NOTES
Bedside shift change report given to Claudio Almanza (oncoming nurse) by Jesenia Keys RN (offgoing nurse). Report included the following information SBAR, Kardex, Intake/Output, MAR and Recent Results.

## 2021-02-13 NOTE — PROGRESS NOTES
Hospitalist Progress Note          Sonido Gallagher NP  Please call  and page for questions. Call physician on-call through the  7pm-7am    Daily Progress Note: 2/13/2021    Primary care provider:Nestor Hull MD    Date of admission: 2/8/2021  4:55 PM    Admission Summary and Hospital Course:      From H&P 02/08/2021:  \" This is a 47-year-old woman with a past medical history significant for asthma, hypertension, hypothyroidism, fibromyalgia, depression, who was in her usual state of health until about 2 days ago when the patient developed shortness of breath which is progressive and getting worse. The shortness of breath is worse with exertion such as walking. No relief with home breathing treatment. The patient stated that she has had asthma for many years, and in 01/2021, she was started on a 12-day course of prednisone taper and she finished the course of the prednisone about 1-1/2 weeks ago and her symptoms got worse 2 days ago. The patient is not on oxygen at home. The shortness of breath is associated with cough which is nonproductive. The patient denies fever, rigors, or chills. The patient also denies sick contact or contact with any person with COVID-19 virus infection. She was taken to [facility] emergency room at [facility] for further evaluation. When the patient arrived at the emergency room, the patient received breathing treatment with improvement in her shortness of breath but the patient has significant desaturation in her oxygen level when an attempt was made to ambulate the patient in the emergency room. Because of that, the patient was referred to the hospitalist service for evaluation for admission. The patient was last admitted to the hospital.  She was admitted overnight from 07/07/2011 to 07/08/2011. The patient was admitted with syncope attributed to dehydration. The patient was rehydrated and discharged to home.   The patient has not been intubated or admitted to intensive care unit in the past as a result of asthma exacerbation. The patient denies history of congestive heart failure. The CTA of the chest done on arrival at the emergency room was negative for pulmonary embolism. \"    Subjective: Follow up SOB, asthma exacerbation  Patient seen and examined at the bedside. Labs, images and notes reviewed  Discussed with nursing staff, orders reviewed. Plan discussed with patient/Family  Feeling ok. Still on 3LPM O2 NC. Not on O2 at home. Agreeable for IV to PO steroid transition. No fever, chills. Feels that she would need some help at home in form of [facility]. Does not want rehab. Assessment/Plan: Mod-persistent asthma: w/ acute exacerbation, slowly improving  Acute hypoxic respiratory failure due to above, still peristent, on O2 3LPM NC  -Improving slowly  -High dose solumedrol 125mg q8h--> 80mg q8h. -Tapered to Solu-Medrol 40 mg q8h on 2/11.  Will change her to BID today and DC IV steroids  -Transition to PO Prednisone 40mg daily 2/14.   -Continue to wean O2 NC.   -Would likely need Home O2 eval  -Continue Duonebs, up to q4h prn  -Cont PRN nebs and inhaler  -Consider adding Spiriva or Duonebs on DC, as ok with Pulm  -Cont Azithromycin  -CXR and CT chest (02/08) neg for PE; w/ clear lungs other than left side calcified granuloma and right lobe nodule (chronic per pt)  -Appreciate pulm input  -Appreciate palliative care consult - requested by daughter    Accelerated HTN, likely by steroid  -Add Amlodipine 5mg daily  -Continue Losartan    Leukocytosis  -Resolved  -Potential for steroid driven leukocytosis    Steroid induced hyperglycemia: A1c 5.2, controlled in acceptable range  -stable in less than 150  -Transition to PO steroid tomorrow  -Cont metformin 500mg BID while on steroids  -Cont accuchecks AC/HS w/ SSI - change to insulin sensitive scale  -Monitor BS    LOREN: POA creat/gfr 1.10/48 (baseline normal renal fx)  -Resolved    Hyponatremia  -Mild, suspect 2/2 poor PO intake w/ sob  -Cont gentle IVF  -Monitor labs    HTN  -Stable, continue home losartan  -Monitor VS, trend BP    Hypothyroidism  -TSH normal limit  -Cont home dose levothyroxine: 62.5mcg daily    Fibromyalgia  -Cont home gabapentin    Depression  -Stable, continue home wellbutrin, trazodone    DVTppx: SCDs, heparin  Gippx: Pepcid  Code Status: Full code  Diet: Cardiac  Activity: OOB to chair TID and PRN  Discharge: Plan to discharge home with [facility]; 24-48hrs           Review of Systems:     Full ROS complete with pertinent positives and negatives as per HPI, otherwise negative  Objective:   Physical Exam:     Visit Vitals  BP (!) 155/94   Pulse 94   Temp 97.7 °F (36.5 °C)   Resp 8   Ht 5' (1.524 m)   Wt 74 kg (163 lb 2.3 oz)   SpO2 96%   BMI 31.86 kg/m²    O2 Flow Rate (L/min): 3 l/min O2 Device: Nasal cannula    Temp (24hrs), Av.2 °F (36.8 °C), Min:97.7 °F (36.5 °C), Max:98.5 °F (36.9 °C)    No intake/output data recorded. No intake/output data recorded. General:  Alert, cooperative, no respiratory distress, appears stated age, obese   Lungs:    CTAB except Left postero-basal minimal wheezing from cough aggravated with deep breaths. No rhochi or wheeze otherwise   Heart:  RRR, S1, S2 normal, no murmur, click, rub or gallop. Abdomen:   Soft, non-tender, non-distended. Bowel sounds normal.    Extremities: Extremities normal, atraumatic, no cyanosis or edema. Skin: Skin color, texture, turgor normal. No rashes or lesions   Neurologic: CNII-XII intact.  VILLALBA, A&Ox4     Data Review:       Recent Days:  Recent Labs     21  0513   WBC 8.8 9.3 9.0   HGB 10.9* 11.1* 11.0*   HCT 34.5* 35.5 34.2*    267 264     Recent Labs     21  052111/21  0513    140 137   K 4.3 4.3 4.4    105 104   CO2 31 29 29   * 124* 146*   BUN 23* 27* 16   CREA 0.89 0.85 0.85   CA 9.1 9.3 9.1 No results for input(s): PH, PCO2, PO2, HCO3, FIO2 in the last 72 hours. 24 Hour Results:  Recent Results (from the past 24 hour(s))   GLUCOSE, POC    Collection Time: 02/12/21  4:22 PM   Result Value Ref Range    Glucose (POC) 108 (H) 65 - 100 mg/dL    Performed by Armani Hurst, POC    Collection Time: 02/12/21  9:21 PM   Result Value Ref Range    Glucose (POC) 113 (H) 65 - 100 mg/dL    Performed by Yosef Melvin S (CON)    CBC WITH AUTOMATED DIFF    Collection Time: 02/13/21  5:22 AM   Result Value Ref Range    WBC 8.8 3.6 - 11.0 K/uL    RBC 3.69 (L) 3.80 - 5.20 M/uL    HGB 10.9 (L) 11.5 - 16.0 g/dL    HCT 34.5 (L) 35.0 - 47.0 %    MCV 93.5 80.0 - 99.0 FL    MCH 29.5 26.0 - 34.0 PG    MCHC 31.6 30.0 - 36.5 g/dL    RDW 13.7 11.5 - 14.5 %    PLATELET 842 495 - 751 K/uL    MPV 9.7 8.9 - 12.9 FL    NRBC 0.0 0  WBC    ABSOLUTE NRBC 0.00 0.00 - 0.01 K/uL    NEUTROPHILS 77 (H) 32 - 75 %    LYMPHOCYTES 13 12 - 49 %    MONOCYTES 8 5 - 13 %    EOSINOPHILS 0 0 - 7 %    BASOPHILS 0 0 - 1 %    IMMATURE GRANULOCYTES 2 (H) 0.0 - 0.5 %    ABS. NEUTROPHILS 6.8 1.8 - 8.0 K/UL    ABS. LYMPHOCYTES 1.1 0.8 - 3.5 K/UL    ABS. MONOCYTES 0.7 0.0 - 1.0 K/UL    ABS. EOSINOPHILS 0.0 0.0 - 0.4 K/UL    ABS. BASOPHILS 0.0 0.0 - 0.1 K/UL    ABS. IMM.  GRANS. 0.1 (H) 0.00 - 0.04 K/UL    DF AUTOMATED     METABOLIC PANEL, BASIC    Collection Time: 02/13/21  5:22 AM   Result Value Ref Range    Sodium 138 136 - 145 mmol/L    Potassium 4.3 3.5 - 5.1 mmol/L    Chloride 104 97 - 108 mmol/L    CO2 31 21 - 32 mmol/L    Anion gap 3 (L) 5 - 15 mmol/L    Glucose 112 (H) 65 - 100 mg/dL    BUN 23 (H) 6 - 20 MG/DL    Creatinine 0.89 0.55 - 1.02 MG/DL    BUN/Creatinine ratio 26 (H) 12 - 20      GFR est AA >60 >60 ml/min/1.73m2    GFR est non-AA >60 >60 ml/min/1.73m2    Calcium 9.1 8.5 - 10.1 MG/DL   GLUCOSE, POC    Collection Time: 02/13/21  7:21 AM   Result Value Ref Range    Glucose (POC) 107 (H) 65 - 100 mg/dL    Performed by Karin SHABAZZ (CON)    GLUCOSE, POC    Collection Time: 02/13/21 11:27 AM   Result Value Ref Range    Glucose (POC) 109 (H) 65 - 100 mg/dL    Performed by CINDY NUNEZ        Problem List:  Problem List as of 2/13/2021 Date Reviewed: 2/8/2021          Codes Class Noted - Resolved    Goals of care, counseling/discussion ICD-10-CM: Z71.89  ICD-9-CM: V65.49  Unknown - Present        Other chronic pain ICD-10-CM: G89.29  ICD-9-CM: 338.29  Unknown - Present        Cough ICD-10-CM: R05  ICD-9-CM: 786.2  Unknown - Present        Impaired mobility ICD-10-CM: Z74.09  ICD-9-CM: 799.89  Unknown - Present        Status asthmaticus ICD-10-CM: J45.902  ICD-9-CM: 493.91  2/8/2021 - Present        * (Principal) Moderate persistent asthma with acute exacerbation ICD-10-CM: J45.41  ICD-9-CM: 493.92  2/8/2021 - Present        IBS (irritable bowel syndrome) (Chronic) ICD-10-CM: K58.9  ICD-9-CM: 564.1  7/8/2011 - Present        HTN (hypertension) (Chronic) ICD-10-CM: I10  ICD-9-CM: 401.9  7/8/2011 - Present        Asthma (Chronic) ICD-10-CM: J45.909  ICD-9-CM: 493.90  7/8/2011 - Present        Unspecified hypothyroidism (Chronic) ICD-10-CM: E03.9  ICD-9-CM: 244.9  7/8/2011 - Present        Syncope ICD-10-CM: R55  ICD-9-CM: 780.2  7/7/2011 - Present        RESOLVED: Dehydration ICD-10-CM: E86.0  ICD-9-CM: 276.51  7/7/2011 - 7/8/2011              Medications reviewed  Current Facility-Administered Medications   Medication Dose Route Frequency    albuterol-ipratropium (DUO-NEB) 2.5 MG-0.5 MG/3 ML  3 mL Nebulization Q4H PRN    [START ON 2/14/2021] predniSONE (DELTASONE) tablet 40 mg  40 mg Oral DAILY WITH BREAKFAST    methylPREDNISolone (PF) (Solu-MEDROL) injection 40 mg  40 mg IntraVENous Q8H    famotidine (PEPCID) tablet 20 mg  20 mg Oral DAILY    metFORMIN (GLUCOPHAGE) tablet 500 mg  500 mg Oral BID WITH MEALS    glucose chewable tablet 16 g  4 Tab Oral PRN    dextrose (D50W) injection syrg 12.5-25 g  25-50 mL IntraVENous PRN    glucagon (GLUCAGEN) injection 1 mg  1 mg IntraMUSCular PRN    insulin lispro (HUMALOG) injection   SubCUTAneous AC&HS    melatonin tablet 6 mg  6 mg Oral QHS PRN    levothyroxine (SYNTHROID) tablet 62.5 mcg  62.5 mcg Oral EVERY OTHER DAY    diphenhydrAMINE (BENADRYL) capsule 25 mg  25 mg Oral QHS PRN    [Held by provider] arformoterol 15 mcg/budesonide 0.5 mg neb solution   Nebulization BID RT    azithromycin (ZITHROMAX) 500 mg in 0.9% sodium chloride 250 mL (VIAL-MATE)  500 mg IntraVENous Q24H    buPROPion SR (WELLBUTRIN SR) tablet 200 mg  200 mg Oral BID    cholecalciferol (VITAMIN D3) (1000 Units /25 mcg) tablet 1,000 Units  1,000 Units Oral DAILY    gabapentin (NEURONTIN) capsule 300 mg  300 mg Oral QHS    losartan (COZAAR) tablet 25 mg  25 mg Oral DAILY    traZODone (DESYREL) tablet 75 mg  75 mg Oral QHS    sodium chloride (NS) flush 5-40 mL  5-40 mL IntraVENous Q8H    sodium chloride (NS) flush 5-40 mL  5-40 mL IntraVENous PRN    acetaminophen (TYLENOL) tablet 650 mg  650 mg Oral Q6H PRN    Or    acetaminophen (TYLENOL) suppository 650 mg  650 mg Rectal Q6H PRN    polyethylene glycol (MIRALAX) packet 17 g  17 g Oral DAILY PRN    promethazine (PHENERGAN) tablet 12.5 mg  12.5 mg Oral Q6H PRN    Or    ondansetron (ZOFRAN) injection 4 mg  4 mg IntraVENous Q6H PRN    heparin (porcine) injection 5,000 Units  5,000 Units SubCUTAneous Q8H    albuterol (PROVENTIL HFA, VENTOLIN HFA, PROAIR HFA) inhaler 1 Puff  1 Puff Inhalation Q4H PRN    ipratropium (ATROVENT HFA) 17 mcg inhaler  1 Puff Inhalation Q4H PRN       Care Plan discussed with: Patient/family, nurse      Noreen President, NP  Hospitalist  Providers can reach me on [employer]

## 2021-02-14 LAB
ANION GAP SERPL CALC-SCNC: 3 MMOL/L (ref 5–15)
BASOPHILS # BLD: 0 K/UL (ref 0–0.1)
BASOPHILS NFR BLD: 0 % (ref 0–1)
BUN SERPL-MCNC: 26 MG/DL (ref 6–20)
BUN/CREAT SERPL: 30 (ref 12–20)
CALCIUM SERPL-MCNC: 9.1 MG/DL (ref 8.5–10.1)
CHLORIDE SERPL-SCNC: 101 MMOL/L (ref 97–108)
CO2 SERPL-SCNC: 34 MMOL/L (ref 21–32)
CREAT SERPL-MCNC: 0.86 MG/DL (ref 0.55–1.02)
DIFFERENTIAL METHOD BLD: ABNORMAL
EOSINOPHIL # BLD: 0 K/UL (ref 0–0.4)
EOSINOPHIL NFR BLD: 0 % (ref 0–7)
ERYTHROCYTE [DISTWIDTH] IN BLOOD BY AUTOMATED COUNT: 13.6 % (ref 11.5–14.5)
GLUCOSE BLD STRIP.AUTO-MCNC: 103 MG/DL (ref 65–100)
GLUCOSE BLD STRIP.AUTO-MCNC: 126 MG/DL (ref 65–100)
GLUCOSE BLD STRIP.AUTO-MCNC: 72 MG/DL (ref 65–100)
GLUCOSE SERPL-MCNC: 84 MG/DL (ref 65–100)
HCT VFR BLD AUTO: 34.3 % (ref 35–47)
HGB BLD-MCNC: 10.9 G/DL (ref 11.5–16)
IMM GRANULOCYTES # BLD AUTO: 0.2 K/UL (ref 0–0.04)
IMM GRANULOCYTES NFR BLD AUTO: 2 % (ref 0–0.5)
LYMPHOCYTES # BLD: 2.1 K/UL (ref 0.8–3.5)
LYMPHOCYTES NFR BLD: 22 % (ref 12–49)
MCH RBC QN AUTO: 29.3 PG (ref 26–34)
MCHC RBC AUTO-ENTMCNC: 31.8 G/DL (ref 30–36.5)
MCV RBC AUTO: 92.2 FL (ref 80–99)
MONOCYTES # BLD: 1.2 K/UL (ref 0–1)
MONOCYTES NFR BLD: 12 % (ref 5–13)
NEUTS SEG # BLD: 6.1 K/UL (ref 1.8–8)
NEUTS SEG NFR BLD: 64 % (ref 32–75)
NRBC # BLD: 0 K/UL (ref 0–0.01)
NRBC BLD-RTO: 0 PER 100 WBC
PLATELET # BLD AUTO: 282 K/UL (ref 150–400)
PMV BLD AUTO: 9.5 FL (ref 8.9–12.9)
POTASSIUM SERPL-SCNC: 3.9 MMOL/L (ref 3.5–5.1)
RBC # BLD AUTO: 3.72 M/UL (ref 3.8–5.2)
SERVICE CMNT-IMP: ABNORMAL
SERVICE CMNT-IMP: ABNORMAL
SERVICE CMNT-IMP: NORMAL
SODIUM SERPL-SCNC: 138 MMOL/L (ref 136–145)
WBC # BLD AUTO: 9.6 K/UL (ref 3.6–11)

## 2021-02-14 PROCEDURE — 74011000250 HC RX REV CODE- 250: Performed by: INTERNAL MEDICINE

## 2021-02-14 PROCEDURE — 36415 COLL VENOUS BLD VENIPUNCTURE: CPT

## 2021-02-14 PROCEDURE — 82962 GLUCOSE BLOOD TEST: CPT

## 2021-02-14 PROCEDURE — 65270000029 HC RM PRIVATE

## 2021-02-14 PROCEDURE — 77010033678 HC OXYGEN DAILY

## 2021-02-14 PROCEDURE — 74011250636 HC RX REV CODE- 250/636: Performed by: NURSE PRACTITIONER

## 2021-02-14 PROCEDURE — 74011250636 HC RX REV CODE- 250/636: Performed by: INTERNAL MEDICINE

## 2021-02-14 PROCEDURE — 74011250637 HC RX REV CODE- 250/637: Performed by: STUDENT IN AN ORGANIZED HEALTH CARE EDUCATION/TRAINING PROGRAM

## 2021-02-14 PROCEDURE — 74011250637 HC RX REV CODE- 250/637: Performed by: INTERNAL MEDICINE

## 2021-02-14 PROCEDURE — 74011636637 HC RX REV CODE- 636/637: Performed by: INTERNAL MEDICINE

## 2021-02-14 PROCEDURE — 74011250637 HC RX REV CODE- 250/637: Performed by: NURSE PRACTITIONER

## 2021-02-14 PROCEDURE — 85025 COMPLETE CBC W/AUTO DIFF WBC: CPT

## 2021-02-14 PROCEDURE — 80048 BASIC METABOLIC PNL TOTAL CA: CPT

## 2021-02-14 PROCEDURE — 94640 AIRWAY INHALATION TREATMENT: CPT

## 2021-02-14 PROCEDURE — 94760 N-INVAS EAR/PLS OXIMETRY 1: CPT

## 2021-02-14 RX ORDER — AMLODIPINE BESYLATE 5 MG/1
10 TABLET ORAL DAILY
Status: DISCONTINUED | OUTPATIENT
Start: 2021-02-15 | End: 2021-02-15 | Stop reason: HOSPADM

## 2021-02-14 RX ORDER — METFORMIN HYDROCHLORIDE 500 MG/1
500 TABLET ORAL
Status: DISCONTINUED | OUTPATIENT
Start: 2021-02-15 | End: 2021-02-15 | Stop reason: HOSPADM

## 2021-02-14 RX ADMIN — FAMOTIDINE 20 MG: 20 TABLET, FILM COATED ORAL at 09:00

## 2021-02-14 RX ADMIN — AMLODIPINE BESYLATE 5 MG: 5 TABLET ORAL at 09:01

## 2021-02-14 RX ADMIN — GABAPENTIN 300 MG: 300 CAPSULE ORAL at 21:48

## 2021-02-14 RX ADMIN — HEPARIN SODIUM 5000 UNITS: 5000 INJECTION INTRAVENOUS; SUBCUTANEOUS at 17:01

## 2021-02-14 RX ADMIN — METFORMIN HYDROCHLORIDE 500 MG: 500 TABLET ORAL at 07:38

## 2021-02-14 RX ADMIN — TRAZODONE HYDROCHLORIDE 75 MG: 50 TABLET ORAL at 21:48

## 2021-02-14 RX ADMIN — Medication 10 ML: at 17:01

## 2021-02-14 RX ADMIN — HEPARIN SODIUM 5000 UNITS: 5000 INJECTION INTRAVENOUS; SUBCUTANEOUS at 07:38

## 2021-02-14 RX ADMIN — ACETAMINOPHEN 650 MG: 325 TABLET ORAL at 02:30

## 2021-02-14 RX ADMIN — Medication 10 ML: at 21:49

## 2021-02-14 RX ADMIN — ACETAMINOPHEN 650 MG: 325 TABLET ORAL at 21:57

## 2021-02-14 RX ADMIN — DIPHENHYDRAMINE HYDROCHLORIDE 25 MG: 25 CAPSULE ORAL at 21:57

## 2021-02-14 RX ADMIN — Medication 1000 UNITS: at 09:00

## 2021-02-14 RX ADMIN — ONDANSETRON 4 MG: 2 INJECTION INTRAMUSCULAR; INTRAVENOUS at 19:05

## 2021-02-14 RX ADMIN — HEPARIN SODIUM 5000 UNITS: 5000 INJECTION INTRAVENOUS; SUBCUTANEOUS at 01:30

## 2021-02-14 RX ADMIN — METFORMIN HYDROCHLORIDE 500 MG: 500 TABLET ORAL at 17:01

## 2021-02-14 RX ADMIN — Medication 1 CAPSULE: at 18:15

## 2021-02-14 RX ADMIN — LOSARTAN POTASSIUM 25 MG: 50 TABLET, FILM COATED ORAL at 09:01

## 2021-02-14 RX ADMIN — BUPROPION HYDROCHLORIDE 200 MG: 100 TABLET, FILM COATED, EXTENDED RELEASE ORAL at 17:01

## 2021-02-14 RX ADMIN — LEVOTHYROXINE SODIUM 62.5 MCG: 0.12 TABLET ORAL at 07:38

## 2021-02-14 RX ADMIN — BUPROPION HYDROCHLORIDE 200 MG: 100 TABLET, FILM COATED, EXTENDED RELEASE ORAL at 07:38

## 2021-02-14 RX ADMIN — IPRATROPIUM BROMIDE AND ALBUTEROL SULFATE 3 ML: .5; 3 SOLUTION RESPIRATORY (INHALATION) at 11:22

## 2021-02-14 RX ADMIN — PREDNISONE 40 MG: 20 TABLET ORAL at 07:38

## 2021-02-14 RX ADMIN — AZITHROMYCIN MONOHYDRATE 500 MG: 500 INJECTION, POWDER, LYOPHILIZED, FOR SOLUTION INTRAVENOUS at 01:30

## 2021-02-14 NOTE — PROGRESS NOTES
5:11 PM  Patient O2 weaned to 1 L nasal cannula, SpO2 94%. Patient wished to continue weaning to room air later this evening.    6:23 PM  Patient SpO2 at 90% on room air, she was left on room air and encouraged incentive spirometer use. Patient able to get IS up to 750. Bedside shift change report given to [address] [place] [address] (oncoming nurse) by Jen Franz RN (offgoing nurse). Report included the following information SBAR, Kardex, MAR and Recent Results.

## 2021-02-14 NOTE — PROGRESS NOTES
Bedside shift change report given to Janette Morocho RN (oncoming nurse) by Randy Ash RN (offgoing nurse). Report included the following information SBAR, Kardex, Intake/Output, MAR and Recent Results.

## 2021-02-15 ENCOUNTER — HOME HEALTH ADMISSION (OUTPATIENT)
Dept: HOME HEALTH SERVICES | Facility: HOME HEALTH | Age: 76
End: 2021-02-15
Payer: MEDICARE

## 2021-02-15 VITALS
HEIGHT: 60 IN | TEMPERATURE: 98 F | DIASTOLIC BLOOD PRESSURE: 78 MMHG | SYSTOLIC BLOOD PRESSURE: 127 MMHG | WEIGHT: 163.14 LBS | HEART RATE: 91 BPM | BODY MASS INDEX: 32.03 KG/M2 | OXYGEN SATURATION: 93 % | RESPIRATION RATE: 16 BRPM

## 2021-02-15 LAB
ANION GAP SERPL CALC-SCNC: 6 MMOL/L (ref 5–15)
BACTERIA SPEC CULT: NORMAL
BASOPHILS # BLD: 0 K/UL (ref 0–0.1)
BASOPHILS NFR BLD: 0 % (ref 0–1)
BUN SERPL-MCNC: 25 MG/DL (ref 6–20)
BUN/CREAT SERPL: 28 (ref 12–20)
CALCIUM SERPL-MCNC: 8.8 MG/DL (ref 8.5–10.1)
CHLORIDE SERPL-SCNC: 102 MMOL/L (ref 97–108)
CO2 SERPL-SCNC: 31 MMOL/L (ref 21–32)
CREAT SERPL-MCNC: 0.89 MG/DL (ref 0.55–1.02)
DIFFERENTIAL METHOD BLD: ABNORMAL
EOSINOPHIL # BLD: 0.2 K/UL (ref 0–0.4)
EOSINOPHIL NFR BLD: 2 % (ref 0–7)
ERYTHROCYTE [DISTWIDTH] IN BLOOD BY AUTOMATED COUNT: 13.5 % (ref 11.5–14.5)
GLUCOSE SERPL-MCNC: 71 MG/DL (ref 65–100)
HCT VFR BLD AUTO: 36.8 % (ref 35–47)
HGB BLD-MCNC: 11.7 G/DL (ref 11.5–16)
IMM GRANULOCYTES # BLD AUTO: 0.2 K/UL (ref 0–0.04)
IMM GRANULOCYTES NFR BLD AUTO: 1 % (ref 0–0.5)
LYMPHOCYTES # BLD: 3.1 K/UL (ref 0.8–3.5)
LYMPHOCYTES NFR BLD: 28 % (ref 12–49)
MCH RBC QN AUTO: 29.4 PG (ref 26–34)
MCHC RBC AUTO-ENTMCNC: 31.8 G/DL (ref 30–36.5)
MCV RBC AUTO: 92.5 FL (ref 80–99)
MONOCYTES # BLD: 1.2 K/UL (ref 0–1)
MONOCYTES NFR BLD: 11 % (ref 5–13)
NEUTS SEG # BLD: 6.3 K/UL (ref 1.8–8)
NEUTS SEG NFR BLD: 58 % (ref 32–75)
NRBC # BLD: 0 K/UL (ref 0–0.01)
NRBC BLD-RTO: 0 PER 100 WBC
PLATELET # BLD AUTO: 284 K/UL (ref 150–400)
PMV BLD AUTO: 9.2 FL (ref 8.9–12.9)
POTASSIUM SERPL-SCNC: 3.7 MMOL/L (ref 3.5–5.1)
RBC # BLD AUTO: 3.98 M/UL (ref 3.8–5.2)
SERVICE CMNT-IMP: NORMAL
SODIUM SERPL-SCNC: 139 MMOL/L (ref 136–145)
WBC # BLD AUTO: 11 K/UL (ref 3.6–11)

## 2021-02-15 PROCEDURE — 74011250637 HC RX REV CODE- 250/637: Performed by: INTERNAL MEDICINE

## 2021-02-15 PROCEDURE — 94640 AIRWAY INHALATION TREATMENT: CPT

## 2021-02-15 PROCEDURE — 80048 BASIC METABOLIC PNL TOTAL CA: CPT

## 2021-02-15 PROCEDURE — 74011250636 HC RX REV CODE- 250/636: Performed by: INTERNAL MEDICINE

## 2021-02-15 PROCEDURE — 85025 COMPLETE CBC W/AUTO DIFF WBC: CPT

## 2021-02-15 PROCEDURE — 94760 N-INVAS EAR/PLS OXIMETRY 1: CPT

## 2021-02-15 PROCEDURE — 77010033678 HC OXYGEN DAILY

## 2021-02-15 PROCEDURE — 36415 COLL VENOUS BLD VENIPUNCTURE: CPT

## 2021-02-15 PROCEDURE — 74011250637 HC RX REV CODE- 250/637: Performed by: NURSE PRACTITIONER

## 2021-02-15 PROCEDURE — 74011000250 HC RX REV CODE- 250: Performed by: INTERNAL MEDICINE

## 2021-02-15 PROCEDURE — 99231 SBSQ HOSP IP/OBS SF/LOW 25: CPT | Performed by: NURSE PRACTITIONER

## 2021-02-15 PROCEDURE — 74011636637 HC RX REV CODE- 636/637: Performed by: INTERNAL MEDICINE

## 2021-02-15 RX ORDER — AMLODIPINE BESYLATE 10 MG/1
10 TABLET ORAL DAILY
Qty: 30 TAB | Refills: 0 | Status: SHIPPED | OUTPATIENT
Start: 2021-02-16 | End: 2021-12-01

## 2021-02-15 RX ORDER — AMLODIPINE BESYLATE 10 MG/1
10 TABLET ORAL DAILY
Qty: 30 TAB | Refills: 0 | Status: SHIPPED | OUTPATIENT
Start: 2021-02-16 | End: 2021-02-15

## 2021-02-15 RX ORDER — CALCIUM CARBONATE 200(500)MG
200 TABLET,CHEWABLE ORAL AS NEEDED
Status: DISCONTINUED | OUTPATIENT
Start: 2021-02-15 | End: 2021-02-15 | Stop reason: HOSPADM

## 2021-02-15 RX ORDER — PREDNISONE 10 MG/1
TABLET ORAL
Qty: 18 TAB | Refills: 0 | Status: SHIPPED | OUTPATIENT
Start: 2021-02-16 | End: 2021-02-25

## 2021-02-15 RX ORDER — PREDNISONE 10 MG/1
TABLET ORAL
Qty: 18 TAB | Refills: 0 | Status: SHIPPED | OUTPATIENT
Start: 2021-02-16 | End: 2021-02-15 | Stop reason: SDUPTHER

## 2021-02-15 RX ADMIN — Medication 1 CAPSULE: at 09:16

## 2021-02-15 RX ADMIN — AMLODIPINE BESYLATE 10 MG: 5 TABLET ORAL at 09:16

## 2021-02-15 RX ADMIN — HEPARIN SODIUM 5000 UNITS: 5000 INJECTION INTRAVENOUS; SUBCUTANEOUS at 07:22

## 2021-02-15 RX ADMIN — IPRATROPIUM BROMIDE AND ALBUTEROL SULFATE 3 ML: .5; 3 SOLUTION RESPIRATORY (INHALATION) at 16:34

## 2021-02-15 RX ADMIN — METFORMIN HYDROCHLORIDE 500 MG: 500 TABLET ORAL at 07:22

## 2021-02-15 RX ADMIN — LOSARTAN POTASSIUM 25 MG: 50 TABLET, FILM COATED ORAL at 09:16

## 2021-02-15 RX ADMIN — BUPROPION HYDROCHLORIDE 200 MG: 100 TABLET, FILM COATED, EXTENDED RELEASE ORAL at 15:25

## 2021-02-15 RX ADMIN — Medication 1000 UNITS: at 09:16

## 2021-02-15 RX ADMIN — HEPARIN SODIUM 5000 UNITS: 5000 INJECTION INTRAVENOUS; SUBCUTANEOUS at 15:25

## 2021-02-15 RX ADMIN — FAMOTIDINE 20 MG: 20 TABLET, FILM COATED ORAL at 09:16

## 2021-02-15 RX ADMIN — BUPROPION HYDROCHLORIDE 200 MG: 100 TABLET, FILM COATED, EXTENDED RELEASE ORAL at 07:22

## 2021-02-15 RX ADMIN — PREDNISONE 40 MG: 20 TABLET ORAL at 07:22

## 2021-02-15 RX ADMIN — HEPARIN SODIUM 5000 UNITS: 5000 INJECTION INTRAVENOUS; SUBCUTANEOUS at 00:52

## 2021-02-15 NOTE — PROGRESS NOTES
Discharge instructions reviewed with patient and family, they had no further questions at this time.

## 2021-02-15 NOTE — PROGRESS NOTES
Pulse oximetry assessment   84% at rest on room air (if 88% or less, skip next steps)  91% while ambulating on room air  93% at rest on 2 LPM  95% while ambulating on 2 LPM

## 2021-02-15 NOTE — PROGRESS NOTES
Physical Therapy  2/15/2021    Chart reviewed. Attempted to see pt this afternoon and pt reports she is leaving sometime this afternoon. Reports living in 1 story apartment and has no LACHO; has daughter and granddaughter support as needed, as available. Pt has Rollator as AD for amb. Reports she has been OOB to bathroom and has amb in hallway at least once w/ daughter while here. She reports that she has home O2 set up. Discussed HHPT/OT to increase strength and activity tolerance for I/ADL's. Pt at this time has no questions/concerns regarding therapy. Will continue to follow if pt does not d/c this afternoon/evening.      Marylu Means, PTA

## 2021-02-15 NOTE — DISCHARGE INSTRUCTIONS
Discharge Instructions       PATIENT ID: Kiana Hernandez  MRN: [id number]   YOB: 1945    DATE OF ADMISSION: 2/8/2021  4:55 PM    DATE OF DISCHARGE: 2/15/2021    PRIMARY CARE PROVIDER: Jeanne Khan MD     ATTENDING PHYSICIAN: Estella Alejandro MD  DISCHARGING PROVIDER: Rajat Fuentes NP    To contact this individual call [phone] and ask the  to page. If unavailable ask to be transferred the Adult Hospitalist Department. DISCHARGE DIAGNOSES Persistant Asthma     CONSULTATIONS: IP CONSULT TO PULMONOLOGY    PROCEDURES/SURGERIES: * No surgery found *    PENDING TEST RESULTS:   At the time of discharge the following test results are still pending: None     FOLLOW UP APPOINTMENTS:   Follow-up Information     Follow up With Specialties Details Why Contact Info    Your pulmonologist   Tomorrow as planned     [facility] Emergency [facility]  If symptoms worsen Annita Cooper [phone]  [phone]    [address]   This is your home oxygen company [phone]    Jeanne Khan MD Family Medicine Schedule an appointment as soon as possible for a visit For follow up  [address] [place] [phone]             ADDITIONAL CARE RECOMMENDATIONS:     Take medications as prescribed. Follow up with your primary care provider within one week. DIET: Resume previous diet      ACTIVITY: Activity as tolerated    WOUND CARE: None     EQUIPMENT needed: Home O2 delivered      DISCHARGE MEDICATIONS:   See Medication Reconciliation Form    · It is important that you take the medication exactly as they are prescribed. · Keep your medication in the bottles provided by the pharmacist and keep a list of the medication names, dosages, and times to be taken in your wallet. · Do not take other medications without consulting your doctor. NOTIFY YOUR PHYSICIAN FOR ANY OF THE FOLLOWING:   Fever over 101 degrees for 24 hours.    Chest pain, shortness of breath, fever, chills, nausea, vomiting, diarrhea, change in mentation, falling, weakness, bleeding. Severe pain or pain not relieved by medications. Or, any other signs or symptoms that you may have questions about.       DISPOSITION:   X Home With:   OT  PT X HH  RN       SNF/Inpatient Rehab/LTAC    Independent/assisted living    Hospice    Other:       Signed:   Marli Santana NP  2/15/2021  4:05 PM

## 2021-02-15 NOTE — PROGRESS NOTES
Palliative Medicine Consult  [place]: [phone])    Patient Name: Karolina Pina  YOB: 1945    Date of Initial Consult: 21  Reason for Consult: Overwhelming symptoms  Requesting Provider: Oliver Marquez NP  Primary Care Physician: Kristen Pérez MD     SUMMARY:   Karolina Pina is a 66 y.o. female with a past history of asthma, hypertension, hypothyroid, fibromyalgia, and depression, who was admitted on 2021 from home with a diagnosis of acute asthma exacerbation. She presented to the ED with complaints of shortness of breath and cough. She was given a breathing treatment and felt better, but became hypoxic with ambulation. She was admitted for further management of an acute asthma exacerbation. Of note, she has not been hospitalized since  and has never been intubated or admitted to ICU for an asthma exacerbation. Current medical issues leading to Palliative Medicine involvement include: overwhelming symptoms, decline in quality of life, chronic pain. Social: She lives alone in a one level apartment. Her daughter Belgica Antony lives nearby and is her main support. Her sister Ramesh Fierro is also involved. Her son  suddenly last year from a heart attack. PALLIATIVE DIAGNOSES:   1. Goals of care  2. Chronic joint and back pain  3. Shortness of breath  4. Cough  5. Moderate persistent asthma with acute exacerbation  6. Impaired mobility  7. Debility     PLAN:   Patient seen at the bedside. She reports feeling well and anticipates being discharged home today. She may need to go with supplemental O2, but she is open to this if it is recommended. I talked with her about her request to be placed on daily low dose prednisone to help with her chronic pain and breathing.  I explained that this is not something that the palliative medicine team can initiate or manage since she does not have any end stage disease diagnoses and her pain is chronic and related to arthritis and fibromyalgia. I did recommend that she discuss the initiation of chronic steroid therapy with her pulmonologist Dr. Erum Hall or her PCP Dr. Maria Best. She verbalized her understanding. Communicated plan of care with: Palliative IDT    Will sign off at this time. Please re-consult us if the palliative medicine team can be of further assistance in Ms. Sarmiento's care. GOALS OF CARE / TREATMENT PREFERENCES:     GOALS OF CARE:  Patient/Health Care Proxy Stated Goals: Other (comment)(be discharged home)    TREATMENT PREFERENCES:   Code Status: Full Code    Advance Care Planning:  [x] The [facility] Interdisciplinary Team has updated the ACP Navigator with Health Care Decision Maker and Patient Capacity      Primary Decision Maker: Wenda Baumgarten - Child - [phone]    No flowsheet data found. Medical Interventions: Full interventions     Other Instructions: Other:    As far as possible, the palliative care team has discussed with patient / health care proxy about goals of care / treatment preferences for patient. HISTORY:     History obtained from: patient, daughter, chart    CHIEF COMPLAINT: Shortness of breath, cough    HPI/SUBJECTIVE:    The patient is:   [x] Verbal and participatory  [] Non-participatory due to: She reports pain in most of her joints, which limits her mobility. She also has low back pain. She has intermittent shortness of breath, worse with talking, coughing, or activity. She has multiple coughing spells and is finally starting to bring up some sputum. She reports a poor appetite, especially while here at the hospital.    She walks with a rollator.     Clinical Pain Assessment (nonverbal scale for severity on nonverbal patients):   Clinical Pain Assessment  Severity: 2  Location: joints  Character: aching  Duration: chronc  Frequency: intermittent          Duration: for how long has pt been experiencing pain (e.g., 2 days, 1 month, years)  Frequency: how often pain is an issue (e.g., several times per day, once every few days, constant)     FUNCTIONAL ASSESSMENT:     Palliative Performance Scale (PPS):  PPS: 70       PSYCHOSOCIAL/SPIRITUAL SCREENING:     Palliative IDT has assessed this patient for cultural preferences / practices and a referral made as appropriate to needs (Cultural Services, Patient Advocacy, Ethics, etc.)    Any spiritual / Worship concerns:  [] Yes /  [x] No    Caregiver Burnout:  [] Yes /  [] No /  [x] No Caregiver Present      Anticipatory grief assessment:   [x] Normal  / [] Maladaptive       ESAS Anxiety: Anxiety: 0    ESAS Depression: Depression: 2        REVIEW OF SYSTEMS:     Positive and pertinent negative findings in ROS are noted above in HPI. The following systems were [x] reviewed / [] unable to be reviewed as noted in HPI  Other findings are noted below. Systems: constitutional, ears/nose/mouth/throat, respiratory, gastrointestinal, genitourinary, musculoskeletal, integumentary, neurologic, psychiatric, endocrine. Positive findings noted below. Modified ESAS Completed by: provider   Fatigue: 5 Drowsiness: 0   Depression: 2 Pain: 2   Anxiety: 0 Nausea: 0   Anorexia: 5 Dyspnea: 2           Stool Occurrence(s): 1        PHYSICAL EXAM:     From RN flowsheet:  Wt Readings from Last 3 Encounters:   02/08/21 163 lb 2.3 oz (74 kg)   09/13/12 175 lb (79.4 kg)   07/08/11 183 lb (83 kg)     Blood pressure 137/86, pulse 86, temperature 98.4 °F (36.9 °C), resp. rate 18, height 5' (1.524 m), weight 163 lb 2.3 oz (74 kg), SpO2 92 %.     Pain Scale 1: Numeric (0 - 10)  Pain Intensity 1: 0  Pain Onset 1: from coughing  Pain Location 1: Chest  Pain Orientation 1: Anterior  Pain Description 1: Aching  Pain Intervention(s) 1: Medication (see MAR)  Last bowel movement, if known:     Constitutional: alert, awake, sitting up in bed, pleasant  Eyes: pupils equal, anicteric  ENMT: no nasal discharge, moist mucous membranes  Respiratory: breathing not labored, symmetric  Musculoskeletal: no deformity, no tenderness to palpation  Skin: warm, dry  Neurologic: following commands, moving all extremities  Psychiatric: full affect, no hallucinations       HISTORY:     Principal Problem: Moderate persistent asthma with acute exacerbation (2/8/2021)    Active Problems:    Status asthmaticus (2/8/2021)      Goals of care, counseling/discussion ()      Other chronic pain ()      Cough ()      Impaired mobility ()      Past Medical History:   Diagnosis Date    Asthma     Depression     Fibromyalgia     GERD (gastroesophageal reflux disease)     Hypertension     Hypothyroid     Menopause 1995    Rotator cuff tear       Past Surgical History:   Procedure Laterality Date    HX CHOLECYSTECTOMY      HX GYN      HX ORTHOPAEDIC      HX TONSIL AND ADENOIDECTOMY        History reviewed. No pertinent family history. History reviewed, no pertinent family history.   Social History     Tobacco Use    Smoking status: Former Smoker     Packs/day: 1.00     Years: 15.00     Pack years: 15.00    Smokeless tobacco: Never Used   Substance Use Topics    Alcohol use: No     Allergies   Allergen Reactions    Pcn [Penicillins] Hives      Current Facility-Administered Medications   Medication Dose Route Frequency    calcium carbonate (TUMS) chewable tablet 200 mg [elemental]  200 mg Oral PRN    L.acidophilus-paracasei-S.thermophil-bifidobacter (RISAQUAD) 8 billion cell capsule  1 Cap Oral DAILY    amLODIPine (NORVASC) tablet 10 mg  10 mg Oral DAILY    metFORMIN (GLUCOPHAGE) tablet 500 mg  500 mg Oral DAILY WITH BREAKFAST    albuterol-ipratropium (DUO-NEB) 2.5 MG-0.5 MG/3 ML  3 mL Nebulization Q4H PRN    predniSONE (DELTASONE) tablet 40 mg  40 mg Oral DAILY WITH BREAKFAST    famotidine (PEPCID) tablet 20 mg  20 mg Oral DAILY    glucose chewable tablet 16 g  4 Tab Oral PRN    dextrose (D50W) injection syrg 12.5-25 g  25-50 mL IntraVENous PRN    glucagon (GLUCAGEN) injection 1 mg 1 mg IntraMUSCular PRN    insulin lispro (HUMALOG) injection   SubCUTAneous AC&HS    melatonin tablet 6 mg  6 mg Oral QHS PRN    levothyroxine (SYNTHROID) tablet 62.5 mcg  62.5 mcg Oral EVERY OTHER DAY    diphenhydrAMINE (BENADRYL) capsule 25 mg  25 mg Oral QHS PRN    [Held by provider] arformoterol 15 mcg/budesonide 0.5 mg neb solution   Nebulization BID RT    azithromycin (ZITHROMAX) 500 mg in 0.9% sodium chloride 250 mL (VIAL-MATE)  500 mg IntraVENous Q24H    buPROPion SR (WELLBUTRIN SR) tablet 200 mg  200 mg Oral BID    cholecalciferol (VITAMIN D3) (1000 Units /25 mcg) tablet 1,000 Units  1,000 Units Oral DAILY    gabapentin (NEURONTIN) capsule 300 mg  300 mg Oral QHS    losartan (COZAAR) tablet 25 mg  25 mg Oral DAILY    traZODone (DESYREL) tablet 75 mg  75 mg Oral QHS    sodium chloride (NS) flush 5-40 mL  5-40 mL IntraVENous Q8H    sodium chloride (NS) flush 5-40 mL  5-40 mL IntraVENous PRN    acetaminophen (TYLENOL) tablet 650 mg  650 mg Oral Q6H PRN    Or    acetaminophen (TYLENOL) suppository 650 mg  650 mg Rectal Q6H PRN    polyethylene glycol (MIRALAX) packet 17 g  17 g Oral DAILY PRN    promethazine (PHENERGAN) tablet 12.5 mg  12.5 mg Oral Q6H PRN    Or    ondansetron (ZOFRAN) injection 4 mg  4 mg IntraVENous Q6H PRN    heparin (porcine) injection 5,000 Units  5,000 Units SubCUTAneous Q8H    albuterol (PROVENTIL HFA, VENTOLIN HFA, PROAIR HFA) inhaler 1 Puff  1 Puff Inhalation Q4H PRN    ipratropium (ATROVENT HFA) 17 mcg inhaler  1 Puff Inhalation Q4H PRN          LAB AND IMAGING FINDINGS:     Lab Results   Component Value Date/Time    WBC 11.0 02/15/2021 12:52 AM    HGB 11.7 02/15/2021 12:52 AM    PLATELET 457 69/59/5331 12:52 AM     Lab Results   Component Value Date/Time    Sodium 139 02/15/2021 12:52 AM    Potassium 3.7 02/15/2021 12:52 AM    Chloride 102 02/15/2021 12:52 AM    CO2 31 02/15/2021 12:52 AM    BUN 25 (H) 02/15/2021 12:52 AM    Creatinine 0.89 02/15/2021 12:52 AM Calcium 8.8 02/15/2021 12:52 AM    Magnesium 1.9 02/09/2021 02:49 AM    Phosphorus 3.2 02/09/2021 02:49 AM      Lab Results   Component Value Date/Time    Alk. phosphatase 160 (H) 02/09/2021 02:49 AM    Protein, total 7.4 02/09/2021 02:49 AM    Albumin 3.5 02/09/2021 02:49 AM    Globulin 3.9 02/09/2021 02:49 AM     Lab Results   Component Value Date/Time    INR 1.1 07/07/2011 04:30 PM    Prothrombin time 11.2 (H) 07/07/2011 04:30 PM    aPTT 24.7 07/07/2011 04:30 PM      No results found for: IRON, FE, TIBC, IBCT, PSAT, FERR   No results found for: PH, PCO2, PO2  No components found for: [place]   Lab Results   Component Value Date/Time    CK 56 07/07/2011 04:30 PM    CK - MB 1.9 07/07/2011 04:30 PM                Total time: 15 min  Counseling / coordination time, spent as noted above: 10 min  > 50% counseling / coordination?: yes    Prolonged service was provided for  []30 min   []75 min in face to face time in the presence of the patient, spent as noted above. Time Start:   Time End:   Note: this can only be billed with 31055 (initial) or 23230 (follow up). If multiple start / stop times, list each separately.

## 2021-02-15 NOTE — DISCHARGE SUMMARY
Discharge Summary       PATIENT ID: Jose Antonio Jalloh  MRN: [id number]   YOB: 1945    DATE OF ADMISSION: 2/8/2021  4:55 PM    DATE OF DISCHARGE: 02/15/2021  PRIMARY CARE PROVIDER: Sanjay Leonard MD     ATTENDING PHYSICIAN: Daina Lopez MD  DISCHARGING PROVIDER: Nirail Sterling NP    To contact this individual call [phone] and ask the  to page. If unavailable ask to be transferred the Adult Hospitalist Department. CONSULTATIONS: IP CONSULT TO PULMONOLOGY    PROCEDURES/SURGERIES: * No surgery found *    [id number] [facility] COURSE:   From H&P 02/08/2021:  \" This is a 20-year-old woman with a past medical history significant for asthma, hypertension, hypothyroidism, fibromyalgia, depression, who was in her usual state of health until about 2 days ago when the patient developed shortness of breath which is progressive and getting worse.  The shortness of breath is worse with exertion such as walking.  No relief with home breathing treatment.  The patient stated that she has had asthma for many years, and in 01/2021, she was started on a 12-day course of prednisone taper and she finished the course of the prednisone about 1-1/2 weeks ago and her symptoms got worse 2 days ago. The patient is not on oxygen at home.  The shortness of breath is associated with cough which is nonproductive.  The patient denies fever, rigors, or chills. The patient also denies sick contact or contact with any person with COVID-19 virus infection.  She was taken to [facility] emergency room at [facility] for further evaluation.  When the patient arrived at the emergency room, the patient received breathing treatment with improvement in her shortness of breath but the patient has significant desaturation in her oxygen level when an attempt was made to ambulate the patient in the emergency room.  Because of that, the patient was referred to the hospitalist service for evaluation for admission.  The patient was last admitted to the hospital. Stormy Poe was admitted overnight from 07/07/2011 to 07/08/2011.  The patient was admitted with syncope attributed to dehydration.  The patient was rehydrated and discharged to home.  The patient has not been intubated or admitted to intensive care unit in the past as a result of asthma exacerbation.  The patient denies history of congestive heart failure.  The CTA of the chest done on arrival at the emergency room was negative for pulmonary embolism. \"    02/15/21: Patient seen and examined sitting up in bed with eyes open. States that she is feeling fine and she wants to go home today. During conversation OT monitor placed on patient it was noted that her oxygen saturation remained in the upper 80s on room air. Placed patient on nasal cannula O2 and her oxygen saturation improved to the mid 90s. Patient states that she is becomes more short of breath while on room air. We discussed going home with home O2 patient is in agreement with this. Case management set up home O2 and home health. No new complaints noted this morning. 1630: Patient's daughter Kaylynn Pinto at bedside. Patient's condition and discharge planning discussed with daughter. Discharge medications reviewed with patient and daughter. Questions answered. Patient instructed to follow-up with primary care provider within the next 3 days and instructed to follow-up with pulmonary as soon as possible. DISCHARGE DIAGNOSES / PLAN:      Mod-persistent asthma: w/ acute exacerbation, slowly improving  Acute hypoxic respiratory failure due to above, still peristent, on O2 3LPM NC  -Improving slowly  - Antibiotic course completed  -Home O2 at 2 L  Rx prednisone taper pack  Patient instructed to follow-up with primary care provider and to make an appointment with pulmonary.   Continue home regimen  -CXR and CT chest (02/08) neg for PE; w/ clear lungs other than left side calcified granuloma and right lobe nodule (chronic per pt)     Accelerated HTN, likely by steroid  -Continue  Amlodipine to 10mg daily  -Continue Losartan  -May need downtitration or stopping Amlodipine as BP improves post-steroid completion  -Patient instructed to monitor blood pressure at home, record results and follow-up with her primary care provider     HTN  -Stable, continue home losartan  -Monitor VS, trend BP     Hypothyroidism  -TSH normal limit  -Cont home dose levothyroxine     Fibromyalgia  -Cont home gabapentin     Depression  -Stable, continue home wellbutrin, trazodone    Morbid obesity  BMI 31.86  Weight loss management to be followed up outpatient    Leukocytosis  -Resolved  -Potential for steroid driven leukocytosis     Steroid induced hyperglycemia: A1c 5.2, controlled in acceptable range       LOREN: POA creat/gfr 1.10/48 (baseline normal renal fx)  -Resolved     Hyponatremia  - Resolved     ADDITIONAL CARE RECOMMENDATIONS:   Take medications as prescribed. Follow up with your primary care provider within one week. PENDING TEST RESULTS:   At the time of discharge the following test results are still pending:None     FOLLOW UP APPOINTMENTS:    Follow-up Information     Follow up With Specialties Details Why Contact Info    Yeyo Hoyt   This is your home oxygen company [phone]    Jeanne Khan MD Family Medicine Schedule an appointment as soon as possible for a visit For follow up  [address]             DIET: Resume previous diet    ACTIVITY: Activity as tolerated    WOUND CARE: None     EQUIPMENT needed: Home O2 delivered to patient. DISCHARGE MEDICATIONS:  Current Discharge Medication List      START taking these medications    Details   L.acid,para-B. bifidum-S.therm (RISAQUAD) 8 billion cell cap cap Take 1 Cap by mouth daily.   Qty: 10 Cap, Refills: 0 amLODIPine (NORVASC) 10 mg tablet Take 1 Tab by mouth daily. Qty: 30 Tab, Refills: 0      predniSONE (STERAPRED DS) 10 mg dose pack Take 3 Tabs by mouth daily for 3 days, THEN 2 Tabs daily for 3 days, THEN 1 Tab daily for 3 days. See administration instruction per 10mg dose pack  Qty: 18 Tab, Refills: 0         CONTINUE these medications which have NOT CHANGED    Details   !! losartan (COZAAR) 25 mg tablet Take 25 mg by mouth daily. montelukast (SINGULAIR) 10 mg tablet Take 10 mg by mouth At bedtime. !! traZODone (DESYREL) 50 mg tablet Take 50 mg by mouth nightly. albuterol (PROVENTIL VENTOLIN) 2.5 mg /3 mL (0.083 %) nebu 1 Each by Nebulization route every four to six (4-6) hours as needed for Wheezing.      !! LOSARTAN PO Take 25 mg by mouth nightly.      gabapentin (NEURONTIN) 100 mg capsule Take 300 mg by mouth nightly. !! levothyroxine (SYNTHROID) 125 mcg tablet Take 125 mcg by mouth every other day. !! levothyroxine (SYNTHROID) 125 mcg tablet Take 62.5 mcg by mouth every other day. albuterol (PROVENTIL HFA) 90 mcg/Actuation inhaler Take 1 Puff by inhalation two (2) times a day. aspirin 81 mg tablet Take 81 mg by mouth nightly. CALCIUM CARBONATE (CALCIUM 600 PO) Take 2 Tabs by mouth daily. !! CHOLECALCIFEROL, VITAMIN D3, (VITAMIN D-3 PO) Take 1,200 Units by mouth daily. !! cholecalciferol, vitamin d3, (VITAMIN D) 1,000 unit tablet Take 1,000 Units by mouth daily. VITAMIN B COMPLEX PO Take  by mouth daily. polyethylene glycol (MIRALAX) 17 gram packet Take 8.5 g by mouth daily. ACETAMINOPHEN (TYLENOL PO) Take 2 g by mouth as needed. PSEUDOEPHEDRINE HCL (SUDOPHED PO) Take 2 Tabs by mouth as needed. pyridoxine (VITAMIN B-6) 200 mg tablet Take 200 mg by mouth daily. LOPERAMIDE HCL (IMODIUM PO) Take 3 Tabs by mouth as needed. fexofenadine (ALLEGRA ALLERGY) 60 mg tablet Take 60 mg by mouth two (2) times a day.       lansoprazole (PREVACID) 30 mg disintegrating tablet Take 30 mg by mouth daily. BUPROPION HCL (WELLBUTRIN XL PO) Take 450 mg by mouth daily. !! TRAZODONE HCL (TRAZODONE PO) Take 75 mg by mouth nightly. zolpidem (AMBIEN) 5 mg tablet Take 5 mg by mouth nightly as needed. budesonide-formoterol (SYMBICORT) 160-4.5 mcg/Actuation HFA inhaler Take 2 Puffs by inhalation two (2) times a day. cyclosporine (RESTASIS) 0.05 % ophthalmic emulsion Administer 1 Drop to both eyes two (2) times a day. !! - Potential duplicate medications found. Please discuss with provider. NOTIFY YOUR PHYSICIAN FOR ANY OF THE FOLLOWING:   Fever over 101 degrees for 24 hours. Chest pain, shortness of breath, fever, chills, nausea, vomiting, diarrhea, change in mentation, falling, weakness, bleeding. Severe pain or pain not relieved by medications. Or, any other signs or symptoms that you may have questions about. DISPOSITION:   X Home With:   OT  PT X HH  RN       Long term SNF/Inpatient Rehab    Independent/assisted living    Hospice    Other:       PATIENT CONDITION AT DISCHARGE:     Functional status    Poor    X Deconditioned     Independent      Cognition    X Lucid     Forgetful     Dementia      Catheters/lines (plus indication)    Godinez     PICC     PEG    X None      Code status     Full code     DNR      PHYSICAL EXAMINATION AT DISCHARGE:  General:          Alert, cooperative, no distress, appears stated age, obese    HEENT:           Atraumatic, anicteric sclerae, pink conjunctivae                          No oral ulcers, mucosa moist, throat clear, dentition fair  Neck:               Supple, symmetrical  Lungs:             Clear to auscultation bilaterally on O2 2L NC. No Wheezing or Rhonchi. No rales. Chest wall:      No tenderness  No Accessory muscle use. Heart:              Regular  rhythm,  No  murmur   No edema  Abdomen:        Soft, non-tender. Not distended.   Bowel sounds normal  Extremities: No cyanosis. No clubbing,                            Skin turgor normal, Capillary refill normal  Skin:                Not pale. Not Jaundiced  No rashes   Psych:             Not anxious or agitated.   Neurologic:      Alert, moves all extremities, answers questions appropriately and responds to commands       [address]:  Problem List as of 2/15/2021 Date Reviewed: 2/8/2021          Codes Class Noted - Resolved    Goals of care, counseling/discussion ICD-10-CM: Z71.89  ICD-9-CM: V65.49  Unknown - Present        Other chronic pain ICD-10-CM: G89.29  ICD-9-CM: 338.29  Unknown - Present        Cough ICD-10-CM: R05  ICD-9-CM: 786.2  Unknown - Present        Impaired mobility ICD-10-CM: Z74.09  ICD-9-CM: 799.89  Unknown - Present        Status asthmaticus ICD-10-CM: J45.902  ICD-9-CM: 493.91  2/8/2021 - Present        * (Principal) Moderate persistent asthma with acute exacerbation ICD-10-CM: J45.41  ICD-9-CM: 493.92  2/8/2021 - Present        IBS (irritable bowel syndrome) (Chronic) ICD-10-CM: K58.9  ICD-9-CM: 564.1  7/8/2011 - Present        HTN (hypertension) (Chronic) ICD-10-CM: I10  ICD-9-CM: 401.9  7/8/2011 - Present        Asthma (Chronic) ICD-10-CM: J45.909  ICD-9-CM: 493.90  7/8/2011 - Present        Unspecified hypothyroidism (Chronic) ICD-10-CM: E03.9  ICD-9-CM: 244.9  7/8/2011 - Present        Syncope ICD-10-CM: R55  ICD-9-CM: 780.2  7/7/2011 - Present        RESOLVED: Dehydration ICD-10-CM: E86.0  ICD-9-CM: 276.51  7/7/2011 - 7/8/2011              Greater than 45 minutes were spent with the patient on counseling and coordination of care    Signed:   Robyn Nunez NP  2/15/2021  4:51 PM

## 2021-02-15 NOTE — PROGRESS NOTES
Occupational Therapy: Chart reviewed. Patient received in bed and reporting that she is planning on discharge today. Declined participation and states she lives alone, but has been managing well. States daughter near by. States she does not have needs at this time. Will follow in case patient does not discharge today.   Jessy Councilman, COTA/WAYNE

## 2021-02-15 NOTE — ROUTINE PROCESS
Attempted to schedule [facility] PCP appt with Dr. Gilliland Peer informed me that she will send a note back to the nurse who will call the patient directly to schedule.

## 2021-02-15 NOTE — PROGRESS NOTES
Hospitalist Progress Note          Oliver Marquez NP  Please call  and page for questions. Call physician on-call through the  7pm-7am    Daily Progress Note: 2/14/2021    Primary care provider:Jose G Hull MD    Date of admission: 2/8/2021  4:55 PM    Admission Summary and Hospital Course:      From H&P 02/08/2021:  \" This is a 28-year-old woman with a past medical history significant for asthma, hypertension, hypothyroidism, fibromyalgia, depression, who was in her usual state of health until about 2 days ago when the patient developed shortness of breath which is progressive and getting worse. The shortness of breath is worse with exertion such as walking. No relief with home breathing treatment. The patient stated that she has had asthma for many years, and in 01/2021, she was started on a 12-day course of prednisone taper and she finished the course of the prednisone about 1-1/2 weeks ago and her symptoms got worse 2 days ago. The patient is not on oxygen at home. The shortness of breath is associated with cough which is nonproductive. The patient denies fever, rigors, or chills. The patient also denies sick contact or contact with any person with COVID-19 virus infection. She was taken to [facility] emergency room at [facility] for further evaluation. When the patient arrived at the emergency room, the patient received breathing treatment with improvement in her shortness of breath but the patient has significant desaturation in her oxygen level when an attempt was made to ambulate the patient in the emergency room. Because of that, the patient was referred to the hospitalist service for evaluation for admission. The patient was last admitted to the hospital.  She was admitted overnight from 07/07/2011 to 07/08/2011. The patient was admitted with syncope attributed to dehydration. The patient was rehydrated and discharged to home.   The patient has not been intubated or admitted to intensive care unit in the past as a result of asthma exacerbation. The patient denies history of congestive heart failure. The CTA of the chest done on arrival at the emergency room was negative for pulmonary embolism. \"    Subjective: Follow up SOB, asthma exacerbation  Patient seen and examined at the bedside. Labs, images and notes reviewed  Discussed with nursing staff, orders reviewed. Plan discussed with patient/Family  Feeling ok. Breathing better but intermittent cough bouts. No fever, chills. Still on 3LPM O2 NC. Agreeable for weaning as tolerated. D/w nursing team.  Overnight dizzi spell and BG was 72. Cutting back on Metformin to daily from BID. Encouraged evening snack as already had evening dose received  BP high and agreeable for uptitrating Amlodipin to 10mg daily from 5mg daily. Assessment/Plan: Mod-persistent asthma: w/ acute exacerbation, slowly improving  Acute hypoxic respiratory failure due to above, still peristent, on O2 3LPM NC  -Improving slowly  -High dose solumedrol 125mg q8h--> 80mg q8h. -Tapered to Solu-Medrol 40 mg q8h on 2/11. Will change her to BID today and DC IV steroids  -Transition to PO Prednisone 40mg daily 2/14. Will taper slowly. -Continue to wean O2 NC.   -Would likely need Home O2 eval  -Continue Duonebs, up to q4h prn  -Cont PRN nebs and inhaler  -Consider adding Spiriva or Duonebs on DC, as ok with Pulm.  Pt has neb machine at home.  -Cont Azithromycin  -CXR and CT chest (02/08) neg for PE; w/ clear lungs other than left side calcified granuloma and right lobe nodule (chronic per pt)  -Appreciate pulm input  -Appreciate palliative care consult - requested by daughter    Accelerated HTN, likely by steroid  -Uptitrate Amlodipine to 10mg daily  -Continue Losartan  -May need downtitration or stopping Amlodipine as BP improves post-steroid completion    Leukocytosis  -Resolved  -Potential for steroid driven leukocytosis    Steroid induced hyperglycemia: A1c 5.2, controlled in acceptable range  -stable in less than 150, once low as 72  -Transition to PO steroid   -Cont metformin, but change to 500mg daily to avoid hypoglycemia while on steroids  -Cont accuchecks AC/HS w/ SSI - change to insulin sensitive scale  -Monitor BS    LOREN: POA creat/gfr 1.10/48 (baseline normal renal fx)  -Resolved    Hyponatremia  -Mild, suspect 2/2 poor PO intake w/ sob  -Cont gentle IVF  -Monitor labs    HTN  -Stable, continue home losartan  -Monitor VS, trend BP    Hypothyroidism  -TSH normal limit  -Cont home dose levothyroxine: 62.5mcg daily    Fibromyalgia  -Cont home gabapentin    Depression  -Stable, continue home wellbutrin, trazodone    DVTppx: SCDs, heparin  Gippx: Pepcid  Code Status: Full code  Diet: Cardiac  Activity: OOB to chair TID and PRN  Discharge: Plan to discharge home with [facility]; 24-48hrs    Discharge Diagnosis:  # Moderate persistent Asthma exacerbation, slow improvement. # Accelrated hypertension, likely steroid induced. Losartan at baseline. Amlodipine added at present. May need further adjustment on completion of steroid treatment. # Hyperglycemia, related to steroid use. On Metfomrin, with likely need to stop once completion of steroid taper  # Leukocytosis, related to steroid  # LOREN, better  # Hyponatremia, better  # Hypothyroidism  # Fibromyalgia  # Depression       Review of Systems:     Full ROS complete with pertinent positives and negatives as per HPI, otherwise negative  Objective:   Physical Exam:     Visit Vitals  BP (!) 144/71 (BP 1 Location: Right upper arm, BP Patient Position: At rest)   Pulse 92   Temp 98.5 °F (36.9 °C)   Resp 18   Ht 5' (1.524 m)   Wt 74 kg (163 lb 2.3 oz)   SpO2 92%   BMI 31.86 kg/m²    O2 Flow Rate (L/min): 1 l/min O2 Device: Room air    Temp (24hrs), Av.2 °F (36.8 °C), Min:97.7 °F (36.5 °C), Max:98.5 °F (36.9 °C)    No intake/output data recorded.    No intake/output data recorded. General:  Alert, cooperative, no respiratory distress, appears stated age, obese. On 3LPM. Intermittent distress from cough bouts   Lungs:    Much clear except fine minimal basal crackles on left. No rhochi or wheeze otherwise   Heart:  RRR, S1, S2 normal, no murmur, click, rub or gallop. Abdomen:   Soft, non-tender, non-distended. Bowel sounds normal.    Extremities: Extremities normal, atraumatic, no cyanosis or edema. Skin: Skin color, texture, turgor normal. No rashes or lesions   Neurologic: CNII-XII intact. VILLALBA, A&Ox4     Data Review:       Recent Days:  Recent Labs     02/14/21 0131 02/13/21  0522 02/12/21  0411   WBC 9.6 8.8 9.3   HGB 10.9* 10.9* 11.1*   HCT 34.3* 34.5* 35.5    290 267     Recent Labs     02/14/21 0131 02/13/21 0522 02/12/21  0411    138 140   K 3.9 4.3 4.3    104 105   CO2 34* 31 29   GLU 84 112* 124*   BUN 26* 23* 27*   CREA 0.86 0.89 0.85   CA 9.1 9.1 9.3     No results for input(s): PH, PCO2, PO2, HCO3, FIO2 in the last 72 hours. 24 Hour Results:  Recent Results (from the past 24 hour(s))   CBC WITH AUTOMATED DIFF    Collection Time: 02/14/21  1:31 AM   Result Value Ref Range    WBC 9.6 3.6 - 11.0 K/uL    RBC 3.72 (L) 3.80 - 5.20 M/uL    HGB 10.9 (L) 11.5 - 16.0 g/dL    HCT 34.3 (L) 35.0 - 47.0 %    MCV 92.2 80.0 - 99.0 FL    MCH 29.3 26.0 - 34.0 PG    MCHC 31.8 30.0 - 36.5 g/dL    RDW 13.6 11.5 - 14.5 %    PLATELET 775 037 - 382 K/uL    MPV 9.5 8.9 - 12.9 FL    NRBC 0.0 0  WBC    ABSOLUTE NRBC 0.00 0.00 - 0.01 K/uL    NEUTROPHILS 64 32 - 75 %    LYMPHOCYTES 22 12 - 49 %    MONOCYTES 12 5 - 13 %    EOSINOPHILS 0 0 - 7 %    BASOPHILS 0 0 - 1 %    IMMATURE GRANULOCYTES 2 (H) 0.0 - 0.5 %    ABS. NEUTROPHILS 6.1 1.8 - 8.0 K/UL    ABS. LYMPHOCYTES 2.1 0.8 - 3.5 K/UL    ABS. MONOCYTES 1.2 (H) 0.0 - 1.0 K/UL    ABS. EOSINOPHILS 0.0 0.0 - 0.4 K/UL    ABS. BASOPHILS 0.0 0.0 - 0.1 K/UL    ABS. IMM.  GRANS. 0.2 (H) 0.00 - 0.04 K/UL    DF AUTOMATED METABOLIC PANEL, BASIC    Collection Time: 02/14/21  1:31 AM   Result Value Ref Range    Sodium 138 136 - 145 mmol/L    Potassium 3.9 3.5 - 5.1 mmol/L    Chloride 101 97 - 108 mmol/L    CO2 34 (H) 21 - 32 mmol/L    Anion gap 3 (L) 5 - 15 mmol/L    Glucose 84 65 - 100 mg/dL    BUN 26 (H) 6 - 20 MG/DL    Creatinine 0.86 0.55 - 1.02 MG/DL    BUN/Creatinine ratio 30 (H) 12 - 20      GFR est AA >60 >60 ml/min/1.73m2    GFR est non-AA >60 >60 ml/min/1.73m2    Calcium 9.1 8.5 - 10.1 MG/DL   GLUCOSE, POC    Collection Time: 02/14/21  6:15 AM   Result Value Ref Range    Glucose (POC) 72 65 - 100 mg/dL    Performed by Kenrick WINTER    GLUCOSE, POC    Collection Time: 02/14/21 11:04 AM   Result Value Ref Range    Glucose (POC) 103 (H) 65 - 100 mg/dL    Performed by Norma Leigh    GLUCOSE, POC    Collection Time: 02/14/21  4:29 PM   Result Value Ref Range    Glucose (POC) 126 (H) 65 - 100 mg/dL    Performed by CINDY NUNEZ        Problem List:  Problem List as of 2/14/2021 Date Reviewed: 2/8/2021          Codes Class Noted - Resolved    Goals of care, counseling/discussion ICD-10-CM: Z71.89  ICD-9-CM: V65.49  Unknown - Present        Other chronic pain ICD-10-CM: G89.29  ICD-9-CM: 338.29  Unknown - Present        Cough ICD-10-CM: R05  ICD-9-CM: 786.2  Unknown - Present        Impaired mobility ICD-10-CM: Z74.09  ICD-9-CM: 799.89  Unknown - Present        Status asthmaticus ICD-10-CM: J45.902  ICD-9-CM: 493.91  2/8/2021 - Present        * (Principal) Moderate persistent asthma with acute exacerbation ICD-10-CM: J45.41  ICD-9-CM: 493.92  2/8/2021 - Present        IBS (irritable bowel syndrome) (Chronic) ICD-10-CM: K58.9  ICD-9-CM: 564.1  7/8/2011 - Present        HTN (hypertension) (Chronic) ICD-10-CM: I10  ICD-9-CM: 401.9  7/8/2011 - Present        Asthma (Chronic) ICD-10-CM: J45.909  ICD-9-CM: 493.90  7/8/2011 - Present        Unspecified hypothyroidism (Chronic) ICD-10-CM: E03.9  ICD-9-CM: 244.9  7/8/2011 - Present        Syncope ICD-10-CM: R55  ICD-9-CM: 780.2  7/7/2011 - Present        RESOLVED: Dehydration ICD-10-CM: E86.0  ICD-9-CM: 276.51  7/7/2011 - 7/8/2011              Medications reviewed  Current Facility-Administered Medications   Medication Dose Route Frequency    L.acidophilus-paracasei-S.thermophil-bifidobacter (RISAQUAD) 8 billion cell capsule  1 Cap Oral DAILY    [START ON 2/15/2021] amLODIPine (NORVASC) tablet 10 mg  10 mg Oral DAILY    [START ON 2/15/2021] metFORMIN (GLUCOPHAGE) tablet 500 mg  500 mg Oral DAILY WITH BREAKFAST    albuterol-ipratropium (DUO-NEB) 2.5 MG-0.5 MG/3 ML  3 mL Nebulization Q4H PRN    predniSONE (DELTASONE) tablet 40 mg  40 mg Oral DAILY WITH BREAKFAST    famotidine (PEPCID) tablet 20 mg  20 mg Oral DAILY    glucose chewable tablet 16 g  4 Tab Oral PRN    dextrose (D50W) injection syrg 12.5-25 g  25-50 mL IntraVENous PRN    glucagon (GLUCAGEN) injection 1 mg  1 mg IntraMUSCular PRN    insulin lispro (HUMALOG) injection   SubCUTAneous AC&HS    melatonin tablet 6 mg  6 mg Oral QHS PRN    levothyroxine (SYNTHROID) tablet 62.5 mcg  62.5 mcg Oral EVERY OTHER DAY    diphenhydrAMINE (BENADRYL) capsule 25 mg  25 mg Oral QHS PRN    [Held by provider] arformoterol 15 mcg/budesonide 0.5 mg neb solution   Nebulization BID RT    azithromycin (ZITHROMAX) 500 mg in 0.9% sodium chloride 250 mL (VIAL-MATE)  500 mg IntraVENous Q24H    buPROPion SR (WELLBUTRIN SR) tablet 200 mg  200 mg Oral BID    cholecalciferol (VITAMIN D3) (1000 Units /25 mcg) tablet 1,000 Units  1,000 Units Oral DAILY    gabapentin (NEURONTIN) capsule 300 mg  300 mg Oral QHS    losartan (COZAAR) tablet 25 mg  25 mg Oral DAILY    traZODone (DESYREL) tablet 75 mg  75 mg Oral QHS    sodium chloride (NS) flush 5-40 mL  5-40 mL IntraVENous Q8H    sodium chloride (NS) flush 5-40 mL  5-40 mL IntraVENous PRN    acetaminophen (TYLENOL) tablet 650 mg  650 mg Oral Q6H PRN    Or    acetaminophen (TYLENOL) suppository 650 mg  650 mg Rectal Q6H PRN    polyethylene glycol (MIRALAX) packet 17 g  17 g Oral DAILY PRN    promethazine (PHENERGAN) tablet 12.5 mg  12.5 mg Oral Q6H PRN    Or    ondansetron (ZOFRAN) injection 4 mg  4 mg IntraVENous Q6H PRN    heparin (porcine) injection 5,000 Units  5,000 Units SubCUTAneous Q8H    albuterol (PROVENTIL HFA, VENTOLIN HFA, PROAIR HFA) inhaler 1 Puff  1 Puff Inhalation Q4H PRN    ipratropium (ATROVENT HFA) 17 mcg inhaler  1 Puff Inhalation Q4H PRN       Care Plan discussed with: Patient/family, nurse      Randall Barrientos NP  Hospitalist  Providers can reach me on [employer]

## 2021-02-15 NOTE — PROGRESS NOTES
Name: Toñito Lob: Dimitry Long 53   : 1945 Admit Date: 2021   Phone: [phone]  Room: [facility]   PCP: Marlene Stratton MD  MRN: [id number]   Date: 2/15/2021  Code: Full Code        HPI:    2/15    On room air  No WOB      Had a rough day yesterday, but better today  Still on supplemental O2       Still on NC  Feeling better    2/10  11:15 AM       History was obtained from patient. I was asked by Marely Cardoso MD to see Samantha Lyle in consultation for a chief complaint of cough. History of Present Illness: 66year old female with past medical history of asthma followed in our office by Dr Sangeetha Chisholm presented to [facility] with increased cough productive of clear to light yellow sputum for the past of 2 days duration along with some wheezing. She denies chest pain. She denied contact with a COVD-19 patient. She lives by herself. Recently treated in 2021 with Prednisone taper by PCP for similar complaints. In the short pump Ed her breathing got a little better after bronchodilator treatment but desaturated on ambulation. CTa chest - no pe.calcified nodules and hilar LN. COVID-19 negative. Office records reviewed:  Normal kamille  Symbicort 160/4.5  Has hx of allergies. Takes prn meds. Past Medical History:   Diagnosis Date    Asthma     Depression     Fibromyalgia     GERD (gastroesophageal reflux disease)     Hypertension     Hypothyroid     Menopause     Rotator cuff tear        Past Surgical History:   Procedure Laterality Date    HX CHOLECYSTECTOMY      HX GYN      HX ORTHOPAEDIC      HX TONSIL AND ADENOIDECTOMY         History reviewed. No pertinent family history.     Social History     Tobacco Use    Smoking status: Former Smoker     Packs/day: 1.00     Years: 15.00     Pack years: 15.00    Smokeless tobacco: Never Used   Substance Use Topics    Alcohol use: No       Allergies   Allergen Reactions    Pcn [Penicillins] Hives       Current Facility-Administered Medications   Medication Dose Route Frequency    L.acidophilus-paracasei-S.thermophil-bifidobacter (RISAQUAD) 8 billion cell capsule  1 Cap Oral DAILY    amLODIPine (NORVASC) tablet 10 mg  10 mg Oral DAILY    metFORMIN (GLUCOPHAGE) tablet 500 mg  500 mg Oral DAILY WITH BREAKFAST    albuterol-ipratropium (DUO-NEB) 2.5 MG-0.5 MG/3 ML  3 mL Nebulization Q4H PRN    predniSONE (DELTASONE) tablet 40 mg  40 mg Oral DAILY WITH BREAKFAST    famotidine (PEPCID) tablet 20 mg  20 mg Oral DAILY    glucose chewable tablet 16 g  4 Tab Oral PRN    dextrose (D50W) injection syrg 12.5-25 g  25-50 mL IntraVENous PRN    glucagon (GLUCAGEN) injection 1 mg  1 mg IntraMUSCular PRN    insulin lispro (HUMALOG) injection   SubCUTAneous AC&HS    melatonin tablet 6 mg  6 mg Oral QHS PRN    levothyroxine (SYNTHROID) tablet 62.5 mcg  62.5 mcg Oral EVERY OTHER DAY    diphenhydrAMINE (BENADRYL) capsule 25 mg  25 mg Oral QHS PRN    [Held by provider] arformoterol 15 mcg/budesonide 0.5 mg neb solution   Nebulization BID RT    azithromycin (ZITHROMAX) 500 mg in 0.9% sodium chloride 250 mL (VIAL-MATE)  500 mg IntraVENous Q24H    buPROPion SR (WELLBUTRIN SR) tablet 200 mg  200 mg Oral BID    cholecalciferol (VITAMIN D3) (1000 Units /25 mcg) tablet 1,000 Units  1,000 Units Oral DAILY    gabapentin (NEURONTIN) capsule 300 mg  300 mg Oral QHS    losartan (COZAAR) tablet 25 mg  25 mg Oral DAILY    traZODone (DESYREL) tablet 75 mg  75 mg Oral QHS    sodium chloride (NS) flush 5-40 mL  5-40 mL IntraVENous Q8H    sodium chloride (NS) flush 5-40 mL  5-40 mL IntraVENous PRN    acetaminophen (TYLENOL) tablet 650 mg  650 mg Oral Q6H PRN    Or    acetaminophen (TYLENOL) suppository 650 mg  650 mg Rectal Q6H PRN    polyethylene glycol (MIRALAX) packet 17 g  17 g Oral DAILY PRN    promethazine (PHENERGAN) tablet 12.5 mg  12.5 mg Oral Q6H PRN    Or    ondansetron (ZOFRAN) injection 4 mg  4 mg IntraVENous Q6H PRN    heparin (porcine) injection 5,000 Units  5,000 Units SubCUTAneous Q8H    albuterol (PROVENTIL HFA, VENTOLIN HFA, PROAIR HFA) inhaler 1 Puff  1 Puff Inhalation Q4H PRN    ipratropium (ATROVENT HFA) 17 mcg inhaler  1 Puff Inhalation Q4H PRN         REVIEW OF SYSTEMS   Negative except as stated in the HPI. Physical Exam:   Visit Vitals  /86 (BP 1 Location: Right upper arm)   Pulse 86   Temp 98.3 °F (36.8 °C)   Resp 18   Ht 5' (1.524 m)   Wt 74 kg (163 lb 2.3 oz)   SpO2 92%   BMI 31.86 kg/m²       General:  Alert. Head:  Normocephalic. Eyes:  Conjunctivae/corneas clear. Nose: Nares normal.   Throat: Lips, mucosa, and tongue normal.           Lungs:   Mostly clear        Heart:  Regular rate and rhythm. Abdomen:   Soft, non-tender. Extremities: Extremities normal, atraumatic, no cyanosis or edema. Pulses: 2+ and symmetric all extremities.    Skin: Skin color, texture, turgor normal. No rashes or lesions   Lymph nodes: Cervical, supraclavicular, and axillary nodes normal.   Neurologic: Grossly nonfocal       Lab Results   Component Value Date/Time    Sodium 139 02/15/2021 12:52 AM    Potassium 3.7 02/15/2021 12:52 AM    Chloride 102 02/15/2021 12:52 AM    CO2 31 02/15/2021 12:52 AM    BUN 25 (H) 02/15/2021 12:52 AM    Creatinine 0.89 02/15/2021 12:52 AM    Glucose 71 02/15/2021 12:52 AM    Calcium 8.8 02/15/2021 12:52 AM    Magnesium 1.9 02/09/2021 02:49 AM    Phosphorus 3.2 02/09/2021 02:49 AM       Lab Results   Component Value Date/Time    WBC 11.0 02/15/2021 12:52 AM    HGB 11.7 02/15/2021 12:52 AM    PLATELET 645 65/77/2867 12:52 AM    MCV 92.5 02/15/2021 12:52 AM       Lab Results   Component Value Date/Time    C-Reactive protein 6.27 (H) 02/10/2021 02:47 AM    TSH 0.66 02/09/2021 02:49 AM       Lab Results   Component Value Date/Time    Culture result: (A) 02/10/2021 04:28 AM     STREPTOCOCCUS AGALACTIAE SERO GROUP B Penicillin and ampicillin are drugs of choice for treatment of beta-hemolytic streptococcal infections. Susceptibility testing of penicillins and beta-lactams approved by the FDA for treatment of beta-hemolytic streptococcal infections need not be performed routinely, because nonsusceptible isolates are extremely rare. CLSI 2012    Culture result: NO GROWTH 5 DAYS 02/10/2021 02:47 AM     Lab Results   Component Value Date/Time    Color YELLOW/STRAW 02/08/2021 06:45 PM    Appearance CLOUDY (A) 02/08/2021 06:45 PM    Specific gravity 1.020 02/08/2021 06:45 PM    pH (UA) 6.0 02/08/2021 06:45 PM    Protein TRACE (A) 02/08/2021 06:45 PM    Glucose Negative 02/08/2021 06:45 PM    Ketone TRACE (A) 02/08/2021 06:45 PM    Bilirubin Negative 02/08/2021 06:45 PM    Blood TRACE (A) 02/08/2021 06:45 PM    Urobilinogen 1.0 02/08/2021 06:45 PM    Nitrites Negative 02/08/2021 06:45 PM    Leukocyte Esterase SMALL (A) 02/08/2021 06:45 PM    WBC 0-4 02/08/2021 06:45 PM    RBC 0-5 02/08/2021 06:45 PM    Bacteria Negative 02/08/2021 06:45 PM       IMPRESSION:  ===========  -asthma exacerbation.  -acute bronchitis - likely viral.  -eosinophilia with elevated CRP. -Hypoxemic respiratory failure     PLAN:  =====    -ige 572 and rast pending   -anti ccp ab; normal    -O2 supplementation to keep [place] above 92%. -Bronchodilator.  -atypical abx coverage.   -PO steroids for taper   -OOB as tolerated; suggest PT/OT  -will need outpatient follow up; Dr Francisco Javier Rubio   -consider xolair or other biologics as an outpatient  -follow up in 1 wk   -can be discharge from a pulmonary aspect       Rasheed Gutiérrez PA-C

## 2021-02-15 NOTE — PROGRESS NOTES
Transitions of Care plan: Home with home oxygen    -RUR: 18%  -Consult received to arrange home oxygen; referral sent to [facility] via [employer].   -Awaiting response. 2:20pm: Home oxygen has been approved through [email or web address] and they have delivered the tank to the patient. No further needs. 3:10pm: Nursing was speaking with patient's daughter on the phone, and daughter then wanted to talk with care management. CM spoke with daughter, who stated she was furious that home health had not already been set up, stating she had informed palliative care last week of the need for home health. CM apologized for the confusion and informed her I would set up the home health now. Choice offered and daughter stated she would like [facility], but added \"I hope they aren't as horrible as your hospital\". CM apologized again for the experience that she had. CM sent a referral to [facility].     4:05pm: [address] can accept patient for home health services.  Follow up information placed on SO.     RADHA LANDON, ACM-SW

## 2021-02-15 NOTE — PROGRESS NOTES
NUTRITION  Reason for Rescreen: LOS        RECOMMENDATIONS:   1. Add imodium PRN  2. Call for alternative trays if pt does not like meal options  3. Weekly weights on standing scale    Interventions   - added supplements/snacks: Ensure Enlive (chocolate) & snacks   - preferences noted       Information obtained from:   patient  Past Medical History:   Diagnosis Date    Asthma     Depression     Fibromyalgia     GERD (gastroesophageal reflux disease)     Hypertension     Hypothyroid     Menopause 1995    Rotator cuff tear      Pt admitted for asthma exacerbation. Possible d/c home today with home O2 noted. Pt visited today. She reports poor appetite on admit. Now feeling more hungry but intake limited by options. Reviewed options she may be more interested in and preferences taken. Drinks supplements (chocolate) at home so will add along with cold cereal with breakfast daily and granola bar as snack in case discharge delayed. Pt complaining of significant reflux. On pepcid but also takes tums at home. MD ordered and also discussed with provider pt complaints of diarrhea, requesting imodium PRN. Will continue to follow for PO intake with rescreen vs full assessment pending intake if discharge delayed. Diet:  regular  Supplements: None  Meal intake: No data found.     Weight Changes:   Stable  Wt Readings from Last 10 Encounters:   02/08/21 74 kg (163 lb 2.3 oz)   09/13/12 79.4 kg (175 lb)   07/08/11 83 kg (183 lb)   06/10/11 77.1 kg (170 lb)     Nutrition-Related Concerns Identified:  Specified food preferences  Diarrhea  reflux    Estimated Nutrition Needs:   Energy: 1628-1850kcal(22-25kcal/kg)  Wt used: Current(74kg)  Protein: 74-81g (1-1.1g/kg)  Wt used: Current(74kg)   Fluid: 7436     PLAN:   - Continue current diet  - Check for acceptance of supplements    Will revisit per policy    Elisha Baron RD [facility], Pager #[phone] or via [employer]

## 2021-02-16 ENCOUNTER — PATIENT OUTREACH (OUTPATIENT)
Dept: CASE MANAGEMENT | Age: 76
End: 2021-02-16

## 2021-02-16 ENCOUNTER — HOME CARE VISIT (OUTPATIENT)
Dept: SCHEDULING | Facility: HOME HEALTH | Age: 76
End: 2021-02-16
Payer: MEDICARE

## 2021-02-16 VITALS
SYSTOLIC BLOOD PRESSURE: 128 MMHG | HEART RATE: 78 BPM | WEIGHT: 163 LBS | TEMPERATURE: 97.1 F | BODY MASS INDEX: 32 KG/M2 | DIASTOLIC BLOOD PRESSURE: 76 MMHG | RESPIRATION RATE: 18 BRPM | OXYGEN SATURATION: 96 % | HEIGHT: 60 IN

## 2021-02-16 PROCEDURE — 3331090001 HH PPS REVENUE CREDIT

## 2021-02-16 PROCEDURE — G0151 HHCP-SERV OF PT,EA 15 MIN: HCPCS

## 2021-02-16 PROCEDURE — 400018 HH-NO PAY CLAIM PROCEDURE

## 2021-02-16 PROCEDURE — 3331090002 HH PPS REVENUE DEBIT

## 2021-02-16 PROCEDURE — 400013 HH SOC

## 2021-02-16 NOTE — PROGRESS NOTES
Patient contacted regarding recent discharge and COVID-19 risk. Discussed COVID-19 related testing which was available at this time. Test results were negative. Patient informed of results, if available? yes    Outreach made within 2 business days of discharge: Yes    Care Transition Nurse/ Ambulatory Care Manager/ LPN Care Coordinator contacted the patient by telephone to perform post discharge assessment. Verified name and  with patient as identifiers. Reports she is feeling good. No fever, still some coughing-is productive. Home Health nurse came to see her today. Used the O2 last night. Has not had on today, no sob noted. Per nurse, pulse ox was 95% on room air. /79. Patient has following risk factors of: asthma and acute respiratory failure. CTN/ACM/LPN reviewed discharge instructions, medical action plan and red flags related to discharge diagnosis. Reviewed and educated them on any new and changed medications related to discharge diagnosis. Advised obtaining a 90-day supply of all daily and as-needed medications. Advance Care Planning:   Does patient have an Advance Directive: currently not on file; patient declined education    Education provided regarding infection prevention, and signs and symptoms of COVID-19 and when to seek medical attention with patient who verbalized understanding. Discussed exposure protocols and quarantine from [address] you at higher risk for severe illness 2019 and given an opportunity for questions and concerns. The patient agrees to contact the COVID-19 hotline [phone] or PCP office for questions related to their healthcare. CTN/ACM/LPN provided contact information for future reference. From CDC: Are you at higher risk for severe illness?  Wash your hands often.  Avoid close contact (6 feet, which is about two arm lengths) with people who are sick.    Put distance between yourself and other people if COVID-19 is spreading in your community.  Clean and disinfect frequently touched surfaces.  Avoid all cruise travel and non-essential air travel.  Call your healthcare professional if you have concerns about COVID-19 and your underlying condition or if you are sick. For more information on steps you can take to protect yourself, see CDC's How to Protect Yourself      Patient/family/caregiver given information for GetWell Loop and agrees to enroll yes  Patient's preferred e-mail:  [email or web address]  Patient's preferred phone number: [phone]   Based on Loop alert triggers, patient will be contacted by nurse care manager for worsening symptoms. Pt will be further monitored by COVID Loop Team, pending account activation by patient.  based on severity of symptoms and risk factors.

## 2021-02-17 ENCOUNTER — HOME CARE VISIT (OUTPATIENT)
Dept: SCHEDULING | Facility: HOME HEALTH | Age: 76
End: 2021-02-17
Payer: MEDICARE

## 2021-02-17 ENCOUNTER — HOME CARE VISIT (OUTPATIENT)
Dept: HOME HEALTH SERVICES | Facility: HOME HEALTH | Age: 76
End: 2021-02-17
Payer: MEDICARE

## 2021-02-17 VITALS
DIASTOLIC BLOOD PRESSURE: 70 MMHG | TEMPERATURE: 97.9 F | SYSTOLIC BLOOD PRESSURE: 118 MMHG | HEART RATE: 84 BPM | RESPIRATION RATE: 16 BRPM | OXYGEN SATURATION: 92 %

## 2021-02-17 PROCEDURE — 3331090002 HH PPS REVENUE DEBIT

## 2021-02-17 PROCEDURE — G0151 HHCP-SERV OF PT,EA 15 MIN: HCPCS

## 2021-02-17 PROCEDURE — 3331090001 HH PPS REVENUE CREDIT

## 2021-02-17 PROCEDURE — G0152 HHCP-SERV OF OT,EA 15 MIN: HCPCS

## 2021-02-18 PROCEDURE — 3331090001 HH PPS REVENUE CREDIT

## 2021-02-18 PROCEDURE — 3331090002 HH PPS REVENUE DEBIT

## 2021-02-19 VITALS
TEMPERATURE: 97.5 F | RESPIRATION RATE: 18 BRPM | DIASTOLIC BLOOD PRESSURE: 70 MMHG | OXYGEN SATURATION: 92 % | SYSTOLIC BLOOD PRESSURE: 118 MMHG | HEART RATE: 84 BPM

## 2021-02-19 PROCEDURE — 3331090002 HH PPS REVENUE DEBIT

## 2021-02-19 PROCEDURE — 3331090001 HH PPS REVENUE CREDIT

## 2021-02-19 NOTE — PROGRESS NOTES
Physician Progress Note      PATIENTDakalpesh Beckman  CSN #:                  [id number]  :                       1945  ADMIT DATE:       2021 4:55 PM  [address] [place] DATE:        2/15/2021 5:46 PM  RESPONDING  PROVIDER #:        Desirae Barillas NP          QUERY TEXT:    Pt admitted with Asthma exacerbation  Pt noted to have Serum Sodium of 136 on date and time of arrival to the ER. Sodium 135 date of admission with documentation by NP Attending of hyponatremia. Sodium 137-140 reminder of admission. If possible, please document in the progress notes and discharge summary if you are evaluating and / or treating any of the following: The medical record reflects the following:  Risk Factors: 66 F  Clinical Indicators:  On  Grisel Ruiz NP documents  hyponatremia  Mild, suspect 2/2 poor PO intake w/ sob  Cont gentle IVF  Monitor labs    Serum Sodium values   136  135  137  140      Treatment: IVF, daily lab monitoring    Thank you    [address] Hensley CRCR  Clinical Documentation Improvement  [phone]  Options provided:  -- Dehydration with hyponatremia  -- Dehydration with out hyponatremia  -- Hyponatremia  -- Pseudohyponatremia  -- Clinically insignificant low serum sodium  -- Other - I will add my own diagnosis  -- Disagree - Not applicable / Not valid  -- Disagree - Clinically unable to determine / Unknown  -- Refer to Clinical Documentation Reviewer    PROVIDER RESPONSE TEXT:    This patient has dehydration with hyponatremia.     Query created by: Concepcion Alicia on 2021 1:56 PM      Electronically signed by:  Desirae Barillas NP 2021 2:36 PM

## 2021-02-20 LAB
A ALTERNATA IGE QN: <0.1 KU/L
A FUMIGATUS IGE QN: <0.1 KU/L
AMER ROACH IGE QN: <0.1 KU/L
AMER SYCAMORE IGE QN: <0.1 KU/L
BAHIA GRASS IGE QN: <0.1 KU/L
BERMUDA GRASS IGE QN: <0.1 KU/L
BOXELDER IGE QN: <0.1 KU/L
C HERBARUM IGE QN: <0.1 KU/L
CAT DANDER IGG QN: <0.1 KU/L
CLASS DESCRIPTION, 600268: NORMAL
COMMON RAGWEED IGE QN: <0.1 KU/L
D FARINAE IGE QN: <0.1 KU/L
D PTERONYSS IGE QN: <0.1 KU/L
DEPRECATED IGE QN: <0.1 KU/L
DOG DANDER IGE QN: <0.1 KU/L
ENGL PLANTAIN IGE QN: <0.1 KU/L
JOHNSON GRASS IGE QN: <0.1 KU/L
M RACEMOSUS IGE QN: <0.1 KU/L
MT JUNIPER IGE QN: <0.1 KU/L
MUGWORT IGE QN: <0.1 KU/L
NETTLE IGE QN: <0.1 KU/L
P NOTATUM IGE QN: <0.1 KU/L
S BOTRYOSUM IGE QN: <0.1 KU/L
SHEEP SORREL IGE QN: <0.1 KU/L
SWEET GUM IGE QN: <0.1 KU/L
TIMOTHY IGE QN: <0.1 KU/L
WHITE BIRCH IGE QN: <0.1 KU/L
WHITE ELM IGG QN: <0.1 KU/L
WHITE HICKORY IGE QN: <0.1 KU/L
WHITE MULBERRY IGE QN: <0.1 KU/L
WHITE OAK IGE QN: <0.1 KU/L

## 2021-02-20 PROCEDURE — 3331090002 HH PPS REVENUE DEBIT

## 2021-02-20 PROCEDURE — 3331090001 HH PPS REVENUE CREDIT

## 2021-02-21 PROCEDURE — 3331090001 HH PPS REVENUE CREDIT

## 2021-02-21 PROCEDURE — 3331090002 HH PPS REVENUE DEBIT

## 2021-02-22 ENCOUNTER — HOME CARE VISIT (OUTPATIENT)
Dept: SCHEDULING | Facility: HOME HEALTH | Age: 76
End: 2021-02-22
Payer: MEDICARE

## 2021-02-22 VITALS
SYSTOLIC BLOOD PRESSURE: 112 MMHG | DIASTOLIC BLOOD PRESSURE: 64 MMHG | HEART RATE: 86 BPM | TEMPERATURE: 98 F | RESPIRATION RATE: 16 BRPM | OXYGEN SATURATION: 94 %

## 2021-02-22 VITALS
TEMPERATURE: 98.1 F | HEART RATE: 95 BPM | OXYGEN SATURATION: 94 % | RESPIRATION RATE: 19 BRPM | SYSTOLIC BLOOD PRESSURE: 130 MMHG | DIASTOLIC BLOOD PRESSURE: 70 MMHG

## 2021-02-22 PROCEDURE — G0152 HHCP-SERV OF OT,EA 15 MIN: HCPCS

## 2021-02-22 PROCEDURE — 3331090002 HH PPS REVENUE DEBIT

## 2021-02-22 PROCEDURE — G0151 HHCP-SERV OF PT,EA 15 MIN: HCPCS

## 2021-02-22 PROCEDURE — 3331090001 HH PPS REVENUE CREDIT

## 2021-02-23 PROCEDURE — 3331090002 HH PPS REVENUE DEBIT

## 2021-02-23 PROCEDURE — 3331090001 HH PPS REVENUE CREDIT

## 2021-02-24 ENCOUNTER — HOME CARE VISIT (OUTPATIENT)
Dept: SCHEDULING | Facility: HOME HEALTH | Age: 76
End: 2021-02-24
Payer: MEDICARE

## 2021-02-24 PROCEDURE — 3331090002 HH PPS REVENUE DEBIT

## 2021-02-24 PROCEDURE — 3331090001 HH PPS REVENUE CREDIT

## 2021-02-25 ENCOUNTER — HOME CARE VISIT (OUTPATIENT)
Dept: SCHEDULING | Facility: HOME HEALTH | Age: 76
End: 2021-02-25
Payer: MEDICARE

## 2021-02-25 PROCEDURE — 3331090002 HH PPS REVENUE DEBIT

## 2021-02-25 PROCEDURE — 3331090001 HH PPS REVENUE CREDIT

## 2021-02-25 PROCEDURE — G0152 HHCP-SERV OF OT,EA 15 MIN: HCPCS

## 2021-02-26 PROCEDURE — 3331090001 HH PPS REVENUE CREDIT

## 2021-02-26 PROCEDURE — 3331090002 HH PPS REVENUE DEBIT

## 2021-02-27 PROCEDURE — 3331090001 HH PPS REVENUE CREDIT

## 2021-02-27 PROCEDURE — 3331090002 HH PPS REVENUE DEBIT

## 2021-02-28 VITALS
SYSTOLIC BLOOD PRESSURE: 121 MMHG | OXYGEN SATURATION: 98 % | TEMPERATURE: 98.1 F | RESPIRATION RATE: 17 BRPM | HEART RATE: 88 BPM | DIASTOLIC BLOOD PRESSURE: 72 MMHG

## 2021-02-28 PROCEDURE — 3331090001 HH PPS REVENUE CREDIT

## 2021-02-28 PROCEDURE — 3331090002 HH PPS REVENUE DEBIT

## 2021-03-01 ENCOUNTER — HOME CARE VISIT (OUTPATIENT)
Dept: SCHEDULING | Facility: HOME HEALTH | Age: 76
End: 2021-03-01
Payer: MEDICARE

## 2021-03-01 VITALS
DIASTOLIC BLOOD PRESSURE: 70 MMHG | HEART RATE: 78 BPM | TEMPERATURE: 98 F | OXYGEN SATURATION: 97 % | SYSTOLIC BLOOD PRESSURE: 122 MMHG | RESPIRATION RATE: 16 BRPM

## 2021-03-01 PROCEDURE — G0151 HHCP-SERV OF PT,EA 15 MIN: HCPCS

## 2021-03-01 PROCEDURE — G0152 HHCP-SERV OF OT,EA 15 MIN: HCPCS

## 2021-03-01 PROCEDURE — 3331090001 HH PPS REVENUE CREDIT

## 2021-03-01 PROCEDURE — 3331090002 HH PPS REVENUE DEBIT

## 2021-03-02 ENCOUNTER — HOME CARE VISIT (OUTPATIENT)
Dept: SCHEDULING | Facility: HOME HEALTH | Age: 76
End: 2021-03-02
Payer: MEDICARE

## 2021-03-02 VITALS
HEART RATE: 70 BPM | OXYGEN SATURATION: 97 % | DIASTOLIC BLOOD PRESSURE: 70 MMHG | RESPIRATION RATE: 18 BRPM | OXYGEN SATURATION: 98 % | RESPIRATION RATE: 18 BRPM | SYSTOLIC BLOOD PRESSURE: 140 MMHG | DIASTOLIC BLOOD PRESSURE: 74 MMHG | TEMPERATURE: 98.1 F | TEMPERATURE: 98.2 F | SYSTOLIC BLOOD PRESSURE: 132 MMHG | HEART RATE: 70 BPM

## 2021-03-02 PROCEDURE — G0152 HHCP-SERV OF OT,EA 15 MIN: HCPCS

## 2021-03-02 PROCEDURE — 3331090001 HH PPS REVENUE CREDIT

## 2021-03-02 PROCEDURE — 3331090002 HH PPS REVENUE DEBIT

## 2021-03-03 ENCOUNTER — HOME CARE VISIT (OUTPATIENT)
Dept: SCHEDULING | Facility: HOME HEALTH | Age: 76
End: 2021-03-03
Payer: MEDICARE

## 2021-03-03 VITALS
TEMPERATURE: 97.7 F | RESPIRATION RATE: 16 BRPM | SYSTOLIC BLOOD PRESSURE: 132 MMHG | DIASTOLIC BLOOD PRESSURE: 82 MMHG | HEART RATE: 71 BPM | OXYGEN SATURATION: 96 %

## 2021-03-03 PROCEDURE — 3331090001 HH PPS REVENUE CREDIT

## 2021-03-03 PROCEDURE — 3331090002 HH PPS REVENUE DEBIT

## 2021-03-03 PROCEDURE — G0151 HHCP-SERV OF PT,EA 15 MIN: HCPCS

## 2021-03-04 PROCEDURE — 3331090001 HH PPS REVENUE CREDIT

## 2021-03-04 PROCEDURE — 3331090002 HH PPS REVENUE DEBIT

## 2021-03-05 PROCEDURE — 3331090002 HH PPS REVENUE DEBIT

## 2021-03-05 PROCEDURE — 3331090001 HH PPS REVENUE CREDIT

## 2021-03-06 PROCEDURE — 3331090001 HH PPS REVENUE CREDIT

## 2021-03-06 PROCEDURE — 3331090002 HH PPS REVENUE DEBIT

## 2021-03-07 PROCEDURE — 3331090002 HH PPS REVENUE DEBIT

## 2021-03-07 PROCEDURE — 3331090001 HH PPS REVENUE CREDIT

## 2021-03-08 ENCOUNTER — HOME CARE VISIT (OUTPATIENT)
Dept: SCHEDULING | Facility: HOME HEALTH | Age: 76
End: 2021-03-08
Payer: MEDICARE

## 2021-03-08 ENCOUNTER — HOME CARE VISIT (OUTPATIENT)
Dept: HOME HEALTH SERVICES | Facility: HOME HEALTH | Age: 76
End: 2021-03-08
Payer: MEDICARE

## 2021-03-08 VITALS
TEMPERATURE: 98 F | OXYGEN SATURATION: 98 % | RESPIRATION RATE: 16 BRPM | SYSTOLIC BLOOD PRESSURE: 128 MMHG | HEART RATE: 91 BPM | DIASTOLIC BLOOD PRESSURE: 78 MMHG

## 2021-03-08 PROCEDURE — 3331090001 HH PPS REVENUE CREDIT

## 2021-03-08 PROCEDURE — 3331090002 HH PPS REVENUE DEBIT

## 2021-03-08 PROCEDURE — G0151 HHCP-SERV OF PT,EA 15 MIN: HCPCS

## 2021-03-09 PROCEDURE — 3331090001 HH PPS REVENUE CREDIT

## 2021-03-09 PROCEDURE — 3331090002 HH PPS REVENUE DEBIT

## 2021-03-10 ENCOUNTER — HOME CARE VISIT (OUTPATIENT)
Dept: SCHEDULING | Facility: HOME HEALTH | Age: 76
End: 2021-03-10
Payer: MEDICARE

## 2021-03-10 ENCOUNTER — APPOINTMENT (OUTPATIENT)
Dept: CT IMAGING | Age: 76
End: 2021-03-10
Attending: PHYSICIAN ASSISTANT
Payer: MEDICARE

## 2021-03-10 ENCOUNTER — HOSPITAL ENCOUNTER (EMERGENCY)
Age: 76
Discharge: HOME | End: 2021-03-10
Attending: EMERGENCY MEDICINE
Payer: MEDICARE

## 2021-03-10 VITALS
HEART RATE: 99 BPM | OXYGEN SATURATION: 97 % | RESPIRATION RATE: 16 BRPM | BODY MASS INDEX: 31.9 KG/M2 | WEIGHT: 162.48 LBS | TEMPERATURE: 98.2 F | DIASTOLIC BLOOD PRESSURE: 90 MMHG | HEIGHT: 60 IN | SYSTOLIC BLOOD PRESSURE: 177 MMHG

## 2021-03-10 VITALS
DIASTOLIC BLOOD PRESSURE: 76 MMHG | TEMPERATURE: 97.8 F | SYSTOLIC BLOOD PRESSURE: 122 MMHG | HEART RATE: 96 BPM | RESPIRATION RATE: 16 BRPM | OXYGEN SATURATION: 95 %

## 2021-03-10 DIAGNOSIS — S09.90XA TRAUMATIC INJURY OF HEAD, INITIAL ENCOUNTER: Primary | ICD-10-CM

## 2021-03-10 PROCEDURE — 72125 CT NECK SPINE W/O DYE: CPT

## 2021-03-10 PROCEDURE — 3331090001 HH PPS REVENUE CREDIT

## 2021-03-10 PROCEDURE — 99282 EMERGENCY DEPT VISIT SF MDM: CPT

## 2021-03-10 PROCEDURE — 3331090002 HH PPS REVENUE DEBIT

## 2021-03-10 PROCEDURE — 70450 CT HEAD/BRAIN W/O DYE: CPT

## 2021-03-10 PROCEDURE — G0151 HHCP-SERV OF PT,EA 15 MIN: HCPCS

## 2021-03-10 NOTE — ED NOTES
Discharge instructions reviewed with pt and copy given by this RN. Pt educated on follow up with PCP and is discharged via wheelchair with daughter providing transport home. Patient verbalized understanding of all instruction and is in no sign of distress with discharge.

## 2021-03-10 NOTE — ED PROVIDER NOTES
Date of Service:  3/10/2021    Patient:  Jaxon Burgos    Chief Complaint:  Head Injury       HPI:  Jaxon Burgos is a 66 y.o.  female who presents for evaluation of trauma. Patient was placing her walker in the truck about 2 hours ago when the walker slipped and she fell backwards hitting the right side of her head. No loss of consciousness, no vomiting, no amnesia. Currently on baby aspirin. Denies neck pain, numbness, tingling, weakness. Denies fever, chills, chest pain, shortness of breath, abdominal pain, nausea, vomiting, diarrhea, dysuria, numbness, tingling, weakness. Past medical history: Asthma, depression, fibromyalgia, GERD, hypertension, hypothyroid, rotator cuff tear  Surgeries: Orthopedic surgery (rotator cuff, neck, lower back, knees) cholecystectomy, tonsils and adenoidectomy  Allergies: Penicillin                   Past Medical History:   Diagnosis Date    Asthma     Depression     Fibromyalgia     GERD (gastroesophageal reflux disease)     Hypertension     Hypothyroid     Menopause 1995    Rotator cuff tear        Past Surgical History:   Procedure Laterality Date    HX CHOLECYSTECTOMY      HX GYN      HX ORTHOPAEDIC      HX TONSIL AND ADENOIDECTOMY           History reviewed. No pertinent family history.     Social History     Socioeconomic History    Marital status:      Spouse name: Not on file    Number of children: Not on file    Years of education: Not on file    Highest education level: Not on file   Occupational History    Not on file   Social Needs    Financial resource strain: Not on file    Food insecurity     Worry: Not on file     Inability: Not on file    Transportation needs     Medical: Not on file     Non-medical: Not on file   Tobacco Use    Smoking status: Former Smoker     Packs/day: 1.00     Years: 15.00     Pack years: 15.00    Smokeless tobacco: Never Used   Substance and Sexual Activity    Alcohol use: No    Drug use: No    Sexual activity: Not on file   Lifestyle    Physical activity     Days per week: Not on file     Minutes per session: Not on file    Stress: Not on file   Relationships    Social connections     Talks on phone: Not on file     Gets together: Not on file     Attends [employer] service: Not on file     Active member of club or organization: Not on file     Attends meetings of clubs or organizations: Not on file     Relationship status: Not on file    Intimate partner violence     Fear of current or ex partner: Not on file     Emotionally abused: Not on file     Physically abused: Not on file     Forced sexual activity: Not on file   Other Topics Concern    Not on file   Social History Narrative    Not on file         ALLERGIES: Pcn [penicillins]    Review of Systems   Constitutional: Negative for chills and fever. HENT: Negative for trouble swallowing. Eyes: Negative for redness. Respiratory: Negative for shortness of breath. Cardiovascular: Negative for chest pain. Gastrointestinal: Negative for abdominal pain, diarrhea, nausea and vomiting. Genitourinary: Negative for dysuria. Musculoskeletal: Negative for gait problem. Skin: Positive for wound. Negative for rash. Neurological: Negative for syncope and headaches. Vitals:    03/10/21 1641   BP: (!) 166/100   Pulse: 99   Resp: 16   Temp: 98.2 °F (36.8 °C)   SpO2: 96%   Weight: 73.7 kg (162 lb 7.7 oz)   Height: 5' (1.524 m)            Physical Exam  Vitals signs and nursing note reviewed. Constitutional:       Appearance: Normal appearance. HENT:      Head: Normocephalic and atraumatic. Comments: No raccoon eyes, no dow sign, no otorrhea, no rhinorrhea    Right parietal hematoma, superficial abrasion noted, minimal tenderness to palpation, no step-off noted     Nose: Nose normal.   Eyes:      Extraocular Movements: Extraocular movements intact.       Conjunctiva/sclera: Conjunctivae normal.      Pupils: Pupils are equal, round, and reactive to light. Neck:      Musculoskeletal: Normal range of motion and neck supple. Cardiovascular:      Rate and Rhythm: Normal rate and regular rhythm. Pulses: Normal pulses. Radial pulses are 2+ on the right side and 2+ on the left side. Posterior tibial pulses are 2+ on the right side and 2+ on the left side. Heart sounds: Normal heart sounds. Pulmonary:      Effort: Pulmonary effort is normal.      Breath sounds: Normal breath sounds. Abdominal:      General: Abdomen is flat. Bowel sounds are normal.      Palpations: Abdomen is soft. Musculoskeletal: Normal range of motion. Comments: Midline C/T/L-spine tenderness, no step-off  Upper and lower extremities visualized, ranged, with no tenderness or deformity  Hips are nontender with bilateral compression  Pelvis stable   Skin:     General: Skin is warm and dry. Neurological:      General: No focal deficit present. Mental Status: She is alert and oriented to person, place, and time. Mental status is at baseline. Comments: No focal neuro deficits   Psychiatric:         Mood and Affect: Mood normal.         Behavior: Behavior normal.         Thought Content: Thought content normal.          MDM  Number of Diagnoses or Management Options  Traumatic injury of head, initial encounter  Diagnosis management comments: IMAGING:  CT HEAD WO CONT   Final Result    Small right parietal scalp hematoma. No acute intracranial    abnormality. CT SPINE CERV WO CONT   Final Result    1. No displaced fracture. Hardware artifact and osteopenia limit sensitivity for    nondisplaced fracture. 2. Degenerative stenoses and fusion hardware is described above. DECISION MAKING:  Jose Welch is a 66 y.o. female who comes in as above. Patient is a 78-year-old female presenting today following head trauma that occurred about 2 hours ago.   Patient was placing her walker in her truck when she slipped, falling backwards hitting her head. No loss of consciousness, no vomiting, no amnesia. Patient is currently on baby aspirin. Denies neck pain, numbness, tingling, weakness. Denies additional symptoms or concerns. Vitals stable, patient in no acute distress. Right superficial abrasion noted, right parietal scalp hematoma noted, no step-off. No focal neuro deficits. CT head, CT spine shows no acute intracranial abnormality, are reassuring. Patient also seen and evaluated by attending physician. Follow-up with primary care physician within 1 week. Strict ED return precautions discussed. IMPRESSION:  Traumatic injury of head, initial encounter  (primary encounter diagnosis)    DISPOSITION:  Discharged      Current Discharge Medication List         Follow-up Information     Follow up With Specialties Details Why Contact Info    Kristen Pérez MD Family Medicine Schedule an appointment as soon as   possible for a visit in 1 week  [address] [phone]      SPT [address] [facility] Emergency Medicine Go to  If symptoms worsen [address] [phone]          The patient is asked to follow-up with their primary care provider in the next several days. They are to call tomorrow for an appointment. The patient is asked to return promptly for any increased concerns or worsening of symptoms. They can return to this emergency department or any other emergency department.            Procedures

## 2021-03-11 ENCOUNTER — HOME CARE VISIT (OUTPATIENT)
Dept: SCHEDULING | Facility: HOME HEALTH | Age: 76
End: 2021-03-11
Payer: MEDICARE

## 2021-03-11 PROCEDURE — 3331090001 HH PPS REVENUE CREDIT

## 2021-03-11 PROCEDURE — 3331090002 HH PPS REVENUE DEBIT

## 2021-03-12 ENCOUNTER — HOME CARE VISIT (OUTPATIENT)
Dept: SCHEDULING | Facility: HOME HEALTH | Age: 76
End: 2021-03-12
Payer: MEDICARE

## 2021-03-12 VITALS
RESPIRATION RATE: 19 BRPM | HEART RATE: 77 BPM | TEMPERATURE: 98.1 F | DIASTOLIC BLOOD PRESSURE: 80 MMHG | SYSTOLIC BLOOD PRESSURE: 128 MMHG | OXYGEN SATURATION: 97 %

## 2021-03-12 PROCEDURE — 3331090002 HH PPS REVENUE DEBIT

## 2021-03-12 PROCEDURE — 3331090001 HH PPS REVENUE CREDIT

## 2021-03-12 PROCEDURE — G0152 HHCP-SERV OF OT,EA 15 MIN: HCPCS

## 2021-03-12 NOTE — PROGRESS NOTES
patient refused services to date, patient stated she fell outside trying to get into her car, daughter took her to the ER for assessment of concussion or any other further injuries, none to report, patient stated she was very [place]" and concerned about falling now, felt really weak. patient denies injuries but stated her neck was really sore. patient confirmed appt for tomorrow but asked to rest to date.

## 2021-03-12 NOTE — PROGRESS NOTES
patient extended for OT services 2w3 due to recent fall, continued debility, issues related to increased fear of falls and timid nature with activity, patient coughing and weezing during appt, recommended pulmonologist appt, patient agreeable.  will call to date to schedule

## 2021-03-13 PROCEDURE — 3331090002 HH PPS REVENUE DEBIT

## 2021-03-13 PROCEDURE — 3331090001 HH PPS REVENUE CREDIT

## 2021-03-14 PROCEDURE — 3331090002 HH PPS REVENUE DEBIT

## 2021-03-14 PROCEDURE — 3331090001 HH PPS REVENUE CREDIT

## 2021-03-15 ENCOUNTER — HOME CARE VISIT (OUTPATIENT)
Dept: SCHEDULING | Facility: HOME HEALTH | Age: 76
End: 2021-03-15
Payer: MEDICARE

## 2021-03-15 VITALS
DIASTOLIC BLOOD PRESSURE: 80 MMHG | SYSTOLIC BLOOD PRESSURE: 142 MMHG | OXYGEN SATURATION: 96 % | RESPIRATION RATE: 16 BRPM | TEMPERATURE: 98.7 F | HEART RATE: 86 BPM

## 2021-03-15 VITALS
RESPIRATION RATE: 18 BRPM | DIASTOLIC BLOOD PRESSURE: 78 MMHG | HEART RATE: 77 BPM | OXYGEN SATURATION: 94 % | SYSTOLIC BLOOD PRESSURE: 140 MMHG | TEMPERATURE: 98.1 F

## 2021-03-15 PROCEDURE — 3331090001 HH PPS REVENUE CREDIT

## 2021-03-15 PROCEDURE — 3331090002 HH PPS REVENUE DEBIT

## 2021-03-15 PROCEDURE — G0151 HHCP-SERV OF PT,EA 15 MIN: HCPCS

## 2021-03-15 PROCEDURE — G0152 HHCP-SERV OF OT,EA 15 MIN: HCPCS

## 2021-03-16 PROCEDURE — 3331090001 HH PPS REVENUE CREDIT

## 2021-03-16 PROCEDURE — 3331090002 HH PPS REVENUE DEBIT

## 2021-03-17 ENCOUNTER — HOME CARE VISIT (OUTPATIENT)
Dept: SCHEDULING | Facility: HOME HEALTH | Age: 76
End: 2021-03-17
Payer: MEDICARE

## 2021-03-17 VITALS
DIASTOLIC BLOOD PRESSURE: 84 MMHG | OXYGEN SATURATION: 94 % | HEART RATE: 102 BPM | SYSTOLIC BLOOD PRESSURE: 124 MMHG | TEMPERATURE: 98.9 F | RESPIRATION RATE: 16 BRPM

## 2021-03-17 VITALS
DIASTOLIC BLOOD PRESSURE: 70 MMHG | HEART RATE: 70 BPM | SYSTOLIC BLOOD PRESSURE: 138 MMHG | OXYGEN SATURATION: 98 % | RESPIRATION RATE: 18 BRPM | TEMPERATURE: 97.7 F

## 2021-03-17 PROCEDURE — 3331090001 HH PPS REVENUE CREDIT

## 2021-03-17 PROCEDURE — G0152 HHCP-SERV OF OT,EA 15 MIN: HCPCS

## 2021-03-17 PROCEDURE — 3331090002 HH PPS REVENUE DEBIT

## 2021-03-17 PROCEDURE — G0151 HHCP-SERV OF PT,EA 15 MIN: HCPCS

## 2021-03-17 NOTE — PROGRESS NOTES
Spoke with Juana Redmond, Dr. Hema Atkins' RN, regarding pt's coughing and difficulty speaking without coughing. I told Juana Redmond that I instructed the pt to call [facility] ASAP if her coughing symptoms do not improve. Jeanell Frankel know the pt was prescribed prednisone by Dr. Gloria Cox and pt stated she will be getting a \"new\" inhaler later today. Yanelis Banks know pt is hesitant to take antibiotic as she is going for her second Covid vaccine tomorrow. Also let Juana Redmond know I told the pt I was concerned she could be getting pneumonia and the pt stated her daughter is a NP and the daughter will listen to her lungs tomorrow. I also notified Juana Ruy that the pt told me she was very tired yesterday and slept \"all day\". The pt also informed me that her sats went to 79 and she used her oxygen yesterday. I re-interated to Juana Redmond that I instructed pt to contact [facility] if her symptoms do not improve.   Thanks,  Pratima Tamez PT

## 2021-03-18 PROCEDURE — 3331090002 HH PPS REVENUE DEBIT

## 2021-03-18 PROCEDURE — 3331090001 HH PPS REVENUE CREDIT

## 2021-03-18 PROCEDURE — 400018 HH-NO PAY CLAIM PROCEDURE

## 2021-03-19 PROCEDURE — 3331090002 HH PPS REVENUE DEBIT

## 2021-03-19 PROCEDURE — 3331090001 HH PPS REVENUE CREDIT

## 2021-03-20 PROCEDURE — 3331090002 HH PPS REVENUE DEBIT

## 2021-03-20 PROCEDURE — 3331090001 HH PPS REVENUE CREDIT

## 2021-03-21 PROCEDURE — 3331090002 HH PPS REVENUE DEBIT

## 2021-03-21 PROCEDURE — 3331090001 HH PPS REVENUE CREDIT

## 2021-03-22 ENCOUNTER — HOME CARE VISIT (OUTPATIENT)
Dept: SCHEDULING | Facility: HOME HEALTH | Age: 76
End: 2021-03-22
Payer: MEDICARE

## 2021-03-22 VITALS
HEART RATE: 77 BPM | DIASTOLIC BLOOD PRESSURE: 80 MMHG | RESPIRATION RATE: 16 BRPM | TEMPERATURE: 98.7 F | OXYGEN SATURATION: 95 % | SYSTOLIC BLOOD PRESSURE: 136 MMHG

## 2021-03-22 PROCEDURE — G0151 HHCP-SERV OF PT,EA 15 MIN: HCPCS

## 2021-03-22 PROCEDURE — 3331090001 HH PPS REVENUE CREDIT

## 2021-03-22 PROCEDURE — 3331090002 HH PPS REVENUE DEBIT

## 2021-03-22 PROCEDURE — 400013 HH SOC

## 2021-03-23 ENCOUNTER — HOME CARE VISIT (OUTPATIENT)
Dept: SCHEDULING | Facility: HOME HEALTH | Age: 76
End: 2021-03-23
Payer: MEDICARE

## 2021-03-23 VITALS
OXYGEN SATURATION: 98 % | DIASTOLIC BLOOD PRESSURE: 98 MMHG | TEMPERATURE: 98.1 F | SYSTOLIC BLOOD PRESSURE: 148 MMHG | RESPIRATION RATE: 18 BRPM | HEART RATE: 70 BPM

## 2021-03-23 PROCEDURE — 3331090001 HH PPS REVENUE CREDIT

## 2021-03-23 PROCEDURE — 3331090002 HH PPS REVENUE DEBIT

## 2021-03-23 PROCEDURE — G0152 HHCP-SERV OF OT,EA 15 MIN: HCPCS

## 2021-03-24 ENCOUNTER — HOME CARE VISIT (OUTPATIENT)
Dept: SCHEDULING | Facility: HOME HEALTH | Age: 76
End: 2021-03-24
Payer: MEDICARE

## 2021-03-24 VITALS
RESPIRATION RATE: 16 BRPM | DIASTOLIC BLOOD PRESSURE: 70 MMHG | OXYGEN SATURATION: 96 % | TEMPERATURE: 98.2 F | SYSTOLIC BLOOD PRESSURE: 122 MMHG | HEART RATE: 80 BPM

## 2021-03-24 PROCEDURE — 3331090002 HH PPS REVENUE DEBIT

## 2021-03-24 PROCEDURE — 3331090001 HH PPS REVENUE CREDIT

## 2021-03-24 PROCEDURE — G0151 HHCP-SERV OF PT,EA 15 MIN: HCPCS

## 2021-03-25 ENCOUNTER — HOME CARE VISIT (OUTPATIENT)
Dept: SCHEDULING | Facility: HOME HEALTH | Age: 76
End: 2021-03-25
Payer: MEDICARE

## 2021-03-25 PROCEDURE — 3331090001 HH PPS REVENUE CREDIT

## 2021-03-25 PROCEDURE — 3331090002 HH PPS REVENUE DEBIT

## 2021-03-25 PROCEDURE — G0152 HHCP-SERV OF OT,EA 15 MIN: HCPCS

## 2021-03-26 VITALS
TEMPERATURE: 96.8 F | OXYGEN SATURATION: 98 % | SYSTOLIC BLOOD PRESSURE: 134 MMHG | HEART RATE: 77 BPM | DIASTOLIC BLOOD PRESSURE: 60 MMHG | RESPIRATION RATE: 18 BRPM

## 2021-03-26 PROCEDURE — 3331090002 HH PPS REVENUE DEBIT

## 2021-03-26 PROCEDURE — 3331090001 HH PPS REVENUE CREDIT

## 2021-03-27 PROCEDURE — 3331090002 HH PPS REVENUE DEBIT

## 2021-03-27 PROCEDURE — 3331090001 HH PPS REVENUE CREDIT

## 2021-03-28 PROCEDURE — 3331090001 HH PPS REVENUE CREDIT

## 2021-03-28 PROCEDURE — 3331090002 HH PPS REVENUE DEBIT

## 2021-03-29 ENCOUNTER — HOME CARE VISIT (OUTPATIENT)
Dept: SCHEDULING | Facility: HOME HEALTH | Age: 76
End: 2021-03-29
Payer: MEDICARE

## 2021-03-29 VITALS
TEMPERATURE: 97.9 F | OXYGEN SATURATION: 95 % | SYSTOLIC BLOOD PRESSURE: 144 MMHG | RESPIRATION RATE: 16 BRPM | DIASTOLIC BLOOD PRESSURE: 82 MMHG | HEART RATE: 85 BPM

## 2021-03-29 PROCEDURE — 3331090002 HH PPS REVENUE DEBIT

## 2021-03-29 PROCEDURE — 3331090001 HH PPS REVENUE CREDIT

## 2021-03-29 PROCEDURE — G0151 HHCP-SERV OF PT,EA 15 MIN: HCPCS

## 2021-03-30 ENCOUNTER — HOME CARE VISIT (OUTPATIENT)
Dept: SCHEDULING | Facility: HOME HEALTH | Age: 76
End: 2021-03-30
Payer: MEDICARE

## 2021-03-30 PROCEDURE — G0152 HHCP-SERV OF OT,EA 15 MIN: HCPCS

## 2021-03-30 PROCEDURE — 3331090001 HH PPS REVENUE CREDIT

## 2021-03-30 PROCEDURE — 3331090002 HH PPS REVENUE DEBIT

## 2021-03-31 VITALS
TEMPERATURE: 98.1 F | SYSTOLIC BLOOD PRESSURE: 138 MMHG | OXYGEN SATURATION: 98 % | RESPIRATION RATE: 18 BRPM | DIASTOLIC BLOOD PRESSURE: 68 MMHG | HEART RATE: 70 BPM

## 2021-03-31 PROCEDURE — 3331090002 HH PPS REVENUE DEBIT

## 2021-03-31 PROCEDURE — 3331090001 HH PPS REVENUE CREDIT

## 2021-04-01 ENCOUNTER — HOME CARE VISIT (OUTPATIENT)
Dept: SCHEDULING | Facility: HOME HEALTH | Age: 76
End: 2021-04-01
Payer: MEDICARE

## 2021-04-01 VITALS
HEART RATE: 90 BPM | DIASTOLIC BLOOD PRESSURE: 80 MMHG | RESPIRATION RATE: 16 BRPM | SYSTOLIC BLOOD PRESSURE: 142 MMHG | OXYGEN SATURATION: 94 % | TEMPERATURE: 98.3 F

## 2021-04-01 PROCEDURE — G0151 HHCP-SERV OF PT,EA 15 MIN: HCPCS

## 2021-04-01 PROCEDURE — 3331090001 HH PPS REVENUE CREDIT

## 2021-04-01 PROCEDURE — 3331090002 HH PPS REVENUE DEBIT

## 2021-04-01 PROCEDURE — G0152 HHCP-SERV OF OT,EA 15 MIN: HCPCS

## 2021-04-02 VITALS
OXYGEN SATURATION: 98 % | TEMPERATURE: 98.5 F | SYSTOLIC BLOOD PRESSURE: 132 MMHG | RESPIRATION RATE: 18 BRPM | HEART RATE: 70 BPM | DIASTOLIC BLOOD PRESSURE: 60 MMHG

## 2021-04-02 PROCEDURE — 3331090001 HH PPS REVENUE CREDIT

## 2021-04-02 PROCEDURE — 3331090002 HH PPS REVENUE DEBIT

## 2021-04-03 PROCEDURE — 3331090002 HH PPS REVENUE DEBIT

## 2021-04-03 PROCEDURE — 3331090001 HH PPS REVENUE CREDIT

## 2021-04-04 PROCEDURE — 3331090001 HH PPS REVENUE CREDIT

## 2021-04-04 PROCEDURE — 3331090002 HH PPS REVENUE DEBIT

## 2021-10-14 ENCOUNTER — TRANSCRIBE ORDER (OUTPATIENT)
Dept: SCHEDULING | Age: 76
End: 2021-10-14

## 2021-10-14 ENCOUNTER — HOSPITAL ENCOUNTER (OUTPATIENT)
Dept: ULTRASOUND IMAGING | Age: 76
Discharge: HOME | End: 2021-10-14
Payer: MEDICARE

## 2021-10-14 DIAGNOSIS — R60.0 EDEMA OF LEFT LOWER EXTREMITY: Primary | ICD-10-CM

## 2021-10-14 DIAGNOSIS — R60.0 EDEMA OF LEFT LOWER EXTREMITY: ICD-10-CM

## 2021-10-14 PROCEDURE — 93971 EXTREMITY STUDY: CPT | Performed by: RADIOLOGY

## 2021-11-11 ENCOUNTER — TELEPHONE (OUTPATIENT)
Dept: ORTHOPEDIC SURGERY | Age: 76
End: 2021-11-11

## 2021-11-11 DIAGNOSIS — G89.29 CHRONIC THORACIC BACK PAIN, UNSPECIFIED BACK PAIN LATERALITY: ICD-10-CM

## 2021-11-11 DIAGNOSIS — M54.2 NECK PAIN: Primary | ICD-10-CM

## 2021-11-11 DIAGNOSIS — M54.6 CHRONIC THORACIC BACK PAIN, UNSPECIFIED BACK PAIN LATERALITY: ICD-10-CM

## 2021-11-30 ENCOUNTER — HOSPITAL ENCOUNTER (INPATIENT)
Age: 76
LOS: 4 days | Discharge: NURSING, REHAB OR LONG-TERM CARE | DRG: 690 | End: 2021-12-05
Attending: EMERGENCY MEDICINE | Admitting: STUDENT IN AN ORGANIZED HEALTH CARE EDUCATION/TRAINING PROGRAM
Payer: MEDICARE

## 2021-11-30 ENCOUNTER — APPOINTMENT (OUTPATIENT)
Dept: GENERAL RADIOLOGY | Age: 76
DRG: 690 | End: 2021-11-30
Attending: NURSE PRACTITIONER
Payer: MEDICARE

## 2021-11-30 ENCOUNTER — APPOINTMENT (OUTPATIENT)
Dept: CT IMAGING | Age: 76
DRG: 690 | End: 2021-11-30
Attending: NURSE PRACTITIONER
Payer: MEDICARE

## 2021-11-30 DIAGNOSIS — R29.6 RECURRENT FALLS: Primary | ICD-10-CM

## 2021-11-30 DIAGNOSIS — Z74.1 REQUIRES ASSISTANCE WITH ACTIVITIES OF DAILY LIVING (ADL): ICD-10-CM

## 2021-11-30 DIAGNOSIS — R53.1 WEAKNESS: ICD-10-CM

## 2021-11-30 LAB
ALBUMIN SERPL-MCNC: 3.5 G/DL (ref 3.5–5)
ALBUMIN/GLOB SERPL: 1.2 {RATIO} (ref 1.1–2.2)
ALP SERPL-CCNC: 93 U/L (ref 45–117)
ALT SERPL-CCNC: 26 U/L (ref 12–78)
ANION GAP SERPL CALC-SCNC: 6 MMOL/L (ref 5–15)
APPEARANCE UR: ABNORMAL
AST SERPL-CCNC: 14 U/L (ref 15–37)
BACTERIA URNS QL MICRO: ABNORMAL /HPF
BASOPHILS # BLD: 0.1 K/UL (ref 0–0.1)
BASOPHILS NFR BLD: 1 % (ref 0–1)
BILIRUB SERPL-MCNC: 0.5 MG/DL (ref 0.2–1)
BILIRUB UR QL: NEGATIVE
BUN SERPL-MCNC: 24 MG/DL (ref 6–20)
BUN/CREAT SERPL: 18 (ref 12–20)
CALCIUM SERPL-MCNC: 9.9 MG/DL (ref 8.5–10.1)
CHLORIDE SERPL-SCNC: 104 MMOL/L (ref 97–108)
CO2 SERPL-SCNC: 29 MMOL/L (ref 21–32)
COLOR UR: ABNORMAL
COMMENT, HOLDF: NORMAL
CREAT SERPL-MCNC: 1.31 MG/DL (ref 0.55–1.02)
DIFFERENTIAL METHOD BLD: ABNORMAL
EOSINOPHIL # BLD: 0.1 K/UL (ref 0–0.4)
EOSINOPHIL NFR BLD: 1 % (ref 0–7)
EPITH CASTS URNS QL MICRO: ABNORMAL /LPF
ERYTHROCYTE [DISTWIDTH] IN BLOOD BY AUTOMATED COUNT: 14.7 % (ref 11.5–14.5)
GLOBULIN SER CALC-MCNC: 3 G/DL (ref 2–4)
GLUCOSE SERPL-MCNC: 138 MG/DL (ref 65–100)
GLUCOSE UR STRIP.AUTO-MCNC: NEGATIVE MG/DL
HCT VFR BLD AUTO: 40 % (ref 35–47)
HGB BLD-MCNC: 12.5 G/DL (ref 11.5–16)
HGB UR QL STRIP: ABNORMAL
IMM GRANULOCYTES # BLD AUTO: 0 K/UL (ref 0–0.04)
IMM GRANULOCYTES NFR BLD AUTO: 0 % (ref 0–0.5)
KETONES UR QL STRIP.AUTO: NEGATIVE MG/DL
LEUKOCYTE ESTERASE UR QL STRIP.AUTO: NEGATIVE
LYMPHOCYTES # BLD: 1 K/UL (ref 0.8–3.5)
LYMPHOCYTES NFR BLD: 11 % (ref 12–49)
MAGNESIUM SERPL-MCNC: 2.3 MG/DL (ref 1.6–2.4)
MCH RBC QN AUTO: 30.2 PG (ref 26–34)
MCHC RBC AUTO-ENTMCNC: 31.3 G/DL (ref 30–36.5)
MCV RBC AUTO: 96.6 FL (ref 80–99)
MONOCYTES # BLD: 0.6 K/UL (ref 0–1)
MONOCYTES NFR BLD: 6 % (ref 5–13)
NEUTS SEG # BLD: 7.8 K/UL (ref 1.8–8)
NEUTS SEG NFR BLD: 81 % (ref 32–75)
NITRITE UR QL STRIP.AUTO: NEGATIVE
NRBC # BLD: 0 K/UL (ref 0–0.01)
NRBC BLD-RTO: 0 PER 100 WBC
PH UR STRIP: 5 [PH] (ref 5–8)
PLATELET # BLD AUTO: 299 K/UL (ref 150–400)
PMV BLD AUTO: 9.5 FL (ref 8.9–12.9)
POTASSIUM SERPL-SCNC: 4 MMOL/L (ref 3.5–5.1)
PROT SERPL-MCNC: 6.5 G/DL (ref 6.4–8.2)
PROT UR STRIP-MCNC: NEGATIVE MG/DL
RBC # BLD AUTO: 4.14 M/UL (ref 3.8–5.2)
RBC #/AREA URNS HPF: ABNORMAL /HPF (ref 0–5)
SAMPLES BEING HELD,HOLD: NORMAL
SODIUM SERPL-SCNC: 139 MMOL/L (ref 136–145)
SP GR UR REFRACTOMETRY: 1.02 (ref 1–1.03)
TROPONIN-HIGH SENSITIVITY: 8 NG/L (ref 0–51)
UR CULT HOLD, URHOLD: NORMAL
UROBILINOGEN UR QL STRIP.AUTO: 0.2 EU/DL (ref 0.2–1)
WBC # BLD AUTO: 9.6 K/UL (ref 3.6–11)
WBC URNS QL MICRO: ABNORMAL /HPF (ref 0–4)

## 2021-11-30 PROCEDURE — 83735 ASSAY OF MAGNESIUM: CPT

## 2021-11-30 PROCEDURE — 99285 EMERGENCY DEPT VISIT HI MDM: CPT

## 2021-11-30 PROCEDURE — 80053 COMPREHEN METABOLIC PANEL: CPT

## 2021-11-30 PROCEDURE — 70450 CT HEAD/BRAIN W/O DYE: CPT

## 2021-11-30 PROCEDURE — 93005 ELECTROCARDIOGRAM TRACING: CPT

## 2021-11-30 PROCEDURE — 36415 COLL VENOUS BLD VENIPUNCTURE: CPT

## 2021-11-30 PROCEDURE — 72131 CT LUMBAR SPINE W/O DYE: CPT

## 2021-11-30 PROCEDURE — 85025 COMPLETE CBC W/AUTO DIFF WBC: CPT

## 2021-11-30 PROCEDURE — 81001 URINALYSIS AUTO W/SCOPE: CPT

## 2021-11-30 PROCEDURE — 73610 X-RAY EXAM OF ANKLE: CPT

## 2021-11-30 PROCEDURE — 96372 THER/PROPH/DIAG INJ SC/IM: CPT

## 2021-11-30 PROCEDURE — 84484 ASSAY OF TROPONIN QUANT: CPT

## 2021-11-30 NOTE — Clinical Note
[address]  [address] [id number] [phone]    Work/School Note    Date: 11/30/2021    To Whom It May concern:    Renae Collazo was seen and treated today in the emergency room by the following provider(s):  Attending Provider: Vasiliy Pickett DO  Nurse Practitioner: Pavan Babb NP. Renae Collazo is excused from work/school on 11/30/21 and 12/01/21. She is medically clear to return to work/school on 12/2/2021.        Sincerely,          Reyes Epp, DO

## 2021-11-30 NOTE — Clinical Note
Patient Class[de-identified] OBSERVATION [104]   Type of Bed: Medical [8]   Reason for Observation: Orthostatic hypotension with falls   Admitting Diagnosis: Orthostatic hypotension [458. 0. ICD-9-CM]   Admitting Diagnosis: Weakness [937720]   Admitting Physician: Emperatriz Martinser [[id number]]   Attending Physician: Elvin Gibbs

## 2021-12-01 PROBLEM — R53.1 WEAKNESS: Status: ACTIVE | Noted: 2021-12-01

## 2021-12-01 PROBLEM — I95.1 ORTHOSTATIC HYPOTENSION: Status: ACTIVE | Noted: 2021-12-01

## 2021-12-01 PROBLEM — N17.9 AKI (ACUTE KIDNEY INJURY) (HCC): Status: ACTIVE | Noted: 2021-12-01

## 2021-12-01 PROBLEM — N39.0 UTI (URINARY TRACT INFECTION): Status: ACTIVE | Noted: 2021-12-01

## 2021-12-01 LAB
ATRIAL RATE: 90 BPM
CALCULATED P AXIS, ECG09: 32 DEGREES
CALCULATED R AXIS, ECG10: -6 DEGREES
CALCULATED T AXIS, ECG11: 15 DEGREES
DIAGNOSIS, 93000: NORMAL
P-R INTERVAL, ECG05: 156 MS
Q-T INTERVAL, ECG07: 352 MS
QRS DURATION, ECG06: 80 MS
QTC CALCULATION (BEZET), ECG08: 430 MS
TROPONIN-HIGH SENSITIVITY: 9 NG/L (ref 0–51)
VENTRICULAR RATE, ECG03: 90 BPM

## 2021-12-01 PROCEDURE — 84484 ASSAY OF TROPONIN QUANT: CPT

## 2021-12-01 PROCEDURE — 94640 AIRWAY INHALATION TREATMENT: CPT

## 2021-12-01 PROCEDURE — 74011250636 HC RX REV CODE- 250/636: Performed by: EMERGENCY MEDICINE

## 2021-12-01 PROCEDURE — 36415 COLL VENOUS BLD VENIPUNCTURE: CPT

## 2021-12-01 PROCEDURE — 97530 THERAPEUTIC ACTIVITIES: CPT

## 2021-12-01 PROCEDURE — 87086 URINE CULTURE/COLONY COUNT: CPT

## 2021-12-01 PROCEDURE — 97161 PT EVAL LOW COMPLEX 20 MIN: CPT

## 2021-12-01 PROCEDURE — 74011000250 HC RX REV CODE- 250: Performed by: STUDENT IN AN ORGANIZED HEALTH CARE EDUCATION/TRAINING PROGRAM

## 2021-12-01 PROCEDURE — G0378 HOSPITAL OBSERVATION PER HR: HCPCS

## 2021-12-01 PROCEDURE — 65660000000 HC RM CCU STEPDOWN

## 2021-12-01 PROCEDURE — 87147 CULTURE TYPE IMMUNOLOGIC: CPT

## 2021-12-01 PROCEDURE — 74011000250 HC RX REV CODE- 250: Performed by: NURSE PRACTITIONER

## 2021-12-01 PROCEDURE — 94664 DEMO&/EVAL PT USE INHALER: CPT

## 2021-12-01 PROCEDURE — 74011250637 HC RX REV CODE- 250/637: Performed by: NURSE PRACTITIONER

## 2021-12-01 PROCEDURE — 74011250637 HC RX REV CODE- 250/637: Performed by: STUDENT IN AN ORGANIZED HEALTH CARE EDUCATION/TRAINING PROGRAM

## 2021-12-01 RX ORDER — IBUPROFEN 600 MG/1
600 TABLET ORAL
Status: DISCONTINUED | OUTPATIENT
Start: 2021-12-01 | End: 2021-12-01

## 2021-12-01 RX ORDER — MELATONIN
1000 DAILY
Status: DISCONTINUED | OUTPATIENT
Start: 2021-12-02 | End: 2021-12-05 | Stop reason: HOSPADM

## 2021-12-01 RX ORDER — ACETAMINOPHEN 325 MG/1
650 TABLET ORAL
Status: DISCONTINUED | OUTPATIENT
Start: 2021-12-01 | End: 2021-12-05 | Stop reason: HOSPADM

## 2021-12-01 RX ORDER — MELOXICAM 15 MG/1
15 TABLET ORAL DAILY
COMMUNITY
End: 2022-01-03

## 2021-12-01 RX ORDER — ONDANSETRON 4 MG/1
4 TABLET, ORALLY DISINTEGRATING ORAL
Status: DISCONTINUED | OUTPATIENT
Start: 2021-12-01 | End: 2021-12-05 | Stop reason: HOSPADM

## 2021-12-01 RX ORDER — SODIUM CHLORIDE 9 MG/ML
100 INJECTION, SOLUTION INTRAVENOUS CONTINUOUS
Status: DISCONTINUED | OUTPATIENT
Start: 2021-12-01 | End: 2021-12-04

## 2021-12-01 RX ORDER — ACETAMINOPHEN 325 MG/1
650 TABLET ORAL
Status: DISCONTINUED | OUTPATIENT
Start: 2021-12-01 | End: 2021-12-02 | Stop reason: SDUPTHER

## 2021-12-01 RX ORDER — ACETAMINOPHEN 650 MG/1
650 SUPPOSITORY RECTAL
Status: DISCONTINUED | OUTPATIENT
Start: 2021-12-01 | End: 2021-12-05 | Stop reason: HOSPADM

## 2021-12-01 RX ORDER — BISMUTH SUBSALICYLATE 262 MG
1 TABLET,CHEWABLE ORAL DAILY
COMMUNITY

## 2021-12-01 RX ORDER — SODIUM CHLORIDE 0.9 % (FLUSH) 0.9 %
5-40 SYRINGE (ML) INJECTION AS NEEDED
Status: DISCONTINUED | OUTPATIENT
Start: 2021-12-01 | End: 2021-12-05 | Stop reason: HOSPADM

## 2021-12-01 RX ORDER — SULFAMETHOXAZOLE AND TRIMETHOPRIM 800; 160 MG/1; MG/1
1 TABLET ORAL EVERY 12 HOURS
Status: COMPLETED | OUTPATIENT
Start: 2021-12-01 | End: 2021-12-04

## 2021-12-01 RX ORDER — DIPHENHYDRAMINE HCL 25 MG
25 CAPSULE ORAL
COMMUNITY
End: 2022-01-03

## 2021-12-01 RX ORDER — IPRATROPIUM BROMIDE AND ALBUTEROL SULFATE 2.5; .5 MG/3ML; MG/3ML
3 SOLUTION RESPIRATORY (INHALATION)
Status: COMPLETED | OUTPATIENT
Start: 2021-12-01 | End: 2021-12-01

## 2021-12-01 RX ORDER — PANTOPRAZOLE SODIUM 40 MG/1
40 TABLET, DELAYED RELEASE ORAL
Status: DISCONTINUED | OUTPATIENT
Start: 2021-12-02 | End: 2021-12-05 | Stop reason: HOSPADM

## 2021-12-01 RX ORDER — BUPROPION HYDROCHLORIDE 100 MG/1
200 TABLET, EXTENDED RELEASE ORAL 2 TIMES DAILY
Status: DISCONTINUED | OUTPATIENT
Start: 2021-12-02 | End: 2021-12-05 | Stop reason: HOSPADM

## 2021-12-01 RX ORDER — BACLOFEN 10 MG/1
TABLET ORAL
Status: ON HOLD | COMMUNITY
Start: 2021-11-25 | End: 2021-12-02

## 2021-12-01 RX ORDER — LATANOPROST 50 UG/ML
1 SOLUTION/ DROPS OPHTHALMIC
Status: DISCONTINUED | OUTPATIENT
Start: 2021-12-01 | End: 2021-12-05 | Stop reason: HOSPADM

## 2021-12-01 RX ORDER — GABAPENTIN 300 MG/1
300 CAPSULE ORAL
Status: DISCONTINUED | OUTPATIENT
Start: 2021-12-01 | End: 2021-12-02

## 2021-12-01 RX ORDER — SODIUM CHLORIDE 0.9 % (FLUSH) 0.9 %
5-40 SYRINGE (ML) INJECTION EVERY 8 HOURS
Status: DISCONTINUED | OUTPATIENT
Start: 2021-12-01 | End: 2021-12-05 | Stop reason: HOSPADM

## 2021-12-01 RX ORDER — LEVOTHYROXINE SODIUM 125 UG/1
125 TABLET ORAL DAILY
Status: DISCONTINUED | OUTPATIENT
Start: 2021-12-02 | End: 2021-12-05 | Stop reason: HOSPADM

## 2021-12-01 RX ORDER — KETOROLAC TROMETHAMINE 30 MG/ML
30 INJECTION, SOLUTION INTRAMUSCULAR; INTRAVENOUS
Status: COMPLETED | OUTPATIENT
Start: 2021-12-01 | End: 2021-12-01

## 2021-12-01 RX ORDER — TRAZODONE HYDROCHLORIDE 50 MG/1
75 TABLET ORAL
Status: DISCONTINUED | OUTPATIENT
Start: 2021-12-01 | End: 2021-12-05 | Stop reason: HOSPADM

## 2021-12-01 RX ORDER — ENOXAPARIN SODIUM 100 MG/ML
40 INJECTION SUBCUTANEOUS DAILY
Status: DISCONTINUED | OUTPATIENT
Start: 2021-12-01 | End: 2021-12-05 | Stop reason: HOSPADM

## 2021-12-01 RX ORDER — SODIUM CHLORIDE 9 MG/ML
75 INJECTION, SOLUTION INTRAVENOUS CONTINUOUS
Status: DISCONTINUED | OUTPATIENT
Start: 2021-12-01 | End: 2021-12-04

## 2021-12-01 RX ORDER — ACETAMINOPHEN 325 MG/1
650 TABLET ORAL
Status: COMPLETED | OUTPATIENT
Start: 2021-12-01 | End: 2021-12-01

## 2021-12-01 RX ORDER — DIPHENHYDRAMINE HCL 25 MG
25 CAPSULE ORAL
Status: DISCONTINUED | OUTPATIENT
Start: 2021-12-01 | End: 2021-12-05 | Stop reason: HOSPADM

## 2021-12-01 RX ORDER — POLYETHYLENE GLYCOL 3350 17 G/17G
17 POWDER, FOR SOLUTION ORAL DAILY PRN
Status: DISCONTINUED | OUTPATIENT
Start: 2021-12-01 | End: 2021-12-05 | Stop reason: HOSPADM

## 2021-12-01 RX ORDER — LANOLIN ALCOHOL/MO/W.PET/CERES
200 CREAM (GRAM) TOPICAL DAILY
Status: DISCONTINUED | OUTPATIENT
Start: 2021-12-02 | End: 2021-12-05 | Stop reason: HOSPADM

## 2021-12-01 RX ORDER — ALBUTEROL SULFATE 0.83 MG/ML
2.5 SOLUTION RESPIRATORY (INHALATION)
Status: DISCONTINUED | OUTPATIENT
Start: 2021-12-01 | End: 2021-12-05 | Stop reason: HOSPADM

## 2021-12-01 RX ORDER — PREDNISONE 20 MG/1
20 TABLET ORAL DAILY
Status: DISCONTINUED | OUTPATIENT
Start: 2021-12-02 | End: 2021-12-02

## 2021-12-01 RX ORDER — IPRATROPIUM BROMIDE 0.5 MG/2.5ML
500 SOLUTION RESPIRATORY (INHALATION)
Status: DISCONTINUED | OUTPATIENT
Start: 2021-12-01 | End: 2021-12-05 | Stop reason: HOSPADM

## 2021-12-01 RX ORDER — BUDESONIDE 0.5 MG/2ML
INHALANT ORAL
COMMUNITY
Start: 2021-07-22

## 2021-12-01 RX ORDER — MONTELUKAST SODIUM 10 MG/1
10 TABLET ORAL
Status: DISCONTINUED | OUTPATIENT
Start: 2021-12-01 | End: 2021-12-05 | Stop reason: HOSPADM

## 2021-12-01 RX ORDER — POLYETHYLENE GLYCOL 3350 17 G/17G
8.5 POWDER, FOR SOLUTION ORAL DAILY
Status: DISCONTINUED | OUTPATIENT
Start: 2021-12-02 | End: 2021-12-05 | Stop reason: HOSPADM

## 2021-12-01 RX ORDER — ONDANSETRON 2 MG/ML
4 INJECTION INTRAMUSCULAR; INTRAVENOUS
Status: DISCONTINUED | OUTPATIENT
Start: 2021-12-01 | End: 2021-12-05 | Stop reason: HOSPADM

## 2021-12-01 RX ORDER — SODIUM FLUORIDE 5 MG/ML
PASTE, DENTIFRICE DENTAL
COMMUNITY
Start: 2021-09-16

## 2021-12-01 RX ORDER — LATANOPROST 50 UG/ML
1 SOLUTION/ DROPS OPHTHALMIC
COMMUNITY
Start: 2021-09-07

## 2021-12-01 RX ORDER — BUDESONIDE 0.5 MG/2ML
500 INHALANT ORAL 2 TIMES DAILY
Status: DISCONTINUED | OUTPATIENT
Start: 2021-12-01 | End: 2021-12-01 | Stop reason: SDUPTHER

## 2021-12-01 RX ADMIN — ALBUTEROL SULFATE 2.5 MG: 2.5 SOLUTION RESPIRATORY (INHALATION) at 21:03

## 2021-12-01 RX ADMIN — MONTELUKAST 10 MG: 10 TABLET, FILM COATED ORAL at 22:31

## 2021-12-01 RX ADMIN — LATANOPROST 1 DROP: 50 SOLUTION OPHTHALMIC at 22:37

## 2021-12-01 RX ADMIN — Medication 10 ML: at 14:00

## 2021-12-01 RX ADMIN — ACETAMINOPHEN 650 MG: 325 TABLET ORAL at 15:55

## 2021-12-01 RX ADMIN — IPRATROPIUM BROMIDE AND ALBUTEROL SULFATE 3 ML: .5; 3 SOLUTION RESPIRATORY (INHALATION) at 01:06

## 2021-12-01 RX ADMIN — KETOROLAC TROMETHAMINE 30 MG: 30 INJECTION, SOLUTION INTRAMUSCULAR; INTRAVENOUS at 06:44

## 2021-12-01 RX ADMIN — GABAPENTIN 300 MG: 300 CAPSULE ORAL at 22:31

## 2021-12-01 RX ADMIN — ACETAMINOPHEN 650 MG: 325 TABLET ORAL at 02:50

## 2021-12-01 RX ADMIN — TRAZODONE HYDROCHLORIDE 75 MG: 50 TABLET ORAL at 22:31

## 2021-12-01 RX ADMIN — SULFAMETHOXAZOLE AND TRIMETHOPRIM 1 TABLET: 800; 160 TABLET ORAL at 22:31

## 2021-12-01 NOTE — SENIOR SERVICES NOTE
Senior Services after hours consult received and appreciated. Chart Reviewed:   HX asthma, hypothyroid, GERD, HTN, depression, fibromyalgia, and osteoarthritis. Patient greeted in room, AOX4, I introduced myself and role as SSED NP. Patient receptive and agreeable to geriatric screening, additional team members present, MARVEL Simons and SHABNAM Chowdhury. Delirium: Negative  Depression: Positive, known HX denies acute needs. Currently on medication and taking. ADL: Independent for the most part, likes to have someone present during showering time. IADLs: Mixture of Independent and Dependent, apartment complex has someone that assists with cleaning weekly, meals are provided at the complex, however patient has had increased difficulty making it down to the dining boucher for the meals- can receive up to 3 meals a day, state that she had been eating only once and has snacks in the apartments. Daughter assists with picking up medications and placing into a pill box, otherwise she is able to manage. Driving up until 3 weeks ago, difficulty getting walker in and out of car. Stopped driving due to decreased movement in neck, increased pain. Currently going to [facility] to walk in the pool as well receiving therapy as well. Family will assist with getting to and from MD appointments. 10 Meter Walk Test: Please REFER to Дмитрий Guajardo PT full note for further evaluation. From what I observed, patient became very orthostatic upon sitting and was not able to be fully evaluated. Assisted to the bedside commode and then placed back in bed. DME:   Rollator, walk in shower with grab bars, elevated toilet seat. Patient lives alone and independently at [facility], moved in 2 months ago. Senior apartments, completely independent with no option to move to assisted living and ever since recent move having increased falls. Using a rollator to ambulate.    Daughter- sees daily, lives 10 minutes away, patient has 1 daughter and son passed away last year. Patient with list of up to date medications on phone. Medication Reconciliation, Allergies and Pharmacy all updated and verified with patient. Last time of medications was last yesterday evening. Patient open to going to a Rehab facility after admission and further evaluation. MARVEL Simons to assist. Patient to be admitted and followed by inpatient team. Will need consideration of medication management, known HX of orthostasis, managed by Dr. Joanna Mitchell and continued PT/OT for debility.   -Consideration for placement into Rehab, 3 weeks ago patient was able to drive independently, difficulty managing rollator in car. Thank you for the opportunity to participate in this patients care.    Melanie Christina NP  [facility]ED  11:39 AM  [phone]

## 2021-12-01 NOTE — DISCHARGE INSTRUCTIONS
Patient Education        Dehydration: Care Instructions  Your Care Instructions  Dehydration happens when your body loses too much fluid. This might happen when you do not drink enough water or you lose large amounts of fluids from your body because of diarrhea, vomiting, or sweating. Severe dehydration can be life-threatening. Water and minerals called electrolytes help put your body fluids back in balance. Learn the early signs of fluid loss, and drink more fluids to prevent dehydration. Follow-up care is a key part of your treatment and safety. Be sure to make and go to all appointments, and call your doctor if you are having problems. It's also a good idea to know your test results and keep a list of the medicines you take. How can you care for yourself at home? · Drink plenty of fluids. Choose water and other clear liquids until you feel better. If you have kidney, heart, or liver disease and have to limit fluids, talk with your doctor before you increase the amount of fluids you drink. · If you do not feel like eating or drinking, try taking small sips of water, sports drinks, or other rehydration drinks. · Get plenty of rest.  To prevent dehydration  · Add more fluids to your diet and daily routine, unless your doctor has told you not to. · During hot weather, drink more fluids. Drink even more fluids if you exercise a lot. Stay away from drinks with alcohol or caffeine. · Watch for the symptoms of dehydration. These include:  ? A dry, sticky mouth. ? Not much urine. ? Dry and sunken eyes. ? Feeling very tired. · Learn what problems can lead to dehydration. These include:  ? Diarrhea, fever, and vomiting. ? Any illness with a fever, such as pneumonia or the flu. ? Activities that cause heavy sweating, such as endurance races and heavy outdoor work in hot or humid weather. ? Alcohol or drug use or problems caused by quitting their use (withdrawal). ?  Certain medicines, such as cold and allergy pills (antihistamines), diet pills (diuretics), and laxatives. ? Certain diseases, such as diabetes, cancer, and heart or kidney disease. When should you call for help? Call 911 anytime you think you may need emergency care. For example, call if:    · You passed out (lost consciousness). Call your doctor now or seek immediate medical care if:    · You are confused and cannot think clearly.     · You are dizzy or lightheaded, or you feel like you may faint.     · You have signs of needing more fluids. You have sunken eyes, a dry mouth, and you pass only a little urine.     · You cannot keep fluids down. Watch closely for changes in your health, and be sure to contact your doctor if:    · You are not making tears.     · Your skin is very dry and sags slowly back into place after you pinch it.     · Your mouth and eyes are very dry. Where can you learn more? Go to http://www.gray.com/  Enter Q814 in the search box to learn more about \"Dehydration: Care Instructions. \"  Current as of: July 1, 2021               Content Version: 13.0  © 9847-4863 [employer]. Care instructions adapted under license by [employer] (which disclaims liability or warranty for this information). If you have questions about a medical condition or this instruction, always ask your healthcare professional. [place] [address] any warranty or liability for your use of this information. Orthostatic Hypotension: Care Instructions  Your Care Instructions     Orthostatic hypotension is a quick drop in blood pressure. It happens when you get up from sitting or lying down. You may feel faint, lightheaded, or dizzy. When a person sits up or stands up, the body changes the way it pumps blood. This can slow the flow of blood to the brain for a very short time. And that can make you feel lightheaded. Many medicines can cause this problem, especially in older people.  Lack of fluids (dehydration) or illnesses such as diabetes or heart disease also can cause it. Follow-up care is a key part of your treatment and safety. Be sure to make and go to all appointments, and call your doctor if you are having problems. It's also a good idea to know your test results and keep a list of the medicines you take. How can you care for yourself at home? · Be safe with medicines. Call your doctor if you think you are having a problem with your medicine. You will get more details on the specific medicines your doctor prescribes. · If you feel dizzy or lightheaded, sit down or lie down for a few minutes. Or you can sit down and put your head between your knees. This will help your blood pressure go back to normal and help your symptoms go away. · Follow your doctor's suggestions for ways to prevent symptoms like dizziness. These suggestions may include:  ? Get up slowly from bed or after sitting for a long time. If you are in bed, roll to your side and swing your legs over the edge of the bed and onto the floor. Push your body up to a sitting position. Wait for a while before you slowly stand up.  ? Add more salt to your diet, if your doctor recommends it. ? Drink plenty of fluids. Choose water and other clear liquids. If you have kidney, heart, or liver disease and have to limit fluids, talk with your doctor before you increase the amount of fluids you drink. ? Avoid or limit alcohol to 2 drinks a day for men and 1 drink a day for women. Alcohol may interfere with your medicine. In addition, alcohol can make your low blood pressure worse by causing your body to lose water. ? Wear compression stockings to help improve blood flow. When should you call for help? Call 911 anytime you think you may need emergency care. For example, call if:    · You passed out (lost consciousness).    Watch closely for changes in your health, and be sure to contact your doctor if:    · You do not get better as expected. Where can you learn more? Go to http://www.4moms.com/  Enter V984 in the search box to learn more about \"Orthostatic Hypotension: Care Instructions. \"  Current as of: 2021               Content Version: 13.0  © 5634-0992 [employer]. Care instructions adapted under license by [employer] (which disclaims liability or warranty for this information). If you have questions about a medical condition or this instruction, always ask your healthcare professional. [place] [address] any warranty or liability for your use of this information. HOSPITALIST DISCHARGE INSTRUCTIONS    NAME: Renae Collazo   :  1945   MRN:  [id number]     Date/Time:  2021 6:29 AM    ADMIT DATE: 2021   DISCHARGE DATE: 2021     Attending Physician: Jose G Booth NP    DISCHARGE DIAGNOSIS:  Weakness  Ground-level fall  Dehydration  Orthostatic hypotension (low blood pressure with position change) in setting of essential hypertension (elevated blood pressure)  Urinary tract infection  Acute kidney injury (acutely abnormal kidney function)  Asthma  Neuropathy (nerve pain)  Hypothyroidism (abnormal function of the thyroid gland)  Gastroesophageal reflux disease (reflux of stomach contents into the esophagus)  Glaucoma (elevated pressure within the eyes)  Depression    Arthritis  Mild hyperglycemia (mildly elevated blood sugar which did not require treatment, likely related to steroid use)      Medications: Per above medication reconciliation.     Pain Management: per above medications    Recommended diet: Cardiac Diet    Recommended activity: PT/OT Eval and Treat    Wound care: None    Indwelling devices:  None    Supplemental Oxygen: 2 LPM/NC, wean as tolerated    Required Lab work: Basic metabolic profile (BMP)    Glucose management:  None    Code status: Full     Take all discharge paperwork with you to all of your follow up doctor appointments. Take your medications exactly as outlined in your discharge paperwork. Do not take any other medications unless specifically prescribed after your hospital stay. If your symptoms return/worsen seek medical attention immediately. You are being discharged on antibiotics. Antibiotics can cause diarrhea and bacterial infection in the GI tract. In order to reduce the risk of this probiotic has been prescribed. Should you develop a rash contact your Primary Care Provider immediately. If you develop swelling of the lips, mouth, or throat activate EMS or go to the closest Emergency Room. Outside physician follow up: Follow-up Information     Follow up With Specialties Details Why Contact Info    Sanjay Leonard MD Family Medicine In 3 days Call to schedule hospital follow up to be seen within 3 days of discharge from rehab [address] [phone]      Santi Reeves, [address] [place] [address] Vascular Surgery, Cardiology, Interventional Cardiology In 1 week Call to schedule hospital follow up to be seen within 1 week of discharge from rehab [address]  [phone]                Rehab provider responsible for above on discharge. Information obtained by :  I understand that if any problems occur once I am at home I am to contact my physician. I understand and acknowledge receipt of the instructions indicated above.                                                                                                                                            Physician's or R.N.'s Signature                                                                  Date/Time                                                                                                                                              Patient or Representative

## 2021-12-01 NOTE — ED TRIAGE NOTES
Patient arrives to ED via EMS from independent living, reports standing at kitchen sink today when \"felt my legs buckle\". Also reports slipping last week, resulting in right ankle injury. Denies hitting head, denies blood thinners. (Lio De La Fuente MD

## 2021-12-01 NOTE — PROGRESS NOTES
Problem: Mobility Impaired (Adult and Pediatric)  Goal: *Acute Goals and Plan of Care (Insert Text)  Description: FUNCTIONAL STATUS PRIOR TO ADMISSION: Patient was modified independent using a rollator for functional mobility, h/o orthostatic hypotension, two recent falls when standing, currently open to OPPT for back and neck pain, stopped driving two weeks ago. HOME SUPPORT PRIOR TO ADMISSION: The patient lived alone with daughter available to provide some assistance if needed and house keeper. Physical Therapy Goals  Initiated 12/1/2021  1. Patient will move from supine to sit and sit to supine , scoot up and down, and roll side to side in bed with modified independence within 7 day(s). 2.  Patient will transfer from bed to chair and chair to bed with modified independence using the least restrictive device within 7 day(s). 3.  Patient will perform sit to stand with modified independence within 7 day(s). 4.  Patient will ambulate with modified independence for 300 feet with the least restrictive device within 7 day(s). Outcome: Not Met   PHYSICAL THERAPY EVALUATION in the EMERGENCY DEPARTMENT  Patient: Isabell Zelaya (81 y.o. female)  Date: 12/1/2021  Primary Diagnosis: Orthostatic hypotension [I95.1]  Weakness [R53.1]        Precautions:  Fall    ASSESSMENT  Based on the objective data described below, the patient presents with symptomatic orthostatic hypotension, generalized weakness, pain (chronic neck and back, participating in OPPT, & ankle pain after a recent fall), impaired balance (relies on rollator at baseline), and recent falls in the home. At baseline, patient is ambulatory with a rollator walker and lives alone. She endorses being open to outpatient physical therapy for neck and back pain. She had two recent falls in the home.   She attributes one fall to her ankle rolling out when attempting to sit in her recliner and the other when her quads \"quivered\" while standing at the sink.  Today, patient was Min A for supine<->sit and Min hand held assist of two to & from the bed and bedside commode. She demonstrates poor tolerance to upright sitting due to being light headed and orthostatic and at times, having a difficult time sitting upright. At this time, she is unable to walk and not safe to DC home. Recommend continued therapy.       12/01/21 1021 12/01/21 1028 12/01/21 1040   BP: 135/73 98/72  (!) 162/83   BP 1 Location: Left arm Left upper arm Left upper arm   BP Patient Position: Lying Sitting, light headed Supine   Pulse: 86 (!) 101 93     Current Level of Function Impacting Discharge (mobility/balance): Min A of 1 to 2    Functional Outcome Measure: The patient scored 3/28 on the Tinetti outcome measure which is indicative of high fall risk. Other factors to consider for discharge: lives alone, symptomatic orthostatic hypotension, h/o falls, plof indep     Patient will benefit from skilled therapy intervention to address the above noted impairments. PLAN :  Recommendations and Planned Interventions: bed mobility training, transfer training, gait training, therapeutic exercises, patient and family training/education and therapeutic activities      Frequency/Duration: Patient will be followed by physical therapy:  5 times a week to address goals. Recommendation for discharge: (in order for the patient to meet his/her long term goals)  To be determined: Rehab vs home with HHPT pending functional progress. At this time, recommending rehab. This discharge recommendation:  Has been made in collaboration with the attending provider and/or case management    IF patient discharges home will need the following DME: patient owns DME required for discharge         SUBJECTIVE:   Patient stated My quads quivered, I sat on the floor.   (referring to her most recent fall)    OBJECTIVE DATA SUMMARY:   HISTORY:    Past Medical History:   Diagnosis Date    Asthma     Depression     Fibromyalgia     GERD (gastroesophageal reflux disease)     Hypertension     Hypothyroid     Menopause     Rotator cuff tear      Past Surgical History:   Procedure Laterality Date    HX CHOLECYSTECTOMY      HX GYN      HX ORTHOPAEDIC      HX TONSIL AND ADENOIDECTOMY         Personal factors and/or comorbidities impacting plan of care: PMH    Home Situation  Home Environment: Apartment (Independent living senior apartments)  # Steps to Enter: 0  One/Two Story Residence: One story  Living Alone: Yes  Support Systems: Other Family Member(s)  Current DME Used/Available at Home: Grab bars, Walker, rollator, Other (comment) (left alert)  Tub or Shower Type: Shower    EXAMINATION/PRESENTATION/DECISION MAKING:   Critical Behavior:  Neurologic State: Alert  Orientation Level: Oriented X4  Cognition: Appropriate decision making, Appropriate for age attention/concentration, Appropriate safety awareness, Follows commands  Safety/Judgement: Awareness of environment  Hearing:  Intact    Range Of Motion:  AROM: Generally decreased, functional                       Strength:    Strength: Generally decreased, functional                    Tone & Sensation:   Tone: Normal                              Coordination:  Coordination: Within functional limits  Vision:      Functional Mobility:  Bed Mobility:     Supine to Sit: Minimum assistance; Bed Modified; Additional time; Adaptive equipment  Sit to Supine: Minimum assistance   Scootin [address] (cues/ instruction for bridging)     Transfers:  Sit to Stand: Minimum assistance  Stand to Sit: Minimum assistance   Bed<->Bedside commode: Minimum assistance; Assist x2  Cues for technique to improve safety                       Balance:   Sitting: Impaired; Without support  Sitting - Static: Fair (occasional)  Sitting - Dynamic: Fair (occasional)  Standing: Impaired;  Without support  Standing - Static: Constant support  Standing - Dynamic : Constant support  Ambulation/Gait Training:              Gait Description (WDL): Exceptions to WDL (uanble d/t orthostatic hypotension)                                     Therapeutic Activity/ Safety Education:   Education - Fall prevention in the home specifically when standing in the kitchen     Functional Measure:  Tinetti test:    Sitting Balance: 0  Arises: 0  Attempts to Rise: 1  Immediate Standing Balance: 0  Standing Balance: 1  Nudged: 0  Eyes Closed: 0  Turn 360 Degrees - Continuous/Discontinuous: 1  Turn 360 Degrees - Steady/Unsteady: 0  Sitting Down: 0  Balance Score: 3 Balance total score  Indication of Gait: 0 (unable to walk d/t symptomatic orthostatic hypotension)  R Step Length/Height: 0  L Step Length/Height: 0  R Foot Clearance: 0  L Foot Clearance: 0  Step Symmetry: 0  Step Continuity: 0  Path: 0  Trunk: 0  Walking Time: 0  Gait Score: 0 Gait total score  Total Score: 3/28 Overall total score         Tinetti Tool Score Risk of Falls  <19 = High Fall Risk  19-24 = Moderate Fall Risk  25-28 = Low Fall Risk  Tinetti ME. Performance-Oriented Assessment of Mobility Problems in Elderly Patients. [place] 64; [id number].  (Scoring Description: PT Bulletin Feb. 10, 1993)    Older adults: Vanda Davis et al, 2009; n = 1000 [facility] elderly evaluated with ABC, PRAVEEN, ADL, and IADL)  · Mean PRAVEEN score for males aged 69-68 years = 26.21(3.40)  · Mean PRAVEEN score for females age 69-68 years = 25.16(4.30)  · Mean PRAVEEN score for males over 80 years = 23.29(6.02)  · Mean PRAVEEN score for females over 80 years = 17.20(8.32)            Physical Therapy Evaluation Charge Determination   History Examination Presentation Decision-Making   MEDIUM  Complexity : 1-2 comorbidities / personal factors will impact the outcome/ POC  MEDIUM Complexity : 3 Standardized tests and measures addressing body structure, function, activity limitation and / or participation in recreation  MEDIUM Complexity : Evolving with changing characteristics  Other outcome measures Tinetti 3/28  HIGH       Based on the above components, the patient evaluation is determined to be of the following complexity level: MEDIUM    Pain Rating:  Back and neck (chronic, spasms, open to outpatient physical therapy)    Activity Tolerance:   Poor, requires rest breaks, signs and symptoms of orthostatic hypotension, and coughing    After treatment patient left in no apparent distress:   Supine in bed, Call bell within reach, and siderails x2 in ED    COMMUNICATION/EDUCATION:   The patients plan of care was discussed with: Registered nurse, Case management, and SED NP, MD .     Fall prevention education was provided and the patient/caregiver indicated understanding., Patient/family have participated as able in goal setting and plan of care. , and Patient/family agree to work toward stated goals and plan of care.     Thank you for this referral.  Saeid Washington, PT   Time Calculation: 33 mins

## 2021-12-01 NOTE — H&P
[address] [facility] Adult  Hospitalist Group  History and Physical    Primary Care Provider: Ted Jones MD  Date of Service:  12/1/2021    Chief Complaint: Weakness and fall    Subjective:     Len York is a 64 y.o. female with past medical history of asthma, depression, fibromyalgia, GERD, hypertension, hypothyroidism, and worsening functional status who was brought in due to weakness and fall. Per the patient she is started going downhill ever since a while back where she started having neck and back pain. She then reports that yesterday she was washing the dishes when she felt her legs buckle. Per the patient's daughter they were concerned about her status and they think that she needs more than just the physical therapy a couple times a week which she has been doing. In the ER the patient has been comfortable stable. On examination in the ER the patient had positive orthostats, weakness, and dizziness. The patient's mental status is normal and she does not complain of anything except issues with her back. Her cardiologist also evaluated her and wants to adjust her medications due to orthostatic hypotension and dizziness. In the ER the patient's UA did show bacteriuria and due to her weakness and orthostatic hypotension is likely that she has a UTI as well. Review of Systems:    A comprehensive review of systems was negative except for that written in the History of Present Illness. Past Medical History:   Diagnosis Date    Asthma     Depression     Fibromyalgia     GERD (gastroesophageal reflux disease)     Hypertension     Hypothyroid     Menopause 1995    Rotator cuff tear       Past Surgical History:   Procedure Laterality Date    HX CHOLECYSTECTOMY      HX GYN      HX ORTHOPAEDIC      HX TONSIL AND ADENOIDECTOMY       Prior to Admission medications    Medication Sig Start Date End Date Taking?  Authorizing Provider   baclofen (LIORESAL) 10 mg tablet  11/25/21  Yes Other, MD Marlyn   budesonide (PULMICORT) 0.5 mg/2 mL nbs INHALE ONE VIAL VIA NEBULIZER EVERY 12 HOURS - RINSE MOUTH AFTER USE, MAY MIX WITH ALBUTEROL TWICE A DAY 7/22/21  Yes Other, MD Marlyn   PreviDent 5000 Plus 1.1 % crea USE AS DIRECTED AFTER MEALS 9/16/21  Yes Other, MD Marlyn   latanoprost (XALATAN) 0.005 % ophthalmic solution Administer 1 Drop to both eyes nightly. 9/7/21  Yes Other, MD Marlyn   multivitamin (ONE A DAY) tablet Take 1 Tablet by mouth daily. Yes Other, MD Marlyn   diphenhydrAMINE (BenadryL) 25 mg capsule Take 25 mg by mouth nightly as needed. Yes Other, MD Marlyn   meloxicam (Mobic) 15 mg tablet Take 15 mg by mouth daily. Yes Other, MD Marlyn   omalizumab Leslee Maw SC) by SubCUTAneous route. Every 2 week injections. Yes Other, MD Marlyn   tiotropium bromide (SPIRIVA RESPIMAT) 1.25 mcg/actuation inhaler Take 2 Puffs by inhalation daily. 3/23/21  Yes Provider, Historical   predniSONE (DELTASONE) 10 mg tablet Take 10 mg by mouth daily. Take 6 tablets by mouth for 2 days, then take 4 tablets by mouth for 3 days, then take 3 tablets by mouth for 3 days, then take 2 tablets by mouth for 3 days, then take 1 talet by mouth for 3 days. 3/16/21  Yes Provider, Historical   montelukast (SINGULAIR) 10 mg tablet Take 10 mg by mouth At bedtime. Yes Provider, Historical   albuterol (PROVENTIL VENTOLIN) 2.5 mg /3 mL (0.083 %) nebu 1 Each by Nebulization route every four to six (4-6) hours as needed for Wheezing. 12/1/20  Yes Provider, Historical   LOSARTAN PO Take 25 mg by mouth nightly. Yes Other, MD Marlyn   gabapentin (NEURONTIN) 100 mg capsule Take 300 mg by mouth nightly. 7/8/11  Yes Provider, Historical   levothyroxine (SYNTHROID) 125 mcg tablet Take 125 mcg by mouth daily. 7/8/11  Yes Provider, Historical   albuterol (PROVENTIL HFA) 90 mcg/Actuation inhaler Take 1 Puff by inhalation two (2) times a day. 7/7/11  Yes Provider, Historical   CALCIUM CARBONATE (CALCIUM 600 PO) Take 1 Tablet by mouth daily.  7/7/11 Yes Provider, Historical   CHOLECALCIFEROL, VITAMIN D3, (VITAMIN D-3 PO) Take 1,200 Units by mouth daily. 7/7/11  Yes Provider, Historical   cholecalciferol, vitamin d3, (VITAMIN D) 1,000 unit tablet Take 1,000 Units by mouth daily. Yes Provider, Historical   polyethylene glycol (MIRALAX) 17 gram packet Take 8.5 g by mouth daily. Yes Provider, Historical   ACETAMINOPHEN (TYLENOL PO) Take 1,000 mg by mouth two (2) times a day. 7/7/11  Yes Provider, Historical   PSEUDOEPHEDRINE HCL (SUDOPHED PO) Take 2 Tabs by mouth every twelve (12) hours as needed for Other (allergies). 7/7/11  Yes Provider, Historical   pyridoxine (VITAMIN B-6) 200 mg tablet Take 200 mg by mouth daily. Yes Provider, Historical   lansoprazole (PREVACID) 30 mg disintegrating tablet Take 30 mg by mouth daily. Yes Other, MD Marlyn   BUPROPION HCL (WELLBUTRIN XL PO) Take 450 mg by mouth daily. 6/10/11  Yes Other, MD Marlyn   TRAZODONE HCL (TRAZODONE PO) Take 75 mg by mouth nightly. 6/10/11  Yes Other, MD Marlyn   budesonide-formoterol (SYMBICORT) 160-4.5 mcg/Actuation HFA inhaler Take 2 Puffs by inhalation two (2) times a day. Yes Other, MD Marlyn   cyclosporine (RESTASIS) 0.05 % ophthalmic emulsion Administer 1 Drop to both eyes two (2) times a day. Yes Other, MD Marlyn   LOPERAMIDE HCL (IMODIUM PO) Take 3 Tabs by mouth every eight (8) hours as needed for Diarrhea. 7/7/11   Provider, Historical     Allergies   Allergen Reactions    Pcn [Penicillins] Hives      History reviewed. No pertinent family history. SOCIAL HISTORY:  Patient resides at independent living facility. Patient ambulates with independently. Smoking history: Patient has 15 pack years no smoking at this time  Alcohol history: No        Objective:       Physical Exam:   Physical Exam  Constitutional:       General: She is not in acute distress. Appearance: She is not toxic-appearing. HENT:      Head: Normocephalic and atraumatic.       Nose: Nose normal. No congestion or rhinorrhea. Mouth/Throat:      Mouth: Mucous membranes are moist.      Pharynx: Oropharynx is clear. No oropharyngeal exudate or posterior oropharyngeal erythema. Eyes:      General: No scleral icterus. Extraocular Movements: Extraocular movements intact. Pupils: Pupils are equal, round, and reactive to light. Cardiovascular:      Rate and Rhythm: Normal rate and regular rhythm. Pulses: Normal pulses. Heart sounds: Normal heart sounds. No murmur heard. No friction rub. No gallop. Pulmonary:      Effort: Pulmonary effort is normal. No respiratory distress. Breath sounds: No stridor. Wheezing present. Abdominal:      General: Bowel sounds are normal. There is no distension. Palpations: Abdomen is soft. Tenderness: There is no abdominal tenderness. There is no guarding. Musculoskeletal:         General: No swelling. Cervical back: Normal range of motion and neck supple. No rigidity. Right lower leg: No edema. Left lower leg: No edema. Comments: Antalgic position due to back pain. Skin:     General: Skin is warm and dry. Capillary Refill: Capillary refill takes less than 2 seconds. Coloration: Skin is not jaundiced. Findings: No bruising or lesion. Neurological:      Mental Status: She is alert and oriented to person, place, and time. Mental status is at baseline. Motor: Weakness present. Psychiatric:         Mood and Affect: Mood normal.         Behavior: Behavior normal.         Thought Content: Thought content normal.         Judgment: Judgment normal.           Data Review: All diagnostic labs and studies have been reviewed. Assessment:     Active Problems:    Dehydration (7/7/2011)      Orthostatic hypotension (12/1/2021)      Weakness (12/1/2021)      UTI (urinary tract infection) (12/1/2021)      LOREN (acute kidney injury) ([facility] (12/1/2021)        Plan:      Active Problems:    Dehydration (7/7/2011)  Continue to monitor with labs  Telemetry, remote  Hydration with normal saline at 75 mL/h      Orthostatic hypotension (12/1/2021)  The patient's cardiologist is on board   hold blood pressure medication  Orthostatic vitals every shift      Weakness (12/1/2021)  Monitor  PT/OT consults placed  Evaluate for placement      UTI (urinary tract infection) (12/1/2021)  Patient is allergic to penicillins and per chart review as far as I can does not receive the cephalosporin before. UTIs not complicated so I am starting Bactrim. Continue to hold losartan and monitor potassium  Urine culture      LOREN (acute kidney injury) ([facility] (12/1/2021)  Hydration with normal saline   Follow-up with morning labs    Hypertension  Hold blood pressure medications due to orthostatic hypotension    Hypothyroidism  GERD   Asthma    Home medications restarted.       FUNCTIONAL STATUS PRIOR TO HOSPITALIZATION Ambulates Independently (including history of recent falls): Has had recent falls and weakness      Signed By: Bulmaro Murillo MD     December 1, 2021

## 2021-12-01 NOTE — CONSULTS
Cardiology Consult Note    CC: weakness/falls  Reason for consult: falls  Requesting MD:  Dr. Dario German:      Date of  Admission: 11/30/2021  9:11 PM     Admission type:Emergency    Brain Mancera is a 64 y.o. female admitted for weakness/falls. She is a patient of mine and has started seeing her several years ago when she had similar presentation with weakness and was diagnosed with orthostatic hypotension. It was especially bad when she was dehydrated. Once her fluid status was adequate she did not have much problem with it. In fact, her systolic HTN requires small dose of ARB, Losartan with subsequent good control. She has h/o asthma and this was also quite stable last several years. In ER, I witnessed her systolic BP dropping from 120s to 94 with dizziness and weakness. Patient Active Problem List    Diagnosis Date Noted    Goals of care, counseling/discussion     Other chronic pain     Cough     Impaired mobility     Status asthmaticus 02/08/2021    Moderate persistent asthma with acute exacerbation 02/08/2021    IBS (irritable bowel syndrome) 07/08/2011    HTN (hypertension) 07/08/2011    Asthma 07/08/2011    Unspecified hypothyroidism 07/08/2011    Syncope 07/07/2011      Davidson Diaz MD  Past Medical History:   Diagnosis Date    Asthma     Depression     Fibromyalgia     GERD (gastroesophageal reflux disease)     Hypertension     Hypothyroid     Menopause 1995    Rotator cuff tear       Past Surgical History:   Procedure Laterality Date    HX CHOLECYSTECTOMY      HX GYN      HX ORTHOPAEDIC      HX TONSIL AND ADENOIDECTOMY       Allergies   Allergen Reactions    Pcn [Penicillins] Hives      History reviewed. No pertinent family history. No current facility-administered medications for this encounter. Current Outpatient Medications   Medication Sig    predniSONE (DELTASONE) 20 mg tablet Take 20 mg by mouth daily.     tiotropium bromide (SPIRIVA RESPIMAT) 1.25 mcg/actuation inhaler Take 2 Puffs by inhalation daily.  predniSONE (DELTASONE) 10 mg tablet Take 10 mg by mouth daily. Take 6 tablets by mouth for 2 days, then take 4 tablets by mouth for 3 days, then take 3 tablets by mouth for 3 days, then take 2 tablets by mouth for 3 days, then take 1 talet by mouth for 3 days.  OXYGEN-AIR DELIVERY SYSTEMS 2 L by Nasal route as needed for Other (SOB).  aspirin 81 mg chewable tablet Take 81 mg by mouth daily.  L.acid,para-B. bifidum-S.therm (RISAQUAD) 8 billion cell cap cap Take 1 Cap by mouth daily.  amLODIPine (NORVASC) 10 mg tablet Take 1 Tab by mouth daily.  losartan (COZAAR) 25 mg tablet Take 25 mg by mouth daily.  montelukast (SINGULAIR) 10 mg tablet Take 10 mg by mouth At bedtime.  traZODone (DESYREL) 50 mg tablet Take 50 mg by mouth nightly.  albuterol (PROVENTIL VENTOLIN) 2.5 mg /3 mL (0.083 %) nebu 1 Each by Nebulization route every four to six (4-6) hours as needed for Wheezing.  LOSARTAN PO Take 25 mg by mouth nightly.  gabapentin (NEURONTIN) 100 mg capsule Take 300 mg by mouth nightly.  levothyroxine (SYNTHROID) 125 mcg tablet Take 125 mcg by mouth daily.  levothyroxine (SYNTHROID) 125 mcg tablet Take 62.5 mcg by mouth every other day.  albuterol (PROVENTIL HFA) 90 mcg/Actuation inhaler Take 1 Puff by inhalation two (2) times a day.  aspirin 81 mg tablet Take 81 mg by mouth nightly.  CALCIUM CARBONATE (CALCIUM 600 PO) Take 2 Tabs by mouth daily.  CHOLECALCIFEROL, VITAMIN D3, (VITAMIN D-3 PO) Take 1,200 Units by mouth daily.  cholecalciferol, vitamin d3, (VITAMIN D) 1,000 unit tablet Take 1,000 Units by mouth daily.  VITAMIN B COMPLEX PO Take 1 Tab by mouth daily.  polyethylene glycol (MIRALAX) 17 gram packet Take 8.5 g by mouth daily.  ACETAMINOPHEN (TYLENOL PO) Take 2 g by mouth every six (6) hours as needed for Pain.     PSEUDOEPHEDRINE HCL (SUDOPHED PO) Take 2 Tabs by mouth every twelve (12) hours as needed for Other (allergies).  pyridoxine (VITAMIN B-6) 200 mg tablet Take 200 mg by mouth daily.  LOPERAMIDE HCL (IMODIUM PO) Take 3 Tabs by mouth every eight (8) hours as needed for Diarrhea.  fexofenadine (ALLEGRA ALLERGY) 60 mg tablet Take 60 mg by mouth two (2) times a day.  lansoprazole (PREVACID) 30 mg disintegrating tablet Take 30 mg by mouth daily.  BUPROPION HCL (WELLBUTRIN XL PO) Take 450 mg by mouth daily.  TRAZODONE HCL (TRAZODONE PO) Take 75 mg by mouth nightly.  zolpidem (AMBIEN) 5 mg tablet Take 5 mg by mouth nightly as needed for Sleep.  budesonide-formoterol (SYMBICORT) 160-4.5 mcg/Actuation HFA inhaler Take 2 Puffs by inhalation two (2) times a day.  cyclosporine (RESTASIS) 0.05 % ophthalmic emulsion Administer 1 Drop to both eyes two (2) times a day. Prior to Admission Medications:  Prior to Admission medications    Medication Sig Start Date End Date Taking? Authorizing Provider   predniSONE (DELTASONE) 20 mg tablet Take 20 mg by mouth daily. 3/26/21   Provider, Historical   tiotropium bromide (SPIRIVA RESPIMAT) 1.25 mcg/actuation inhaler Take 2 Puffs by inhalation daily. 3/23/21   Provider, Historical   predniSONE (DELTASONE) 10 mg tablet Take 10 mg by mouth daily. Take 6 tablets by mouth for 2 days, then take 4 tablets by mouth for 3 days, then take 3 tablets by mouth for 3 days, then take 2 tablets by mouth for 3 days, then take 1 talet by mouth for 3 days. 3/16/21   Provider, Historical   OXYGEN-AIR DELIVERY SYSTEMS 2 L by Nasal route as needed for Other (SOB). Edy Levi MD   aspirin 81 mg chewable tablet Take 81 mg by mouth daily. Provider, Historical   L.acid,para-B. bifidum-S.therm (RISAQUAD) 8 billion cell cap cap Take 1 Cap by mouth daily. 2/16/21   Guille Rueda NP   amLODIPine (NORVASC) 10 mg tablet Take 1 Tab by mouth daily. 2/16/21   Guille Rueda NP   losartan (COZAAR) 25 mg tablet Take 25 mg by mouth daily.  1/8/21   Provider, Historical   montelukast (SINGULAIR) 10 mg tablet Take 10 mg by mouth At bedtime. Provider, Historical   traZODone (DESYREL) 50 mg tablet Take 50 mg by mouth nightly. 12/2/20   Provider, Historical   albuterol (PROVENTIL VENTOLIN) 2.5 mg /3 mL (0.083 %) nebu 1 Each by Nebulization route every four to six (4-6) hours as needed for Wheezing. 12/1/20   Provider, Historical   LOSARTAN PO Take 25 mg by mouth nightly. Other, MD Marlyn   gabapentin (NEURONTIN) 100 mg capsule Take 300 mg by mouth nightly. 7/8/11   Provider, Historical   levothyroxine (SYNTHROID) 125 mcg tablet Take 125 mcg by mouth daily. 7/8/11   Provider, Historical   levothyroxine (SYNTHROID) 125 mcg tablet Take 62.5 mcg by mouth every other day. 7/8/11   Provider, Historical   albuterol (PROVENTIL HFA) 90 mcg/Actuation inhaler Take 1 Puff by inhalation two (2) times a day. 7/7/11   Provider, Historical   aspirin 81 mg tablet Take 81 mg by mouth nightly. 7/7/11   Provider, Historical   CALCIUM CARBONATE (CALCIUM 600 PO) Take 2 Tabs by mouth daily. 7/7/11   Provider, Historical   CHOLECALCIFEROL, VITAMIN D3, (VITAMIN D-3 PO) Take 1,200 Units by mouth daily. 7/7/11   Provider, Historical   cholecalciferol, vitamin d3, (VITAMIN D) 1,000 unit tablet Take 1,000 Units by mouth daily. Provider, Historical   VITAMIN B COMPLEX PO Take 1 Tab by mouth daily. 7/7/11   Provider, Historical   polyethylene glycol (MIRALAX) 17 gram packet Take 8.5 g by mouth daily. Provider, Historical   ACETAMINOPHEN (TYLENOL PO) Take 2 g by mouth every six (6) hours as needed for Pain. 7/7/11   Provider, Historical   PSEUDOEPHEDRINE HCL (SUDOPHED PO) Take 2 Tabs by mouth every twelve (12) hours as needed for Other (allergies). 7/7/11   Provider, Historical   pyridoxine (VITAMIN B-6) 200 mg tablet Take 200 mg by mouth daily. Provider, Historical   LOPERAMIDE HCL (IMODIUM PO) Take 3 Tabs by mouth every eight (8) hours as needed for Diarrhea.  7/7/11   Provider, Historical fexofenadine (ALLEGRA ALLERGY) 60 mg tablet Take 60 mg by mouth two (2) times a day. 6/10/11   Marlyn Linn MD   lansoprazole (PREVACID) 30 mg disintegrating tablet Take 30 mg by mouth daily. Marlyn Linn MD   BUPROPION HCL (WELLBUTRIN XL PO) Take 450 mg by mouth daily. 6/10/11   Marlyn Linn MD   TRAZODONE HCL (TRAZODONE PO) Take 75 mg by mouth nightly. 6/10/11   Marlyn Linn MD   zolpidem (AMBIEN) 5 mg tablet Take 5 mg by mouth nightly as needed for Sleep. Marlyn Linn MD   budesonide-formoterol (SYMBICORT) 160-4.5 mcg/Actuation HFA inhaler Take 2 Puffs by inhalation two (2) times a day. Marlyn Linn MD   cyclosporine (RESTASIS) 0.05 % ophthalmic emulsion Administer 1 Drop to both eyes two (2) times a day. Marlyn Linn MD        Review of Symptoms:  Except as noted in HPI, patient denies recent fever or chills, nausea, vomiting, diarrhea, hemoptysis, hematemesis, dysuria, myalgias, focal neurologic symptoms, ecchymosis, angioedema, odynophagia, dysphagia, sore throat, earache,rash, melena, hematochezia, depression, GERD, cold intolerance, petechia, bleeding gums, or significant weight loss. A comprehensive review of systems was negative except for that written in the HPI. Subjective:    24 hr VS reviewed, overall VSSAF  Temp (24hrs), Av.9 °F (36.6 °C), Min:97.9 °F (36.6 °C), Max:97.9 °F (36.6 °C)    No data found. No data found.  Patient Vitals for the past 8 hrs:   BP   21 0929 120/76   21 0844 115/70   21 0539 132/80   21 0359 107/81   21 0314 120/85        No intake or output data in the 24 hours ending 21 1038      Physical Exam (complete single organ system exam)      Visit Vitals  BP (!) 162/83 (BP 1 Location: Left upper arm, BP Patient Position: Supine)   Pulse 93   Temp 97.9 °F (36.6 °C)   Resp 18   Ht 5' (1.524 m)   Wt 160 lb (72.6 kg)   SpO2 93%   BMI 31.25 kg/m²     General Appearance:  Well developed, well nourished,alert and oriented x 3, and individual in no acute distress. Ears/Nose/Mouth/Throat:   Hearing grossly normal.         Neck: Supple. Chest:   Lungs clear to auscultation bilaterally. Cardiovascular:  Regular rate and rhythm, S1, S2 normal, no murmur. Abdomen:   Soft, non-tender, bowel sounds are active. Extremities: No edema bilaterally. Skin: Warm and dry. Cardiographics    Telemetry: normal sinus rhythm  ECG: normal EKG, normal sinus rhythm, unchanged from previous tracings  Echocardiogram: Not done    Labs:   Recent Results (from the past 24 hour(s))   EKG, 12 LEAD, INITIAL    Collection Time: 11/30/21 10:41 PM   Result Value Ref Range    Ventricular Rate 90 BPM    Atrial Rate 90 BPM    P-R Interval 156 ms    QRS Duration 80 ms    Q-T Interval 352 ms    QTC Calculation (Bezet) 430 ms    Calculated P Axis 32 degrees    Calculated R Axis -6 degrees    Calculated T Axis 15 degrees    Diagnosis       Normal sinus rhythm  Normal ECG  When compared with ECG of 08-FEB-2021 17:08,  No significant change was found     CBC WITH AUTOMATED DIFF    Collection Time: 11/30/21 10:45 PM   Result Value Ref Range    WBC 9.6 3.6 - 11.0 K/uL    RBC 4.14 3.80 - 5.20 M/uL    HGB 12.5 11.5 - 16.0 g/dL    HCT 40.0 35.0 - 47.0 %    MCV 96.6 80.0 - 99.0 FL    MCH 30.2 26.0 - 34.0 PG    MCHC 31.3 30.0 - 36.5 g/dL    RDW 14.7 (H) 11.5 - 14.5 %    PLATELET 436 550 - 691 K/uL    MPV 9.5 8.9 - 12.9 FL    NRBC 0.0 0  WBC    ABSOLUTE NRBC 0.00 0.00 - 0.01 K/uL    NEUTROPHILS 81 (H) 32 - 75 %    LYMPHOCYTES 11 (L) 12 - 49 %    MONOCYTES 6 5 - 13 %    EOSINOPHILS 1 0 - 7 %    BASOPHILS 1 0 - 1 %    IMMATURE GRANULOCYTES 0 0.0 - 0.5 %    ABS. NEUTROPHILS 7.8 1.8 - 8.0 K/UL    ABS. LYMPHOCYTES 1.0 0.8 - 3.5 K/UL    ABS. MONOCYTES 0.6 0.0 - 1.0 K/UL    ABS. EOSINOPHILS 0.1 0.0 - 0.4 K/UL    ABS. BASOPHILS 0.1 0.0 - 0.1 K/UL    ABS. IMM.  GRANS. 0.0 0.00 - 0.04 K/UL    DF AUTOMATED     METABOLIC PANEL, COMPREHENSIVE    Collection Time: 11/30/21 10:45 PM   Result Value Ref Range    Sodium 139 136 - 145 mmol/L    Potassium 4.0 3.5 - 5.1 mmol/L    Chloride 104 97 - 108 mmol/L    CO2 29 21 - 32 mmol/L    Anion gap 6 5 - 15 mmol/L    Glucose 138 (H) 65 - 100 mg/dL    BUN 24 (H) 6 - 20 MG/DL    Creatinine 1.31 (H) 0.55 - 1.02 MG/DL    BUN/Creatinine ratio 18 12 - 20      GFR est AA 48 (L) >60 ml/min/1.73m2    GFR est non-AA 39 (L) >60 ml/min/1.73m2    Calcium 9.9 8.5 - 10.1 MG/DL    Bilirubin, total 0.5 0.2 - 1.0 MG/DL    ALT (SGPT) 26 12 - 78 U/L    AST (SGOT) 14 (L) 15 - 37 U/L    Alk. phosphatase 93 45 - 117 U/L    Protein, total 6.5 6.4 - 8.2 g/dL    Albumin 3.5 3.5 - 5.0 g/dL    Globulin 3.0 2.0 - 4.0 g/dL    A-G Ratio 1.2 1.1 - 2.2     TROPONIN-HIGH SENSITIVITY    Collection Time: 11/30/21 10:45 PM   Result Value Ref Range    Troponin-High Sensitivity 8 0 - 51 ng/L   SAMPLES BEING HELD    Collection Time: 11/30/21 10:45 PM   Result Value Ref Range    SAMPLES BEING HELD 1RED, 1BLU     COMMENT        Add-on orders for these samples will be processed based on acceptable specimen integrity and analyte stability, which may vary by analyte. URINALYSIS W/MICROSCOPIC    Collection Time: 11/30/21 10:45 PM   Result Value Ref Range    Color DARK YELLOW      Appearance TURBID (A) CLEAR      Specific gravity 1.020 1.003 - 1.030      pH (UA) 5.0 5.0 - 8.0      Protein Negative NEG mg/dL    Glucose Negative NEG mg/dL    Ketone Negative NEG mg/dL    Bilirubin Negative NEG      Blood TRACE (A) NEG      Urobilinogen 0.2 0.2 - 1.0 EU/dL    Nitrites Negative NEG      Leukocyte Esterase Negative NEG      WBC 10-20 0 - 4 /hpf    RBC 5-10 0 - 5 /hpf    Epithelial cells MANY (A) FEW /lpf    Bacteria 1+ (A) NEG /hpf   URINE CULTURE HOLD SAMPLE    Collection Time: 11/30/21 10:45 PM    Specimen: Serum; Urine   Result Value Ref Range    Urine culture hold        Urine on hold in Microbiology dept for 2 days.   If unpreserved urine is submitted, it cannot be used for addtional testing after 24 hours, recollection will be required.    MAGNESIUM    Collection Time: 11/30/21 10:45 PM   Result Value Ref Range    Magnesium 2.3 1.6 - 2.4 mg/dL   TROPONIN-HIGH SENSITIVITY    Collection Time: 12/01/21  1:06 AM   Result Value Ref Range    Troponin-High Sensitivity 9 0 - 51 ng/L        Assessment:     Assessment:   Falls and weakness; due to orthostatic hypotension  LOREN; due to dehydration  H/o HTN  Asthma; stable   Obesity; she did lose lots of weight I can tell   Plan:   Remote tele  Hydrate either po or IV  Stop Losartan  Monitor for orthostatic hypotension  Consider Midodrine if not improving  Aggressive inpatient PT and then rehab    Jose Spring MD

## 2021-12-01 NOTE — PROGRESS NOTES
Orders received, chart reviewed and patient evaluated by physical therapy. Pending progression with skilled acute physical therapy, recommend: To be determined: Rehab vs home with home with HHPT pending progress. At this time, recommending rehab. Vitals:    12/01/21 1021 12/01/21 1028 12/01/21 1040   BP: 135/73 98/72  (!) 162/83   BP 1 Location: Left arm Left upper arm Left upper arm   BP Patient Position: Lying Sitting, light headed Supine   Pulse: 86 (!) 101 93     Full evaluation to follow.

## 2021-12-01 NOTE — ED NOTES
Bedside shift change report given to Dedrick (oncoming nurse) by Columbus Harada (offgoing nurse).  Report included the following information SBAR and ED Summary.

## 2021-12-01 NOTE — PROGRESS NOTES
Occupational Therapy    Order's acknowledged, chart reviewed in prep for skilled OT evaluation; however, per physical therapist, pt orthostatic at EOB and being admitted; therefore, OT to hold evaluation and follow up tomorrow once patient has room.     Thank you,  Fide Martins, OT

## 2021-12-01 NOTE — PROGRESS NOTES
Transition of Care Plan:    OBSERVATION     RUR:  N/A%  GLOS:  N/A   LOS:    1  Disposition:  TBD, Home with increased support, SNF, vs IPR  Follow up appointments: TBD  DME needed:  Rollator, handicap accessible bathroom  Transportation at Discharge: TBD  IM Medicare letter:  N/A  OBS  Caregiver Contact/NOK:   Bobetta Sever [phone]  Plan of Care:      · Cardiology to place OBS orders  · PT/OT eval, will follow recommendation and discuss with patient    Observation notice provided in writing to patient and/or caregiver as well as verbal explanation of the policy. Patients who are in outpatient status also receive the Observation notice. Patient has received notice and or patient representative has received via secure email, fax, or certified mail based on patient representative's preference. Reason for Admission:  Weakness, \"felt my legs buckle\"                   RUR Score:          OBS           Plan for utilizing home health:      TBD    PCP: First and Last name:  Edy Levi MD     Name of Practice:    Are you a current patient: Yes/No:    Approximate date of last visit: 9/1/21   Can you participate in a virtual visit with your PCP:  Possibly with daughter's assistance                    Current Advanced Directive/Advance Care Plan: Full Code    Healthcare Decision Maker:   Click here to complete [address] including selection of the Healthcare Decision Maker Relationship (ie \"Primary\")             Primary Decision MakerVirgil Julio Cesar - Child - [phone]                  Transition of Care Plan:                      Too Mckinney is a 64year old female to [facility] with weakness. ED workup:  CT of head - no acute changes, CT of spine - no acute changes, XR ankle - no acute findings. Senior Services Consulted:  NP, PT, and CM. Ms. Trinidad Shultz lives in [facility], [address]. 3 Meals provided. She does have to go down to  meals/dine in.   She has a care assistant/aide/ that comes 2X week. Currently for PT she was working with [facility], at her apartment. She endorses no previous SNF or IPR stay. CM discussed Physical Therapy will work with her and discuss options. Verified face sheet/demographics. CM provided SNF list and IPR list, encouraged her to have top three choices. Primary: [facility]   Secondary: [facility]    Care Management Interventions  PCP Verified by CM: Yes (Dr. Claus Monahan)  Last Visit to PCP: 09/01/21  Palliative Care Criteria Met (RRAT>21 & CHF Dx)?: No  Transition of Care Consult (CM Consult): Discharge Planning (Senior Services Consult:  Care Management for placement)  MyChart Signup: No (Active)  Discharge Durable Medical Equipment: No  Health Maintenance Reviewed: Yes  Physical Therapy Consult: Yes  Occupational Therapy Consult: Yes  Speech Therapy Consult: No  Support Systems: Other (Comment) (Senior Independent/ Daughter/NP Jeet Gonzales)  Confirm Follow Up Transport: Family  Discharge Location  Discharge Placement: Skilled nursing facility (SNF vs IPR)    Home Situation  Home Environment: Private residence/Apartment  # Steps to Enter:  None  One/Two Story Residence: One story  [employer]  Living Alone: Yes  Current DME Used/Available at Home:  rollator  Tub or Shower Type: Walk in/grab bars       CM will continue to follow and assist for transition of care needs, plan - TO BE DETERMINED.     Galileo Desir RN, BSN, [facility]  ED Care Management  [phone]

## 2021-12-01 NOTE — ED NOTES
CT came to get pt and they report the pt is requesting a phone to call her daughter before going for CT. Phone given to pt to call.

## 2021-12-01 NOTE — ED PROVIDER NOTES
I personally saw and examined the patient. I have reviewed and agree with the MLP's findings, including all diagnostic interpretations, and plans as written. I was present during the key portions of separately billed procedures. Mihaela DO Alhaji             Foreign Orellana is a 64 y.o. female with Hx of asthma, depression, fibromyalgia, GERD, HTN, hypothyroid,menopause who presents via EMS ED with cc of recurrent falls. Patient reports that she has felt that her quadricep muscles are growing weak over the last few weeks. She lives in the independent living facility where she has undergone physical therapy that has not helped her improve her symptoms. Ground level fall last week in which she injured her right ankle. She has been able to bear weight on it, but it still hurts. Tonight she was trying to wash dishes at the sink, she felt her legs give way and she was weak again. She reports that she is now having to use a walker to ambulate. She states that her daughter (who is a NP) came to her residence tonight after the fall and felt that she needs evaluation in the ER. Patient states that there is concerned that she needs rehab placement. Patient states that her independent living facility does not have a transitional unit or rehab unit present. She denies any head injury, neck injury, loss of consciousness. She does have a remote history of lumbar surgery with aspen placement. She denies any numbness or tingling in her extremities. No urinary or fecal incontinence. No recent history of F/C, N/V/D, cough, congestion, CP, SOB, dysuria, urinary frequency/hesitancy, flank pain. PCP: Whitley Husain MD    There are no other complaints, changes or physical findings at this time.         Past Medical History:   Diagnosis Date    Asthma     Depression     Fibromyalgia     GERD (gastroesophageal reflux disease)     Hypertension     Hypothyroid     Menopause 1995    Rotator cuff tear Past Surgical History:   Procedure Laterality Date    HX CHOLECYSTECTOMY      HX GYN      HX ORTHOPAEDIC      HX TONSIL AND ADENOIDECTOMY           History reviewed. No pertinent family history. Social History     Socioeconomic History    Marital status:      Spouse name: Not on file    Number of children: Not on file    Years of education: Not on file    Highest education level: Not on file   Occupational History    Not on file   Tobacco Use    Smoking status: Former Smoker     Packs/day: 1.00     Years: 15.00     Pack years: 15.00    Smokeless tobacco: Never Used   Substance and Sexual Activity    Alcohol use: No    Drug use: No    Sexual activity: Not on file   Other Topics Concern    Not on file   Social History Narrative    Not on file     Social Determinants of Health     Financial Resource Strain:     Difficulty of Paying Living Expenses: Not on file   Food Insecurity:     Worried About [address] [employer] in the Last Year: Not on file    Maynor of Food in the Last Year: Not on file   Transportation Needs:     Lack of Transportation (Medical): Not on file    Lack of Transportation (Non-Medical):  Not on file   Physical Activity:     Days of Exercise per Week: Not on file    Minutes of Exercise per Session: Not on file   Stress:     Feeling of Stress : Not on file   Social Connections:     Frequency of Communication with Friends and Family: Not on file    Frequency of Social Gatherings with Friends and Family: Not on file    Attends Yazidi Services: Not on file    Active Member of Clubs or Organizations: Not on file    Attends Club or Organization Meetings: Not on file    Marital Status: Not on file   Intimate Partner Violence:     Fear of Current or Ex-Partner: Not on file    Emotionally Abused: Not on file    Physically Abused: Not on file    Sexually Abused: Not on file   Housing Stability:     Unable to Pay for Housing in the Last Year: Not on file    Number of Places Lived in the Last Year: Not on file    Unstable Housing in the Last Year: Not on file         ALLERGIES: Pcn [penicillins]    Review of Systems   Constitutional: Negative for activity change, appetite change, chills and fever. HENT: Negative for congestion, rhinorrhea and sore throat. Eyes: Negative for discharge and visual disturbance. Respiratory: Negative for cough and shortness of breath. Cardiovascular: Negative for chest pain. Gastrointestinal: Negative for abdominal pain, diarrhea, nausea and vomiting. Genitourinary: Negative for dysuria, flank pain, frequency and urgency. Musculoskeletal: Positive for arthralgias, joint swelling and myalgias. Negative for back pain, gait problem and neck pain. Skin: Negative for color change and rash. Neurological: Positive for weakness. Negative for dizziness, light-headedness, numbness and headaches. Psychiatric/Behavioral: Negative for agitation, behavioral problems and confusion. All other systems reviewed and are negative. Vitals:    11/30/21 2124   BP: 128/81   Pulse: 88   Resp: 18   Temp: 97.9 °F (36.6 °C)   SpO2: 98%   Weight: 72.6 kg (160 lb)   Height: 5' (1.524 m)            Physical Exam  Vitals and nursing note reviewed. Constitutional:       General: She is not in acute distress. Appearance: She is well-developed. HENT:      Head: Normocephalic and atraumatic. Right Ear: External ear normal.      Left Ear: External ear normal.   Eyes:      Conjunctiva/sclera: Conjunctivae normal.      Pupils: Pupils are equal, round, and reactive to light. Cardiovascular:      Rate and Rhythm: Normal rate and regular rhythm. Heart sounds: Normal heart sounds. Pulmonary:      Effort: Pulmonary effort is normal.      Breath sounds: Normal breath sounds. Musculoskeletal:         General: Normal range of motion. Cervical back: Normal range of motion and neck supple. Comments:  There are no step offs, deformities on palpation of cervical through lumbar spine. There is no para-spinal musculoskeletal TTP or tension noted. Skin:     General: Skin is warm and dry. Neurological:      Mental Status: She is alert and oriented to person, place, and time. Psychiatric:         Behavior: Behavior normal.         Thought Content: Thought content normal.         Judgment: Judgment normal.          MDM  Number of Diagnoses or Management Options  Recurrent falls  Requires assistance with activities of daily living (ADL)  Weakness  Diagnosis management comments: DDx: FTT, weakness, immobility            Amount and/or Complexity of Data Reviewed  Clinical lab tests: ordered and reviewed  Tests in the radiology section of CPT®: ordered and reviewed  Review and summarize past medical records: yes      ED Course as of 12/01/21 0757   Wed Dec 01, 2021   0043 Spoke with patient and daughter at bedside who are agreeable for admission. Will order duoneb and place geriatric services consult as requested by family at bedside. []   0256 Attempted admission to the hospitalist.  Hospitalist advised Senior services consult in the morning for appropriate disposition. We will keep patient in the ER overnight for final disposition in the morning. []   3723 Have treated pain during my shift. Will sign out to incoming provider Dr. Jessica Welch for final disposition.  []      ED Course User Index  [JH] Alice HAGER, DO       Procedures      LABORATORY TESTS:  Recent Results (from the past 12 hour(s))   EKG, 12 LEAD, INITIAL    Collection Time: 11/30/21 10:41 PM   Result Value Ref Range    Ventricular Rate 90 BPM    Atrial Rate 90 BPM    P-R Interval 156 ms    QRS Duration 80 ms    Q-T Interval 352 ms    QTC Calculation (Bezet) 430 ms    Calculated P Axis 32 degrees    Calculated R Axis -6 degrees    Calculated T Axis 15 degrees    Diagnosis       Normal sinus rhythm  Normal ECG  When compared with ECG of 08-FEB-2021 17:08,  No significant change was found     CBC WITH AUTOMATED DIFF    Collection Time: 11/30/21 10:45 PM   Result Value Ref Range    WBC 9.6 3.6 - 11.0 K/uL    RBC 4.14 3.80 - 5.20 M/uL    HGB 12.5 11.5 - 16.0 g/dL    HCT 40.0 35.0 - 47.0 %    MCV 96.6 80.0 - 99.0 FL    MCH 30.2 26.0 - 34.0 PG    MCHC 31.3 30.0 - 36.5 g/dL    RDW 14.7 (H) 11.5 - 14.5 %    PLATELET 163 611 - 069 K/uL    MPV 9.5 8.9 - 12.9 FL    NRBC 0.0 0  WBC    ABSOLUTE NRBC 0.00 0.00 - 0.01 K/uL    NEUTROPHILS 81 (H) 32 - 75 %    LYMPHOCYTES 11 (L) 12 - 49 %    MONOCYTES 6 5 - 13 %    EOSINOPHILS 1 0 - 7 %    BASOPHILS 1 0 - 1 %    IMMATURE GRANULOCYTES 0 0.0 - 0.5 %    ABS. NEUTROPHILS 7.8 1.8 - 8.0 K/UL    ABS. LYMPHOCYTES 1.0 0.8 - 3.5 K/UL    ABS. MONOCYTES 0.6 0.0 - 1.0 K/UL    ABS. EOSINOPHILS 0.1 0.0 - 0.4 K/UL    ABS. BASOPHILS 0.1 0.0 - 0.1 K/UL    ABS. IMM. GRANS. 0.0 0.00 - 0.04 K/UL    DF AUTOMATED     METABOLIC PANEL, COMPREHENSIVE    Collection Time: 11/30/21 10:45 PM   Result Value Ref Range    Sodium 139 136 - 145 mmol/L    Potassium 4.0 3.5 - 5.1 mmol/L    Chloride 104 97 - 108 mmol/L    CO2 29 21 - 32 mmol/L    Anion gap 6 5 - 15 mmol/L    Glucose 138 (H) 65 - 100 mg/dL    BUN 24 (H) 6 - 20 MG/DL    Creatinine 1.31 (H) 0.55 - 1.02 MG/DL    BUN/Creatinine ratio 18 12 - 20      GFR est AA 48 (L) >60 ml/min/1.73m2    GFR est non-AA 39 (L) >60 ml/min/1.73m2    Calcium 9.9 8.5 - 10.1 MG/DL    Bilirubin, total 0.5 0.2 - 1.0 MG/DL    ALT (SGPT) 26 12 - 78 U/L    AST (SGOT) 14 (L) 15 - 37 U/L    Alk.  phosphatase 93 45 - 117 U/L    Protein, total 6.5 6.4 - 8.2 g/dL    Albumin 3.5 3.5 - 5.0 g/dL    Globulin 3.0 2.0 - 4.0 g/dL    A-G Ratio 1.2 1.1 - 2.2     TROPONIN-HIGH SENSITIVITY    Collection Time: 11/30/21 10:45 PM   Result Value Ref Range    Troponin-High Sensitivity 8 0 - 51 ng/L   SAMPLES BEING HELD    Collection Time: 11/30/21 10:45 PM   Result Value Ref Range    SAMPLES BEING HELD 1RED, 1BLU     COMMENT        Add-on orders for these samples will be processed based on acceptable specimen integrity and analyte stability, which may vary by analyte. URINALYSIS W/MICROSCOPIC    Collection Time: 11/30/21 10:45 PM   Result Value Ref Range    Color DARK YELLOW      Appearance TURBID (A) CLEAR      Specific gravity 1.020 1.003 - 1.030      pH (UA) 5.0 5.0 - 8.0      Protein Negative NEG mg/dL    Glucose Negative NEG mg/dL    Ketone Negative NEG mg/dL    Bilirubin Negative NEG      Blood TRACE (A) NEG      Urobilinogen 0.2 0.2 - 1.0 EU/dL    Nitrites Negative NEG      Leukocyte Esterase Negative NEG      WBC 10-20 0 - 4 /hpf    RBC 5-10 0 - 5 /hpf    Epithelial cells MANY (A) FEW /lpf    Bacteria 1+ (A) NEG /hpf   URINE CULTURE HOLD SAMPLE    Collection Time: 11/30/21 10:45 PM    Specimen: Serum; Urine   Result Value Ref Range    Urine culture hold        Urine on hold in Microbiology dept for 2 days. If unpreserved urine is submitted, it cannot be used for addtional testing after 24 hours, recollection will be required. MAGNESIUM    Collection Time: 11/30/21 10:45 PM   Result Value Ref Range    Magnesium 2.3 1.6 - 2.4 mg/dL       IMAGING RESULTS:  CT SPINE LUMB WO CONT   Final Result   No acute abnormality. Intact posterior fusion hardware at L3-S1. Diffuse   degenerative changes. CT HEAD WO CONT   Final Result   No acute intracranial abnormality. XR ANKLE RT MIN 3 V   Final Result   No acute abnormality. MEDICATIONS GIVEN:  Medications   albuterol-ipratropium (DUO-NEB) 2.5 MG-0.5 MG/3 ML (has no administration in time range)       IMPRESSION:  1. Recurrent falls    2. Weakness    3. Requires assistance with activities of daily living (ADL)        PLAN:  Patient's lab and radiologic studies were reviewed and without acute or emergent findings.   Initial intention was to have patient admitted to hospitalist service, stated that without clear medical criteria at this time patient should be seen by case management and Senior services in the morning with hopes of obtaining rehab placement. Senior services consults were ordered. Presentation, management, and disposition were discussed with the attending physician, Dr. Tommi Skiff, who is in agreement with plan of care. Patient is over the age of 70 , and the attending will see the patient. 3:46 AM  Change of shift. Care of patient signed over to Tommi Skiff. Bedside handoff complete. Awaiting SR Service evaluation for rehab vs other placement and disposition planning.     Khloe Huggins NP  .

## 2021-12-02 LAB
ANION GAP SERPL CALC-SCNC: 7 MMOL/L (ref 5–15)
BASOPHILS # BLD: 0.1 K/UL (ref 0–0.1)
BASOPHILS NFR BLD: 1 % (ref 0–1)
BUN SERPL-MCNC: 22 MG/DL (ref 6–20)
BUN/CREAT SERPL: 21 (ref 12–20)
CALCIUM SERPL-MCNC: 9.5 MG/DL (ref 8.5–10.1)
CHLORIDE SERPL-SCNC: 106 MMOL/L (ref 97–108)
CO2 SERPL-SCNC: 27 MMOL/L (ref 21–32)
CREAT SERPL-MCNC: 1.06 MG/DL (ref 0.55–1.02)
DIFFERENTIAL METHOD BLD: NORMAL
EOSINOPHIL # BLD: 0.3 K/UL (ref 0–0.4)
EOSINOPHIL NFR BLD: 3 % (ref 0–7)
ERYTHROCYTE [DISTWIDTH] IN BLOOD BY AUTOMATED COUNT: 14.5 % (ref 11.5–14.5)
GLUCOSE SERPL-MCNC: 102 MG/DL (ref 65–100)
HCT VFR BLD AUTO: 37.7 % (ref 35–47)
HGB BLD-MCNC: 11.6 G/DL (ref 11.5–16)
IMM GRANULOCYTES # BLD AUTO: 0 K/UL (ref 0–0.04)
IMM GRANULOCYTES NFR BLD AUTO: 0 % (ref 0–0.5)
LYMPHOCYTES # BLD: 3.5 K/UL (ref 0.8–3.5)
LYMPHOCYTES NFR BLD: 37 % (ref 12–49)
MCH RBC QN AUTO: 30.1 PG (ref 26–34)
MCHC RBC AUTO-ENTMCNC: 30.8 G/DL (ref 30–36.5)
MCV RBC AUTO: 97.9 FL (ref 80–99)
MONOCYTES # BLD: 0.8 K/UL (ref 0–1)
MONOCYTES NFR BLD: 9 % (ref 5–13)
NEUTS SEG # BLD: 4.7 K/UL (ref 1.8–8)
NEUTS SEG NFR BLD: 50 % (ref 32–75)
NRBC # BLD: 0 K/UL (ref 0–0.01)
NRBC BLD-RTO: 0 PER 100 WBC
PLATELET # BLD AUTO: 269 K/UL (ref 150–400)
PMV BLD AUTO: 9.8 FL (ref 8.9–12.9)
POTASSIUM SERPL-SCNC: 3.8 MMOL/L (ref 3.5–5.1)
RBC # BLD AUTO: 3.85 M/UL (ref 3.8–5.2)
SODIUM SERPL-SCNC: 140 MMOL/L (ref 136–145)
WBC # BLD AUTO: 9.3 K/UL (ref 3.6–11)

## 2021-12-02 PROCEDURE — 97535 SELF CARE MNGMENT TRAINING: CPT

## 2021-12-02 PROCEDURE — 97165 OT EVAL LOW COMPLEX 30 MIN: CPT

## 2021-12-02 PROCEDURE — 74011636637 HC RX REV CODE- 636/637: Performed by: STUDENT IN AN ORGANIZED HEALTH CARE EDUCATION/TRAINING PROGRAM

## 2021-12-02 PROCEDURE — 74011250637 HC RX REV CODE- 250/637: Performed by: NURSE PRACTITIONER

## 2021-12-02 PROCEDURE — 80048 BASIC METABOLIC PNL TOTAL CA: CPT

## 2021-12-02 PROCEDURE — 65660000000 HC RM CCU STEPDOWN

## 2021-12-02 PROCEDURE — 85025 COMPLETE CBC W/AUTO DIFF WBC: CPT

## 2021-12-02 PROCEDURE — 74011250637 HC RX REV CODE- 250/637: Performed by: STUDENT IN AN ORGANIZED HEALTH CARE EDUCATION/TRAINING PROGRAM

## 2021-12-02 PROCEDURE — 36415 COLL VENOUS BLD VENIPUNCTURE: CPT

## 2021-12-02 PROCEDURE — 74011250636 HC RX REV CODE- 250/636: Performed by: STUDENT IN AN ORGANIZED HEALTH CARE EDUCATION/TRAINING PROGRAM

## 2021-12-02 PROCEDURE — 94640 AIRWAY INHALATION TREATMENT: CPT

## 2021-12-02 PROCEDURE — 94664 DEMO&/EVAL PT USE INHALER: CPT

## 2021-12-02 PROCEDURE — 74011000250 HC RX REV CODE- 250: Performed by: STUDENT IN AN ORGANIZED HEALTH CARE EDUCATION/TRAINING PROGRAM

## 2021-12-02 PROCEDURE — 77010033678 HC OXYGEN DAILY

## 2021-12-02 RX ORDER — LORATADINE 10 MG/1
10 TABLET ORAL
COMMUNITY

## 2021-12-02 RX ORDER — LANSOPRAZOLE 30 MG/1
CAPSULE, DELAYED RELEASE ORAL
Status: ON HOLD | COMMUNITY
End: 2021-12-02

## 2021-12-02 RX ORDER — MULTIVIT WITH MINERALS/HERBS
1 TABLET ORAL DAILY
COMMUNITY

## 2021-12-02 RX ORDER — GABAPENTIN 300 MG/1
300 CAPSULE ORAL 2 TIMES DAILY
Status: DISCONTINUED | OUTPATIENT
Start: 2021-12-02 | End: 2021-12-05 | Stop reason: HOSPADM

## 2021-12-02 RX ORDER — MELOXICAM 7.5 MG/1
15 TABLET ORAL DAILY
Status: DISCONTINUED | OUTPATIENT
Start: 2021-12-03 | End: 2021-12-05 | Stop reason: HOSPADM

## 2021-12-02 RX ORDER — PREDNISONE 10 MG/1
10 TABLET ORAL DAILY
Status: DISCONTINUED | OUTPATIENT
Start: 2021-12-03 | End: 2021-12-05 | Stop reason: HOSPADM

## 2021-12-02 RX ADMIN — POLYETHYLENE GLYCOL 3350 8.5 G: 17 POWDER, FOR SOLUTION ORAL at 21:39

## 2021-12-02 RX ADMIN — Medication 1000 UNITS: at 09:52

## 2021-12-02 RX ADMIN — ARFORMOTEROL TARTRATE: 15 SOLUTION RESPIRATORY (INHALATION) at 21:20

## 2021-12-02 RX ADMIN — GABAPENTIN 300 MG: 300 CAPSULE ORAL at 13:51

## 2021-12-02 RX ADMIN — LEVOTHYROXINE SODIUM 125 MCG: 0.12 TABLET ORAL at 09:52

## 2021-12-02 RX ADMIN — PYRIDOXINE HCL TAB 50 MG 200 MG: 50 TAB at 09:53

## 2021-12-02 RX ADMIN — ARFORMOTEROL TARTRATE: 15 SOLUTION RESPIRATORY (INHALATION) at 07:45

## 2021-12-02 RX ADMIN — ENOXAPARIN SODIUM 40 MG: 100 INJECTION SUBCUTANEOUS at 08:42

## 2021-12-02 RX ADMIN — BUPROPION HYDROCHLORIDE 200 MG: 100 TABLET, EXTENDED RELEASE ORAL at 21:39

## 2021-12-02 RX ADMIN — LATANOPROST 1 DROP: 50 SOLUTION OPHTHALMIC at 21:39

## 2021-12-02 RX ADMIN — MONTELUKAST 10 MG: 10 TABLET, FILM COATED ORAL at 21:38

## 2021-12-02 RX ADMIN — SULFAMETHOXAZOLE AND TRIMETHOPRIM 1 TABLET: 800; 160 TABLET ORAL at 09:52

## 2021-12-02 RX ADMIN — IPRATROPIUM BROMIDE 0.5 MG: 0.5 SOLUTION RESPIRATORY (INHALATION) at 07:45

## 2021-12-02 RX ADMIN — ACETAMINOPHEN 650 MG: 325 TABLET ORAL at 01:00

## 2021-12-02 RX ADMIN — IPRATROPIUM BROMIDE 0.5 MG: 0.5 SOLUTION RESPIRATORY (INHALATION) at 13:21

## 2021-12-02 RX ADMIN — SULFAMETHOXAZOLE AND TRIMETHOPRIM 1 TABLET: 800; 160 TABLET ORAL at 21:38

## 2021-12-02 RX ADMIN — PREDNISONE 20 MG: 20 TABLET ORAL at 09:52

## 2021-12-02 RX ADMIN — TRAZODONE HYDROCHLORIDE 75 MG: 50 TABLET ORAL at 21:38

## 2021-12-02 RX ADMIN — ACETAMINOPHEN 650 MG: 325 TABLET ORAL at 13:51

## 2021-12-02 RX ADMIN — PANTOPRAZOLE SODIUM 40 MG: 40 TABLET, DELAYED RELEASE ORAL at 07:19

## 2021-12-02 RX ADMIN — IPRATROPIUM BROMIDE 0.5 MG: 0.5 SOLUTION RESPIRATORY (INHALATION) at 21:21

## 2021-12-02 NOTE — PROGRESS NOTES
TRANSFER - IN REPORT:    [facility] [facility] Jr Healy  being received from ED (unit) for routine progression of care      Report consisted of patients Situation, Background, Assessment and   Recommendations(SBAR). Information from the following report(s) SBAR and ED Summary was reviewed with the receiving nurse. Opportunity for questions and clarification was provided. Assessment completed upon patients arrival to unit and care assumed.

## 2021-12-02 NOTE — ED NOTES
Discussed with floor nurse that Need for IV fluids to start asap. And that there were 2 orders that needed to be clarified.

## 2021-12-02 NOTE — PROGRESS NOTES
Primary Nurse Faiza Aburto and Dayan Carl RN performed a dual skin assessment on this patient Impairment noted- see wound doc flow sheet  Vaughn score is 18      Scattered bruising noted

## 2021-12-02 NOTE — PROGRESS NOTES
MAE: Pending referral with [facility]. BLS to transport when stable for discharge. RUR: 13%    The patient has requested to go to [address] [place] [address] for rehab. CM gave the patient a SNF list to get choices in case IPR doesn't approve. CM following for discharge needs.      Kai Barrios RN/CRM

## 2021-12-02 NOTE — PROGRESS NOTES
Bedside shift change report given to Radha Huerta RN (oncoming nurse) by Salvador Bradley (offgoing nurse). Report included the following information SBAR, Kardex, Procedure Summary, Intake/Output, MAR and Recent Results.

## 2021-12-02 NOTE — ROUTINE PROCESS
TRANSFER - OUT REPORT:    Verbal report given to Marianne Miguel RN on Erika Park  being transferred to  for routine progression of care       Report consisted of patients Situation, Background, Assessment and   Recommendations(SBAR). Information from the following report(s) as reviewed with the receiving nurse. Lines:   Peripheral IV 11/30/21 Right Antecubital (Active)   Site Assessment Clean, dry, & intact 11/30/21 2249   Phlebitis Assessment 0 11/30/21 2249   Infiltration Assessment 0 11/30/21 2249   Dressing Status Clean, dry, & intact 11/30/21 2249   Dressing Type Transparent; Tape 11/30/21 2249   Hub Color/Line Status Pink; Patent; Flushed 11/30/21 2249   Action Taken Blood drawn 11/30/21 2249        Opportunity for questions and clarification was provided.       Patient transported with:   [email or web address]

## 2021-12-02 NOTE — PROGRESS NOTES
Problem: Self Care Deficits Care Plan (Adult)  Goal: *Acute Goals and Plan of Care (Insert Text)  Description: Occupational Therapy Goals  Initiated: 12/2/2021   1. Patient will perform grooming with supervision/set-up sitting in chair within 7 day(s). 2.  Patient will perform bathing with min A from chair within 7 day(s). 3.  Patient will perform upper body dressing and lower body dressing with min A within 7 day(s). 4.  Patient will perform toilet transfers with supervision within 7 day(s). 5.  Patient will perform all aspects of toileting with supervision within 7 day(s). FUNCTIONAL STATUS PRIOR TO ADMISSION: Pt reports she is independent at home prior to admission. She lives in an elderly apartment. She does not drive. She does need to walk to the [employer] which is great than 300' using the rollator. HOME SUPPORT: Daughter helps her at home as needed. Outcome: Progressing Towards Goal     OCCUPATIONAL THERAPY EVALUATION  Patient: Samantha Lyle (23 y.o. female)  Date: 12/2/2021  Primary Diagnosis: Orthostatic hypotension [I95.1]  Weakness [R53.1]  UTI (urinary tract infection) [N39.0]        Precautions:   Fall    ASSESSMENT  Based on the objective data described below, the patient presents with decreased independence with self care and functional mobility following admission for generalized weakness, UTI, and orthostatic hypotension. She was able to progress EOB and to the [facility] with min A using RW. She demonstrated fair activity tolerance with stable BP progressing to the [facility]. She does have scoliosis and very short waisted. She moves fairly well overall but exhibits limitations mostly due to RA and poor posture overall. She may benefit from discharge to rehab setting pending continued progress with basic self care and functional mobility.       Current Level of Function Impacting Discharge (ADLs/self-care): min A to mod A for LB ADL, BSC with min A    Functional Outcome Measure: The patient scored 50 on the Barthel Index outcome measure which is indicative of 50% impairment with ADL activities. Other factors to consider for discharge: debility, pain     Patient will benefit from skilled therapy intervention to address the above noted impairments. PLAN :  Recommendations and Planned Interventions: self care training, functional mobility training, therapeutic exercise, balance training, therapeutic activities, endurance activities, patient education, home safety training, and family training/education    Frequency/Duration: Patient will be followed by occupational therapy 5 times a week to address goals. Recommendation for discharge: (in order for the patient to meet his/her long term goals)  Therapy up to 5 days/week in SNF setting    This discharge recommendation:  Has been made in collaboration with the attending provider and/or case management    IF patient discharges home will need the following DME: none       SUBJECTIVE:   Patient stated I am feeling alright.     OBJECTIVE DATA SUMMARY:   HISTORY:   Past Medical History:   Diagnosis Date    Asthma     Depression     Fibromyalgia     GERD (gastroesophageal reflux disease)     Hypertension     Hypothyroid     Menopause 1995    Rotator cuff tear      Past Surgical History:   Procedure Laterality Date    HX CHOLECYSTECTOMY      HX GYN      HX ORTHOPAEDIC      HX TONSIL AND ADENOIDECTOMY         Expanded or extensive additional review of patient history:     Home Situation  Home Environment: Apartment  # Steps to Enter: 0  One/Two Story Residence: One story  Living Alone: Yes  Support Systems: Other Family Member(s)  Current DME Used/Available at Home: Grab bars, Walker, rollator (Life alert)  Tub or Shower Type: Shower (build in shower)    Hand dominance: Right    EXAMINATION OF PERFORMANCE DEFICITS:  Cognitive/Behavioral Status:  Neurologic State: Alert  Orientation Level: Oriented X4  Cognition: Appropriate for age attention/concentration  Perception: Appears intact  Perseveration: No perseveration noted  Safety/Judgement: Awareness of environment    Skin: see nursing notes    Edema: none noted    Hearing: Auditory  Auditory Impairment: None    Vision/Perceptual:                           Acuity: Within Defined Limits         Range of Motion:    AROM: Generally decreased, functional (significant RA in ZAK hands)  PROM: Generally decreased, functional                      Strength:  RA in ZAK hands, elbows, and shoulders. She does have scoliosis and kyphosis as well. Strength: Generally decreased, functional                Coordination:  Coordination: Generally decreased, functional  Fine Motor Skills-Upper: Right Impaired; Left Impaired    Gross Motor Skills-Upper: Right Intact; Left Intact    Tone & Sensation:    Tone: Normal  Sensation: Intact                      Balance:  Sitting: Intact  Sitting - Static: Fair (occasional) (scolosis and short wasted)  Sitting - Dynamic: Fair (occasional)  Standing: Impaired; With support  Standing - Static: Constant support  Standing - Dynamic : Constant support; Fair    Functional Mobility and Transfers for ADLs:  Bed Mobility:  Supine to Sit: Additional time; Moderate assistance  Sit to Supine: Additional time; Minimum assistance    Transfers:  Sit to Stand: Minimum assistance  Stand to Sit: Minimum assistance  Bed to Chair: Minimum assistance  Bathroom Mobility: Minimum assistance  Toilet Transfer : Minimum assistance ([facility])    ADL Assessment:  Feeding: Setup    Oral Facial Hygiene/Grooming: Setup (additional time and compensatory strategies due to RA)    Bathing: Additional time; Maximum assistance    Upper Body Dressing: Maximum assistance    Lower Body Dressing: Maximum assistance    Toileting: Additional time; Moderate assistance                ADL Intervention and task modifications:   LB dressing at the EOB . She needs mod A for bed mobility.   Transfers from EOB to [facility] with min A using RW. Pt voided bladder. Pt needs mod A for hygiene and clothing management. She returned to bed and repositioned with bed in modified chair position. Cognitive Retraining  Safety/Judgement: Awareness of environment    Functional Measure:    Barthel Index:  Bathin  Bladder: 10  Bowels: 10  Groomin  Dressin  Feedin  Mobility: 0  Stairs: 0  Toilet Use: 5  Transfer (Bed to Chair and Back): 10  Total: 50/100      The Barthel ADL Index: Guidelines  1. The index should be used as a record of what a patient does, not as a record of what a patient could do. 2. The main aim is to establish degree of independence from any help, physical or verbal, however minor and for whatever reason. 3. The need for supervision renders the patient not independent. 4. A patient's performance should be established using the best available evidence. Asking the patient, friends/relatives and nurses are the usual sources, but direct observation and common sense are also important. However direct testing is not needed. 5. Usually the patient's performance over the preceding 24-48 hours is important, but occasionally longer periods will be relevant. 6. Middle categories imply that the patient supplies over 50 per cent of the effort. 7. Use of aids to be independent is allowed. Score Interpretation (from [address] [address])    Independent   60-79 Minimally independent   40-59 Partially dependent   20-39 Very dependent   <20 Totally dependent     -Hunter Mathias., Barthel, D.W. (1965). Functional evaluation: the Barthel Index. [address] [facility] ([address] (). The Barthel activities of daily living index: self-reporting versus actual performance in the old (> or = 75 years). Journal of [address] [place] [address] 45(7), [facility], .JJudahMJudahF, Carlos Hicks., Jose Núñez. (1999).  Measuring the change in disability after inpatient rehabilitation; comparison of the responsiveness of the Barthel Index and Functional [place] Measure. Journal of Neurology, Neurosurgery, and Psychiatry, 66(4), 518-443. CRISPIN Hilario, ERICA Weir, & Radha Moran M.A. (2004) Assessment of post-stroke quality of life in cost-effectiveness studies: The usefulness of the Barthel Index and the EuroQoL-5D. Quality of Life Research, 15, 829-17         Occupational Therapy Evaluation Charge Determination   History Examination Decision-Making   LOW Complexity : Brief history review  HIGH Complexity : 5 or more performance deficits relating to physical, cognitive , or psychosocial skils that result in activity limitations and / or participation restrictions HIGH Complexity : Patient presents with comorbidities that affect occupational performance. Signifigant modification of tasks or assistance (eg, physical or verbal) with assessment (s) is necessary to enable patient to complete evaluation       Based on the above components, the patient evaluation is determined to be of the following complexity level: LOW   Pain Rating: Moderate pain in neck and back. She has chronic pain due to RA. Activity Tolerance:   Good    After treatment patient left in no apparent distress:    Supine in bed and Call bell within reach    COMMUNICATION/EDUCATION:   The patients plan of care was discussed with: Physical therapist and Registered nurse. Home safety education was provided and the patient/caregiver indicated understanding. This patients plan of care is appropriate for delegation to hospitals.     Thank you for this referral.  Drea Wiggins OT  Time Calculation: 44 mins

## 2021-12-02 NOTE — PROGRESS NOTES
Cardiology Progress Note                                        Admit Date: 11/30/2021    Assessment/Plan:     Orthostatic hypotension; improving; continue IVF for another 24 hours  Debility; severe with lots of back issue and difficulty with mobility; needs inpatient rehab;  is working on this  Asthma; stable  HTN; accept some intermittent mild systolic HTN; stop Losartan for now, which was already held last night    Lam Vasquez is a 64 y.o. female with     PROBLEM LIST:  Patient Active Problem List    Diagnosis Date Noted    Orthostatic hypotension 12/01/2021    Weakness 12/01/2021    UTI (urinary tract infection) 12/01/2021    LOREN (acute kidney injury) ([facility] [address] [address] 12/01/2021    Goals of care, counseling/discussion     Other chronic pain     Cough     Impaired mobility     Status asthmaticus 02/08/2021    Moderate persistent asthma with acute exacerbation 02/08/2021    IBS (irritable bowel syndrome) 07/08/2011    HTN (hypertension) 07/08/2011    Asthma 07/08/2011    Unspecified hypothyroidism 07/08/2011    Syncope 07/07/2011    Dehydration 07/07/2011         Subjective:     [employer] reports none. Visit Vitals  /79 (BP 1 Location: Right upper arm)   Pulse 99   Temp 97.8 °F (36.6 °C)   Resp 18   Ht 5' (1.524 m)   Wt 160 lb (72.6 kg)   SpO2 94%   BMI 31.25 kg/m²     No intake or output data in the 24 hours ending 12/02/21 1524    Objective:      Physical Exam:  HEENT: Perrla, EOMI  Neck: No JVD,  No thyroidmegaly  Resp: CTA bilaterally;  No wheezes or rales  CV: RRR s1s2 No murmur no s3  Abd:Soft, Nontender  Ext: No edema  Neuro: Alert and oriented; Nonfocal  Skin: Warm, Dry, Intact  Pulses: 2+ DP/PT/Rad      Telemetry: normal sinus rhythm    Current Facility-Administered Medications   Medication Dose Route Frequency    [START ON 12/3/2021] predniSONE (DELTASONE) tablet 10 mg  10 mg Oral DAILY    gabapentin (NEURONTIN) capsule 300 mg  300 mg Oral BID    [START ON 12/3/2021] meloxicam (MOBIC) tablet 15 mg  15 mg Oral DAILY    . PHARMACY TO SUBSTITUTE PER PROTOCOL (Reordered from: cyclosporine (RESTASIS) 0.05 % ophthalmic emulsion)    Per Protocol    sodium chloride (NS) flush 5-40 mL  5-40 mL IntraVENous Q8H    sodium chloride (NS) flush 5-40 mL  5-40 mL IntraVENous PRN    acetaminophen (TYLENOL) tablet 650 mg  650 mg Oral Q6H PRN    Or    acetaminophen (TYLENOL) suppository 650 mg  650 mg Rectal Q6H PRN    polyethylene glycol (MIRALAX) packet 17 g  17 g Oral DAILY PRN    ondansetron (ZOFRAN ODT) tablet 4 mg  4 mg Oral Q8H PRN    Or    ondansetron (ZOFRAN) injection 4 mg  4 mg IntraVENous Q6H PRN    enoxaparin (LOVENOX) injection 40 mg  40 mg SubCUTAneous DAILY    levothyroxine (SYNTHROID) tablet 125 mcg  125 mcg Oral DAILY    latanoprost (XALATAN) 0.005 % ophthalmic solution 1 Drop  1 Drop Both Eyes QHS    albuterol (PROVENTIL VENTOLIN) nebulizer solution 2.5 mg  2.5 mg Nebulization Q4H PRN    arformoterol 15 mcg/budesonide 0.5 mg neb solution   Nebulization BID RT    . PHARMACY TO SUBSTITUTE PER PROTOCOL (Reordered from: BUPROPION HCL (WELLBUTRIN XL PO))    Per Protocol    cholecalciferol (VITAMIN D3) (1000 Units /25 mcg) tablet 1,000 Units  1,000 Units Oral DAILY    diphenhydrAMINE (BENADRYL) capsule 25 mg  25 mg Oral QHS PRN    pantoprazole (PROTONIX) tablet 40 mg  40 mg Oral ACB    montelukast (SINGULAIR) tablet 10 mg  10 mg Oral QHS    polyethylene glycol (MIRALAX) packet 8.5 g  8.5 g Oral DAILY    . PHARMACY TO SUBSTITUTE PER PROTOCOL (Reordered from: PreviDent 5000 Plus 1.1 % crea)    Per Protocol    ipratropium (ATROVENT) 0.02 % nebulizer solution 0.5 mg  500 mcg Nebulization Q6HWA RT    pyridoxine (vitamin B6) (VITAMIN B-6) tablet 200 mg  200 mg Oral DAILY    traZODone (DESYREL) tablet 75 mg  75 mg Oral QHS    trimethoprim-sulfamethoxazole (BACTRIM DS, SEPTRA DS) 160-800 mg per tablet 1 Tablet  1 Tablet Oral Q12H    0.9% sodium chloride infusion  75 mL/hr IntraVENous CONTINUOUS    0.9% sodium chloride infusion  100 mL/hr IntraVENous CONTINUOUS         Data Review:   Labs:    Recent Results (from the past 24 hour(s))   METABOLIC PANEL, BASIC    Collection Time: 12/02/21  1:20 AM   Result Value Ref Range    Sodium 140 136 - 145 mmol/L    Potassium 3.8 3.5 - 5.1 mmol/L    Chloride 106 97 - 108 mmol/L    CO2 27 21 - 32 mmol/L    Anion gap 7 5 - 15 mmol/L    Glucose 102 (H) 65 - 100 mg/dL    BUN 22 (H) 6 - 20 MG/DL    Creatinine 1.06 (H) 0.55 - 1.02 MG/DL    BUN/Creatinine ratio 21 (H) 12 - 20      GFR est AA >60 >60 ml/min/1.73m2    GFR est non-AA 50 (L) >60 ml/min/1.73m2    Calcium 9.5 8.5 - 10.1 MG/DL   CBC WITH AUTOMATED DIFF    Collection Time: 12/02/21  1:20 AM   Result Value Ref Range    WBC 9.3 3.6 - 11.0 K/uL    RBC 3.85 3.80 - 5.20 M/uL    HGB 11.6 11.5 - 16.0 g/dL    HCT 37.7 35.0 - 47.0 %    MCV 97.9 80.0 - 99.0 FL    MCH 30.1 26.0 - 34.0 PG    MCHC 30.8 30.0 - 36.5 g/dL    RDW 14.5 11.5 - 14.5 %    PLATELET 890 326 - 362 K/uL    MPV 9.8 8.9 - 12.9 FL    NRBC 0.0 0  WBC    ABSOLUTE NRBC 0.00 0.00 - 0.01 K/uL    NEUTROPHILS 50 32 - 75 %    LYMPHOCYTES 37 12 - 49 %    MONOCYTES 9 5 - 13 %    EOSINOPHILS 3 0 - 7 %    BASOPHILS 1 0 - 1 %    IMMATURE GRANULOCYTES 0 0.0 - 0.5 %    ABS. NEUTROPHILS 4.7 1.8 - 8.0 K/UL    ABS. LYMPHOCYTES 3.5 0.8 - 3.5 K/UL    ABS. MONOCYTES 0.8 0.0 - 1.0 K/UL    ABS. EOSINOPHILS 0.3 0.0 - 0.4 K/UL    ABS. BASOPHILS 0.1 0.0 - 0.1 K/UL    ABS. IMM.  GRANS. 0.0 0.00 - 0.04 K/UL    DF AUTOMATED

## 2021-12-03 LAB
BACTERIA SPEC CULT: ABNORMAL
BACTERIA SPEC CULT: ABNORMAL
CC UR VC: ABNORMAL
SERVICE CMNT-IMP: ABNORMAL

## 2021-12-03 PROCEDURE — 74011250637 HC RX REV CODE- 250/637: Performed by: NURSE PRACTITIONER

## 2021-12-03 PROCEDURE — 74011000250 HC RX REV CODE- 250: Performed by: STUDENT IN AN ORGANIZED HEALTH CARE EDUCATION/TRAINING PROGRAM

## 2021-12-03 PROCEDURE — U0005 INFEC AGEN DETEC AMPLI PROBE: HCPCS

## 2021-12-03 PROCEDURE — 94640 AIRWAY INHALATION TREATMENT: CPT

## 2021-12-03 PROCEDURE — 74011250637 HC RX REV CODE- 250/637: Performed by: STUDENT IN AN ORGANIZED HEALTH CARE EDUCATION/TRAINING PROGRAM

## 2021-12-03 PROCEDURE — 94760 N-INVAS EAR/PLS OXIMETRY 1: CPT

## 2021-12-03 PROCEDURE — 74011250636 HC RX REV CODE- 250/636: Performed by: STUDENT IN AN ORGANIZED HEALTH CARE EDUCATION/TRAINING PROGRAM

## 2021-12-03 PROCEDURE — 65660000000 HC RM CCU STEPDOWN

## 2021-12-03 PROCEDURE — 74011636637 HC RX REV CODE- 636/637: Performed by: NURSE PRACTITIONER

## 2021-12-03 RX ORDER — CALCIUM CARBONATE 200(500)MG
200 TABLET,CHEWABLE ORAL
Status: DISCONTINUED | OUTPATIENT
Start: 2021-12-03 | End: 2021-12-05 | Stop reason: HOSPADM

## 2021-12-03 RX ADMIN — Medication 10 ML: at 20:40

## 2021-12-03 RX ADMIN — SODIUM CHLORIDE 75 ML/HR: 9 INJECTION, SOLUTION INTRAVENOUS at 09:30

## 2021-12-03 RX ADMIN — BUPROPION HYDROCHLORIDE 200 MG: 100 TABLET, EXTENDED RELEASE ORAL at 17:55

## 2021-12-03 RX ADMIN — ARFORMOTEROL TARTRATE: 15 SOLUTION RESPIRATORY (INHALATION) at 20:33

## 2021-12-03 RX ADMIN — SODIUM CHLORIDE 75 ML/HR: 9 INJECTION, SOLUTION INTRAVENOUS at 22:00

## 2021-12-03 RX ADMIN — ARFORMOTEROL TARTRATE: 15 SOLUTION RESPIRATORY (INHALATION) at 07:33

## 2021-12-03 RX ADMIN — CALCIUM CARBONATE (ANTACID) CHEW TAB 500 MG 200 MG: 500 CHEW TAB at 20:40

## 2021-12-03 RX ADMIN — ENOXAPARIN SODIUM 40 MG: 100 INJECTION SUBCUTANEOUS at 09:18

## 2021-12-03 RX ADMIN — MELOXICAM 15 MG: 7.5 TABLET ORAL at 09:17

## 2021-12-03 RX ADMIN — TRAZODONE HYDROCHLORIDE 75 MG: 50 TABLET ORAL at 21:04

## 2021-12-03 RX ADMIN — GABAPENTIN 300 MG: 300 CAPSULE ORAL at 09:17

## 2021-12-03 RX ADMIN — ONDANSETRON HYDROCHLORIDE 4 MG: 2 SOLUTION INTRAMUSCULAR; INTRAVENOUS at 21:50

## 2021-12-03 RX ADMIN — PREDNISONE 10 MG: 10 TABLET ORAL at 09:17

## 2021-12-03 RX ADMIN — GABAPENTIN 300 MG: 300 CAPSULE ORAL at 13:39

## 2021-12-03 RX ADMIN — Medication 1000 UNITS: at 09:17

## 2021-12-03 RX ADMIN — PANTOPRAZOLE SODIUM 40 MG: 40 TABLET, DELAYED RELEASE ORAL at 06:44

## 2021-12-03 RX ADMIN — ACETAMINOPHEN 650 MG: 325 TABLET ORAL at 12:56

## 2021-12-03 RX ADMIN — SULFAMETHOXAZOLE AND TRIMETHOPRIM 1 TABLET: 800; 160 TABLET ORAL at 20:40

## 2021-12-03 RX ADMIN — MONTELUKAST 10 MG: 10 TABLET, FILM COATED ORAL at 21:04

## 2021-12-03 RX ADMIN — LEVOTHYROXINE SODIUM 125 MCG: 0.12 TABLET ORAL at 09:17

## 2021-12-03 RX ADMIN — IPRATROPIUM BROMIDE 0.5 MG: 0.5 SOLUTION RESPIRATORY (INHALATION) at 07:33

## 2021-12-03 RX ADMIN — IPRATROPIUM BROMIDE 0.5 MG: 0.5 SOLUTION RESPIRATORY (INHALATION) at 20:34

## 2021-12-03 RX ADMIN — LATANOPROST 1 DROP: 50 SOLUTION OPHTHALMIC at 21:07

## 2021-12-03 RX ADMIN — POLYETHYLENE GLYCOL 3350 8.5 G: 17 POWDER, FOR SOLUTION ORAL at 21:06

## 2021-12-03 RX ADMIN — PYRIDOXINE HCL TAB 50 MG 200 MG: 50 TAB at 09:17

## 2021-12-03 RX ADMIN — SULFAMETHOXAZOLE AND TRIMETHOPRIM 1 TABLET: 800; 160 TABLET ORAL at 09:17

## 2021-12-03 RX ADMIN — IPRATROPIUM BROMIDE 0.5 MG: 0.5 SOLUTION RESPIRATORY (INHALATION) at 14:00

## 2021-12-03 RX ADMIN — BUPROPION HYDROCHLORIDE 200 MG: 100 TABLET, EXTENDED RELEASE ORAL at 09:17

## 2021-12-03 NOTE — PROGRESS NOTES
MAE: Update 2:33pm. Bed available tomorrow vs. Sunday per Padmini jones at [address]. [facility] has accepted, awaiting bed. PCR covid test pending. [facility] on will call for transport. PCR covid negative result needed prior to admission. RUR: 12%    CM following for discharge needs.     Jacqueline Duncan RN/CRM

## 2021-12-03 NOTE — PROGRESS NOTES
Physician Progress Note      PATIENTGriselda Poncho  [facility] #:                  [id number]  :                       1945  ADMIT DATE:       2021 9:11 PM  [address] [place] DATE:  RESPONDING  PROVIDER #:        Saurav Bardales NP          QUERY TEXT:    Patient admitted with sCr 1.31 GFR 39. Noted documentation of Acute Kidney Injury in H&P on  Karon Pinto MD.   Lab work shows sCr 1.06 GFR 50. In order to support the diagnosis of Acute Kidney Injury, please include baseline sCr and other indicators in your documentation. Or please document if the diagnosis of Acute Kidney Injury has been ruled out after further study. The medical record reflects the following:    Risk Factors: 76F HTN , depression, hypothyriod    Clinical Indicators:     sCr 1.31 GFR 39   sCr 1.06 GFR 50     Gundersen NP  LOREN  - Likely pre-renal.  - Renal function improving.  - Continue to avoid nephrotoxins.  - Monitor.  Karon Pinto MD H&P  LOREN (acute kidney injury)  Hydration with normal saline  Follow-up with morning labs    Treatment: NS IV 75 ml/hr, lab monitoring, I&Os    Defined by Kidney Disease Improving Global Outcomes (KDIGO) clinical practice guideline for acute kidney injury:  -Increase in SCr by greater than or equal to 0.3 mg/dl within 48 hours; or  -Increase or decrease in SCr to greater than or equal to 1.5 times baseline, which is known or presumed to have occurred within the prior 7 days; or  -Urine volume < 0.5ml/kg/h for 6 hours    Thank you    [address] [place] [address]  Clinical Documentation Improvement Specialist  [phone]  Options provided:  -- Acute kidney injury evidenced by, Please document baseline creatinine and additional indicators.   -- Acute kidney injury ruled out after study  -- Other - I will add my own diagnosis  -- Disagree - Not applicable / Not valid  -- Disagree - Clinically unable to determine / Unknown  -- Refer to Clinical Documentation Reviewer    PROVIDER RESPONSE TEXT:    LOREN stage I, serum creatinine up greater than 0.3 from baseline on admission    Query created by: Haleigh Montana on 12/3/2021 1:19 PM      QUERY TEXT:    Pt admitted with orthostatic hypotension and has been having weakness and a GLF PTA. Pt noted to be documented to have functional decline, hypothyroidism, and that her cardiologist had been adjusting her medications. Patient is also being treated for dehydration and UTI. Patient also has neuropathy. Please clearly state the cause(s) of the patient's orthostatic hypotension as well as indicate acuity      The medical record reflects the following:    Risk Factors: 76F function decline/prolonged periods of rest, neuropathy, hypothyroidism, follows with Salma ALTAMIRANO for orthostatic hypotension    Clinical Indicators:    12/01 Salma ALTAMIRANO Consult  She is a patient of mine and has started seeing her several years ago when she had similar presentation with weakness and was diagnosed with orthostatic hypotension. It was especially bad when she was dehydrated. Once her fluid status was adequate she did not have much problem with it. In fact, her systolic HTN requires small dose of ARB, Losartan with subsequent good control. She has h/o asthma and this was also quite stable last several years. In ER, I witnessed her systolic BP dropping from 120s to 94 with dizziness and weakness.   Falls and weakness; due to orthostatic hypotension  Remote tele  Hydrate either po or IV  Stop Losartan  Monitor for orthostatic hypotension  Consider Midodrine if not improving  Aggressive inpatient PT and then rehab    12/2 Salma ALTAMIRANO  Orthostatic hypotension; improving; continue IVF for another 24 hours  Debility; severe with lots of back issue and difficulty with mobility; needs inpatient rehab;  is working on this  Asthma; stable  HTN; accept some intermittent mild systolic HTN; stop Losartan for now, which was already held last meghana Salvador MD H&P  Her cardiologist also evaluated her and wants to adjust her medications due to orthostatic hypotension and dizziness. In the ER the patient's UA did show bacteriuria and due to her weakness and orthostatic hypotension is likely that she has a UTI as well. 12/2 Nick Estrada NP  Weakness  GLF  Dehydration  - Continue current IVF. - Will need rehab.  ?  Orthostatic hypotension in setting of essential HTN  - No orthostatic VS today, request set. - Consider midodrine if patient continues to have orthostatic changes. ? UTI  - Continue trimethoprim-sulfamethoxazole 160-800 mg po bid. Neuropathy  - Continue gabapentin 300 mg po bid. ? Hypothyroidism  - Continue levothyroxine 125 mcg po daily. Treatment: medications as quoted in the above documentation, increased frequency of PT/OT, IVF and increased PO intake, abx for infection    Thank you    Ubaldo MANNING RN [facility]  Clinical Documentation Improvement Specialist  [phone]  Options provided:  -- acute on chronic orthostatic hypotension due to, document the acute condition as well as the chronic condition  -- acute orthostatic hypotension due to, document conditions  -- Other - I will add my own diagnosis  -- Disagree - Not applicable / Not valid  -- Disagree - Clinically unable to determine / Unknown  -- Refer to Clinical Documentation Reviewer    PROVIDER RESPONSE TEXT:    Provider is clinically unable to determine a response to this query.     Query created by: Carlton Irwin on 12/3/2021 1:38 PM      Electronically signed by:  Cassandra Flynn NP 12/3/2021 1:48 PM

## 2021-12-03 NOTE — PROGRESS NOTES
Bedside shift change report given to Sara Zimmer RN (oncoming nurse) by David Romo (offgoing nurse). Report included the following information SBAR, Kardex, Procedure Summary, Intake/Output, MAR and Recent Results.

## 2021-12-03 NOTE — PROGRESS NOTES
Physician Progress Note      Aishwarya Estrada  [facility] #:                  [id number]  :                       1945  ADMIT DATE:       2021 9:11 PM  [address] [place] DATE:  RESPONDING  PROVIDER #:        Cassandra lFynn NP          QUERY TEXT:    Patient admitted with sCr 1.31 GFR 39. Noted documentation of Acute Kidney Injury in H&P on  Alaina Salvador MD.   Lab work shows sCr 1.06 GFR 50. In order to support the diagnosis of Acute Kidney Injury, please include baseline sCr and other indicators in your documentation. Or please document if the diagnosis of Acute Kidney Injury has been ruled out after further study. The medical record reflects the following:    Risk Factors: 76F HTN , depression, hypothyriod    Clinical Indicators:     sCr 1.31 GFR 39   sCr 1.06 GFR 50     [facility]en NP  LOREN  - Likely pre-renal.  - Renal function improving.  - Continue to avoid nephrotoxins.  - Monitor.  Alaina Salvador MD H&P  LOREN (acute kidney injury)  Hydration with normal saline  Follow-up with morning labs    Treatment: NS IV 75 ml/hr, lab monitoring, I&Os    Defined by Kidney Disease Improving Global Outcomes (KDIGO) clinical practice guideline for acute kidney injury:  -Increase in SCr by greater than or equal to 0.3 mg/dl within 48 hours; or  -Increase or decrease in SCr to greater than or equal to 1.5 times baseline, which is known or presumed to have occurred within the prior 7 days; or  -Urine volume < 0.5ml/kg/h for 6 hours    Thank you    [address] [place] [address]  Clinical Documentation Improvement Specialist  [phone]  Options provided:  -- Acute kidney injury evidenced by, Please document baseline creatinine and additional indicators.   -- Acute kidney injury ruled out after study  -- Other - I will add my own diagnosis  -- Disagree - Not applicable / Not valid  -- Disagree - Clinically unable to determine / Unknown  -- Refer to Clinical Documentation Reviewer    PROVIDER RESPONSE TEXT:    Provider is clinically unable to determine a response to this query.     Query created by: Marion Laboy on 12/3/2021 3:12 PM      Electronically signed by:  Nohemi Cyr NP 12/3/2021 3:15 PM

## 2021-12-03 NOTE — PROGRESS NOTES
Cardiology Progress Note                                        Admit Date: 11/30/2021    Assessment/Plan:     Orthostatic hypotension; resolved; will stop IVF now as she is well hydrated; I will sign off now  LOREN; due to dehydration; resolved  HTN; off Losartan; I will resume this as an outpatient  Debility; approved to go to [facility] when bed is available    James Acosta is a 64 y.o. female with     PROBLEM LIST:  Patient Active Problem List    Diagnosis Date Noted    Orthostatic hypotension 12/01/2021    Weakness 12/01/2021    UTI (urinary tract infection) 12/01/2021    LOREN (acute kidney injury) ([address] 12/01/2021    Goals of care, counseling/discussion     Other chronic pain     Cough     Impaired mobility     Status asthmaticus 02/08/2021    Moderate persistent asthma with acute exacerbation 02/08/2021    IBS (irritable bowel syndrome) 07/08/2011    HTN (hypertension) 07/08/2011    Asthma 07/08/2011    Unspecified hypothyroidism 07/08/2011    Syncope 07/07/2011    Dehydration 07/07/2011         Subjective:     Shade Chain reports none. Visit Vitals  /72 (BP 1 Location: Right upper arm, BP Patient Position: At rest)   Pulse 92   Temp 98.3 °F (36.8 °C)   Resp 18   Ht 5' (1.524 m)   Wt 160 lb (72.6 kg)   SpO2 90%   BMI 31.25 kg/m²       Intake/Output Summary (Last 24 hours) at 12/3/2021 1420  Last data filed at 12/2/2021 1838  Gross per 24 hour   Intake 240 ml   Output    Net 240 ml       Objective:      Physical Exam:  HEENT: Perrla, EOMI  Neck: No JVD,  No thyroidmegaly  Resp: CTA bilaterally;  No wheezes or rales  CV: RRR s1s2 No murmur no s3  Abd:Soft, Nontender  Ext: No edema  Neuro: Alert and oriented; Nonfocal  Skin: Warm, Dry, Intact  Pulses: 2+ DP/PT/Rad      Telemetry: normal sinus rhythm    Current Facility-Administered Medications   Medication Dose Route Frequency    predniSONE (DELTASONE) tablet 10 mg  10 mg Oral DAILY    gabapentin (NEURONTIN) capsule 300 mg  300 mg Oral BID    meloxicam (MOBIC) tablet 15 mg  15 mg Oral DAILY    . PHARMACY TO SUBSTITUTE PER PROTOCOL (Reordered from: cyclosporine (RESTASIS) 0.05 % ophthalmic emulsion)    Per Protocol    sodium chloride (NS) flush 5-40 mL  5-40 mL IntraVENous Q8H    sodium chloride (NS) flush 5-40 mL  5-40 mL IntraVENous PRN    acetaminophen (TYLENOL) tablet 650 mg  650 mg Oral Q6H PRN    Or    acetaminophen (TYLENOL) suppository 650 mg  650 mg Rectal Q6H PRN    polyethylene glycol (MIRALAX) packet 17 g  17 g Oral DAILY PRN    ondansetron (ZOFRAN ODT) tablet 4 mg  4 mg Oral Q8H PRN    Or    ondansetron (ZOFRAN) injection 4 mg  4 mg IntraVENous Q6H PRN    enoxaparin (LOVENOX) injection 40 mg  40 mg SubCUTAneous DAILY    levothyroxine (SYNTHROID) tablet 125 mcg  125 mcg Oral DAILY    latanoprost (XALATAN) 0.005 % ophthalmic solution 1 Drop  1 Drop Both Eyes QHS    albuterol (PROVENTIL VENTOLIN) nebulizer solution 2.5 mg  2.5 mg Nebulization Q4H PRN    arformoterol 15 mcg/budesonide 0.5 mg neb solution   Nebulization BID RT    buPROPion SR (WELLBUTRIN SR) tablet 200 mg  200 mg Oral BID    cholecalciferol (VITAMIN D3) (1000 Units /25 mcg) tablet 1,000 Units  1,000 Units Oral DAILY    diphenhydrAMINE (BENADRYL) capsule 25 mg  25 mg Oral QHS PRN    pantoprazole (PROTONIX) tablet 40 mg  40 mg Oral ACB    montelukast (SINGULAIR) tablet 10 mg  10 mg Oral QHS    polyethylene glycol (MIRALAX) packet 8.5 g  8.5 g Oral DAILY    . PHARMACY TO SUBSTITUTE PER PROTOCOL (Reordered from: PreviDent 5000 Plus 1.1 % crea)    Per Protocol    ipratropium (ATROVENT) 0.02 % nebulizer solution 0.5 mg  500 mcg Nebulization Q6HWA RT    pyridoxine (vitamin B6) (VITAMIN B-6) tablet 200 mg  200 mg Oral DAILY    traZODone (DESYREL) tablet 75 mg  75 mg Oral QHS    trimethoprim-sulfamethoxazole (BACTRIM DS, SEPTRA DS) 160-800 mg per tablet 1 Tablet  1 Tablet Oral Q12H    0.9% sodium chloride infusion  75 mL/hr IntraVENous CONTINUOUS    0.9% sodium chloride infusion  100 mL/hr IntraVENous CONTINUOUS         Data Review:   Labs:  No results found for this or any previous visit (from the past 24 hour(s)).

## 2021-12-03 NOTE — PROGRESS NOTES
Hospitalist Progress Note    NAME: Pia Purvis   :  1945   MRN:  [id number]     HPI excerpted from admission H&P:  \"Gabriella Morrison is a 64 y.o. female with past medical history of asthma, depression, fibromyalgia, GERD, hypertension, hypothyroidism, and worsening functional status who was brought in due to weakness and fall. Per the patient she is started going downhill ever since a while back where she started having neck and back pain. She then reports that yesterday she was washing the dishes when she felt her legs buckle. Per the patient's daughter they were concerned about her status and they think that she needs more than just the physical therapy a couple times a week which she has been doing. In the ER the patient has been comfortable stable. On examination in the ER the patient had positive orthostats, weakness, and dizziness. The patient's mental status is normal and she does not complain of anything except issues with her back. Her cardiologist also evaluated her and wants to adjust her medications due to orthostatic hypotension and dizziness. In the ER the patient's UA did show bacteriuria and due to her weakness and orthostatic hypotension is likely that she has a UTI as well. \"    Assessment / Plan:  Weakness  GLF  Dehydration  - Continue current IVF for now. - Will need rehab, accepted and awaiting bed. Orthostatic hypotension in setting of essential HTN  - No orthostatic VS today, set requested. - Consider midodrine if patient continues to have orthostatic changes. UTI  - Continue trimethoprim-sulfamethoxazole 160-800 mg po bid. LOREN stage I, improved  - Likely pre-renal.    - Renal function improving.  - Continue to avoid nephrotoxins.  - Recheck bmp in a.m. if patient is still in hospital.        Asthma  - Continue arformoterol/budesonide 15 mcg/0.5 mg nebs bid.  - Continue ipratropium 0.5 mg nebs q6h.   - Continue montelukast 10 mg po daily at hs.   - Continue prednisone 10 mg po daily.  - Patient on Xolair every other week which is non-formulary. Neuropathy   - Continue gabapentin 300 mg po bid. Hypothyroidism  - Continue levothyroxine 125 mcg po daily. GERD  - Continue pantoprazole 40 mg po daily. Glaucoma  - Continue latanoprost 0.005% 1 gtt ou daily at hs. Depression    - Bupropion XL non-formulary, non-formulary - continue bupropion  mg po bid. - Continue trazodone 75 mg po daily at hs. Arthritis  - Continue meloxicam 15 mg po daily. Mild hyperglycemia   - Likely related to steroid use. - No tx indicated. - Monitor with a.m. labs while hospitalized. 30.0 - 39.9 Obese / Body mass index is 31.25 kg/m². Code status: Full  Prophylaxis: Lovenox  Recommended Disposition: [facility]/Rehab     Subjective:     Chief Complaint / Reason for Physician Visit  No complaints currently. Plan of care and pertinent events reviewed with bedside nurse. Review of Systems:  Symptom Y/N Comments  Symptom Y/N Comments   Fever/Chills N   Chest Pain N    Poor Appetite N   Edema N    Cough N   Abdominal Pain N    Sputum N   Joint Pain Y    SOB/DEWITT N   Pruritis/Rash N    Nausea/vomit N   Tolerating PT/OT Y    Diarrhea N   Tolerating Diet Y    Constipation    Other       Could NOT obtain due to:      Objective:     VITALS:   Last 24hrs VS reviewed since prior progress note.  Most recent are:  Patient Vitals for the past 24 hrs:   Temp Pulse Resp BP SpO2   12/03/21 1200  91      12/03/21 1000  90      12/03/21 0915 97.5 °F (36.4 °C) 87 18 (!) 161/96 92 %   12/03/21 0733     92 %   12/03/21 0602  87      12/03/21 0426  85      12/03/21 0227  94      12/03/21 0200 97.9 °F (36.6 °C) 97 17 106/64 96 %   12/02/21 2230  (!) 107      12/02/21 2118 98.8 °F (37.1 °C) 100 18 95/63 92 %   12/02/21 2019  (!) 112      12/02/21 1838  (!) 106      12/02/21 1600  (!) 105      12/02/21 1535  (!) 102  117/82    12/02/21 1530  98  115/79    12/02/21 1446 97.8 °F (36.6 °C) 99 18 130/79 94 %   12/02/21 1400  (!) 102          Intake/Output Summary (Last 24 hours) at 12/3/2021 1348  Last data filed at 12/2/2021 1189  Gross per 24 hour   Intake 600 ml   Output    Net 600 ml        I had a face to face encounter and independently examined this patient on 12/3/2021, as outlined below:  PHYSICAL EXAM:  General:  A/A/O X 3. NAD. HEENT:  Normocephalic. Sclera anicteric. Mucous membranes moist.    Chest:  Resps even/unlabored with symmetrical CWE. Air entry diminished at bases. Lungs CTA. No use of accessory muscles. CV:  RRR. Normal S1/S2. No M/C/R appreciated. No JVD. Trace right ankle edema. Cap refill < 3 sec. Peripheral pulses 1-2+. GI:  Abdomen soft/NT/ND. No organomegaly. No hernia. ABT X 4.    :  Voiding. Neurologic:  Nonfocal.  CN II-XII grossly intact. Face symmetrical.  Speech normal.     Psych:  Cooperative. No anxiety or agitation. No suicidal/homicidal ideation. Skin:  Warm and color appropriate. No rashes or jaundice. Turgor elastic. Reviewed most current lab test results and cultures  YES  Reviewed most current radiology test results   YES  Review and summation of old records today    NO  Reviewed patient's current orders and MAR    YES  PMH/SH reviewed - no change compared to H&P  ________________________________________________________________________  Care Plan discussed with:    Comments   Patient [address]     Consultant                        Multidiciplinary team rounds were held today with , nursing, pharmacist and clinical coordinator. Patient's plan of care was discussed; medications were reviewed and discharge planning was addressed.      ________________________________________________________________________  Jolynn Medrano NP     Procedures: see electronic medical records for all procedures/Xrays and details which were not copied into this note but were reviewed prior to creation of Plan. LABS:  I reviewed today's most current labs and imaging studies.   Pertinent labs include:  Recent Labs     12/02/21  0120 11/30/21  2245   WBC 9.3 9.6   HGB 11.6 12.5   HCT 37.7 40.0    299     Recent Labs     12/02/21  0120 11/30/21  2245    139   K 3.8 4.0    104   CO2 27 29   * 138*   BUN 22* 24*   CREA 1.06* 1.31*   CA 9.5 9.9   MG  --  2.3   ALB  --  3.5   TBILI  --  0.5   ALT  --  26       Signed: Rochelle Ortiz NP

## 2021-12-04 ENCOUNTER — APPOINTMENT (OUTPATIENT)
Dept: GENERAL RADIOLOGY | Age: 76
DRG: 690 | End: 2021-12-04
Attending: NURSE PRACTITIONER
Payer: MEDICARE

## 2021-12-04 LAB
ANION GAP SERPL CALC-SCNC: 6 MMOL/L (ref 5–15)
ARTERIAL PATENCY WRIST A: POSITIVE
BASE EXCESS BLD CALC-SCNC: 2.4 MMOL/L
BASOPHILS # BLD: 0.1 K/UL (ref 0–0.1)
BASOPHILS NFR BLD: 1 % (ref 0–1)
BDY SITE: ABNORMAL
BUN SERPL-MCNC: 19 MG/DL (ref 6–20)
BUN/CREAT SERPL: 17 (ref 12–20)
CALCIUM SERPL-MCNC: 9.2 MG/DL (ref 8.5–10.1)
CHLORIDE SERPL-SCNC: 105 MMOL/L (ref 97–108)
CO2 SERPL-SCNC: 28 MMOL/L (ref 21–32)
CREAT SERPL-MCNC: 1.13 MG/DL (ref 0.55–1.02)
DIFFERENTIAL METHOD BLD: ABNORMAL
EOSINOPHIL # BLD: 0.1 K/UL (ref 0–0.4)
EOSINOPHIL NFR BLD: 2 % (ref 0–7)
ERYTHROCYTE [DISTWIDTH] IN BLOOD BY AUTOMATED COUNT: 14.5 % (ref 11.5–14.5)
GAS FLOW.O2 O2 DELIVERY SYS: ABNORMAL L/MIN
GLUCOSE SERPL-MCNC: 90 MG/DL (ref 65–100)
HCO3 BLD-SCNC: 28.3 MMOL/L (ref 22–26)
HCT VFR BLD AUTO: 37.1 % (ref 35–47)
HGB BLD-MCNC: 11.7 G/DL (ref 11.5–16)
IMM GRANULOCYTES # BLD AUTO: 0.1 K/UL (ref 0–0.04)
IMM GRANULOCYTES NFR BLD AUTO: 1 % (ref 0–0.5)
LACTATE SERPL-SCNC: 0.8 MMOL/L (ref 0.4–2)
LYMPHOCYTES # BLD: 0.4 K/UL (ref 0.8–3.5)
LYMPHOCYTES NFR BLD: 7 % (ref 12–49)
MCH RBC QN AUTO: 30.3 PG (ref 26–34)
MCHC RBC AUTO-ENTMCNC: 31.5 G/DL (ref 30–36.5)
MCV RBC AUTO: 96.1 FL (ref 80–99)
MONOCYTES # BLD: 0.5 K/UL (ref 0–1)
MONOCYTES NFR BLD: 8 % (ref 5–13)
NEUTS SEG # BLD: 4.9 K/UL (ref 1.8–8)
NEUTS SEG NFR BLD: 81 % (ref 32–75)
NRBC # BLD: 0 K/UL (ref 0–0.01)
NRBC BLD-RTO: 0 PER 100 WBC
O2/TOTAL GAS SETTING VFR VENT: 2 %
PCO2 BLD: 48.5 MMHG (ref 35–45)
PH BLD: 7.37 [PH] (ref 7.35–7.45)
PLATELET # BLD AUTO: 218 K/UL (ref 150–400)
PMV BLD AUTO: 9.5 FL (ref 8.9–12.9)
PO2 BLD: 69 MMHG (ref 80–100)
POTASSIUM SERPL-SCNC: 4 MMOL/L (ref 3.5–5.1)
PROCALCITONIN SERPL-MCNC: 0.25 NG/ML
RBC # BLD AUTO: 3.86 M/UL (ref 3.8–5.2)
RBC MORPH BLD: ABNORMAL
SAO2 % BLD: 92.8 % (ref 92–97)
SARS-COV-2, XPLCVT: NOT DETECTED
SODIUM SERPL-SCNC: 139 MMOL/L (ref 136–145)
SOURCE, COVRS: NORMAL
SPECIMEN TYPE: ABNORMAL
WBC # BLD AUTO: 6.1 K/UL (ref 3.6–11)

## 2021-12-04 PROCEDURE — 84145 PROCALCITONIN (PCT): CPT

## 2021-12-04 PROCEDURE — 71045 X-RAY EXAM CHEST 1 VIEW: CPT

## 2021-12-04 PROCEDURE — 74011250637 HC RX REV CODE- 250/637: Performed by: STUDENT IN AN ORGANIZED HEALTH CARE EDUCATION/TRAINING PROGRAM

## 2021-12-04 PROCEDURE — 74011000250 HC RX REV CODE- 250: Performed by: NURSE PRACTITIONER

## 2021-12-04 PROCEDURE — 74011250636 HC RX REV CODE- 250/636: Performed by: NURSE PRACTITIONER

## 2021-12-04 PROCEDURE — 36600 WITHDRAWAL OF ARTERIAL BLOOD: CPT

## 2021-12-04 PROCEDURE — 65660000000 HC RM CCU STEPDOWN

## 2021-12-04 PROCEDURE — 80048 BASIC METABOLIC PNL TOTAL CA: CPT

## 2021-12-04 PROCEDURE — 82803 BLOOD GASES ANY COMBINATION: CPT

## 2021-12-04 PROCEDURE — 87040 BLOOD CULTURE FOR BACTERIA: CPT

## 2021-12-04 PROCEDURE — 74011636637 HC RX REV CODE- 636/637: Performed by: NURSE PRACTITIONER

## 2021-12-04 PROCEDURE — 85025 COMPLETE CBC W/AUTO DIFF WBC: CPT

## 2021-12-04 PROCEDURE — 74011000250 HC RX REV CODE- 250: Performed by: STUDENT IN AN ORGANIZED HEALTH CARE EDUCATION/TRAINING PROGRAM

## 2021-12-04 PROCEDURE — 74011250636 HC RX REV CODE- 250/636: Performed by: STUDENT IN AN ORGANIZED HEALTH CARE EDUCATION/TRAINING PROGRAM

## 2021-12-04 PROCEDURE — 74011250637 HC RX REV CODE- 250/637: Performed by: NURSE PRACTITIONER

## 2021-12-04 PROCEDURE — 83605 ASSAY OF LACTIC ACID: CPT

## 2021-12-04 PROCEDURE — 94640 AIRWAY INHALATION TREATMENT: CPT

## 2021-12-04 PROCEDURE — 36415 COLL VENOUS BLD VENIPUNCTURE: CPT

## 2021-12-04 RX ADMIN — GABAPENTIN 300 MG: 300 CAPSULE ORAL at 13:41

## 2021-12-04 RX ADMIN — Medication 10 ML: at 22:00

## 2021-12-04 RX ADMIN — BUPROPION HYDROCHLORIDE 200 MG: 100 TABLET, EXTENDED RELEASE ORAL at 09:31

## 2021-12-04 RX ADMIN — PANTOPRAZOLE SODIUM 40 MG: 40 TABLET, DELAYED RELEASE ORAL at 06:31

## 2021-12-04 RX ADMIN — IPRATROPIUM BROMIDE 0.5 MG: 0.5 SOLUTION RESPIRATORY (INHALATION) at 12:30

## 2021-12-04 RX ADMIN — ENOXAPARIN SODIUM 40 MG: 100 INJECTION SUBCUTANEOUS at 09:32

## 2021-12-04 RX ADMIN — PYRIDOXINE HCL TAB 50 MG 200 MG: 50 TAB at 09:30

## 2021-12-04 RX ADMIN — SULFAMETHOXAZOLE AND TRIMETHOPRIM 1 TABLET: 800; 160 TABLET ORAL at 09:31

## 2021-12-04 RX ADMIN — Medication 10 ML: at 14:00

## 2021-12-04 RX ADMIN — BUPROPION HYDROCHLORIDE 200 MG: 100 TABLET, EXTENDED RELEASE ORAL at 17:43

## 2021-12-04 RX ADMIN — MONTELUKAST 10 MG: 10 TABLET, FILM COATED ORAL at 21:50

## 2021-12-04 RX ADMIN — ARFORMOTEROL TARTRATE: 15 SOLUTION RESPIRATORY (INHALATION) at 19:49

## 2021-12-04 RX ADMIN — POLYETHYLENE GLYCOL 3350 8.5 G: 17 POWDER, FOR SOLUTION ORAL at 21:51

## 2021-12-04 RX ADMIN — LEVOTHYROXINE SODIUM 125 MCG: 0.12 TABLET ORAL at 09:31

## 2021-12-04 RX ADMIN — GABAPENTIN 300 MG: 300 CAPSULE ORAL at 09:30

## 2021-12-04 RX ADMIN — LATANOPROST 1 DROP: 50 SOLUTION OPHTHALMIC at 21:51

## 2021-12-04 RX ADMIN — SODIUM CHLORIDE 1 G: 9 INJECTION INTRAMUSCULAR; INTRAVENOUS; SUBCUTANEOUS at 13:41

## 2021-12-04 RX ADMIN — Medication 1000 UNITS: at 09:30

## 2021-12-04 RX ADMIN — IPRATROPIUM BROMIDE 0.5 MG: 0.5 SOLUTION RESPIRATORY (INHALATION) at 19:49

## 2021-12-04 RX ADMIN — TRAZODONE HYDROCHLORIDE 75 MG: 50 TABLET ORAL at 21:50

## 2021-12-04 RX ADMIN — PREDNISONE 10 MG: 10 TABLET ORAL at 09:31

## 2021-12-04 RX ADMIN — MELOXICAM 15 MG: 7.5 TABLET ORAL at 09:30

## 2021-12-04 RX ADMIN — Medication 10 ML: at 05:05

## 2021-12-04 RX ADMIN — ONDANSETRON HYDROCHLORIDE 4 MG: 2 SOLUTION INTRAMUSCULAR; INTRAVENOUS at 05:03

## 2021-12-04 NOTE — DISCHARGE SUMMARY
Patient seen and examined- agree with NP note documented below                  Hospitalist Discharge Summary     Patient ID:  Marva Flores  [id number]  11 y.o.  1945 11/30/2021    PCP on record: Manjit Vivas MD    Admit date: 11/30/2021  Discharge date and time: 12/5/2021    DISCHARGE DIAGNOSIS:  Weakness  GLF  Dehydration  Orthostatic hypotension in setting of essential HTN  UTI  LOREN stage I, improved  Asthma  Neuropathy   Hypothyroidism  GERD  Glaucoma  Depression    Arthritis  Mild hyperglycemia     CONSULTATIONS:  ED CONSULT TO SENIOR SERVICES CASE MANAGEMENT  ED CONSULT TO Gabby Olvera  ED CONSULT TO SENIOR SERVICES PHYSICAL THERAPY  IP CONSULT TO CARDIOLOGY    Excerpted HPI from H&P of Keith Sexton MD:  Anabell Sherwood is a 64 y.o. female with past medical history of asthma, depression, fibromyalgia, GERD, hypertension, hypothyroidism, and worsening functional status who was brought in due to weakness and fall. Per the patient she is started going downhill ever since a while back where she started having neck and back pain. She then reports that yesterday she was washing the dishes when she felt her legs buckle. Per the patient's daughter they were concerned about her status and they think that she needs more than just the physical therapy a couple times a week which she has been doing. In the ER the patient has been comfortable stable. On examination in the ER the patient had positive orthostats, weakness, and dizziness. The patient's mental status is normal and she does not complain of anything except issues with her back. Her cardiologist also evaluated her and wants to adjust her medications due to orthostatic hypotension and dizziness. In the ER the patient's UA did show bacteriuria and due to her weakness and orthostatic hypotension is likely that she has a UTI as well. \"    ______________________________________________________________________  DISCHARGE SUMMARY/HOSPITAL COURSE:  for full details see H&P, daily progress notes, labs, consult notes. Weakness  GLF  Dehydration, resolved  CT head 11/30/21:  No acute intracranial abnormality. CT lumbar spine 11/30/21:  No acute abnormality. Intact posterior fusion hardware at L3-S1. Diffuse degenerative changes. - Patient received IVF while hospitalized. - Transition to rehab. Orthostatic hypotension, resolved  Essential HTN   Tachycardia, resolved  - Patient seen by cardiology while hospitalized, cleared for discharge. - Orthostatic hypotension resolved with hydration.    - Losartan stopped, plan to restart by cardiologist as OP - BP good off med. - Patient transiently tachycardic on 12/04/21. UTI  Urine culture 12/01/21:  Group B beta hemolytic Strep. - Treated with course of trimethoprim-sulfamethoxazole 160-800 mg po bid, completed 12/04/21 prior to final culture results. - Patient started on ceftriaxone 12/04/21, received 2 doses while hospitalized - transition to cefdinir 300 mg po bid for remainder of abx course. - Add probiotic 1 cap po daily while on abx. Asthma  CXR 12/04/21:  No acute disease.   - Patient requiring supplemental O2, CXR ok - plan to discharge on O2 to be weaned at rehab. Per patient she was on O2 in the past and daughter states that she refused to wear it and had it removed from her home. - Continue PTA asthma meds as noted below. - Patient is on prednisone taper, currently on10 mg po daily - given length of time she was on steroids she will need a slow taper to off.   - Patient on Xolair every other week which was non-formulary, she is due dose - per patient and patient's daughter her Pulmonologist is planning on changing to another biologic agent. [facility] pulmonology consult prior to discharge however it was declined. Neuropathy   - Continue gabapentin 300 mg po bid. Hypothyroidism  - Continue levothyroxine 125 mcg po daily.       GERD  - Continue PPI daily as noted below. Glaucoma  - Continue glaucoma as noted below . Depression    - Continue medications as noted below. Arthritis  - Continue NSAID as noted below. LOREN stage I, resolved  - Likely a pre-renal element. - Serum Cr 0.99 on day of hospital discharge. - Will need to be monitored periodically. Mild hyperglycemia, resolved   - Likely related to steroid use. - Patient did not require tx while hospitalized. At the time of this dictation patient's vital signs were as follows:  T 98.1, /72, , RR 17, SpO2 93% on L 2 LPM   _______________________________________________________________________  Patient seen and examined by me on discharge day. Pertinent Findings:  General:  A/A/O X 3. NAD. HEENT:  Normocephalic. Sclera anicteric. Mucous membranes moist.    Chest:  Resps even/unlabored with symmetrical CWE. Air entry diminished. Lungs CTA. No use of accessory muscles. CV:  RRR. Normal S1/S2. No M/C/R appreciated. No JVD. Trace right ankle edema with surrounding ecchymosis 2/2 recently \"twisting\" of ankle. Cap refill < 3 sec. Peripheral pulses 1-2+. GI:  Abdomen soft/NT/ND. No organomegaly. No hernia. ABT X 4.    :  Voiding. Neurologic:  Nonfocal.  CN II-XII grossly intact. Face symmetrical.  Speech normal.     Psych:  Cooperative. No anxiety or agitation. No suicidal/homicidal ideation. Skin:  Warm and color appropriate. No rashes or jaundice. Turgor elastic.   _______________________________________________________________________  DISCHARGE MEDICATIONS:   Current Discharge Medication List        START taking these medications    Details   cefdinir (OMNICEF) 300 mg capsule Take 1 Capsule by mouth two (2) times a day. Qty: 6 Capsule, Refills: 0  Start date: 12/6/2021      L.acidoph & parac-S.therm-Bifido (JACKY Q2/RISAQUAD-2) 16 billion cell cap cap Take 1 Capsule by mouth daily.   Qty: 5 Capsule, Refills: 0  Start date: 12/5/2021           CONTINUE these medications which have NOT CHANGED    Details   b complex vitamins tablet Take 1 Tablet by mouth daily. loratadine (CLARITIN) 10 mg tablet Take 10 mg by mouth daily as needed for Allergies. guaifenesin (MUCINEX PO) Take 1 Tablet by mouth two (2) times daily as needed for PRN Reason (Other) (Cough, congestion). budesonide (PULMICORT) 0.5 mg/2 mL nbsp two (2) times daily as needed for PRN Reason (Other). PreviDent 5000 Plus 1.1 % crea USE AS DIRECTED AFTER MEALS      latanoprost (XALATAN) 0.005 % ophthalmic solution Administer 1 Drop to both eyes nightly. multivitamin (ONE A DAY) tablet Take 1 Tablet by mouth daily. diphenhydrAMINE (BenadryL) 25 mg capsule Take 25 mg by mouth nightly as needed. meloxicam (Mobic) 15 mg tablet Take 15 mg by mouth daily. omalizumab (XOLAIR SC) by SubCUTAneous route. Every 2 week injections. tiotropium bromide (SPIRIVA RESPIMAT) 1.25 mcg/actuation inhaler Take 2 Puffs by inhalation daily. predniSONE (DELTASONE) 10 mg tablet Take 10 mg by mouth daily. Take 6 tablets by mouth for 2 days, then take 4 tablets by mouth for 3 days, then take 3 tablets by mouth for 3 days, then take 2 tablets by mouth for 3 days, then take 1 talet by mouth for 3 days. albuterol (PROVENTIL VENTOLIN) 2.5 mg /3 mL (0.083 %) nebu 1 Each by Nebulization route every four to six (4-6) hours as needed for Wheezing. gabapentin (NEURONTIN) 100 mg capsule Take 300 mg by mouth two (2) times a day. levothyroxine (SYNTHROID) 125 mcg tablet Take 125 mcg by mouth daily. albuterol (PROVENTIL HFA) 90 mcg/Actuation inhaler Take 1 Puff by inhalation two (2) times a day. CALCIUM CARBONATE (CALCIUM 600 PO) Take 1 Tablet by mouth daily. cholecalciferol, vitamin d3, (VITAMIN D) 1,000 unit tablet Take 1,000 Units by mouth daily. polyethylene glycol (MIRALAX) 17 gram packet Take 8.5 g by mouth daily. ACETAMINOPHEN (TYLENOL PO) Take 1,000 mg by mouth two (2) times a day. PSEUDOEPHEDRINE HCL (SUDOPHED PO) Take 2 Tabs by mouth every twelve (12) hours as needed for Other (allergies). lansoprazole (PREVACID) 30 mg disintegrating tablet Take 30 mg by mouth daily. BUPROPION HCL (WELLBUTRIN XL PO) Take 450 mg by mouth daily. TRAZODONE HCL (TRAZODONE PO) Take 75 mg by mouth nightly. budesonide-formoterol (SYMBICORT) 160-4.5 mcg/Actuation HFA inhaler Take 2 Puffs by inhalation two (2) times a day. cyclosporine (RESTASIS) 0.05 % ophthalmic emulsion Administer 1 Drop to both eyes two (2) times a day. STOP taking these medications       LOSARTAN PO Comments:   Reason for Stopping:         pyridoxine (VITAMIN B-6) 200 mg tablet Comments:   Reason for Stopping:                 Patient Follow Up Instructions:    Activity: PT/OT Eval and Treat  Diet: Cardiac Diet  Wound Care: None needed    Follow-up as noted below  Follow-up tests/labs:  [facility]    Follow-up Information       Follow up With Specialties Details Why Contact Info    Jeanne Khan MD Family Medicine In 3 days Call to schedule hospital follow up to be seen within 3 days of discharge from rehab [phone]      Mariia Calloway, [address] [place] [address] Vascular Surgery, Cardiology, Interventional Cardiology In 1 week Call to schedule hospital follow up to be seen within 1 week of discharge from rehab [address] [facility] [place] [phone] [facility]          ________________________________________________________________    Risk of deterioration: Low    Condition at Discharge:  Stable  __________________________________________________________________    Disposition  IP Rehab    ____________________________________________________________________    Code Status: Full Code  ___________________________________________________________________      Total time in minutes spent coordinating this discharge (includes going over instructions, follow-up, prescriptions, and preparing report for sign off to her PCP) :  >30 minutes    Signed:  Sierra Anderson NP

## 2021-12-04 NOTE — PROGRESS NOTES
Transitions of Care    RUR 12%    Alessandro Taylor called from [facility]. They have a bed for Ms. Sarmiento. [facility] was called. A 3:00pm pick-up time has been arranged. The discharge packet was initiated. Will continue to follow for discharge planning.   Signed By: Yolie Melvin LCSW     December 4, 2021

## 2021-12-04 NOTE — PROGRESS NOTES
Bedside and Verbal shift change report given to Nevaeh Harry (oncoming nurse) by Monisha Garcia (offgoing nurse). Report included the following information SBAR, Kardex, Intake/Output, MAR and Recent Results.

## 2021-12-04 NOTE — PROGRESS NOTES
Transitions of Care    RUR 12%    Tracy Goodman called from [facility]. They have a bed for Ms. Sarmiento. [facility] was called. A 3:00pm pick-up time has been arranged. The discharge packet was initiated. Will continue to follow for discharge planning.   Signed By: Johny Coyle LCSW     December 4, 2021

## 2021-12-04 NOTE — PROGRESS NOTES
Hospitalist Progress Note    NAME: Mandy Morse   :  1945   MRN:  [id number]     HPI excerpted from admission H&P:  \"Gabriella Donovan is a 64 y.o. female with past medical history of asthma, depression, fibromyalgia, GERD, hypertension, hypothyroidism, and worsening functional status who was brought in due to weakness and fall. Per the patient she is started going downhill ever since a while back where she started having neck and back pain. She then reports that yesterday she was washing the dishes when she felt her legs buckle. Per the patient's daughter they were concerned about her status and they think that she needs more than just the physical therapy a couple times a week which she has been doing. In the ER the patient has been comfortable stable. On examination in the ER the patient had positive orthostats, weakness, and dizziness. The patient's mental status is normal and she does not complain of anything except issues with her back. Her cardiologist also evaluated her and wants to adjust her medications due to orthostatic hypotension and dizziness. In the ER the patient's UA did show bacteriuria and due to her weakness and orthostatic hypotension is likely that she has a UTI as well. \"    Assessment / Plan:  Weakness  GLF  Dehydration, resolved  CT head 21:  No acute intracranial abnormality. CT lumbar spine 21:  No acute abnormality. Intact posterior fusion hardware at L3-S1. Diffuse degenerative changes. - Patient appears euvolemic.  - Stop IVF. - Will need rehab, accepted and awaiting medical stability. Orthostatic hypotension, resolved  Essential HTN  Tachycardia   - Cardiology input appreciated. - Losartan stopped at time of hospitalization, continue to hold. - Plan to restart by cardiology as OP.  - BP on the low side today with associated tachycardia, concern for sepsis - see below. UTI  Urine culture 21:  Group B beta hemolytic Strep.   - Patient tachycardic and slightly confused this a.m.  - Check cbc, LA, procalcitonin, and blood cultures. - Patient PCN allergic, hives. - Stop trimethoprim-sulfamethoxazole. - Start ceftriaxone. Asthma  Hypoxia  CXR 12/04/21:  No acute disease.   - CXR reviewed. - Check ABG. - Continue arformoterol/budesonide 15 mcg/0.5 mg nebs bid.  - Continue ipratropium 0.5 mg nebs q6h. - Continue montelukast 10 mg po daily at hs.   - Continue prednisone 10 mg po daily.  - Patient on Xolair every other week which is non-formulary.   - Supplemental O2 prn. LOREN stage I, improved  - Likely pre-renal.    - Renal function improving.  - Continue to avoid nephrotoxins. - monitor. Neuropathy   - Continue gabapentin 300 mg po bid. Hypothyroidism  - Continue levothyroxine 125 mcg po daily. GERD  - Continue pantoprazole 40 mg po daily. Glaucoma  - Continue latanoprost 0.005% 1 gtt ou daily at hs. Depression    - Bupropion XL non-formulary, non-formulary - continue bupropion  mg po bid. - Continue trazodone 75 mg po daily at hs. Arthritis  DJD  - Continue meloxicam 15 mg po daily. Mild hyperglycemia, improved   - Likely related to steroid use. - No tx indicated. - Monitor with a.m. labs. 30.0 - 39.9 Obese / Body mass index is 31.25 kg/m². Code status: Full  Prophylaxis: Lovenox  Recommended Disposition: [facility]/Rehab     Subjective:     Chief Complaint / Reason for Physician Visit  No complaints currently. Plan of care and pertinent events reviewed with bedside nurse. Review of Systems:  Symptom Y/N Comments  Symptom Y/N Comments   Fever/Chills N   Chest Pain N    Poor Appetite N   Edema N    Cough N   Abdominal Pain N    Sputum N   Joint Pain Y    SOB/DEWITT N   Pruritis/Rash N    Nausea/vomit N   Tolerating PT/OT Y    Diarrhea N   Tolerating Diet Y    Constipation    Other       Could NOT obtain due to:      Objective:     VITALS:   Last 24hrs VS reviewed since prior progress note. Most recent are:  Patient Vitals for the past 24 hrs:   Temp Pulse Resp BP SpO2   12/04/21 1200  (!) 113      12/04/21 1000  (!) 112      12/04/21 0922 99.6 °F (37.6 °C) (!) 103 16 102/62 92 %   12/04/21 0601  (!) 108      12/04/21 0407  (!) 105      12/04/21 0201 97.8 °F (36.6 °C) (!) 105 16 135/82 95 %   12/03/21 2215  (!) 102      12/03/21 2114 98 °F (36.7 °C) 97 18 138/84 97 %   12/03/21 2025  94      12/03/21 1813  90      12/03/21 1718    (!) 144/77    12/03/21 1717    128/86    12/03/21 1716    128/83    12/03/21 1550     94 %   12/03/21 1400  94      12/03/21 1358 98.3 °F (36.8 °C) 92  118/72 90 %     No intake or output data in the 24 hours ending 12/04/21 1234     I had a face to face encounter and independently examined this patient on 12/4/2021, as outlined below:  PHYSICAL EXAM:  General:  A/A/O. Intermittently confused. NAD. HEENT:  Normocephalic. Sclera anicteric. Mucous membranes moist.    Chest:  Resps even/unlabored with symmetrical CWE. Air entry diminished. Lungs CTA. No use of accessory muscles. CV:  RRR, tachycardic. Normal S1/S2. No M/C/R appreciated. No JVD. Trace right ankle edema. Cap refill < 3 sec. Peripheral pulses 1-2+. GI:  Abdomen soft/NT/ND. No organomegaly. No hernia. ABT X 4.    :  Voiding. Neurologic:  Nonfocal.  CN II-XII grossly intact. Face symmetrical.  Speech normal.     Psych:  Cooperative. No anxiety or agitation. Skin:  Warm and color appropriate. No rashes or jaundice. Turgor elastic.      Reviewed most current lab test results and cultures  YES  Reviewed most current radiology test results   YES  Review and summation of old records today    NO  Reviewed patient's current orders and MAR    YES  PMH/SH reviewed - no change compared to H&P  ________________________________________________________________________  Care Plan discussed with:    Comments   Patient Y    Family      RN Y    Care Manager     Consultant                        Multidiciplinary team rounds were held today with , nursing, pharmacist and clinical coordinator. Patient's plan of care was discussed; medications were reviewed and discharge planning was addressed. ________________________________________________________________________  Christian Kulkarni NP     Procedures: see electronic medical records for all procedures/Xrays and details which were not copied into this note but were reviewed prior to creation of Plan. LABS:  I reviewed today's most current labs and imaging studies.   Pertinent labs include:  Recent Labs     12/02/21  0120   WBC 9.3   HGB 11.6   HCT 37.7        Recent Labs     12/04/21  0222 12/02/21  0120    140   K 4.0 3.8    106   CO2 28 27   GLU 90 102*   BUN 19 22*   CREA 1.13* 1.06*   CA 9.2 9.5       Signed: Christian Kulkarni NP

## 2021-12-04 NOTE — PROGRESS NOTES
Transitions of care    RUR 12%    Nursing was called to see about the discharge order. The discharge had been cancelled due to the patient's condition. She had some episodes of Tachycardia. [facility] was called. The ambulance was rescheduled for 2:00pm of 12/5/2021. Melvin Pina was called with [facility]. She was informed that discharge was cancelled today. She was informed that it had been rescheduled for 12/5/2021 at 2:00pm.  Will continue to follow for discharge planning.   Signed By: Tracie Jenkins LCSW     December 4, 2021

## 2021-12-04 NOTE — PROGRESS NOTES
Transitions of Care     RUR 12%    Ms. Sheldon Gruber was informed of the 3:00pm discharge. Her questions were answered. Nursing was informed of the 3:0pm discharge.   Signed By: Wilkins Bloch, LCSW     December 4, 2021

## 2021-12-05 VITALS
OXYGEN SATURATION: 93 % | DIASTOLIC BLOOD PRESSURE: 72 MMHG | SYSTOLIC BLOOD PRESSURE: 104 MMHG | RESPIRATION RATE: 17 BRPM | HEIGHT: 60 IN | HEART RATE: 100 BPM | WEIGHT: 160 LBS | TEMPERATURE: 98.1 F | BODY MASS INDEX: 31.41 KG/M2

## 2021-12-05 LAB
ANION GAP SERPL CALC-SCNC: 4 MMOL/L (ref 5–15)
BASOPHILS # BLD: 0 K/UL (ref 0–0.1)
BASOPHILS NFR BLD: 1 % (ref 0–1)
BUN SERPL-MCNC: 19 MG/DL (ref 6–20)
BUN/CREAT SERPL: 19 (ref 12–20)
CALCIUM SERPL-MCNC: 8.9 MG/DL (ref 8.5–10.1)
CHLORIDE SERPL-SCNC: 103 MMOL/L (ref 97–108)
CO2 SERPL-SCNC: 27 MMOL/L (ref 21–32)
CREAT SERPL-MCNC: 0.99 MG/DL (ref 0.55–1.02)
DIFFERENTIAL METHOD BLD: ABNORMAL
EOSINOPHIL # BLD: 0.2 K/UL (ref 0–0.4)
EOSINOPHIL NFR BLD: 3 % (ref 0–7)
ERYTHROCYTE [DISTWIDTH] IN BLOOD BY AUTOMATED COUNT: 14.2 % (ref 11.5–14.5)
GLUCOSE SERPL-MCNC: 86 MG/DL (ref 65–100)
HCT VFR BLD AUTO: 34.1 % (ref 35–47)
HGB BLD-MCNC: 10.7 G/DL (ref 11.5–16)
IMM GRANULOCYTES # BLD AUTO: 0 K/UL (ref 0–0.04)
IMM GRANULOCYTES NFR BLD AUTO: 1 % (ref 0–0.5)
LYMPHOCYTES # BLD: 1.1 K/UL (ref 0.8–3.5)
LYMPHOCYTES NFR BLD: 21 % (ref 12–49)
MCH RBC QN AUTO: 30.1 PG (ref 26–34)
MCHC RBC AUTO-ENTMCNC: 31.4 G/DL (ref 30–36.5)
MCV RBC AUTO: 95.8 FL (ref 80–99)
MONOCYTES # BLD: 0.4 K/UL (ref 0–1)
MONOCYTES NFR BLD: 8 % (ref 5–13)
NEUTS SEG # BLD: 3.7 K/UL (ref 1.8–8)
NEUTS SEG NFR BLD: 66 % (ref 32–75)
NRBC # BLD: 0 K/UL (ref 0–0.01)
NRBC BLD-RTO: 0 PER 100 WBC
PLATELET # BLD AUTO: 206 K/UL (ref 150–400)
PMV BLD AUTO: 9.8 FL (ref 8.9–12.9)
POTASSIUM SERPL-SCNC: 4.4 MMOL/L (ref 3.5–5.1)
RBC # BLD AUTO: 3.56 M/UL (ref 3.8–5.2)
SODIUM SERPL-SCNC: 134 MMOL/L (ref 136–145)
WBC # BLD AUTO: 5.5 K/UL (ref 3.6–11)

## 2021-12-05 PROCEDURE — 74011250637 HC RX REV CODE- 250/637: Performed by: NURSE PRACTITIONER

## 2021-12-05 PROCEDURE — 94760 N-INVAS EAR/PLS OXIMETRY 1: CPT

## 2021-12-05 PROCEDURE — 77010033678 HC OXYGEN DAILY

## 2021-12-05 PROCEDURE — 74011000250 HC RX REV CODE- 250: Performed by: STUDENT IN AN ORGANIZED HEALTH CARE EDUCATION/TRAINING PROGRAM

## 2021-12-05 PROCEDURE — 74011250637 HC RX REV CODE- 250/637: Performed by: STUDENT IN AN ORGANIZED HEALTH CARE EDUCATION/TRAINING PROGRAM

## 2021-12-05 PROCEDURE — 85025 COMPLETE CBC W/AUTO DIFF WBC: CPT

## 2021-12-05 PROCEDURE — 94640 AIRWAY INHALATION TREATMENT: CPT

## 2021-12-05 PROCEDURE — 74011250636 HC RX REV CODE- 250/636: Performed by: NURSE PRACTITIONER

## 2021-12-05 PROCEDURE — 36415 COLL VENOUS BLD VENIPUNCTURE: CPT

## 2021-12-05 PROCEDURE — 80048 BASIC METABOLIC PNL TOTAL CA: CPT

## 2021-12-05 PROCEDURE — 74011636637 HC RX REV CODE- 636/637: Performed by: NURSE PRACTITIONER

## 2021-12-05 PROCEDURE — 74011250636 HC RX REV CODE- 250/636: Performed by: STUDENT IN AN ORGANIZED HEALTH CARE EDUCATION/TRAINING PROGRAM

## 2021-12-05 PROCEDURE — 74011000250 HC RX REV CODE- 250: Performed by: NURSE PRACTITIONER

## 2021-12-05 RX ORDER — CEFDINIR 300 MG/1
300 CAPSULE ORAL 2 TIMES DAILY
Qty: 6 CAPSULE | Refills: 0 | Status: SHIPPED
Start: 2021-12-06 | End: 2022-01-03

## 2021-12-05 RX ADMIN — Medication 10 ML: at 14:00

## 2021-12-05 RX ADMIN — GABAPENTIN 300 MG: 300 CAPSULE ORAL at 08:16

## 2021-12-05 RX ADMIN — IPRATROPIUM BROMIDE 0.5 MG: 0.5 SOLUTION RESPIRATORY (INHALATION) at 14:01

## 2021-12-05 RX ADMIN — MELOXICAM 15 MG: 7.5 TABLET ORAL at 08:16

## 2021-12-05 RX ADMIN — BUPROPION HYDROCHLORIDE 200 MG: 100 TABLET, EXTENDED RELEASE ORAL at 17:30

## 2021-12-05 RX ADMIN — ENOXAPARIN SODIUM 40 MG: 100 INJECTION SUBCUTANEOUS at 08:16

## 2021-12-05 RX ADMIN — ARFORMOTEROL TARTRATE: 15 SOLUTION RESPIRATORY (INHALATION) at 08:46

## 2021-12-05 RX ADMIN — PREDNISONE 10 MG: 10 TABLET ORAL at 08:16

## 2021-12-05 RX ADMIN — Medication 1000 UNITS: at 08:16

## 2021-12-05 RX ADMIN — GABAPENTIN 300 MG: 300 CAPSULE ORAL at 15:33

## 2021-12-05 RX ADMIN — PANTOPRAZOLE SODIUM 40 MG: 40 TABLET, DELAYED RELEASE ORAL at 06:25

## 2021-12-05 RX ADMIN — LEVOTHYROXINE SODIUM 125 MCG: 0.12 TABLET ORAL at 08:16

## 2021-12-05 RX ADMIN — Medication 10 ML: at 06:00

## 2021-12-05 RX ADMIN — IPRATROPIUM BROMIDE 0.5 MG: 0.5 SOLUTION RESPIRATORY (INHALATION) at 08:46

## 2021-12-05 RX ADMIN — BUPROPION HYDROCHLORIDE 200 MG: 100 TABLET, EXTENDED RELEASE ORAL at 08:16

## 2021-12-05 RX ADMIN — PYRIDOXINE HCL TAB 50 MG 200 MG: 50 TAB at 08:16

## 2021-12-05 RX ADMIN — SODIUM CHLORIDE 1 G: 9 INJECTION INTRAMUSCULAR; INTRAVENOUS; SUBCUTANEOUS at 12:39

## 2021-12-05 RX ADMIN — ACETAMINOPHEN 650 MG: 325 TABLET ORAL at 15:33

## 2021-12-05 RX ADMIN — CALCIUM CARBONATE (ANTACID) CHEW TAB 500 MG 200 MG: 500 CHEW TAB at 12:13

## 2021-12-05 NOTE — PROGRESS NOTES
Transition of [facility] Plan   RUR 12%    Disposition   [facility]     Transportation   [facility] phone [phone]  Confirmed for 3:30 pm today.   (the transporter did not have the rescheduled time for 2 pm in the system)    Contact  Daughter   Ginette Pac  [phone]     CM talked with NP Kendrick Castro and confirmed that patient can be discharged today. CM talked with Juan David Metzger [phone] with [facility]- Patient will go to room 3114/ Dr Tulio Spears is the admitting physician and report to be called to [phone]    Envelope and ambulance form on chart    CM  Called  daughter and she is in agreement with patient being discharged today    Medicare letter explained to daughter and she is in agreement    Medicare pt has received, reviewed, and signed 2nd IM letter informing them of their right to appeal the discharge. Signed copy has been placed on pt bedside chart.

## 2021-12-05 NOTE — PROGRESS NOTES
TRANSFER - OUT REPORT:    Verbal report given to Kailash Church RN(name) on Penny Messenger  being transferred to [address] [place] [address] for routine progression of care       Report consisted of patients Situation, Background, Assessment and   Recommendations(SBAR). Opportunity for questions and clarification was provided.       Patient transported with: JOSE ARFAEL

## 2021-12-05 NOTE — PROGRESS NOTES
Pt is alert and oriented x4. Pt pulled out iv and refused nurse to insert iv. Pt stated [facility] will go [facility] and does not need IV anymore.

## 2021-12-09 LAB
BACTERIA SPEC CULT: NORMAL
SERVICE CMNT-IMP: NORMAL

## 2021-12-09 NOTE — PROGRESS NOTES
Physician Progress Note      PATIENTMann Moore  CSN #:                  [id number]  :                       1945  ADMIT DATE:       2021 9:11 PM  [address] [place] DATE:        2021 7:10 PM  RESPONDING  PROVIDER #:        Rebeka Todd MD          QUERY TEXT:    [address] Ronak Nobles,    Pt admitted with orthostatic hypotension and has been having weakness and a GLF PTA. Pt noted to be documented to have functional decline, hypothyroidism, and noted to be followed by your practice for orthostatic hypotension. Patient is also being treated for dehydration and UTI. Patient also has neuropathy. Please clearly state the cause(s) of the patient's orthostatic hypotension as well as indicate acuity      The medical record reflects the following:    Risk Factors: 76F function decline/prolonged periods of rest, neuropathy, hypothyroidism  Clinical Indicators:     Salma ALTAMIRANO Consult  She is a patient of mine and has started seeing her several years ago when she had similar presentation with weakness and was diagnosed with orthostatic hypotension. It was especially bad when she was dehydrated. Once her fluid status was adequate she did not have much problem with it. In fact, her systolic HTN requires small dose of ARB, Losartan with subsequent good control. She has h/o asthma and this was also quite stable last several years. In ER, I witnessed her systolic BP dropping from 120s to 94 with dizziness and weakness.   Falls and weakness; due to orthostatic hypotension  Remote tele  Hydrate either po or IV  Stop Losartan  Monitor for orthostatic hypotension  Consider Midodrine if not improving  Aggressive inpatient PT and then rehab     Salma ALTAMIRANO  Orthostatic hypotension; improving; continue IVF for another 24 hours  Debility; severe with lots of back issue and difficulty with mobility; needs inpatient rehab;  is working on this  Asthma; stable  HTN; accept some intermittent mild systolic HTN; stop Losartan for now, which was already held last night    Pretty Calderón MD H&P  Her cardiologist also evaluated her and wants to adjust her medications due to orthostatic hypotension and dizziness. In the ER the patient's UA did show bacteriuria and due to her weakness and orthostatic hypotension is likely that she has a UTI as well. 12/2 Ochoa Marino NP  Weakness  GLF  Dehydration  - Continue current IVF. - Will need rehab.  ?  Orthostatic hypotension in setting of essential HTN  - No orthostatic VS today, request set. - Consider midodrine if patient continues to have orthostatic changes. ? UTI  - Continue trimethoprim-sulfamethoxazole 160-800 mg po bid. Neuropathy  - Continue gabapentin 300 mg po bid. ? Hypothyroidism  - Continue levothyroxine 125 mcg po daily.     Treatment: medications as quoted in the above documentation, increased frequency of PT/OT, IVF and increased PO intake, abx for infection    Thank you    Vanita MGN RN CRCR  Clinical Documentation Improvement Specialist  [phone]  Options provided:  -- acute on chronic orthostatic hypotension due to, document the acute condition as well as the chronic condition  -- acute orthostatic hypotension due to, document conditions  -- Other - I will add my own diagnosis  -- Disagree - Not applicable / Not valid  -- Disagree - Clinically unable to determine / Unknown  -- Refer to Clinical Documentation Reviewer    PROVIDER RESPONSE TEXT:    acute on chronic orthostatic hypotension due to This was precipitated by the dehydration and UTI    Query created by: Terra Paris on 12/3/2021 2:56 PM      Electronically signed by:  Chip Dunlap MD 12/9/2021 4:48 PM

## 2021-12-15 ENCOUNTER — HOME HEALTH ADMISSION (OUTPATIENT)
Dept: HOME HEALTH SERVICES | Facility: HOME HEALTH | Age: 76
End: 2021-12-15

## 2021-12-25 ENCOUNTER — HOSPITAL ENCOUNTER (INPATIENT)
Age: 76
LOS: 5 days | Discharge: NURSING, REHAB OR LONG-TERM CARE | DRG: 091 | End: 2022-01-03
Attending: EMERGENCY MEDICINE | Admitting: HOSPITALIST
Payer: MEDICARE

## 2021-12-25 ENCOUNTER — APPOINTMENT (OUTPATIENT)
Dept: CT IMAGING | Age: 76
DRG: 091 | End: 2021-12-25
Attending: EMERGENCY MEDICINE
Payer: MEDICARE

## 2021-12-25 ENCOUNTER — APPOINTMENT (OUTPATIENT)
Dept: GENERAL RADIOLOGY | Age: 76
DRG: 091 | End: 2021-12-25
Attending: EMERGENCY MEDICINE
Payer: MEDICARE

## 2021-12-25 DIAGNOSIS — I10 PRIMARY HYPERTENSION: Chronic | ICD-10-CM

## 2021-12-25 DIAGNOSIS — G89.29 OTHER CHRONIC PAIN: Primary | ICD-10-CM

## 2021-12-25 DIAGNOSIS — R53.1 ACUTE RIGHT-SIDED WEAKNESS: ICD-10-CM

## 2021-12-25 DIAGNOSIS — R20.0 RIGHT ARM NUMBNESS: ICD-10-CM

## 2021-12-25 DIAGNOSIS — R29.898 RIGHT LEG WEAKNESS: ICD-10-CM

## 2021-12-25 PROBLEM — I63.9 CVA (CEREBRAL VASCULAR ACCIDENT) (HCC): Status: ACTIVE | Noted: 2021-12-25

## 2021-12-25 PROBLEM — G45.9 TIA (TRANSIENT ISCHEMIC ATTACK): Status: ACTIVE | Noted: 2021-12-25

## 2021-12-25 LAB
ALBUMIN SERPL-MCNC: 3.1 G/DL (ref 3.5–5)
ALBUMIN/GLOB SERPL: 1 {RATIO} (ref 1.1–2.2)
ALP SERPL-CCNC: 70 U/L (ref 45–117)
ALT SERPL-CCNC: 25 U/L (ref 12–78)
ANION GAP SERPL CALC-SCNC: 8 MMOL/L (ref 5–15)
AST SERPL-CCNC: 27 U/L (ref 15–37)
B PERT DNA SPEC QL NAA+PROBE: NOT DETECTED
BASOPHILS # BLD: 0 K/UL (ref 0–0.1)
BASOPHILS NFR BLD: 0 % (ref 0–1)
BILIRUB SERPL-MCNC: 0.4 MG/DL (ref 0.2–1)
BORDETELLA PARAPERTUSSIS PCR, BORPAR: NOT DETECTED
BUN SERPL-MCNC: 20 MG/DL (ref 6–20)
BUN/CREAT SERPL: 13 (ref 12–20)
C PNEUM DNA SPEC QL NAA+PROBE: NOT DETECTED
CALCIUM SERPL-MCNC: 9.5 MG/DL (ref 8.5–10.1)
CHLORIDE SERPL-SCNC: 99 MMOL/L (ref 97–108)
CO2 SERPL-SCNC: 27 MMOL/L (ref 21–32)
COMMENT, HOLDF: NORMAL
COVID-19 RAPID TEST, COVR: DETECTED
CREAT SERPL-MCNC: 1.53 MG/DL (ref 0.55–1.02)
D DIMER PPP FEU-MCNC: 2.21 MG/L FEU (ref 0–0.65)
DIFFERENTIAL METHOD BLD: ABNORMAL
EOSINOPHIL # BLD: 0.2 K/UL (ref 0–0.4)
EOSINOPHIL NFR BLD: 2 % (ref 0–7)
ERYTHROCYTE [DISTWIDTH] IN BLOOD BY AUTOMATED COUNT: 14.1 % (ref 11.5–14.5)
FLUAV H1 2009 PAND RNA SPEC QL NAA+PROBE: NOT DETECTED
FLUAV H1 RNA SPEC QL NAA+PROBE: NOT DETECTED
FLUAV H3 RNA SPEC QL NAA+PROBE: NOT DETECTED
FLUAV SUBTYP SPEC NAA+PROBE: NOT DETECTED
FLUBV RNA SPEC QL NAA+PROBE: NOT DETECTED
GLOBULIN SER CALC-MCNC: 3 G/DL (ref 2–4)
GLUCOSE BLD STRIP.AUTO-MCNC: 112 MG/DL (ref 65–117)
GLUCOSE SERPL-MCNC: 110 MG/DL (ref 65–100)
HADV DNA SPEC QL NAA+PROBE: NOT DETECTED
HCOV 229E RNA SPEC QL NAA+PROBE: NOT DETECTED
HCOV HKU1 RNA SPEC QL NAA+PROBE: NOT DETECTED
HCOV NL63 RNA SPEC QL NAA+PROBE: NOT DETECTED
HCOV OC43 RNA SPEC QL NAA+PROBE: NOT DETECTED
HCT VFR BLD AUTO: 37.9 % (ref 35–47)
HGB BLD-MCNC: 11.9 G/DL (ref 11.5–16)
HMPV RNA SPEC QL NAA+PROBE: NOT DETECTED
HPIV1 RNA SPEC QL NAA+PROBE: NOT DETECTED
HPIV2 RNA SPEC QL NAA+PROBE: NOT DETECTED
HPIV3 RNA SPEC QL NAA+PROBE: NOT DETECTED
HPIV4 RNA SPEC QL NAA+PROBE: NOT DETECTED
IMM GRANULOCYTES # BLD AUTO: 0.1 K/UL (ref 0–0.04)
IMM GRANULOCYTES NFR BLD AUTO: 1 % (ref 0–0.5)
INR PPP: 1 (ref 0.9–1.1)
LYMPHOCYTES # BLD: 2 K/UL (ref 0.8–3.5)
LYMPHOCYTES NFR BLD: 19 % (ref 12–49)
M PNEUMO DNA SPEC QL NAA+PROBE: NOT DETECTED
MCH RBC QN AUTO: 29.6 PG (ref 26–34)
MCHC RBC AUTO-ENTMCNC: 31.4 G/DL (ref 30–36.5)
MCV RBC AUTO: 94.3 FL (ref 80–99)
MONOCYTES # BLD: 0.9 K/UL (ref 0–1)
MONOCYTES NFR BLD: 9 % (ref 5–13)
NEUTS SEG # BLD: 7.2 K/UL (ref 1.8–8)
NEUTS SEG NFR BLD: 69 % (ref 32–75)
NRBC # BLD: 0 K/UL (ref 0–0.01)
NRBC BLD-RTO: 0 PER 100 WBC
PLATELET # BLD AUTO: 276 K/UL (ref 150–400)
PMV BLD AUTO: 10.3 FL (ref 8.9–12.9)
POTASSIUM SERPL-SCNC: 3.5 MMOL/L (ref 3.5–5.1)
PROT SERPL-MCNC: 6.1 G/DL (ref 6.4–8.2)
PROTHROMBIN TIME: 10.4 SEC (ref 9–11.1)
RBC # BLD AUTO: 4.02 M/UL (ref 3.8–5.2)
RSV RNA SPEC QL NAA+PROBE: NOT DETECTED
RV+EV RNA SPEC QL NAA+PROBE: NOT DETECTED
SAMPLES BEING HELD,HOLD: NORMAL
SARS-COV-2 PCR, COVPCR: DETECTED
SERVICE CMNT-IMP: NORMAL
SODIUM SERPL-SCNC: 134 MMOL/L (ref 136–145)
SOURCE, COVRS: ABNORMAL
WBC # BLD AUTO: 10.4 K/UL (ref 3.6–11)

## 2021-12-25 PROCEDURE — 93005 ELECTROCARDIOGRAM TRACING: CPT

## 2021-12-25 PROCEDURE — 71045 X-RAY EXAM CHEST 1 VIEW: CPT

## 2021-12-25 PROCEDURE — 74011000636 HC RX REV CODE- 636: Performed by: EMERGENCY MEDICINE

## 2021-12-25 PROCEDURE — 85379 FIBRIN DEGRADATION QUANT: CPT

## 2021-12-25 PROCEDURE — 74011250636 HC RX REV CODE- 250/636: Performed by: STUDENT IN AN ORGANIZED HEALTH CARE EDUCATION/TRAINING PROGRAM

## 2021-12-25 PROCEDURE — G0378 HOSPITAL OBSERVATION PER HR: HCPCS

## 2021-12-25 PROCEDURE — 70496 CT ANGIOGRAPHY HEAD: CPT

## 2021-12-25 PROCEDURE — 0042T CT CODE NEURO PERF W CBF: CPT

## 2021-12-25 PROCEDURE — 70450 CT HEAD/BRAIN W/O DYE: CPT

## 2021-12-25 PROCEDURE — 87635 SARS-COV-2 COVID-19 AMP PRB: CPT

## 2021-12-25 PROCEDURE — 99285 EMERGENCY DEPT VISIT HI MDM: CPT

## 2021-12-25 PROCEDURE — 80053 COMPREHEN METABOLIC PANEL: CPT

## 2021-12-25 PROCEDURE — 85025 COMPLETE CBC W/AUTO DIFF WBC: CPT

## 2021-12-25 PROCEDURE — 0202U NFCT DS 22 TRGT SARS-COV-2: CPT

## 2021-12-25 PROCEDURE — 36415 COLL VENOUS BLD VENIPUNCTURE: CPT

## 2021-12-25 PROCEDURE — 74011250637 HC RX REV CODE- 250/637: Performed by: STUDENT IN AN ORGANIZED HEALTH CARE EDUCATION/TRAINING PROGRAM

## 2021-12-25 PROCEDURE — 4A03X5D MEASUREMENT OF ARTERIAL FLOW, INTRACRANIAL, EXTERNAL APPROACH: ICD-10-PCS | Performed by: EMERGENCY MEDICINE

## 2021-12-25 PROCEDURE — 82962 GLUCOSE BLOOD TEST: CPT

## 2021-12-25 PROCEDURE — 85610 PROTHROMBIN TIME: CPT

## 2021-12-25 RX ORDER — MULTIVIT WITH MINERALS/HERBS
1 TABLET ORAL DAILY
Status: DISCONTINUED | OUTPATIENT
Start: 2021-12-26 | End: 2022-01-03 | Stop reason: HOSPADM

## 2021-12-25 RX ORDER — HYDRALAZINE HYDROCHLORIDE 20 MG/ML
10 INJECTION INTRAMUSCULAR; INTRAVENOUS
Status: DISCONTINUED | OUTPATIENT
Start: 2021-12-25 | End: 2022-01-03 | Stop reason: HOSPADM

## 2021-12-25 RX ORDER — ENOXAPARIN SODIUM 100 MG/ML
30 INJECTION SUBCUTANEOUS DAILY
Status: DISCONTINUED | OUTPATIENT
Start: 2021-12-26 | End: 2021-12-26

## 2021-12-25 RX ORDER — SODIUM CHLORIDE 0.9 % (FLUSH) 0.9 %
5-40 SYRINGE (ML) INJECTION EVERY 8 HOURS
Status: DISCONTINUED | OUTPATIENT
Start: 2021-12-25 | End: 2022-01-03 | Stop reason: HOSPADM

## 2021-12-25 RX ORDER — CLOPIDOGREL BISULFATE 75 MG/1
75 TABLET ORAL DAILY
Status: DISCONTINUED | OUTPATIENT
Start: 2021-12-26 | End: 2022-01-03 | Stop reason: HOSPADM

## 2021-12-25 RX ORDER — PANTOPRAZOLE SODIUM 40 MG/1
40 TABLET, DELAYED RELEASE ORAL
Status: DISCONTINUED | OUTPATIENT
Start: 2021-12-26 | End: 2022-01-03 | Stop reason: HOSPADM

## 2021-12-25 RX ORDER — SODIUM CHLORIDE 0.9 % (FLUSH) 0.9 %
5-40 SYRINGE (ML) INJECTION AS NEEDED
Status: DISCONTINUED | OUTPATIENT
Start: 2021-12-25 | End: 2022-01-03 | Stop reason: HOSPADM

## 2021-12-25 RX ORDER — ACETAMINOPHEN 325 MG/1
650 TABLET ORAL
Status: DISCONTINUED | OUTPATIENT
Start: 2021-12-25 | End: 2022-01-03 | Stop reason: HOSPADM

## 2021-12-25 RX ORDER — GUAIFENESIN 600 MG/1
600 TABLET, EXTENDED RELEASE ORAL
Status: DISCONTINUED | OUTPATIENT
Start: 2021-12-25 | End: 2022-01-03 | Stop reason: HOSPADM

## 2021-12-25 RX ORDER — CETIRIZINE HCL 10 MG
10 TABLET ORAL
Status: DISCONTINUED | OUTPATIENT
Start: 2021-12-25 | End: 2022-01-03 | Stop reason: HOSPADM

## 2021-12-25 RX ORDER — ATORVASTATIN CALCIUM 40 MG/1
80 TABLET, FILM COATED ORAL
Status: DISCONTINUED | OUTPATIENT
Start: 2021-12-25 | End: 2022-01-03 | Stop reason: HOSPADM

## 2021-12-25 RX ORDER — BUDESONIDE 0.5 MG/2ML
500 INHALANT ORAL
Status: DISCONTINUED | OUTPATIENT
Start: 2021-12-25 | End: 2021-12-26 | Stop reason: ALTCHOICE

## 2021-12-25 RX ORDER — PREDNISONE 20 MG/1
20 TABLET ORAL
Status: DISCONTINUED | OUTPATIENT
Start: 2021-12-26 | End: 2022-01-03 | Stop reason: HOSPADM

## 2021-12-25 RX ORDER — THERA TABS 400 MCG
1 TAB ORAL DAILY
Status: DISCONTINUED | OUTPATIENT
Start: 2021-12-26 | End: 2022-01-03 | Stop reason: HOSPADM

## 2021-12-25 RX ORDER — ASPIRIN 325 MG
325 TABLET ORAL ONCE
Status: COMPLETED | OUTPATIENT
Start: 2021-12-25 | End: 2021-12-25

## 2021-12-25 RX ORDER — SODIUM CHLORIDE 9 MG/ML
100 INJECTION, SOLUTION INTRAVENOUS CONTINUOUS
Status: DISCONTINUED | OUTPATIENT
Start: 2021-12-25 | End: 2021-12-28

## 2021-12-25 RX ORDER — LEVOTHYROXINE SODIUM 125 UG/1
125 TABLET ORAL DAILY
Status: DISCONTINUED | OUTPATIENT
Start: 2021-12-26 | End: 2022-01-03 | Stop reason: HOSPADM

## 2021-12-25 RX ORDER — ALBUTEROL SULFATE 0.83 MG/ML
2.5 SOLUTION RESPIRATORY (INHALATION)
Status: DISCONTINUED | OUTPATIENT
Start: 2021-12-25 | End: 2021-12-26 | Stop reason: ALTCHOICE

## 2021-12-25 RX ORDER — POLYETHYLENE GLYCOL 3350 17 G/17G
17 POWDER, FOR SOLUTION ORAL DAILY PRN
Status: DISCONTINUED | OUTPATIENT
Start: 2021-12-25 | End: 2022-01-03 | Stop reason: HOSPADM

## 2021-12-25 RX ORDER — MONTELUKAST SODIUM 10 MG/1
10 TABLET ORAL
Status: DISCONTINUED | OUTPATIENT
Start: 2021-12-25 | End: 2022-01-03 | Stop reason: HOSPADM

## 2021-12-25 RX ORDER — ACETAMINOPHEN 650 MG/1
650 SUPPOSITORY RECTAL
Status: DISCONTINUED | OUTPATIENT
Start: 2021-12-25 | End: 2022-01-03 | Stop reason: HOSPADM

## 2021-12-25 RX ORDER — IPRATROPIUM BROMIDE 0.5 MG/2.5ML
500 SOLUTION RESPIRATORY (INHALATION)
Status: DISCONTINUED | OUTPATIENT
Start: 2021-12-26 | End: 2021-12-26 | Stop reason: ALTCHOICE

## 2021-12-25 RX ORDER — LOSARTAN POTASSIUM 25 MG/1
25 TABLET ORAL DAILY
COMMUNITY
End: 2022-01-03

## 2021-12-25 RX ORDER — MELATONIN
1000 DAILY
Status: DISCONTINUED | OUTPATIENT
Start: 2021-12-26 | End: 2022-01-03 | Stop reason: HOSPADM

## 2021-12-25 RX ORDER — GABAPENTIN 300 MG/1
300 CAPSULE ORAL 2 TIMES DAILY
Status: DISCONTINUED | OUTPATIENT
Start: 2021-12-25 | End: 2022-01-03 | Stop reason: HOSPADM

## 2021-12-25 RX ORDER — ONDANSETRON 4 MG/1
4 TABLET, ORALLY DISINTEGRATING ORAL
Status: DISCONTINUED | OUTPATIENT
Start: 2021-12-25 | End: 2022-01-03 | Stop reason: HOSPADM

## 2021-12-25 RX ORDER — ONDANSETRON 2 MG/ML
4 INJECTION INTRAMUSCULAR; INTRAVENOUS
Status: DISCONTINUED | OUTPATIENT
Start: 2021-12-25 | End: 2022-01-03 | Stop reason: HOSPADM

## 2021-12-25 RX ORDER — GUAIFENESIN 100 MG/5ML
81 LIQUID (ML) ORAL DAILY
Status: DISCONTINUED | OUTPATIENT
Start: 2021-12-26 | End: 2022-01-03 | Stop reason: HOSPADM

## 2021-12-25 RX ORDER — LATANOPROST 50 UG/ML
1 SOLUTION/ DROPS OPHTHALMIC
Status: DISCONTINUED | OUTPATIENT
Start: 2021-12-25 | End: 2022-01-03 | Stop reason: HOSPADM

## 2021-12-25 RX ORDER — LEVOTHYROXINE SODIUM 125 UG/1
125 TABLET ORAL
COMMUNITY

## 2021-12-25 RX ADMIN — IOPAMIDOL 100 ML: 755 INJECTION, SOLUTION INTRAVENOUS at 12:48

## 2021-12-25 RX ADMIN — SODIUM CHLORIDE 100 ML/HR: 9 INJECTION, SOLUTION INTRAVENOUS at 14:43

## 2021-12-25 RX ADMIN — ACETAMINOPHEN 650 MG: 325 TABLET ORAL at 23:02

## 2021-12-25 RX ADMIN — ASPIRIN 325 MG ORAL TABLET 325 MG: 325 PILL ORAL at 16:22

## 2021-12-25 RX ADMIN — ATORVASTATIN CALCIUM 80 MG: 40 TABLET, FILM COATED ORAL at 23:02

## 2021-12-25 RX ADMIN — GABAPENTIN 300 MG: 100 CAPSULE ORAL at 23:02

## 2021-12-25 RX ADMIN — IOPAMIDOL 50 ML: 755 INJECTION, SOLUTION INTRAVENOUS at 12:48

## 2021-12-25 RX ADMIN — MONTELUKAST 10 MG: 10 TABLET, FILM COATED ORAL at 23:02

## 2021-12-25 RX ADMIN — SODIUM CHLORIDE, PRESERVATIVE FREE 10 ML: 5 INJECTION INTRAVENOUS at 23:02

## 2021-12-25 RX ADMIN — SODIUM CHLORIDE, PRESERVATIVE FREE 10 ML: 5 INJECTION INTRAVENOUS at 14:43

## 2021-12-25 NOTE — ED NOTES
TRANSFER - OUT REPORT:    Verbal report given to Sabrina(name) on Laura Redd  being transferred to [facility]) for routine progression of care       Report consisted of patients Situation, Background, Assessment and   Recommendations(SBAR). Information from the following report(s) SBAR, Kardex, ED Summary and MAR was reviewed with the receiving nurse. Lines:   Peripheral IV 12/25/21 Left Antecubital (Active)        Opportunity for questions and clarification was provided.       Patient transported with:   [employer]

## 2021-12-25 NOTE — ADVANCED PRACTICE NURSE
Neurocritical Care Code Stroke Documentation      Symptoms:   Right arm and leg weakness, paresthesias    Last Known Well:   Last night around 8 pm per patient report   Medical hx: Past Medical History:   Diagnosis Date    Asthma     Depression     Fibromyalgia     GERD (gastroesophageal reflux disease)     Hypertension     Hypothyroid     Menopause     Rotator cuff tear       Anticoagulation:  none   VAN:   Negative   NIHSS:   1a-LOC:0    1b-Month/Age:0    1c-Open/Close Hand:0    2-Best Gaze:0    3-Visual Fields:0    4-Facial Palsy:0    5a-Left Arm:1 (chronic shoulder replacement)    5b-Right Arm:1    6a-Left Le    6b-Right Le    7-Limb Ataxia:0    8-Sensory:1    9-Best Language:0    10-Dysarthria:0    11-Extinction/Inattention:0  TOTAL SCORE:3   Imaging:   CT-   IMPRESSION  No acute intracranial abnormality. Chronic small vessel ischemic disease. CTA-   CTA HEAD  The vertebral arteries are codominant. The basilar artery and its branches are  normal. The internal carotid, anterior cerebral, and middle cerebral arteries  are patent. There is no flow-limiting intracranial stenosis. There is no  aneurysm. There is fetal origin of the left posterior cerebral artery.     CT PERFUSION:     There are no regional areas of elevated Tmax, decreased cerebral blood flow or  blood volume. rCBF < 30% = 0 cc. Tmax > 6 seconds = 0 cc.     IMPRESSION     1. No evidence of large vessel occlusion or significant vascular disease.     2. No perfusion defects are identified.     3. Incidental findings as above. CTP   Plan:   TPA Candidate: NO    Mechanical thrombectomy Candidate: NO     Discussed with: Teleneurology, Dr. Don Handler, patient and RN    Arrival time: 3865  Time spent: 25 minutes.      Soheila Gonzalez NP  Neurocritical Care Nurse Practitioner  [phone]

## 2021-12-25 NOTE — ED NOTES
Code stroke level 2 called pt states last night around 11 she felt her knee buckle and she felt weaker on her right side then normal.  Pt states sensation on her right arm feels different from left side.

## 2021-12-25 NOTE — H&P
[id number] [facility] Adult  Hospitalist Group  History and Physical    Primary Care Provider: Krystal Mayers MD  Date of Service:  12/25/2021    Chief Complaint: RLE weakness and fall last night at 11 pm    Subjective:     Lawson Stoddard is a 64 y.o. female  with past medical history of eosinophilic asthma, depression, fibromyalgia, GERD, hypertension, hypothyroidism, bilateral shoulder arthritis, who comes in for the above. Patient was recently discharged on the 23rd of this month to [facility] after being in the hospital and in rehab due to weakness and dehydration. Per the patient's daughter the patient called her last night around 11 PM to say that something was different and she had fallen. The patient remembers the phone couple does not remember much. The patient says that she has felt weak ever since she was hospitalized before and this did not get better at the inpatient rehab and SNF. She says that her right lower extremity feels different and is weak. The daughter reports that when she got the phone call the patient said that her right lower extremity was weak so she fell. When EMS arrived they had to help her walk and saw that her right lower extremity was not responding like her left lower extremity. The patient does report some weakness in the right upper extremity as well. She denies any facial droop, facial weakness, numbness, and others. Her daughter denies that the patient had any type of aphasia or dysarthria when she called her. The patient's daughter does report that the patient's right upper extremity has been trembling more than normal.  The patient reports a \"weird\" sensation in her right lower extremity that still has not resolved, but denies any actual weakness at this time. In the ER patient's labs were essentially normal except for LOREN and CT of the head and CTAs of head and neck were normal.  Teleneurology was consulted and code stroke was called.   Patient is out of the window for TPA. Review of Systems:    A comprehensive review of systems was negative except for that written in the History of Present Illness. Past Medical History:   Diagnosis Date    Asthma     Depression     Fibromyalgia     GERD (gastroesophageal reflux disease)     Hypertension     Hypothyroid     Menopause 1995    Rotator cuff tear       Past Surgical History:   Procedure Laterality Date    HX CHOLECYSTECTOMY      HX GYN      HX ORTHOPAEDIC      HX TONSIL AND ADENOIDECTOMY       Prior to Admission medications    Medication Sig Start Date End Date Taking? Authorizing Provider   cefdinir (OMNICEF) 300 mg capsule Take 1 Capsule by mouth two (2) times a day. 12/6/21   Gutierrez Burden NP   L.acidoph & parac-S.therm-Bifido (JACKY Q2/RISAQUAD-2) 16 billion cell cap cap Take 1 Capsule by mouth daily. 12/5/21   Gutierrez Burden NP   b complex vitamins tablet Take 1 Tablet by mouth daily. Provider, Historical   loratadine (CLARITIN) 10 mg tablet Take 10 mg by mouth daily as needed for Allergies. Provider, Historical   guaifenesin (MUCINEX PO) Take 1 Tablet by mouth two (2) times daily as needed for PRN Reason (Other) (Cough, congestion). Provider, Historical   budesonide (PULMICORT) 0.5 mg/2 mL nbsp two (2) times daily as needed for PRN Reason (Other). 7/22/21   Marlyn Linn MD   PreviDent 5000 Plus 1.1 % crea USE AS DIRECTED AFTER MEALS 9/16/21   Marlyn Linn MD   latanoprost (XALATAN) 0.005 % ophthalmic solution Administer 1 Drop to both eyes nightly. 9/7/21   Marlyn Linn MD   multivitamin (ONE A DAY) tablet Take 1 Tablet by mouth daily. Marlyn Linn MD   diphenhydrAMINE (BenadryL) 25 mg capsule Take 25 mg by mouth nightly as needed. Marlyn Linn MD   meloxicam (Mobic) 15 mg tablet Take 15 mg by mouth daily. Marlyn Linn MD   omalizumab Christopher Hernandez SC) by SubCUTAneous route. Every 2 week injections.     Marlyn Linn MD   tiotropium bromide (SPIRIVA RESPIMAT) 1.25 mcg/actuation inhaler Take 2 Puffs by inhalation daily. 3/23/21   Provider, Historical   predniSONE (DELTASONE) 10 mg tablet Take 10 mg by mouth daily. Take 6 tablets by mouth for 2 days, then take 4 tablets by mouth for 3 days, then take 3 tablets by mouth for 3 days, then take 2 tablets by mouth for 3 days, then take 1 talet by mouth for 3 days. 3/16/21   Provider, Historical   montelukast (SINGULAIR) 10 mg tablet Take 10 mg by mouth At bedtime. Provider, Historical   albuterol (PROVENTIL VENTOLIN) 2.5 mg /3 mL (0.083 %) nebu 1 Each by Nebulization route every four to six (4-6) hours as needed for Wheezing. 12/1/20   Provider, Historical   gabapentin (NEURONTIN) 100 mg capsule Take 300 mg by mouth two (2) times a day. 7/8/11   Provider, Historical   levothyroxine (SYNTHROID) 125 mcg tablet Take 125 mcg by mouth daily. 7/8/11   Provider, Historical   albuterol (PROVENTIL HFA) 90 mcg/Actuation inhaler Take 1 Puff by inhalation two (2) times a day. 7/7/11   Provider, Historical   CALCIUM CARBONATE (CALCIUM 600 PO) Take 1 Tablet by mouth daily. 7/7/11   Provider, Historical   cholecalciferol, vitamin d3, (VITAMIN D) 1,000 unit tablet Take 1,000 Units by mouth daily. Provider, Historical   polyethylene glycol (MIRALAX) 17 gram packet Take 8.5 g by mouth daily. Provider, Historical   ACETAMINOPHEN (TYLENOL PO) Take 1,000 mg by mouth two (2) times a day. 7/7/11   Provider, Historical   PSEUDOEPHEDRINE HCL (SUDOPHED PO) Take 2 Tabs by mouth every twelve (12) hours as needed for Other (allergies). 7/7/11   Provider, Historical   lansoprazole (PREVACID) 30 mg disintegrating tablet Take 30 mg by mouth daily. Other, MD Marlyn   BUPROPION HCL (WELLBUTRIN XL PO) Take 450 mg by mouth daily. 6/10/11   Marlyn Linn MD   TRAZODONE HCL (TRAZODONE PO) Take 75 mg by mouth nightly. 6/10/11   Marlyn Linn MD   budesonide-formoterol (SYMBICORT) 160-4.5 mcg/Actuation HFA inhaler Take 2 Puffs by inhalation two (2) times a day.     Marlyn Linn MD cyclosporine (RESTASIS) 0.05 % ophthalmic emulsion Administer 1 Drop to both eyes two (2) times a day. Other, MD Marlyn     Allergies   Allergen Reactions    Pcn [Penicillins] Hives      No family history on file. SOCIAL HISTORY:  Patient [facility] at [facility]. Patient ambulates with without assistance. Smoking history: Former smoker but quit in her thirties  Alcohol history: None        Objective:       Physical Exam:   Physical Exam  Vitals reviewed. Constitutional:       General: She is not in acute distress. Appearance: She is not ill-appearing, toxic-appearing or diaphoretic. HENT:      Head: Normocephalic and atraumatic. Nose: Nose normal. No congestion or rhinorrhea. Mouth/Throat:      Mouth: Mucous membranes are moist.      Pharynx: Oropharynx is clear. No oropharyngeal exudate or posterior oropharyngeal erythema. Eyes:      General: No scleral icterus. Extraocular Movements: Extraocular movements intact. Pupils: Pupils are equal, round, and reactive to light. Cardiovascular:      Rate and Rhythm: Normal rate and regular rhythm. Pulses: Normal pulses. Heart sounds: No murmur heard. No friction rub. No gallop. Pulmonary:      Effort: Pulmonary effort is normal. No respiratory distress. Breath sounds: No stridor. No wheezing, rhonchi or rales. Comments: Decreased breath sounds bilaterally. Abdominal:      General: Bowel sounds are normal. There is no distension. Palpations: Abdomen is soft. Tenderness: There is no abdominal tenderness. There is no guarding. Musculoskeletal:         General: Normal range of motion. Cervical back: Normal range of motion and neck supple. No rigidity or tenderness. Right lower leg: No edema. Left lower leg: No edema. Skin:     General: Skin is warm and dry. Capillary Refill: Capillary refill takes 2 to 3 seconds. Coloration: Skin is not jaundiced or pale.       Findings: Bruising (Right upper extremity) present. Neurological:      Mental Status: She is alert and oriented to person, place, and time. Sensory: No sensory deficit. Motor: Weakness present. Comments: Patient has chronic arthritis in both shoulders and in the hips so exam is a little bit compromised. Bilateral lower extremities 5/5  Right upper extremity 4/5  Left upper extremity 5/5  No facial droop   Psychiatric:         Mood and Affect: Mood normal.         Behavior: Behavior normal.         Thought Content: Thought content normal.         Judgment: Judgment normal.         ECG: NSR    Data Review: All diagnostic labs and studies have been reviewed. Chest x-ray: No acute findings    Assessment:     Active Problems:    HTN (hypertension) (7/8/2011)      Asthma (7/8/2011)      Hypothyroidism (7/8/2011)      Dehydration (7/7/2011)      Other chronic pain ()      LOREN (acute kidney injury) ([facility] [address] (12/1/2021)      TIA (transient ischemic attack) (12/25/2021)      CVA (cerebral vascular accident) ([facility] [address] (12/25/2021)        Plan:     CVA/TIA  CT and CTA is negative  Admit to telemetry  PT/OT/speech consults placed  MRI brain ordered  ABCD2 score 6  Giving aspirin 325 mg  Starting aspirin 81 mg and clopidogrel 75 mg for tomorrow  Starting Lipitor at high dose  Permissive hypertension up to 220/120  Echocardiogram ordered  Teleneurologist seen  N.p.o. until she passes her swallow test  Neurology consult placed    Dehydration  Normal saline at 100 mils per hour  Monitor with morning labs    LOREN  Normal saline 100 mL/h    Chronic asthma  Continue patient's Symbicort, Pulmicort, albuterol    Hypothyroidism  Continue levothyroxine    Chronic pain  Patient is on Tylenol and Mobic as outpatient. I will hold these medications for now as she has an LOREN so the Mobic will not help. Tylenol will put on his as needed.      Hypertension  Allowing permissive hypertension  Patient is on losartan at home, but will hold this due to permissive hypertension at least for 24 hours    FUNCTIONAL STATUS PRIOR TO HOSPITALIZATION Ambulates Independently (including history of recent falls): fell last night.        Signed By: Juan Ramon Garcia MD     December 25, 2021

## 2021-12-25 NOTE — PROGRESS NOTES
TRANSFER - IN REPORT:    Verbal report received from Brooke(name) on Jay Grossman  being received from ED(unit) for routine admission     Report consisted of patients Situation, Background, Assessment and   Recommendations(SBAR). Information from the following report(s) SBAR, Kardex, ED Summary, Procedure Summary, Intake/Output, MAR, Med Rec Status and Cardiac Rhythm NSR was reviewed with the receiving nurse. Opportunity for questions and clarification was provided. Assessment completed upon patients arrival to unit and care assumed.      Primary Nurse Corrie Olivier RN and Cameron Kulkarni RN performed a dual skin assessment on this patient No impairment noted

## 2021-12-25 NOTE — ED PROVIDER NOTES
This is a 53-year-old female with a history of asthma, hypertension and hypothyroidism. She has no history of heart disease or stroke. She states that last night she was walking from the kitchen to the bed and her right leg developed weakness and she thinks numbness. She fell and had to call her family member to come pick her up and take her to bed. She slept through the night and this morning when she attempted to get up her right leg gave way on her and she managed to catch herself and get back in bed before she fell. She called family back and called EMS. She arrives here with a complaint of some sense of numbness and weakness from her right waist down as well as perhaps some weakness and a little numbness in the right arm. She has no other acute complaints neurologically at this time. She has had no complaint of fever or chills, nausea or vomiting or other acute symptomatology. Past Medical History:   Diagnosis Date    Asthma     Depression     Fibromyalgia     GERD (gastroesophageal reflux disease)     Hypertension     Hypothyroid     Menopause 1995    Rotator cuff tear        Past Surgical History:   Procedure Laterality Date    HX CHOLECYSTECTOMY      HX GYN      HX ORTHOPAEDIC      HX TONSIL AND ADENOIDECTOMY           No family history on file.     Social History     Socioeconomic History    Marital status:      Spouse name: Not on file    Number of children: Not on file    Years of education: Not on file    Highest education level: Not on file   Occupational History    Not on file   Tobacco Use    Smoking status: Former Smoker     Packs/day: 1.00     Years: 15.00     Pack years: 15.00    Smokeless tobacco: Never Used   Substance and Sexual Activity    Alcohol use: No    Drug use: No    Sexual activity: Not on file   Other Topics Concern    Not on file   Social History Narrative    Not on file     Social Determinants of Health     Financial Resource Strain:  Difficulty of Paying Living Expenses: Not on file   Food Insecurity:     Worried About Running Out of Food in the Last Year: Not on file    Ran Out of Food in the Last Year: Not on file   Transportation Needs:     Lack of Transportation (Medical): Not on file    Lack of Transportation (Non-Medical): Not on file   Physical Activity:     Days of Exercise per Week: Not on file    Minutes of Exercise per Session: Not on file   Stress:     Feeling of Stress : Not on file   Social Connections:     Frequency of Communication with Friends and Family: Not on file    Frequency of Social Gatherings with Friends and Family: Not on file    Attends [employer] Services: Not on file    Active Member of [address] [place] [address] or Organizations: Not on file    Attends Club or Organization Meetings: Not on file    Marital Status: Not on file   Intimate Partner Violence:     Fear of Current or Ex-Partner: Not on file    Emotionally Abused: Not on file    Physically Abused: Not on file    Sexually Abused: Not on file   Housing Stability:     Unable to Pay for Housing in the Last Year: Not on file    Number of [place] in the Last Year: Not on file    Unstable Housing in the Last Year: Not on file         ALLERGIES: Pcn [penicillins]    Review of Systems    There were no vitals filed for this visit. Physical Exam  Vitals and nursing note reviewed. Constitutional:       General: She is not in acute distress. Appearance: She is well-developed. She is not ill-appearing or diaphoretic. Comments: This is a 77-year-old female who presents with right-sided weakness and numbness under a code 2 stroke alert. HENT:      Head: Normocephalic and atraumatic. Nose: Nose normal.   Eyes:      General: No scleral icterus. Conjunctiva/sclera: Conjunctivae normal.      Pupils: Pupils are equal, round, and reactive to light. Neck:      Thyroid: No thyromegaly. Vascular: No JVD.       Trachea: No tracheal deviation. Comments: No carotid bruits noted. Cardiovascular:      Rate and Rhythm: Normal rate and regular rhythm. Heart sounds: Normal heart sounds. No murmur heard. No friction rub. No gallop. Pulmonary:      Effort: Pulmonary effort is normal. No respiratory distress. Breath sounds: Normal breath sounds. No wheezing or rales. Chest:      Chest wall: No tenderness. Abdominal:      General: Bowel sounds are normal. There is no distension. Palpations: Abdomen is soft. There is no mass. Tenderness: There is no abdominal tenderness. There is no guarding or rebound. Musculoskeletal:         General: No tenderness. Normal range of motion. Cervical back: Normal range of motion and neck supple. Lymphadenopathy:      Cervical: No cervical adenopathy. Skin:     General: Skin is warm and dry. Capillary Refill: Capillary refill takes 2 to 3 seconds. Findings: No erythema or rash. Neurological:      Mental Status: She is alert and oriented to person, place, and time. Cranial Nerves: No cranial nerve deficit. Sensory: Sensory deficit present. Motor: Weakness present. Coordination: Coordination normal.      Deep Tendon Reflexes: Reflexes are normal and symmetric. Comments: There is slight weakness noted in the right leg. She is able to raise the leg off the bed however it is weaker than the left and there is some slight tremor noted in the right leg while she is holding it up. Is also some minimal weakness in her right upper extremity with perhaps slight paresthesia compared to left. No other acute focal neurologic deficit is noted. Psychiatric:         Behavior: Behavior normal.         Thought Content:  Thought content normal.         Judgment: Judgment normal.          MDM  Number of Diagnoses or Management Options     Amount and/or Complexity of Data Reviewed  Clinical lab tests: ordered and reviewed  Tests in the radiology section of CPT®: ordered and reviewed  Decide to obtain previous medical records or to obtain history from someone other than the patient: yes  Review and summarize past medical records: yes  Discuss the patient with other providers: yes  Independent visualization of images, tracings, or specimens: yes    Risk of Complications, Morbidity, and/or Mortality  Presenting problems: high  Diagnostic procedures: high  Management options: high    Patient Progress  Patient progress: stable         Procedures  I have spoken with the telemetry neurologist who will be evaluating the patient. She was seen in the scanner by myself upon arrival to the ED. CT scans are being obtained labs are drawn and patient will be reassessed after evaluation by the telemetry neurologist.  I also discussed the patient with neuro interventional.    The patient's CTs have been unremarkable. Chest x-ray is pending there is some question of groundglass appearance of the apices of both lungs. Patient has had no respiratory distress. Will consult the hospitalist for admission to rule out CVA. No evidence of large vessel occlusion currently. Perfect Serve Consult for Admission  1:40 PM    ED Room Number: ER23/23  Patient Name and age:  Melly Khanna 64 y.o.  female  Working Diagnosis:   1. Acute right-sided weakness        COVID-19 Suspicion:  no  Sepsis present:  no  Reassessment needed: no  Code Status:  Full Code  Readmission: no  Isolation Requirements:  no  Recommended Level of Care:  telemetry  Department:[facility] Adult ED - 21   Other:  Patient has been seen by Tele Neuro and Neuro Interventional       ED MD EKG interpretation: There is a normal sinus rhythm at 71 bpm.  There is left axis shift noted. No ectopy is appreciated. No acute ischemic change noted.

## 2021-12-26 LAB
ANION GAP SERPL CALC-SCNC: 6 MMOL/L (ref 5–15)
ATRIAL RATE: 71 BPM
BASOPHILS # BLD: 0.1 K/UL (ref 0–0.1)
BASOPHILS NFR BLD: 1 % (ref 0–1)
BUN SERPL-MCNC: 15 MG/DL (ref 6–20)
BUN/CREAT SERPL: 12 (ref 12–20)
CALCIUM SERPL-MCNC: 8.8 MG/DL (ref 8.5–10.1)
CALCULATED P AXIS, ECG09: 26 DEGREES
CALCULATED R AXIS, ECG10: -8 DEGREES
CALCULATED T AXIS, ECG11: 5 DEGREES
CHLORIDE SERPL-SCNC: 104 MMOL/L (ref 97–108)
CO2 SERPL-SCNC: 28 MMOL/L (ref 21–32)
CREAT SERPL-MCNC: 1.23 MG/DL (ref 0.55–1.02)
DIAGNOSIS, 93000: NORMAL
DIFFERENTIAL METHOD BLD: ABNORMAL
EOSINOPHIL # BLD: 0.6 K/UL (ref 0–0.4)
EOSINOPHIL NFR BLD: 6 % (ref 0–7)
ERYTHROCYTE [DISTWIDTH] IN BLOOD BY AUTOMATED COUNT: 14.2 % (ref 11.5–14.5)
GLUCOSE SERPL-MCNC: 100 MG/DL (ref 65–100)
HCT VFR BLD AUTO: 36.6 % (ref 35–47)
HGB BLD-MCNC: 11.3 G/DL (ref 11.5–16)
IMM GRANULOCYTES # BLD AUTO: 0 K/UL (ref 0–0.04)
IMM GRANULOCYTES NFR BLD AUTO: 0 % (ref 0–0.5)
LYMPHOCYTES # BLD: 3 K/UL (ref 0.8–3.5)
LYMPHOCYTES NFR BLD: 30 % (ref 12–49)
MCH RBC QN AUTO: 29.4 PG (ref 26–34)
MCHC RBC AUTO-ENTMCNC: 30.9 G/DL (ref 30–36.5)
MCV RBC AUTO: 95.3 FL (ref 80–99)
MONOCYTES # BLD: 0.8 K/UL (ref 0–1)
MONOCYTES NFR BLD: 8 % (ref 5–13)
NEUTS SEG # BLD: 5.5 K/UL (ref 1.8–8)
NEUTS SEG NFR BLD: 55 % (ref 32–75)
NRBC # BLD: 0 K/UL (ref 0–0.01)
NRBC BLD-RTO: 0 PER 100 WBC
P-R INTERVAL, ECG05: 168 MS
PLATELET # BLD AUTO: 232 K/UL (ref 150–400)
PMV BLD AUTO: 9.9 FL (ref 8.9–12.9)
POTASSIUM SERPL-SCNC: 3 MMOL/L (ref 3.5–5.1)
Q-T INTERVAL, ECG07: 404 MS
QRS DURATION, ECG06: 88 MS
QTC CALCULATION (BEZET), ECG08: 439 MS
RBC # BLD AUTO: 3.84 M/UL (ref 3.8–5.2)
SODIUM SERPL-SCNC: 138 MMOL/L (ref 136–145)
VENTRICULAR RATE, ECG03: 71 BPM
WBC # BLD AUTO: 10 K/UL (ref 3.6–11)

## 2021-12-26 PROCEDURE — 74011250637 HC RX REV CODE- 250/637: Performed by: STUDENT IN AN ORGANIZED HEALTH CARE EDUCATION/TRAINING PROGRAM

## 2021-12-26 PROCEDURE — G0378 HOSPITAL OBSERVATION PER HR: HCPCS

## 2021-12-26 PROCEDURE — 97530 THERAPEUTIC ACTIVITIES: CPT

## 2021-12-26 PROCEDURE — 36415 COLL VENOUS BLD VENIPUNCTURE: CPT

## 2021-12-26 PROCEDURE — 74011636637 HC RX REV CODE- 636/637: Performed by: STUDENT IN AN ORGANIZED HEALTH CARE EDUCATION/TRAINING PROGRAM

## 2021-12-26 PROCEDURE — 74011250636 HC RX REV CODE- 250/636: Performed by: STUDENT IN AN ORGANIZED HEALTH CARE EDUCATION/TRAINING PROGRAM

## 2021-12-26 PROCEDURE — 77010033678 HC OXYGEN DAILY

## 2021-12-26 PROCEDURE — 99223 1ST HOSP IP/OBS HIGH 75: CPT | Performed by: PSYCHIATRY & NEUROLOGY

## 2021-12-26 PROCEDURE — 80048 BASIC METABOLIC PNL TOTAL CA: CPT

## 2021-12-26 PROCEDURE — 96372 THER/PROPH/DIAG INJ SC/IM: CPT

## 2021-12-26 PROCEDURE — 94640 AIRWAY INHALATION TREATMENT: CPT

## 2021-12-26 PROCEDURE — 85025 COMPLETE CBC W/AUTO DIFF WBC: CPT

## 2021-12-26 PROCEDURE — A9270 NON-COVERED ITEM OR SERVICE: HCPCS | Performed by: STUDENT IN AN ORGANIZED HEALTH CARE EDUCATION/TRAINING PROGRAM

## 2021-12-26 PROCEDURE — 97161 PT EVAL LOW COMPLEX 20 MIN: CPT

## 2021-12-26 PROCEDURE — 74011000250 HC RX REV CODE- 250: Performed by: STUDENT IN AN ORGANIZED HEALTH CARE EDUCATION/TRAINING PROGRAM

## 2021-12-26 RX ORDER — POTASSIUM CHLORIDE 750 MG/1
10 TABLET, FILM COATED, EXTENDED RELEASE ORAL DAILY
Status: COMPLETED | OUTPATIENT
Start: 2021-12-27 | End: 2021-12-29

## 2021-12-26 RX ORDER — BUDESONIDE AND FORMOTEROL FUMARATE DIHYDRATE 160; 4.5 UG/1; UG/1
2 AEROSOL RESPIRATORY (INHALATION)
Status: DISCONTINUED | OUTPATIENT
Start: 2021-12-26 | End: 2022-01-03 | Stop reason: HOSPADM

## 2021-12-26 RX ORDER — ALBUTEROL SULFATE 90 UG/1
1 AEROSOL, METERED RESPIRATORY (INHALATION)
Status: DISCONTINUED | OUTPATIENT
Start: 2021-12-26 | End: 2022-01-03 | Stop reason: HOSPADM

## 2021-12-26 RX ORDER — ENOXAPARIN SODIUM 100 MG/ML
40 INJECTION SUBCUTANEOUS EVERY 12 HOURS
Status: DISCONTINUED | OUTPATIENT
Start: 2021-12-26 | End: 2022-01-03 | Stop reason: HOSPADM

## 2021-12-26 RX ORDER — POTASSIUM CHLORIDE 750 MG/1
40 TABLET, FILM COATED, EXTENDED RELEASE ORAL
Status: COMPLETED | OUTPATIENT
Start: 2021-12-26 | End: 2021-12-26

## 2021-12-26 RX ORDER — ALBUTEROL SULFATE 90 UG/1
1 AEROSOL, METERED RESPIRATORY (INHALATION)
Status: DISCONTINUED | OUTPATIENT
Start: 2021-12-26 | End: 2021-12-27

## 2021-12-26 RX ADMIN — PREDNISONE 20 MG: 20 TABLET ORAL at 09:12

## 2021-12-26 RX ADMIN — ACETAMINOPHEN 650 MG: 325 TABLET ORAL at 21:47

## 2021-12-26 RX ADMIN — Medication 2 PUFF: at 21:21

## 2021-12-26 RX ADMIN — GABAPENTIN 300 MG: 100 CAPSULE ORAL at 09:12

## 2021-12-26 RX ADMIN — Medication 2 PUFF: at 17:33

## 2021-12-26 RX ADMIN — SODIUM CHLORIDE 100 ML/HR: 9 INJECTION, SOLUTION INTRAVENOUS at 06:46

## 2021-12-26 RX ADMIN — SODIUM CHLORIDE, PRESERVATIVE FREE 10 ML: 5 INJECTION INTRAVENOUS at 06:47

## 2021-12-26 RX ADMIN — LATANOPROST 1 DROP: 50 SOLUTION OPHTHALMIC at 22:00

## 2021-12-26 RX ADMIN — Medication 2 PUFF: at 17:32

## 2021-12-26 RX ADMIN — ATORVASTATIN CALCIUM 80 MG: 40 TABLET, FILM COATED ORAL at 21:47

## 2021-12-26 RX ADMIN — POTASSIUM CHLORIDE 40 MEQ: 750 TABLET, FILM COATED, EXTENDED RELEASE ORAL at 09:12

## 2021-12-26 RX ADMIN — CLOPIDOGREL BISULFATE 75 MG: 75 TABLET ORAL at 09:12

## 2021-12-26 RX ADMIN — Medication 1000 UNITS: at 09:14

## 2021-12-26 RX ADMIN — THERA TABS 1 TABLET: TAB at 09:14

## 2021-12-26 RX ADMIN — LATANOPROST 1 DROP: 50 SOLUTION OPHTHALMIC at 00:27

## 2021-12-26 RX ADMIN — ALBUTEROL SULFATE 1 PUFF: 90 AEROSOL, METERED RESPIRATORY (INHALATION) at 21:21

## 2021-12-26 RX ADMIN — ENOXAPARIN SODIUM 40 MG: 100 INJECTION SUBCUTANEOUS at 21:47

## 2021-12-26 RX ADMIN — Medication 1 TABLET: at 09:14

## 2021-12-26 RX ADMIN — ENOXAPARIN SODIUM 30 MG: 100 INJECTION SUBCUTANEOUS at 09:14

## 2021-12-26 RX ADMIN — ACETAMINOPHEN 650 MG: 325 TABLET ORAL at 14:32

## 2021-12-26 RX ADMIN — MONTELUKAST 10 MG: 10 TABLET, FILM COATED ORAL at 21:47

## 2021-12-26 RX ADMIN — PANTOPRAZOLE SODIUM 40 MG: 40 TABLET, DELAYED RELEASE ORAL at 06:47

## 2021-12-26 RX ADMIN — LEVOTHYROXINE SODIUM 125 MCG: 0.12 TABLET ORAL at 09:14

## 2021-12-26 RX ADMIN — SODIUM CHLORIDE, PRESERVATIVE FREE 10 ML: 5 INJECTION INTRAVENOUS at 14:33

## 2021-12-26 RX ADMIN — GABAPENTIN 300 MG: 100 CAPSULE ORAL at 18:24

## 2021-12-26 RX ADMIN — ASPIRIN 81 MG CHEWABLE TABLET 81 MG: 81 TABLET CHEWABLE at 09:12

## 2021-12-26 RX ADMIN — GUAIFENESIN 600 MG: 600 TABLET, EXTENDED RELEASE ORAL at 21:47

## 2021-12-26 NOTE — CONSULTS
Neuro consult completed. Dictated note to follow. Exam with chronic left foot drop, diminished LE reflexes, o/w non-focal. Low suspicion for stroke, pt has had similar complaints since at least Sept, 2021 and has been falling since the Spring. I agree with MRI brain to fully exclude stroke given risk factors. D/w Dr. Deandra Herbert.

## 2021-12-26 NOTE — PROGRESS NOTES
[address] [facility] Adult  Hospitalist Group                                                                                          Hospitalist Progress Note  Ayo Kelley MD  Answering service: [phone] from in house phone        Date of Service:  2021  NAME:  Alexis Lao  :  1945  MRN:  [id number]      Admission Summary:   Alexis Lao is a 64 y.o. female  with past medical history of eosinophilic asthma, depression, fibromyalgia, GERD, hypertension, hypothyroidism, bilateral shoulder arthritis, who comes in for the above. Patient was recently discharged on the  of this month to [facility] after being in the hospital and in rehab due to weakness and dehydration. Per the patient's daughter the patient called her last night around 11 PM to say that something was different and she had fallen. The patient remembers the phone couple does not remember much. The patient says that she has felt weak ever since she was hospitalized before and this did not get better at the inpatient rehab and SNF. She says that her right lower extremity feels different and is weak. The daughter reports that when she got the phone call the patient said that her right lower extremity was weak so she fell. When EMS arrived they had to help her walk and saw that her right lower extremity was not responding like her left lower extremity. The patient does report some weakness in the right upper extremity as well. She denies any facial droop, facial weakness, numbness, and others. Her daughter denies that the patient had any type of aphasia or dysarthria when she called her.   The patient's daughter does report that the patient's right upper extremity has been trembling more than normal.  The patient reports a \"weird\" sensation in her right lower extremity that still has not resolved, but denies any actual weakness at this time.     In the ER patient's labs were essentially normal except for LOREN and CT of the head and CTAs of head and neck were normal.  Teleneurology was consulted and code stroke was called. Patient is out of the window for TPA. Interval history / Subjective:   Patient seen for follow-up of TIA/CVA. Patient seen and examined earlier this morning by me. Patient reports some shortness of breath and wants her inhalers. I explained to her that I would switch her nebulizers to what inhalers we had in the hospital because nebulizers are aerosolizing in her being Covid positive. Assessment & Plan:     CVA/TIA  Neurology seen  Pending MRI  Continue current medical management     Dehydration  Normal saline at 100 mils per hour  Monitor with morning labs  Improved     LOREN  Normal saline 100 mL/h  Improved     Hypokalemia  Gave 40 mEq p.o. potassium today  Rechecking this afternoon  Start 10 mEq p.o. daily and will adjust based on lab results    Chronic asthma  Continue patient's Symbicort, Pulmicort, albuterol  Stable     Hypothyroidism  Continue levothyroxine     Chronic pain  Patient is on Tylenol and Mobic as outpatient. I will hold these medications for now as she has an LOREN so the Mobic will not help.   Tylenol will put on his as needed.      Hypertension  Allowing permissive hypertension  Patient is on losartan at home, but will hold this due to permissive hypertension at least for 24 hours  Blood pressure is fine without any medications at this time    Code status: Full  DVT prophylaxis: lovenox    Care Plan discussed with: Patient/Family and Nurse  Anticipated Disposition: Home w/Family  Anticipated Discharge: Less than 24 hours     Hospital Problems  Date Reviewed: 12/4/2021          Codes Class Noted POA    TIA (transient ischemic attack) ICD-10-CM: G45.9  ICD-9-CM: 435.9  12/25/2021 Unknown        CVA (cerebral vascular accident) [facility]) ICD-10-CM: I63.9  ICD-9-CM: 434.91  12/25/2021 Unknown        LOREN (acute kidney injury) ([facility] [address] ICD-10-CM: N17.9  ICD-9-CM: 584.9  12/1/2021 Yes Other chronic pain ICD-10-CM: G89.29  ICD-9-CM: 338.29  Unknown Yes        HTN (hypertension) (Chronic) ICD-10-CM: I10  ICD-9-CM: 401.9  7/8/2011 Yes        Asthma (Chronic) ICD-10-CM: J45.909  ICD-9-CM: 493.90  7/8/2011 Yes        Hypothyroidism ICD-10-CM: E03.9  ICD-9-CM: 244.9  7/8/2011 Yes        Dehydration ICD-10-CM: E86.0  ICD-9-CM: 276.51  7/7/2011 Yes                Review of Systems:   A comprehensive review of systems was negative except for that written in the HPI. Vital Signs:    Last 24hrs VS reviewed since prior progress note. Most recent are:  Visit Vitals  /77 (BP 1 Location: Left upper arm, BP Patient Position: Supine)   Pulse 87   Temp 98 °F (36.7 °C)   Resp 15   Ht 5' (1.524 m)   Wt 75 kg (165 lb 5.5 oz)   SpO2 100%   BMI 32.29 kg/m²         Intake/Output Summary (Last 24 hours) at 12/26/2021 1659  Last data filed at 12/26/2021 0400  Gross per 24 hour   Intake 1000 ml   Output 500 ml   Net 500 ml        Physical Examination:     I had a face to face encounter with this patient and independently examined them on 12/26/2021 as outlined below:          Constitutional:  No acute distress, cooperative, pleasant    ENT:  Oral mucosa moist, oropharynx benign. Resp:  CTA bilaterally. No wheezing/rhonchi/rales. No accessory muscle use   CV:  Regular rhythm, normal rate, no murmurs, gallops, rubs    GI:  Soft, non distended, non tender. normoactive bowel sounds, no hepatosplenomegaly     Musculoskeletal:  No edema, warm, 2+ pulses throughout    Neurologic:  Moves all extremities. AAOx3, CN II-XII reviewed. Has some upper extremity lower extremity weakness which is chronic. No signs of the acute manifestations at this time.             Data Review:    Review and/or order of clinical lab test  Review and/or order of tests in the radiology section of CPT  Review and/or order of tests in the medicine section of CPT      Labs:     Recent Labs     12/26/21  0203 12/25/21  1243   WBC 10.0 10. 4   HGB 11.3* 11.9   HCT 36.6 37.9    276     Recent Labs     12/26/21  0203 12/25/21  1243    134*   K 3.0* 3.5    99   CO2 28 27   BUN 15 20   CREA 1.23* 1.53*    110*   CA 8.8 9.5     Recent Labs     12/25/21  1243   ALT 25   AP 70   TBILI 0.4   TP 6.1*   ALB 3.1*   GLOB 3.0     Recent Labs     12/25/21  1243   INR 1.0   PTP 10.4      No results for input(s): FE, TIBC, PSAT, FERR in the last 72 hours. No results found for: FOL, RBCF   No results for input(s): PH, PCO2, PO2 in the last 72 hours. No results for input(s): CPK, CKNDX, TROIQ in the last 72 hours.     No lab exists for component: CPKMB  No results found for: CHOL, CHOLX, CHLST, CHOLV, HDL, HDLP, LDL, LDLC, DLDLP, TGLX, TRIGL, TRIGP, CHHD, CHHDX  Lab Results   Component Value Date/Time    Glucose (POC) 112 12/25/2021 12:31 PM    Glucose (POC) 126 (H) 02/14/2021 04:29 PM    Glucose (POC) 103 (H) 02/14/2021 11:04 AM    Glucose (POC) 72 02/14/2021 06:15 AM    Glucose (POC) 95 02/13/2021 09:14 PM     Lab Results   Component Value Date/Time    Color DARK YELLOW 11/30/2021 10:45 PM    Appearance TURBID (A) 11/30/2021 10:45 PM    Specific gravity 1.020 11/30/2021 10:45 PM    Specific gravity 1.020 02/08/2021 06:45 PM    pH (UA) 5.0 11/30/2021 10:45 PM    Protein Negative 11/30/2021 10:45 PM    Glucose Negative 11/30/2021 10:45 PM    Ketone Negative 11/30/2021 10:45 PM    Bilirubin Negative 11/30/2021 10:45 PM    Urobilinogen 0.2 11/30/2021 10:45 PM    Nitrites Negative 11/30/2021 10:45 PM    Leukocyte Esterase Negative 11/30/2021 10:45 PM    Epithelial cells MANY (A) 11/30/2021 10:45 PM    Bacteria 1+ (A) 11/30/2021 10:45 PM    WBC 10-20 11/30/2021 10:45 PM    RBC 5-10 11/30/2021 10:45 PM         Medications Reviewed:     Current Facility-Administered Medications   Medication Dose Route Frequency    tiotropium bromide (SPIRIVA RESPIMAT) 2.5 mcg /actuation  2 Puff Inhalation DAILY    budesonide-formoteroL (SYMBICORT) 160-4.5 mcg/actuation HFA inhaler 2 Puff  2 Puff Inhalation BID RT    albuterol (PROVENTIL HFA, VENTOLIN HFA, PROAIR HFA) inhaler 1 Puff  1 Puff Inhalation BID RT    albuterol (PROVENTIL HFA, VENTOLIN HFA, PROAIR HFA) inhaler 1 Puff  1 Puff Inhalation Q6H PRN    enoxaparin (LOVENOX) injection 40 mg  40 mg SubCUTAneous Q12H    0.9% sodium chloride infusion  100 mL/hr IntraVENous CONTINUOUS    sodium chloride (NS) flush 5-40 mL  5-40 mL IntraVENous Q8H    sodium chloride (NS) flush 5-40 mL  5-40 mL IntraVENous PRN    acetaminophen (TYLENOL) tablet 650 mg  650 mg Oral Q6H PRN    Or    acetaminophen (TYLENOL) suppository 650 mg  650 mg Rectal Q6H PRN    polyethylene glycol (MIRALAX) packet 17 g  17 g Oral DAILY PRN    ondansetron (ZOFRAN ODT) tablet 4 mg  4 mg Oral Q8H PRN    Or    ondansetron (ZOFRAN) injection 4 mg  4 mg IntraVENous Q6H PRN    levothyroxine (SYNTHROID) tablet 125 mcg  125 mcg Oral DAILY    montelukast (SINGULAIR) tablet 10 mg  10 mg Oral QHS    . PHARMACY TO SUBSTITUTE PER PROTOCOL (Reordered from: BUPROPION HCL (WELLBUTRIN XL PO))    Per Protocol    gabapentin (NEURONTIN) capsule 300 mg  300 mg Oral BID    cholecalciferol (VITAMIN D3) (1000 Units /25 mcg) tablet 1,000 Units  1,000 Units Oral DAILY    vitamin B complex tablet  1 Tablet Oral DAILY    cetirizine (ZYRTEC) tablet 10 mg  10 mg Oral DAILY PRN    therapeutic multivitamin (THERAGRAN) tablet 1 Tablet  1 Tablet Oral DAILY    latanoprost (XALATAN) 0.005 % ophthalmic solution 1 Drop  1 Drop Both Eyes QHS    pantoprazole (PROTONIX) tablet 40 mg  40 mg Oral ACB    guaiFENesin ER (MUCINEX) tablet 600 mg  600 mg Oral BID PRN    predniSONE (DELTASONE) tablet 20 mg  20 mg Oral DAILY WITH BREAKFAST    clopidogreL (PLAVIX) tablet 75 mg  75 mg Oral DAILY    aspirin chewable tablet 81 mg  81 mg Oral DAILY    atorvastatin (LIPITOR) tablet 80 mg  80 mg Oral QHS    hydrALAZINE (APRESOLINE) 20 mg/mL injection 10 mg  10 mg IntraVENous Q6H PRN ______________________________________________________________________  EXPECTED LENGTH OF STAY: - - -  ACTUAL LENGTH OF STAY:          0                 Marline Randle MD

## 2021-12-26 NOTE — PROGRESS NOTES
Problem: Mobility Impaired (Adult and Pediatric)  Goal: *Acute Goals and Plan of Care (Insert Text)  Description:   FUNCTIONAL STATUS PRIOR TO ADMISSION: Patient was modified independent using a rollator for functional mobility in an independent living facility. She reports receiving assist with meals provided and housekeeping. She reports managing her own medications. When asked she endorsed 4-5 falls in the last 12 months. Thresa Abed HOME SUPPORT PRIOR TO ADMISSION: The patient lived alone with support as noted above. Physical Therapy Goals  Initiated 12/26/2021  1. Patient will move from supine to sit and sit to supine , scoot up and down, and roll side to side in bed with independence within 7 day(s). 2.  Patient will transfer from bed to chair and chair to bed with modified independence using the least restrictive device within 7 day(s). 3.  Patient will perform sit to stand with modified independence within 7 day(s). 4.  Patient will ambulate with modified independence for 200 feet with the least restrictive device within 7 day(s). 5.  Patient will improve Soares Balance score by 7 points within 7 days. Outcome: Progressing Towards Goal   PHYSICAL THERAPY EVALUATION- NEURO POPULATION  Patient: Ben Kurtz (5 y.o. female)  Date: 12/26/2021  Primary Diagnosis: CVA (cerebral vascular accident) ([facility] [address] [I63.9]  TIA (transient ischemic attack) [G45.9]        Precautions:   Fall,Skin (droplet plus)      ASSESSMENT  Based on the objective data described below, the patient presents with generalized weakness, impaired standing balance, and mobility not at her baseline of mod I, during a somewhat limited eval. Pt was received with an urgent need for a BM. Got her onto a bedside commode and while on the bedside she reported becoming symptomatic after a large BM (see subjective below). Transferred her back to bed and cleaned her up there. VSS in supine. Pt was admitted with a diagnosis of CVA/TIA.  The CT was negative for an acute process and an MRI is pending. Additionally pt was diagnosed with COVID-19. Of note, Sp02 was difficult to get a reliable reading on a portable pulse ox but was stable on room air on the finger probed attached to the room monitor. Disposition depending on progress and how much assistance pt can receive at [facility] (currently there in independent living). Current Level of Function Impacting Discharge (mobility/balance): min assist and impaired standing balance. Functional Outcome Measure: The patient scored Total: 6/56 on the [facility] Assessment which is indicative of high fall risk. Other factors to consider for discharge: arthritis, fall history, rotator cuff tear     Patient will benefit from skilled therapy intervention to address the above noted impairments. PLAN :  Recommendations and Planned Interventions: bed mobility training, transfer training, gait training, therapeutic exercises, patient and family training/education, and therapeutic activities      Frequency/Duration: Patient will be followed by physical therapy:  4 times a week to address goals.     Recommendation for discharge: (in order for the patient to meet his/her long term goals)  Physical therapy at least 2 days/week in the home AND ensure assist and/or supervision for safety with mobility vs rehab    This discharge recommendation:  A follow-up discussion with the attending provider and/or case management is planned    IF patient discharges home will need the following DME: to be determined (TBD)         SUBJECTIVE:   Patient stated I'm starting to get lightheaded, this while up on a bedside commode    OBJECTIVE DATA SUMMARY:   Consult received, chart reviewed, pt cleared by nursing  HISTORY:    Past Medical History:   Diagnosis Date    Asthma     Depression     Fibromyalgia     GERD (gastroesophageal reflux disease)     Hypertension     Hypothyroid     Menopause 1995    Rotator cuff tear      Past Surgical History:   Procedure Laterality Date    HX CHOLECYSTECTOMY      HX GYN      HX ORTHOPAEDIC      HX TONSIL AND ADENOIDECTOMY         Personal factors and/or comorbidities impacting plan of care: arthritis, fall history, rotator cuff tear    Home Situation  Home Environment: [place] [address] Name: Kerri Cevallos  Living Alone: Yes  Support Systems:  (staff for meals and housekeeping)  Current DME Used/Available at Home: Grab bars,Walker, rollator (reacher)    EXAMINATION/PRESENTATION/DECISION MAKING:   Critical Behavior:  Neurologic State: Alert  Orientation Level: Oriented X4  Cognition: Appropriate decision making,Appropriate for age attention/concentration,Appropriate safety awareness,Follows commands  Safety/Judgement: Awareness of environment,Good awareness of safety precautions,Insight into deficits  Hearing:     Skin:  refer to MD and nursing notes, note bruising left upper arm  Edema: refer to MD and nursing ntoes  Range Of Motion:  AROM: Generally decreased, functional (LEs)                       Strength:    Strength: Generally decreased, functional (LEs)                    Tone & Sensation:                  Sensation: Intact               Coordination:     Vision:      Functional Mobility:  Bed Mobility:     Supine to Sit: Stand-by assistance; Adaptive equipment;Bed Modified  Sit to Supine: Minimum assistance     Transfers:  Sit to Stand: Minimum assistance  Stand to Sit: Minimum assistance  Stand Pivot Transfers: Minimum assistance                    Balance:   Sitting: Intact; Without support  Standing: Impaired; With support  Ambulation/Gait Training:                                                         Stairs:               Therapeutic Exercises:       Functional Measure  Soares Balance Test:    Sitting to Standin  Standing Unsupported: 0  Sitting with Back Unsupported: 4  Standing to Sittin  Transfers: 1  Standing Unsupported with Eyes Closed: 0  Standing Unsupported with Feet Together: 0  Reach Forward with Outstretched Arm: 0   Object: 0  Turn to Look Over Shoulders: 0  Turn 360 Degrees: 0  Alternate Foot on Step/Stool: 0  Standing Unsupported One Foot in Front: 0  Stand on One Le  Total: 6/56         56=Maximum possible score;   0-20=High fall risk  21-40=Moderate fall risk   41-56=Low fall risk        Physical Therapy Evaluation Charge Determination   History Examination Presentation Decision-Making   HIGH Complexity :3+ comorbidities / personal factors will impact the outcome/ POC  MEDIUM Complexity : 3 Standardized tests and measures addressing body structure, function, activity limitation and / or participation in recreation  MEDIUM Complexity : Evolving with changing characteristics  LOW Complexity : FOTO score of       Based on the above components, the patient evaluation is determined to be of the following complexity level: LOW     Pain Ratin neck and back    Activity Tolerance:   See assessment       After treatment patient left in no apparent distress:   Supine in bed, Call bell within reach, and Side rails x 3    COMMUNICATION/EDUCATION:   The patients plan of care was discussed with: Registered nurse and Physician. Patient was educated regarding her deficit(s) of right side weakness as this relates to her diagnosis of possible CVA/TIA. She demonstrated Good understanding as evidenced by teach back. Patient and/or family was verbally educated on the BE FAST acronym for signs/symptoms of CVA and TIA. BE FAST was written on patient's communication board  for visual education and reinforcement. All questions answered with patient indicating good understanding. Fall prevention education was provided and the patient/caregiver indicated understanding., Patient/family have participated as able in goal setting and plan of care. , and Patient/family agree to work toward stated goals and plan of care.     Thank you for this referral.  Sil Riley Trell   Time Calculation: 53 mins

## 2021-12-26 NOTE — PROGRESS NOTES
Orders received, chart reviewed and patient evaluated by physical therapy. Pending progression with skilled acute physical therapy, recommend:  Physical therapy at least 2 days/week in the home AND ensure assist and/or supervision for safety with mobility pending progress vs rehab    Recommend with nursing OOB to chair 3x/day and walking daily with assist X 1 stand pivot transfer assist.Thank you for completing as able in order to maintain patient strength, endurance and independence. Full evaluation to follow.    Visit Vitals  BP (P) 136/77 (BP 1 Location: Left upper arm, BP Patient Position: Supine)   Pulse (P) 88   Temp 98 °F (36.7 °C)   Resp 15   Ht 5' (1.524 m)   Wt 75 kg (165 lb 5.5 oz)   SpO2 (P) 100%   BMI 32.29 kg/m²     Vince Norman, PT

## 2021-12-27 ENCOUNTER — APPOINTMENT (OUTPATIENT)
Dept: NON INVASIVE DIAGNOSTICS | Age: 76
DRG: 091 | End: 2021-12-27
Attending: STUDENT IN AN ORGANIZED HEALTH CARE EDUCATION/TRAINING PROGRAM
Payer: MEDICARE

## 2021-12-27 LAB
ANION GAP SERPL CALC-SCNC: 6 MMOL/L (ref 5–15)
BASOPHILS # BLD: 0.1 K/UL (ref 0–0.1)
BASOPHILS NFR BLD: 1 % (ref 0–1)
BUN SERPL-MCNC: 13 MG/DL (ref 6–20)
BUN/CREAT SERPL: 13 (ref 12–20)
CALCIUM SERPL-MCNC: 9.3 MG/DL (ref 8.5–10.1)
CHLORIDE SERPL-SCNC: 104 MMOL/L (ref 97–108)
CO2 SERPL-SCNC: 27 MMOL/L (ref 21–32)
CREAT SERPL-MCNC: 1.02 MG/DL (ref 0.55–1.02)
DIFFERENTIAL METHOD BLD: ABNORMAL
ECHO AO ROOT DIAM: 3.4 CM
ECHO AO ROOT INDEX: 1.98 CM/M2
ECHO AV PEAK GRADIENT: 6 MMHG
ECHO AV PEAK VELOCITY: 1.3 M/S
ECHO AV VELOCITY RATIO: 0.62
ECHO LA DIAMETER INDEX: 1.98 CM/M2
ECHO LA DIAMETER: 3.4 CM
ECHO LA TO AORTIC ROOT RATIO: 1
ECHO LV E' LATERAL VELOCITY: 4 CM/S
ECHO LV E' SEPTAL VELOCITY: 5 CM/S
ECHO LV EDV A2C: 55 ML
ECHO LV EDV A4C: 73 ML
ECHO LV EDV BP: 63 ML (ref 56–104)
ECHO LV EDV BP: 63 ML (ref 56–104)
ECHO LV EDV INDEX A4C: 42 ML/M2
ECHO LV EDV NDEX A2C: 32 ML/M2
ECHO LV EJECTION FRACTION A2C: 52 %
ECHO LV EJECTION FRACTION A4C: 47 %
ECHO LV EJECTION FRACTION BIPLANE: 46 % (ref 55–100)
ECHO LV EJECTION FRACTION BIPLANE: 46 % (ref 55–100)
ECHO LV ESV A2C: 26 ML
ECHO LV ESV A4C: 38 ML
ECHO LV ESV BP: 34 ML (ref 19–49)
ECHO LV ESV INDEX A2C: 15 ML/M2
ECHO LV ESV INDEX A4C: 22 ML/M2
ECHO LV ESV INDEX BP: 20 ML/M2
ECHO LV FRACTIONAL SHORTENING: 23 % (ref 28–44)
ECHO LV INTERNAL DIMENSION DIASTOLE INDEX: 2.33 CM/M2
ECHO LV INTERNAL DIMENSION DIASTOLIC: 4 CM (ref 3.9–5.3)
ECHO LV INTERNAL DIMENSION SYSTOLIC INDEX: 1.8 CM/M2
ECHO LV INTERNAL DIMENSION SYSTOLIC: 3.1 CM
ECHO LV IVSD: 0.7 CM (ref 0.6–0.9)
ECHO LV MASS 2D: 93.5 G (ref 67–162)
ECHO LV MASS INDEX 2D: 54.3 G/M2 (ref 43–95)
ECHO LV POSTERIOR WALL DIASTOLIC: 0.9 CM (ref 0.6–0.9)
ECHO LV RELATIVE WALL THICKNESS RATIO: 0.45
ECHO LVOT PEAK GRADIENT: 2 MMHG
ECHO LVOT PEAK VELOCITY: 0.8 M/S
ECHO MV A VELOCITY: 0.77 M/S
ECHO MV AREA PHT: 3.8 CM2
ECHO MV E DECELERATION TIME (DT): 199.9 MS
ECHO MV E VELOCITY: 0.55 M/S
ECHO MV E/A RATIO: 0.71
ECHO MV E/E' LATERAL: 13.75
ECHO MV E/E' RATIO (AVERAGED): 12.38
ECHO MV E/E' SEPTAL: 11
ECHO MV PRESSURE HALF TIME (PHT): 58 MS
ECHO PV MAX VELOCITY: 1.5 M/S
ECHO PV PEAK GRADIENT: 9 MMHG
ECHO RV TAPSE: 1.5 CM (ref 1.5–2)
ECHO TV REGURGITANT MAX VELOCITY: 2.14 M/S
ECHO TV REGURGITANT PEAK GRADIENT: 18 MMHG
EOSINOPHIL # BLD: 0.5 K/UL (ref 0–0.4)
EOSINOPHIL NFR BLD: 5 % (ref 0–7)
ERYTHROCYTE [DISTWIDTH] IN BLOOD BY AUTOMATED COUNT: 14.6 % (ref 11.5–14.5)
GLUCOSE SERPL-MCNC: 103 MG/DL (ref 65–100)
HCT VFR BLD AUTO: 38.7 % (ref 35–47)
HGB BLD-MCNC: 12.1 G/DL (ref 11.5–16)
IMM GRANULOCYTES # BLD AUTO: 0 K/UL (ref 0–0.04)
IMM GRANULOCYTES NFR BLD AUTO: 0 % (ref 0–0.5)
LYMPHOCYTES # BLD: 2 K/UL (ref 0.8–3.5)
LYMPHOCYTES NFR BLD: 22 % (ref 12–49)
MCH RBC QN AUTO: 29.7 PG (ref 26–34)
MCHC RBC AUTO-ENTMCNC: 31.3 G/DL (ref 30–36.5)
MCV RBC AUTO: 94.9 FL (ref 80–99)
MONOCYTES # BLD: 0.8 K/UL (ref 0–1)
MONOCYTES NFR BLD: 9 % (ref 5–13)
NEUTS SEG # BLD: 5.8 K/UL (ref 1.8–8)
NEUTS SEG NFR BLD: 63 % (ref 32–75)
NRBC # BLD: 0 K/UL (ref 0–0.01)
NRBC BLD-RTO: 0 PER 100 WBC
PLATELET # BLD AUTO: 233 K/UL (ref 150–400)
PMV BLD AUTO: 10 FL (ref 8.9–12.9)
POTASSIUM SERPL-SCNC: 3.7 MMOL/L (ref 3.5–5.1)
POTASSIUM SERPL-SCNC: 3.8 MMOL/L (ref 3.5–5.1)
RBC # BLD AUTO: 4.08 M/UL (ref 3.8–5.2)
SODIUM SERPL-SCNC: 137 MMOL/L (ref 136–145)
WBC # BLD AUTO: 9.2 K/UL (ref 3.6–11)

## 2021-12-27 PROCEDURE — 93306 TTE W/DOPPLER COMPLETE: CPT

## 2021-12-27 PROCEDURE — A9270 NON-COVERED ITEM OR SERVICE: HCPCS | Performed by: STUDENT IN AN ORGANIZED HEALTH CARE EDUCATION/TRAINING PROGRAM

## 2021-12-27 PROCEDURE — 74011250637 HC RX REV CODE- 250/637: Performed by: HOSPITALIST

## 2021-12-27 PROCEDURE — 97166 OT EVAL MOD COMPLEX 45 MIN: CPT

## 2021-12-27 PROCEDURE — 85025 COMPLETE CBC W/AUTO DIFF WBC: CPT

## 2021-12-27 PROCEDURE — 74011250636 HC RX REV CODE- 250/636: Performed by: STUDENT IN AN ORGANIZED HEALTH CARE EDUCATION/TRAINING PROGRAM

## 2021-12-27 PROCEDURE — 92610 EVALUATE SWALLOWING FUNCTION: CPT

## 2021-12-27 PROCEDURE — G0378 HOSPITAL OBSERVATION PER HR: HCPCS

## 2021-12-27 PROCEDURE — 94640 AIRWAY INHALATION TREATMENT: CPT

## 2021-12-27 PROCEDURE — 84132 ASSAY OF SERUM POTASSIUM: CPT

## 2021-12-27 PROCEDURE — 80048 BASIC METABOLIC PNL TOTAL CA: CPT

## 2021-12-27 PROCEDURE — 36415 COLL VENOUS BLD VENIPUNCTURE: CPT

## 2021-12-27 PROCEDURE — 74011636637 HC RX REV CODE- 636/637: Performed by: STUDENT IN AN ORGANIZED HEALTH CARE EDUCATION/TRAINING PROGRAM

## 2021-12-27 PROCEDURE — 96372 THER/PROPH/DIAG INJ SC/IM: CPT

## 2021-12-27 PROCEDURE — 74011250637 HC RX REV CODE- 250/637: Performed by: STUDENT IN AN ORGANIZED HEALTH CARE EDUCATION/TRAINING PROGRAM

## 2021-12-27 RX ORDER — HYDROCODONE BITARTRATE AND ACETAMINOPHEN 5; 325 MG/1; MG/1
1 TABLET ORAL
Status: DISCONTINUED | OUTPATIENT
Start: 2021-12-27 | End: 2022-01-03 | Stop reason: HOSPADM

## 2021-12-27 RX ORDER — BUPROPION HYDROCHLORIDE 100 MG/1
200 TABLET, EXTENDED RELEASE ORAL EVERY 12 HOURS
Status: DISCONTINUED | OUTPATIENT
Start: 2021-12-27 | End: 2022-01-03 | Stop reason: HOSPADM

## 2021-12-27 RX ORDER — POTASSIUM CHLORIDE 750 MG/1
40 TABLET, FILM COATED, EXTENDED RELEASE ORAL EVERY 4 HOURS
Status: COMPLETED | OUTPATIENT
Start: 2021-12-27 | End: 2021-12-27

## 2021-12-27 RX ADMIN — Medication 1000 UNITS: at 09:33

## 2021-12-27 RX ADMIN — ATORVASTATIN CALCIUM 80 MG: 40 TABLET, FILM COATED ORAL at 21:38

## 2021-12-27 RX ADMIN — SODIUM CHLORIDE 100 ML/HR: 9 INJECTION, SOLUTION INTRAVENOUS at 23:19

## 2021-12-27 RX ADMIN — Medication 2 PUFF: at 08:43

## 2021-12-27 RX ADMIN — GABAPENTIN 300 MG: 100 CAPSULE ORAL at 09:33

## 2021-12-27 RX ADMIN — THERA TABS 1 TABLET: TAB at 09:33

## 2021-12-27 RX ADMIN — ENOXAPARIN SODIUM 40 MG: 100 INJECTION SUBCUTANEOUS at 21:38

## 2021-12-27 RX ADMIN — GABAPENTIN 300 MG: 100 CAPSULE ORAL at 17:36

## 2021-12-27 RX ADMIN — Medication 2 PUFF: at 08:45

## 2021-12-27 RX ADMIN — MONTELUKAST 10 MG: 10 TABLET, FILM COATED ORAL at 21:38

## 2021-12-27 RX ADMIN — Medication 2 PUFF: at 23:25

## 2021-12-27 RX ADMIN — ACETAMINOPHEN 650 MG: 325 TABLET ORAL at 09:33

## 2021-12-27 RX ADMIN — BUPROPION HYDROCHLORIDE 200 MG: 100 TABLET, FILM COATED, EXTENDED RELEASE ORAL at 21:38

## 2021-12-27 RX ADMIN — CLOPIDOGREL BISULFATE 75 MG: 75 TABLET ORAL at 09:33

## 2021-12-27 RX ADMIN — SODIUM CHLORIDE, PRESERVATIVE FREE 10 ML: 5 INJECTION INTRAVENOUS at 21:38

## 2021-12-27 RX ADMIN — PREDNISONE 20 MG: 20 TABLET ORAL at 09:33

## 2021-12-27 RX ADMIN — ASPIRIN 81 MG CHEWABLE TABLET 81 MG: 81 TABLET CHEWABLE at 09:33

## 2021-12-27 RX ADMIN — LATANOPROST 1 DROP: 50 SOLUTION OPHTHALMIC at 21:39

## 2021-12-27 RX ADMIN — BUPROPION HYDROCHLORIDE 200 MG: 100 TABLET, FILM COATED, EXTENDED RELEASE ORAL at 12:00

## 2021-12-27 RX ADMIN — GUAIFENESIN 600 MG: 600 TABLET, EXTENDED RELEASE ORAL at 10:41

## 2021-12-27 RX ADMIN — POTASSIUM CHLORIDE 40 MEQ: 750 TABLET, FILM COATED, EXTENDED RELEASE ORAL at 11:57

## 2021-12-27 RX ADMIN — ENOXAPARIN SODIUM 40 MG: 100 INJECTION SUBCUTANEOUS at 09:34

## 2021-12-27 RX ADMIN — PANTOPRAZOLE SODIUM 40 MG: 40 TABLET, DELAYED RELEASE ORAL at 09:33

## 2021-12-27 RX ADMIN — ACETAMINOPHEN 650 MG: 325 TABLET ORAL at 23:16

## 2021-12-27 RX ADMIN — LEVOTHYROXINE SODIUM 125 MCG: 0.12 TABLET ORAL at 09:33

## 2021-12-27 RX ADMIN — POTASSIUM CHLORIDE 40 MEQ: 750 TABLET, FILM COATED, EXTENDED RELEASE ORAL at 09:43

## 2021-12-27 RX ADMIN — Medication 1 TABLET: at 09:33

## 2021-12-27 RX ADMIN — HYDROCODONE BITARTRATE AND ACETAMINOPHEN 1 TABLET: 5; 325 TABLET ORAL at 12:00

## 2021-12-27 RX ADMIN — POTASSIUM CHLORIDE 10 MEQ: 750 TABLET, FILM COATED, EXTENDED RELEASE ORAL at 09:33

## 2021-12-27 NOTE — PROGRESS NOTES
Problem: Self Care Deficits Care Plan (Adult)  Goal: *Acute Goals and Plan of Care (Insert Text)  Description: PLOF: Patient was modified independent with Rollator. Managed own meds. After hospitalization DC to [facility] 12/4/2021, then transferred to [facility] in which she states is where she contracted COVID, discharged home, home one day and then to [facility]. Patient is vaccinated. Baseline arthritis, RTC tear right and repair left. States extensive [place] OT June 2021 and again at rehab recently. Home support: Lives alone. Has hired help for meals and housekeeping. Daughter lives nearby. Occupational Therapy Goals  Initiated 12/27/2021  1. Patient will perform lower body dressing with modified independence within 7 day(s). 2.  Patient will perform bathing with modified independence within 7 day(s). 3.  Patient will perform standing ADLs 2 mins with RW with modified independence within 7 day(s). 4.  Patient will perform toilet transfers with modified independence within 7 day(s). 5.  Patient will perform all aspects of toileting with modified independence within 7 day(s). 6.  Patient will participate in upper extremity therapeutic exercise/activities with modified independence within 7 day(s). 7.  Patient will utilize energy conservation techniques during functional activities with verbal cues within 7 day(s). Outcome: Not Met   OCCUPATIONAL THERAPY EVALUATION  Patient: Fredy Gonzalez (58 y.o. female)  Date: 12/27/2021  Primary Diagnosis: CVA (cerebral vascular accident) ([facility] [address] [I63.9]  TIA (transient ischemic attack) [G45.9]        Precautions:   Fall,Skin (droplet plus)    ASSESSMENT  Based on the objective data described below, the patient presents with ADL independence impaired by overall activity tolerance, standing tolerance, and functional reach.     Current Level of Function Impacting Discharge (ADLs/self-care): setup upper body ADLs, moderate assistance overall lower body ADLs    Functional Outcome Measure: The patient scored Total: 55/100 on the Barthel Index outcome measure which is indicative of being partially dependent in basic self-care. Other factors to consider for discharge: daughter and hired help     Patient will benefit from skilled therapy intervention to address the above noted impairments. PLAN :  Recommendations and Planned Interventions: self care training, functional mobility training, therapeutic exercise, balance training, therapeutic activities, endurance activities, patient education, home safety training, and family training/education    Frequency/Duration: Patient will be followed by occupational therapy 3 times a week to address goals. Staff: recommend continued OOB to chair, BSC for toileting    Recommendation for discharge: (in order for the patient to meet his/her long term goals)  Occupational therapy at least 2 days/week in the home     This discharge recommendation:  Has not yet been discussed the attending provider and/or case management    IF patient discharges home will need the following DME: AE: long handled bathing and AE: long handled dressing       SUBJECTIVE:   Patient stated I am ready to go home today.     OBJECTIVE DATA SUMMARY:   HISTORY:   Past Medical History:   Diagnosis Date    Asthma     Depression     Fibromyalgia     GERD (gastroesophageal reflux disease)     Hypertension     Hypothyroid     Menopause 1995    Rotator cuff tear      Past Surgical History:   Procedure Laterality Date    HX CHOLECYSTECTOMY      HX GYN      HX ORTHOPAEDIC      HX TONSIL AND ADENOIDECTOMY         Expanded or extensive additional review of patient history:     Home Situation  Home Environment: Independent living  [address] [facility] [address] Name: Rigoberto Pizarro  Living Alone: Yes  Support Systems:  (staff for meals and housekeeping)  Current DME Used/Available at Home: Grab bars,Walker, rollator (reacher)  Tub or Shower Type: Shower    Hand dominance: Right    EXAMINATION OF PERFORMANCE DEFICITS:  Cognitive/Behavioral Status:  Neurologic State: Alert  Orientation Level: Oriented X4;Appropriate for age  Cognition: Follows commands; Appropriate for age attention/concentration  Perception: Appears intact  Perseveration: No perseveration noted  Safety/Judgement: Awareness of environment    Skin: intact L PIV    Edema:  intact    Hearing:       Vision/Perceptual:                           Acuity: Able to read normal print without difficulty         Range of Motion:    AROM: Within functional limits                         Strength:    Strength: Within functional limits                Coordination:     Fine Motor Skills-Upper: Left Intact; Right Intact (arthritis limiting)    Gross Motor Skills-Upper: Left Intact; Right Intact    Tone & Sensation:       Sensation: Intact                      Balance:       Functional Mobility and Transfers for ADLs:  Bed Mobility:       Transfers:       ADL Assessment:  Feeding: Independent    Oral Facial Hygiene/Grooming: Setup setup items on beside tray, sitting up in bed    Bathing: Moderate assistance infer    Upper Body Dressing: Setup    Lower Body Dressing: Maximum assistance infer    Toileting: Minimum assistance stating completed twice today on BSC, diarrhea         Stating fatigued from [facility] use and OOB to chair. ADL Intervention and task modifications:     Patient instructed and indicated understanding the benefits of maintaining activity tolerance, functional mobility, and independence with self care tasks during acute stay  to ensure safe return home and to baseline. Encouraged patient to increase frequency and duration OOB, be out of bed for all meals, perform daily ADLs (as approved by RN/MD regarding bathing etc), and performing functional mobility to/from bathroom. Patient instruction and indicated understanding resources for home, cognition, energy conservation, breathing techniques during ADLs and exercise. Handouts provided. Cognitive Retraining  Safety/Judgement: Awareness of environment    Therapeutic Exercise:     Functional Measure:    Barthel Index:  Bathin  Bladder: 10  Bowels: 10  Groomin  Dressin  Feeding: 10  Mobility: 0  Stairs: 0  Toilet Use: 5  Transfer (Bed to Chair and Back): 10  Total: 55/100      The Barthel ADL Index: Guidelines  1. The index should be used as a record of what a patient does, not as a record of what a patient could do. 2. The main aim is to establish degree of independence from any help, physical or verbal, however minor and for whatever reason. 3. The need for supervision renders the patient not independent. 4. A patient's performance should be established using the best available evidence. Asking the patient, friends/relatives and nurses are the usual sources, but direct observation and common sense are also important. However direct testing is not needed. 5. Usually the patient's performance over the preceding 24-48 hours is important, but occasionally longer periods will be relevant. 6. Middle categories imply that the patient supplies over 50 per cent of the effort. 7. Use of aids to be independent is allowed. Score Interpretation (from [address] [address])    Independent   60-79 Minimally independent   40-59 Partially dependent   20-39 Very dependent   <20 Totally dependent     -Hunter Mathias., Barthel, D.W. (1965). Functional evaluation: the Barthel Index. [address] [facility] ([address] (1997). The Barthel activities of daily living index: self-reporting versus actual performance in the old (> or = 75 years). Journal of [address] [place] [address] 45(7), [facility], J.J.M.F, Kathaleen Boas., Damaris Hector. (). Measuring the change in disability after inpatient rehabilitation; comparison of the responsiveness of the Barthel Index and Functional [place] Measure.  Journal of Neurology, Neurosurgery, and Psychiatry, 66(4), 134-781. CRISPIN Payne, ERICA Weir, & Joellen Starkey M.A. (2004) Assessment of post-stroke quality of life in cost-effectiveness studies: The usefulness of the Barthel Index and the EuroQoL-5D. Quality of Life Research, 13, 427-43       Pain Rating:      Activity Tolerance:   Fair  Room air    After treatment patient left in no apparent distress:    Supine in bed, Call bell within reach, and Side rails x 3        Home safety education was provided and the patient/caregiver indicated understanding., Patient/family have participated as able in goal setting and plan of care. , and Patient/family agree to work toward stated goals and plan of care. This patients plan of care is appropriate for delegation to [facility].     Thank you for this referral.  Francisco Javier Lowry  Time Calculation: 21 mins

## 2021-12-27 NOTE — PROGRESS NOTES
12/27/21 0846   Oxygen Therapy   O2 Sat (%) 100 %   Pulse via Oximetry 75 beats per minute   O2 Device Nasal cannula   O2 Flow Rate (L/min) 1 l/min  (Patient asked not to be wean off her 1 liter)   FIO2 (%) 24 %   patient refused Albuterol stated she uses it only for a rescue drug.

## 2021-12-27 NOTE — PROGRESS NOTES
[address] [facility] Adult  Hospitalist Group                                                                                          Hospitalist Progress Note  Elio Meigs, MD  Answering service: [phone] from in house phone        Date of Service:  2021  NAME:  Cristel Garcia  :  1945  MRN:  [id number]      Admission Summary:     Poonam Castle a 64 y. o. female  with past medical history of eosinophilic asthma, depression, fibromyalgia, GERD, hypertension, hypothyroidism, bilateral shoulder arthritis, who comes in for the above. Ericka Pearl was recently discharged on the  of this month to [facility] after being in the hospital and in rehab due to weakness and dehydration.  Per the patient's daughter the patient called her last night around 11 PM to say that something was different and she had fallen.  The patient remembers the phone couple does not remember much.  The patient says that she has felt weak ever since she was hospitalized before and this did not get better at the inpatient rehab and SNF. Mal Rosario says that her right lower extremity feels different and is weak.  The daughter reports that when she got the phone call the patient said that her right lower extremity was weak so she fell.  When EMS arrived they had to help her walk and saw that her right lower extremity was not responding like her left lower extremity.  The patient does report some weakness in the right upper extremity as well.  She denies any facial droop, facial weakness, numbness, and others.  Her daughter denies that the patient had any type of aphasia or dysarthria when she called her. Fidel Heller patient's daughter does report that the patient's right upper extremity has been trembling more than normal.  The patient reports a \"weird\" sensation in her right lower extremity that still has not resolved, but denies any actual weakness at this time.     In the ER patient's labs were essentially normal except for LOREN and CT of the head and CTAs of head and neck were normal.  Teleneurology was consulted and code stroke was called.  Patient is out of the window for TPA. Interval history / Subjective:     Headache 7/10, on and off shortness of breath, no numbness or weakness of extremities     Assessment & Plan:     Acute CVA/TIA  -on aspirin, plavix and lipitor  -CT head no acute intracranial abnormality. Chronic small vessel ischemic disease.   -CTA head no evidence of large vessel occlusion or significant vascular disease   -CT head perfusion no perfusion defect  -MRI head pending  -Echo LV EF 50-55% normal wall motion,   -A1c 5.2  -PT/OT/Speech  -continue neuro check and supportive care  -Neurologist on board    COVID 19 positive  -SpO2 % on RA  -no fever or leukocytosis  -chest x ray no acute finding,   -monitor pulse ox     Dehydration  -on Normal saline at 100 mils per hour  -monitor electrolyte      LOREN  -creatinine improving from 1.53 to 1.02  -avoid nephrotoxin   -monitor renal function     Hypokalemia  -received KCL and resolved     Chronic asthma  -not bronchospastic, Continue patient's Symbicort, Pulmicort, albuterol     Hypothyroidism  -Continue levothyroxine     Chronic pain  -stable, continue tylenol prn      HTN  Allowing permissive hypertension  -Patient is on losartan at home, but will hold this due to permissive hypertension at least for 24 hours  -BP stable, monitor BP        Code status: Full Code  DVT prophylaxis:lovenox    Care Plan discussed with: Patient/Family, Nurse and   Anticipated Disposition: SNF/LTC  Anticipated Discharge: Greater than 48 hours     Hospital Problems  Date Reviewed: 12/4/2021          Codes Class Noted POA    TIA (transient ischemic attack) ICD-10-CM: G45.9  ICD-9-CM: 435.9  12/25/2021 Unknown        CVA (cerebral vascular accident) [facility]) ICD-10-CM: I63.9  ICD-9-CM: 434.91  12/25/2021 Unknown        LOREN (acute kidney injury) ([facility] [address] ICD-10-CM: N17.9  ICD-9-CM: 584.9 12/1/2021 Yes        Other chronic pain ICD-10-CM: G89.29  ICD-9-CM: 338.29  Unknown Yes        HTN (hypertension) (Chronic) ICD-10-CM: I10  ICD-9-CM: 401.9  7/8/2011 Yes        Asthma (Chronic) ICD-10-CM: J45.909  ICD-9-CM: 493.90  7/8/2011 Yes        Hypothyroidism ICD-10-CM: E03.9  ICD-9-CM: 244.9  7/8/2011 Yes        Dehydration ICD-10-CM: E86.0  ICD-9-CM: 276.51  7/7/2011 Yes                     Vital Signs:    Last 24hrs VS reviewed since prior progress note. Most recent are:  Visit Vitals  BP (!) 166/84   Pulse 83   Temp 97.7 °F (36.5 °C)   Resp 17   Ht 5' (1.524 m)   Wt 75 kg (165 lb 5.5 oz)   SpO2 98%   BMI 32.29 kg/m²         Intake/Output Summary (Last 24 hours) at 12/27/2021 1053  Last data filed at 12/27/2021 0946  Gross per 24 hour   Intake    Output 1101 ml   Net -1101 ml        Physical Examination:     I had a face to face encounter with this patient and independently examined them on 12/27/2021 as outlined below:          Constitutional:  No acute distress, cooperative, pleasant    ENT:  Oral mucosa moist, oropharynx benign. Resp:  CTA bilaterally. No wheezing/rhonchi/rales. No accessory muscle use   CV:  Regular rhythm, normal rate, no murmurs, gallops, rubs    GI:  Soft, non distended, non tender.  normoactive bowel sounds, no hepatosplenomegaly     Musculoskeletal:  No edema     Neurologic:  Conscious and alert, well oriented, motor 5/5,  CN II-XII reviewed            Data Review:    Review and/or order of clinical lab test  Review and/or order of tests in the radiology section of CPT  Review and/or order of tests in the medicine section of CPT      Labs:     Recent Labs     12/27/21  0956 12/26/21  0203   WBC 9.2 10.0   HGB 12.1 11.3*   HCT 38.7 36.6    232     Recent Labs     12/27/21  0956 12/26/21  0203 12/25/21  1243    138 134*   K 3.8  3.7 3.0* 3.5    104 99   CO2 27 28 27   BUN 13 15 20   CREA 1.02 1.23* 1.53*   * 100 110*   CA 9.3 8.8 9.5     Recent Labs 12/25/21  1243   ALT 25   AP 70   TBILI 0.4   TP 6.1*   ALB 3.1*   GLOB 3.0     Recent Labs     12/25/21  1243   INR 1.0   PTP 10.4      No results for input(s): FE, TIBC, PSAT, FERR in the last 72 hours. No results found for: FOL, RBCF   No results for input(s): PH, PCO2, PO2 in the last 72 hours. No results for input(s): CPK, CKNDX, TROIQ in the last 72 hours.     No lab exists for component: CPKMB  No results found for: CHOL, CHOLX, CHLST, CHOLV, HDL, HDLP, LDL, LDLC, DLDLP, TGLX, TRIGL, TRIGP, CHHD, CHHDX  Lab Results   Component Value Date/Time    Glucose (POC) 112 12/25/2021 12:31 PM    Glucose (POC) 126 (H) 02/14/2021 04:29 PM    Glucose (POC) 103 (H) 02/14/2021 11:04 AM    Glucose (POC) 72 02/14/2021 06:15 AM    Glucose (POC) 95 02/13/2021 09:14 PM     Lab Results   Component Value Date/Time    Color DARK YELLOW 11/30/2021 10:45 PM    Appearance TURBID (A) 11/30/2021 10:45 PM    Specific gravity 1.020 11/30/2021 10:45 PM    Specific gravity 1.020 02/08/2021 06:45 PM    pH (UA) 5.0 11/30/2021 10:45 PM    Protein Negative 11/30/2021 10:45 PM    Glucose Negative 11/30/2021 10:45 PM    Ketone Negative 11/30/2021 10:45 PM    Bilirubin Negative 11/30/2021 10:45 PM    Urobilinogen 0.2 11/30/2021 10:45 PM    Nitrites Negative 11/30/2021 10:45 PM    Leukocyte Esterase Negative 11/30/2021 10:45 PM    Epithelial cells MANY (A) 11/30/2021 10:45 PM    Bacteria 1+ (A) 11/30/2021 10:45 PM    WBC 10-20 11/30/2021 10:45 PM    RBC 5-10 11/30/2021 10:45 PM         Medications Reviewed:     Current Facility-Administered Medications   Medication Dose Route Frequency    potassium chloride SR (KLOR-CON 10) tablet 40 mEq  40 mEq Oral Q4H    buPROPion SR (WELLBUTRIN SR) tablet 200 mg  200 mg Oral Q12H    tiotropium bromide (SPIRIVA RESPIMAT) 2.5 mcg /actuation  2 Puff Inhalation DAILY    budesonide-formoteroL (SYMBICORT) 160-4.5 mcg/actuation HFA inhaler 2 Puff  2 Puff Inhalation BID RT    albuterol (PROVENTIL HFA, VENTOLIN HFA, PROAIR HFA) inhaler 1 Puff  1 Puff Inhalation Q6H PRN    enoxaparin (LOVENOX) injection 40 mg  40 mg SubCUTAneous Q12H    potassium chloride SR (KLOR-CON 10) tablet 10 mEq  10 mEq Oral DAILY    0.9% sodium chloride infusion  100 mL/hr IntraVENous CONTINUOUS    sodium chloride (NS) flush 5-40 mL  5-40 mL IntraVENous Q8H    sodium chloride (NS) flush 5-40 mL  5-40 mL IntraVENous PRN    acetaminophen (TYLENOL) tablet 650 mg  650 mg Oral Q6H PRN    Or    acetaminophen (TYLENOL) suppository 650 mg  650 mg Rectal Q6H PRN    polyethylene glycol (MIRALAX) packet 17 g  17 g Oral DAILY PRN    ondansetron (ZOFRAN ODT) tablet 4 mg  4 mg Oral Q8H PRN    Or    ondansetron (ZOFRAN) injection 4 mg  4 mg IntraVENous Q6H PRN    levothyroxine (SYNTHROID) tablet 125 mcg  125 mcg Oral DAILY    montelukast (SINGULAIR) tablet 10 mg  10 mg Oral QHS    gabapentin (NEURONTIN) capsule 300 mg  300 mg Oral BID    cholecalciferol (VITAMIN D3) (1000 Units /25 mcg) tablet 1,000 Units  1,000 Units Oral DAILY    vitamin B complex tablet  1 Tablet Oral DAILY    cetirizine (ZYRTEC) tablet 10 mg  10 mg Oral DAILY PRN    therapeutic multivitamin (THERAGRAN) tablet 1 Tablet  1 Tablet Oral DAILY    latanoprost (XALATAN) 0.005 % ophthalmic solution 1 Drop  1 Drop Both Eyes QHS    pantoprazole (PROTONIX) tablet 40 mg  40 mg Oral ACB    guaiFENesin ER (MUCINEX) tablet 600 mg  600 mg Oral BID PRN    predniSONE (DELTASONE) tablet 20 mg  20 mg Oral DAILY WITH BREAKFAST    clopidogreL (PLAVIX) tablet 75 mg  75 mg Oral DAILY    aspirin chewable tablet 81 mg  81 mg Oral DAILY    atorvastatin (LIPITOR) tablet 80 mg  80 mg Oral QHS    hydrALAZINE (APRESOLINE) 20 mg/mL injection 10 mg  10 mg IntraVENous Q6H PRN     ______________________________________________________________________  EXPECTED LENGTH OF STAY: - - -  ACTUAL LENGTH OF STAY:          0                 Kurt Rubalcava MD

## 2021-12-27 NOTE — PROGRESS NOTES
Bedside shift change report given to  (oncoming nurse) by  Mendez nicole). Report included the following information SBAR. Tolerated meds meals an daily care with no issues.

## 2021-12-27 NOTE — PROGRESS NOTES
The patient lost her iv access, I attempted 3 times and was unable to gain access. Another nurse could not get an iv as well. I will ask another nurse to try also.

## 2021-12-27 NOTE — CONSULTS
[address]    Name:  Jm Delcid  MR#:  [id number]  :  1945  ACCOUNT #:  [de-identified]  DATE OF SERVICE:  2021    NEUROLOGY CONSULTATION    HISTORY OF PRESENT ILLNESS:  This is a 75-year-old female, who was admitted on 2021, with complaints of left lower extremity weakness and numbness resulting in a fall on . When she awoke yesterday, she still felt weak and in the ED, reported possible right upper extremity numbness. Her NIH stroke scale score was noted to be 3 based on left arm weakness, but has a history of shoulder replacement and chronic issues with right arm weakness and numbness. CT of the head, showed chronic ischemic white matter disease and CTA of the head and neck which was unremarkable. CTP negative. The patient had recent admission on 2021, through 2021, for complaints of right ankle pain and her leg giving out with low back pain and leg weakness causing recurrent falls. She had a CT of her L-spine during that admission, which showed no acute changes. She had an intact posterior fusion hardware from L3-S1 and diffuse degenerative changes. CT of the head on , did not show any acute changes. 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 It was recommended she see a specialist regarding her ongoing spinal issues. Additionally, at an [address] [place] [address] visit from 10/12/2021, the patient was complaining of right shoulder pain as well as right upper extremity numbness and tingling throughout her arm. When further questioned today, the patient agrees that her extremity weakness and numbness is not new nor are her falls, believes she has been falling since the spring.   I am asked to see the patient for a stroke evaluation as an explanation for her right-sided complaints. The patient was discharged from [facility] on Thursday, 12/23, after her last admission. For some reason today, COVID test was checked and was positive, although the patient is seemingly asymptomatic, does report, while she was at [facility], towards the end of her stay, she had a headache for 2 days and nausea and decreased p.o. intake for 2 days, but no other symptoms. No new shortness of breath over her baseline difficulties from asthma. She is triple vaccinated. PAST MEDICAL HISTORY:  1. Hypertension. 2.  Orthostatic hypotension. 3.  Fibromyalgia. 4.  Depression. 5.  Gastroesophageal reflux disease. 6.  Asthma. 7.  Hypothyroidism. 8.  Rotator cuff tear on the left, status post shoulder replacement. 9.  Cholecystectomy. 10.  Tonsillectomy and adenoidectomy. 11.  Cervical spinal fusion. 12.  Lumbar spinal fusion. 13.  Acute kidney injury and UTI in 11/2021. 14.  Peripheral neuropathy. 15.  Glaucoma. REVIEW OF SYSTEMS:  As per past medical history and HPI, otherwise, reviewed and negative. MEDICATIONS AT HOME:  The patient has 30 medications listed including;  1. Losartan. 2.  Levothyroxine. 3.  Omnicef, although she was discharged on this at the last visit. 4.  Elizabeth-Q 2.  5.  Vitamin B complex. 6.  Claritin. 7.  Mucinex. 8.  Pulmicort. 9.  PreviDent cream.  10.  Xalatan. 11.  Multivitamins. 12.  Benadryl. 13.  Mobic. 14.  Xolair. 15.  Spiriva. 16.  Prednisone. 17.  Singulair. 18.  Albuterol. 19.  Gabapentin 300 mg twice a day. 20.  Proventil. 21.  Calcium carbonate. 22.  Vitamin D.  23.  MiraLax. 24.  Tylenol. 25.  Sudafed. 26.  Prevacid. 27.  Wellbutrin- mg a day. 28.  Trazodone. 29.  Symbicort. 30.  Restasis eyedrops. ALLERGIES:  PENICILLIN. SOCIAL HISTORY:  She lives in [address] [place] [address]. Meals and housekeeping are provided. She quit smoking at age 26years old. No drug or alcohol use, does report using CBD THC cream for pain. FAMILY HISTORY:  Father  with heart failure and aspiration pneumonia. Mother  with Alzheimer's dementia. PHYSICAL EXAMINATION:  VITAL SIGNS:  Blood pressure 136/77, respiratory rate 100% on room air, temperature is 98, BMI of 32. GENERAL:  She is a well-nourished, well-developed, healthy-appearing female lying in bed in no distress, not requiring any supplemental O2. HEART:  Regular rate and rhythm. No murmurs. Carotids are 2+, no bruits. EXTREMITIES:  Warm. No edema. She has 2+ radial pulses. NEUROLOGIC EXAMINATION:  Mental status; she is alert and oriented x4. Speech and language intact. Attention, memory, and fund of knowledge appropriate. Cranial nerve examination shows no facial asymmetry or ptosis. Extraocular eye movements intact without diplopia or nystagmus. Visual fields are full. Pupils are equally round and reactive. Tongue midline. Palate elevates symmetrically. Trapezius and sternocleidomastoid are 5/5. Motor exam is 5/5 throughout except for chronic left foot drop related to peroneal injury. No pronator drift. No tremor. Sensory exam; intact to pinprick throughout. Reflexes are diminished in the lower extremities, 2+ in the upper extremities. Toes are downgoing. Coordination is intact finger-to-nose, rapid alternating movements. Gait is not assessed at this time. STUDIES AND REPORTS:  Reviewed above in the HPI.     ASSESSMENT/PLAN:  This is a 68-year-old female with multiple falls over the last year with complaints that her right leg was giving out and right upper extremity was numb, but has chronic complaints of bilateral lower extremity weakness and right upper extremity numbness and tingling with a history of failed back surgical syndrome, followed at [facility], incidentally found to be COVID-19 positive and exam with left foot drop and decreased reflexes in the lower extremities, otherwise, unremarkable. I have a low suspicion for a stroke at this time given similar complaints since at least 09/2021; however, agree with planned MRI of the brain to fully exclude a stroke. Unfortunately, this may be delayed due to her COVID positive status; however, she is only in the hospital because of stroke symptoms and needs her MRI and if negative, could potentially be discharged given that her COVID infection is seemingly asymptomatic. This was discussed with Hospitalist caring for the patient.       MD BRITTA [email or web address]  D:  12/26/2021 23:20  T:  12/27/2021 3:53  JOB #:  [id number]

## 2021-12-27 NOTE — PROGRESS NOTES
SPEECH PATHOLOGY BEDSIDE SWALLOW EVALUATION/DISCHARGE  Patient: Alysha Valerio (37 y.o. female)  Date: 12/27/2021  Primary Diagnosis: CVA (cerebral vascular accident) ([facility] [I63.9]  TIA (transient ischemic attack) [G45.9]       Precautions:   Fall,Skin (droplet plus)    ASSESSMENT :  Based on the objective data described below, the patient presents with functional oropharyngeal swallow with no difficulties or s/s of aspiration appreciated at bedside with any consistencies. Patient denies any speech or swallowing concerns and has been tolerating a regular diet/thin liquids. Given bedside presentation, recommend continue regular diet/thin liquids with general aspiration precautions. Suspect pt is at baseline swallow function and therefore, skilled acute therapy provided by a speech-language pathologist is not indicated at this time. SLP will sign off. Please reconsult with any changes or if SLP can be of any further assistance.      PLAN :  Recommendations:  -- regular diet/ thin liquids  -- general aspiration precautions including completely upright for all PO   -- SLP will sign off     Discharge Recommendations: None     SUBJECTIVE:   Patient stated No, no problems at all. I just need to get my asthma under control.     OBJECTIVE:     Past Medical History:   Diagnosis Date    Asthma     Depression     Fibromyalgia     GERD (gastroesophageal reflux disease)     Hypertension     Hypothyroid     Menopause 1995    Rotator cuff tear      Past Surgical History:   Procedure Laterality Date    HX CHOLECYSTECTOMY      HX GYN      HX ORTHOPAEDIC      HX TONSIL AND ADENOIDECTOMY       Prior Level of Function/Home Situation:   Home Situation  Home Environment: [place] [address] Name: Danyell Gonzalez  Living Alone: Yes  Support Systems:  (staff for meals and housekeeping)  Current DME Used/Available at Home: Grab bars,Walker, rollator (reacher)  Diet prior to admission: regular/thin  Current Diet: Regular/thin   Cognitive and Communication Status:  Neurologic State: Alert  Orientation Level: Oriented X4,Appropriate for age  Cognition: Follows commands,Appropriate for age attention/concentration  Perception: Appears intact  Perseveration: No perseveration noted  Safety/Judgement: Awareness of environment  Oral Assessment:  Oral Assessment  Dentition: Intact; Natural  Oral Hygiene: moist oral mucosa martha eof secretions  Lingual: No impairment  Velum: No impairment  Mandible: No impairment  P.O. Trials:  Patient Position: upright in bed  Vocal quality prior to P.O.: No impairment  Consistency Presented: Thin liquid;Puree; Solid  How Presented: Self-fed/presented     Bolus Acceptance: No impairment  Bolus Formation/Control: No impairment     Propulsion: No impairment  Oral Residue: None  Initiation of Swallow: No impairment  Laryngeal Elevation: Functional  Aspiration Signs/Symptoms: None  Pharyngeal Phase Characteristics: No impairment, issues, or problems              Oral Phase Severity: No impairment  Pharyngeal Phase Severity : No impairment  NOMS:   The NOMS functional outcome measure was used to quantify this patient's level of swallowing impairment. Based on the NOMS, the patient was determined to be at level 7 for swallow function     NOMS Swallowing Levels:  Level 1 (CN): NPO  Level 2 (CM): NPO but takes consistency in therapy  Level 3 (CL): Takes less than 50% of nutrition p.o. and continues with nonoral feedings; and/or safe with mod cues; and/or max diet restriction  Level 4 (CK): Safe swallow but needs mod cues; and/or mod diet restriction; and/or still requires some nonoral feeding/supplements  Level 5 (CJ): Safe swallow with min diet restriction; and/or needs min cues  Level 6 (CI): Independent with p.o.; rare cues; usually self cues; may need to avoid some foods or needs extra time  Level 7 ([address] [place] [address]): Independent for all p.o.  DAE. (2003).  National Outcomes Measurement System (NOMS): Adult Speech-Language Pathology User's Guide. Pain:  Pain Scale 1: Numeric (0 - 10)  Pain Intensity 1: 0     After treatment:   Patient left in no apparent distress in bed, Call bell within reach and Nursing notified    COMMUNICATION/EDUCATION:     The patient's plan of care including recommendations, planned interventions, and recommended diet changes were discussed with: Registered nurse.      Thank you for this referral.  Linda Queen M.S. CF-SLP   Speech Language Pathologist     Time Calculation: 15 mins

## 2021-12-28 ENCOUNTER — APPOINTMENT (OUTPATIENT)
Dept: MRI IMAGING | Age: 76
DRG: 091 | End: 2021-12-28
Attending: STUDENT IN AN ORGANIZED HEALTH CARE EDUCATION/TRAINING PROGRAM
Payer: MEDICARE

## 2021-12-28 PROCEDURE — 70551 MRI BRAIN STEM W/O DYE: CPT

## 2021-12-28 PROCEDURE — 96372 THER/PROPH/DIAG INJ SC/IM: CPT

## 2021-12-28 PROCEDURE — 97116 GAIT TRAINING THERAPY: CPT

## 2021-12-28 PROCEDURE — 74011636637 HC RX REV CODE- 636/637: Performed by: STUDENT IN AN ORGANIZED HEALTH CARE EDUCATION/TRAINING PROGRAM

## 2021-12-28 PROCEDURE — 77010033678 HC OXYGEN DAILY

## 2021-12-28 PROCEDURE — A9270 NON-COVERED ITEM OR SERVICE: HCPCS | Performed by: STUDENT IN AN ORGANIZED HEALTH CARE EDUCATION/TRAINING PROGRAM

## 2021-12-28 PROCEDURE — 74011250637 HC RX REV CODE- 250/637: Performed by: STUDENT IN AN ORGANIZED HEALTH CARE EDUCATION/TRAINING PROGRAM

## 2021-12-28 PROCEDURE — G0378 HOSPITAL OBSERVATION PER HR: HCPCS

## 2021-12-28 PROCEDURE — 94640 AIRWAY INHALATION TREATMENT: CPT

## 2021-12-28 PROCEDURE — 74011250637 HC RX REV CODE- 250/637: Performed by: HOSPITALIST

## 2021-12-28 PROCEDURE — 74011250636 HC RX REV CODE- 250/636: Performed by: STUDENT IN AN ORGANIZED HEALTH CARE EDUCATION/TRAINING PROGRAM

## 2021-12-28 RX ORDER — LOSARTAN POTASSIUM 25 MG/1
25 TABLET ORAL DAILY
Status: DISCONTINUED | OUTPATIENT
Start: 2021-12-28 | End: 2022-01-01

## 2021-12-28 RX ORDER — LORAZEPAM 1 MG/1
1 TABLET ORAL ONCE
Status: ACTIVE | OUTPATIENT
Start: 2021-12-28 | End: 2021-12-28

## 2021-12-28 RX ADMIN — SODIUM CHLORIDE, PRESERVATIVE FREE 10 ML: 5 INJECTION INTRAVENOUS at 06:55

## 2021-12-28 RX ADMIN — ACETAMINOPHEN 650 MG: 325 TABLET ORAL at 23:19

## 2021-12-28 RX ADMIN — THERA TABS 1 TABLET: TAB at 09:21

## 2021-12-28 RX ADMIN — SODIUM CHLORIDE, PRESERVATIVE FREE 10 ML: 5 INJECTION INTRAVENOUS at 23:19

## 2021-12-28 RX ADMIN — LATANOPROST 1 DROP: 50 SOLUTION OPHTHALMIC at 21:09

## 2021-12-28 RX ADMIN — CLOPIDOGREL BISULFATE 75 MG: 75 TABLET ORAL at 09:20

## 2021-12-28 RX ADMIN — GUAIFENESIN 600 MG: 600 TABLET, EXTENDED RELEASE ORAL at 13:04

## 2021-12-28 RX ADMIN — Medication 2 PUFF: at 08:43

## 2021-12-28 RX ADMIN — GABAPENTIN 300 MG: 100 CAPSULE ORAL at 18:44

## 2021-12-28 RX ADMIN — POTASSIUM CHLORIDE 10 MEQ: 750 TABLET, FILM COATED, EXTENDED RELEASE ORAL at 09:21

## 2021-12-28 RX ADMIN — HYDROCODONE BITARTRATE AND ACETAMINOPHEN 1 TABLET: 5; 325 TABLET ORAL at 05:41

## 2021-12-28 RX ADMIN — ONDANSETRON 4 MG: 4 TABLET, ORALLY DISINTEGRATING ORAL at 18:47

## 2021-12-28 RX ADMIN — PREDNISONE 20 MG: 20 TABLET ORAL at 09:20

## 2021-12-28 RX ADMIN — ALBUTEROL SULFATE 1 PUFF: 90 AEROSOL, METERED RESPIRATORY (INHALATION) at 18:53

## 2021-12-28 RX ADMIN — SODIUM CHLORIDE, PRESERVATIVE FREE 10 ML: 5 INJECTION INTRAVENOUS at 13:04

## 2021-12-28 RX ADMIN — Medication 2 PUFF: at 21:08

## 2021-12-28 RX ADMIN — LEVOTHYROXINE SODIUM 125 MCG: 0.12 TABLET ORAL at 09:20

## 2021-12-28 RX ADMIN — ACETAMINOPHEN 650 MG: 325 TABLET ORAL at 10:34

## 2021-12-28 RX ADMIN — SODIUM CHLORIDE 100 ML/HR: 9 INJECTION, SOLUTION INTRAVENOUS at 10:34

## 2021-12-28 RX ADMIN — PANTOPRAZOLE SODIUM 40 MG: 40 TABLET, DELAYED RELEASE ORAL at 06:55

## 2021-12-28 RX ADMIN — GABAPENTIN 300 MG: 100 CAPSULE ORAL at 09:20

## 2021-12-28 RX ADMIN — Medication 1000 UNITS: at 09:20

## 2021-12-28 RX ADMIN — LOSARTAN POTASSIUM 25 MG: 25 TABLET, FILM COATED ORAL at 18:44

## 2021-12-28 RX ADMIN — BUPROPION HYDROCHLORIDE 200 MG: 100 TABLET, FILM COATED, EXTENDED RELEASE ORAL at 21:08

## 2021-12-28 RX ADMIN — ENOXAPARIN SODIUM 40 MG: 100 INJECTION SUBCUTANEOUS at 21:07

## 2021-12-28 RX ADMIN — ATORVASTATIN CALCIUM 80 MG: 40 TABLET, FILM COATED ORAL at 21:08

## 2021-12-28 RX ADMIN — BUPROPION HYDROCHLORIDE 200 MG: 100 TABLET, FILM COATED, EXTENDED RELEASE ORAL at 09:20

## 2021-12-28 RX ADMIN — ENOXAPARIN SODIUM 40 MG: 100 INJECTION SUBCUTANEOUS at 09:21

## 2021-12-28 RX ADMIN — MONTELUKAST 10 MG: 10 TABLET, FILM COATED ORAL at 21:08

## 2021-12-28 RX ADMIN — ASPIRIN 81 MG CHEWABLE TABLET 81 MG: 81 TABLET CHEWABLE at 09:21

## 2021-12-28 RX ADMIN — Medication 1 TABLET: at 09:21

## 2021-12-28 NOTE — PROGRESS NOTES
Reason for Admission:  Admitted from [facility] independent living with complaints of weakness and reports a fall prior to admit. At baseline she gets assist with meals and housekeeping. She manages her own medications and does self care. She uses a walker to assist ambulation. RUR Score:  Observation                  Plan for utilizing home health:  recommendation are for home health. PCP: First and Last name:  Karina Mo MD  Are you a current patient: Yes/No: YES  Approximate date of last visit:  11/10  Can you participate in a virtual visit with your PCP: NO                    Current Advanced Directive/Advance Care   Plan: Full Code  Healthcare Decision Maker:         Primary Decision Maker: Omayra Anila - Child - [phone]                  Transition of Care Plan:    CVA ruled out  And Neurology following. Will discuss home health choices with patient. Family will likely transport. Possibly home today. Medicare Outpatient Observation Notice (MOON) provided to patient/representative with verbal explanation of the notice. Time allotted for questions regarding the notice. Patient /representative provided a completed copy of the MOON notice. Copy placed on bedside chart.

## 2021-12-28 NOTE — PROGRESS NOTES
[address] [facility]  Hospitalist Group                                                                                          Hospitalist Progress Note  Rosalia Cevallos MD  Answering service: [phone] from in house phone        Date of Service:  2021  NAME:  Lawson Stoddard  :  1945  MRN:  [id number]      Admission Summary:     Bandar reed 64 y. o. female  with past medical history of eosinophilic asthma, depression, fibromyalgia, GERD, hypertension, hypothyroidism, bilateral shoulder arthritis, who comes in for right lower weakness and fall .  Patient was recently discharged on the  of this month to [facility] after being in the hospital and in rehab due to weakness and dehydration.  Per the patient's daughter the patient called her last night around 11 PM to say that something was different and she had fallen.  The patient remembers the phone couple does not remember much.  The patient says that she has felt weak ever since she was hospitalized before and this did not get better at the inpatient rehab and SNF. Aashish Loco says that her right lower extremity feels different and is weak.  The daughter reports that when she got the phone call the patient said that her right lower extremity was weak so she fell.  When EMS arrived they had to help her walk and saw that her right lower extremity was not responding like her left lower extremity.  The patient does report some weakness in the right upper extremity as well.  She denies any facial droop, facial weakness, numbness, and others.  Her daughter denies that the patient had any type of aphasia or dysarthria when she called her. Barrett Phoenix patient's daughter does report that the patient's right upper extremity has been trembling more than normal.  The patient reports a \"weird\" sensation in her right lower extremity that still has not resolved, but denies any actual weakness at this time.     In the ER patient's labs were essentially normal except for LOREN and CT of the head and CTAs of head and neck were normal.  Teleneurology was consulted and code stroke was called.  Patient is out of the window for TPA. Interval history / Subjective:     Headache 7/10, on and off shortness of breath, no numbness or weakness of extremities     Assessment & Plan:     Right lower extremities weakness and fall  -on aspirin, plavix and lipitor  -CT head no acute intracranial abnormality. Chronic small vessel ischemic disease.   -CTA head no evidence of large vessel occlusion or significant vascular disease   -CT head perfusion no perfusion defect  -MRI head no evidence of acute infarct, at least moderate to severe spinal canal stenosis at the craniocervical junction due to extensive retrodental pannus formation.  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 MRI  -hx of s/p Posterior cervicothoracic fusion, C2-T2, using cancellous allograft, demineralized bone matrix, and [employer] CerviFix instrumentation on 07/6/2010  -patient with neck pain worse with movement, recurrent fall, and difficult to stands up  -consult to spine surgery     COVID 19 positive  -SpO2 % on RA  -no fever or leukocytosis  -chest x ray no acute finding,   -monitor pulse ox     Dehydration  -improved, d/c IVF  -monitor electrolyte      LOREN  -creatinine improving from 1.53 to 1.02  -avoid nephrotoxin   -monitor renal function     Hypokalemia  -replaced and resolved     Chronic asthma  -not bronchospastic, Continue patient's Symbicort, Pulmicort, albuterol     Hypothyroidism  -Continue levothyroxine     Chronic pain  -stable, continue tylenol prn      HTN  -BP not at goal, add losartan, monitor BP    Hx of chronic RA  -on prednisone        Code status: Full Code  DVT prophylaxis:lovenox    Care Plan discussed with: Patient/Family, Nurse and   Anticipated Disposition: SNF/LTC  Anticipated Discharge: Greater than 48 hours     Hospital Problems  Date Reviewed: 12/4/2021          Codes Class Noted POA    TIA (transient ischemic attack) ICD-10-CM: G45.9  ICD-9-CM: 435.9  12/25/2021 Unknown        CVA (cerebral vascular accident) [facility]) ICD-10-CM: I63.9  ICD-9-CM: 434.91  12/25/2021 Unknown        LOREN (acute kidney injury) ([facility] ICD-10-CM: N17.9  ICD-9-CM: 584.9  12/1/2021 Yes        Other chronic pain ICD-10-CM: G89.29  ICD-9-CM: 338.29  Unknown Yes        HTN (hypertension) (Chronic) ICD-10-CM: I10  ICD-9-CM: 401.9  7/8/2011 Yes        Asthma (Chronic) ICD-10-CM: J45.909  ICD-9-CM: 493.90  7/8/2011 Yes        Hypothyroidism ICD-10-CM: E03.9  ICD-9-CM: 244.9  7/8/2011 Yes        Dehydration ICD-10-CM: E86.0  ICD-9-CM: 276.51  7/7/2011 Yes                     Vital Signs:    Last 24hrs VS reviewed since prior progress note. Most recent are:  Visit Vitals  BP (!) 150/99 (BP 1 Location: Left upper arm, BP Patient Position: At rest)   Pulse 92   Temp 98 °F (36.7 °C)   Resp 18   Ht 5' (1.524 m)   Wt 75 kg (165 lb 5.5 oz)   SpO2 99%   BMI 32.29 kg/m²         Intake/Output Summary (Last 24 hours) at 12/28/2021 1655  Last data filed at 12/28/2021 0919  Gross per 24 hour   Intake    Output 1500 ml   Net -1500 ml        Physical Examination:     I had a face to face encounter with this patient and independently examined them on 12/28/2021 as outlined below:          Constitutional:  No acute distress, cooperative, pleasant    ENT:  Oral mucosa moist, oropharynx benign. Resp:  CTA bilaterally. No wheezing/rhonchi/rales. No accessory muscle use   CV:  Regular rhythm, normal rate, no murmurs, gallops, rubs    GI:  Soft, non distended, non tender.  normoactive bowel sounds, no hepatosplenomegaly     Musculoskeletal:  No edema     Neurologic:  Conscious and alert, well oriented, motor 5/5,  CN II-XII reviewed Data Review:    Review and/or order of clinical lab test  Review and/or order of tests in the radiology section of CPT  Review and/or order of tests in the medicine section of CPT      Labs:     Recent Labs     12/27/21  0956 12/26/21  0203   WBC 9.2 10.0   HGB 12.1 11.3*   HCT 38.7 36.6    232     Recent Labs     12/27/21  0956 12/26/21  0203    138   K 3.8  3.7 3.0*    104   CO2 27 28   BUN 13 15   CREA 1.02 1.23*   * 100   CA 9.3 8.8     No results for input(s): ALT, AP, TBIL, TBILI, TP, ALB, GLOB, GGT, AML, LPSE in the last 72 hours. No lab exists for component: SGOT, GPT, AMYP, HLPSE  No results for input(s): INR, PTP, APTT, INREXT, INREXT in the last 72 hours. No results for input(s): FE, TIBC, PSAT, FERR in the last 72 hours. No results found for: FOL, RBCF   No results for input(s): PH, PCO2, PO2 in the last 72 hours. No results for input(s): CPK, CKNDX, TROIQ in the last 72 hours.     No lab exists for component: CPKMB  No results found for: CHOL, CHOLX, CHLST, CHOLV, HDL, HDLP, LDL, LDLC, DLDLP, TGLX, TRIGL, TRIGP, CHHD, CHHDX  Lab Results   Component Value Date/Time    Glucose (POC) 112 12/25/2021 12:31 PM    Glucose (POC) 126 (H) 02/14/2021 04:29 PM    Glucose (POC) 103 (H) 02/14/2021 11:04 AM    Glucose (POC) 72 02/14/2021 06:15 AM    Glucose (POC) 95 02/13/2021 09:14 PM     Lab Results   Component Value Date/Time    Color DARK YELLOW 11/30/2021 10:45 PM    Appearance TURBID (A) 11/30/2021 10:45 PM    Specific gravity 1.020 11/30/2021 10:45 PM    Specific gravity 1.020 02/08/2021 06:45 PM    pH (UA) 5.0 11/30/2021 10:45 PM    Protein Negative 11/30/2021 10:45 PM    Glucose Negative 11/30/2021 10:45 PM    Ketone Negative 11/30/2021 10:45 PM    Bilirubin Negative 11/30/2021 10:45 PM    Urobilinogen 0.2 11/30/2021 10:45 PM    Nitrites Negative 11/30/2021 10:45 PM    Leukocyte Esterase Negative 11/30/2021 10:45 PM    Epithelial cells MANY (A) 11/30/2021 10:45 PM Bacteria 1+ (A) 11/30/2021 10:45 PM    WBC 10-20 11/30/2021 10:45 PM    RBC 5-10 11/30/2021 10:45 PM         Medications Reviewed:     Current Facility-Administered Medications   Medication Dose Route Frequency    LORazepam (ATIVAN) tablet 1 mg  1 mg Oral ONCE    buPROPion SR (WELLBUTRIN SR) tablet 200 mg  200 mg Oral Q12H    HYDROcodone-acetaminophen (NORCO) 5-325 mg per tablet 1 Tablet  1 Tablet Oral Q4H PRN    tiotropium bromide (SPIRIVA RESPIMAT) 2.5 mcg /actuation  2 Puff Inhalation DAILY    budesonide-formoteroL (SYMBICORT) 160-4.5 mcg/actuation HFA inhaler 2 Puff  2 Puff Inhalation BID RT    albuterol (PROVENTIL HFA, VENTOLIN HFA, PROAIR HFA) inhaler 1 Puff  1 Puff Inhalation Q6H PRN    enoxaparin (LOVENOX) injection 40 mg  40 mg SubCUTAneous Q12H    potassium chloride SR (KLOR-CON 10) tablet 10 mEq  10 mEq Oral DAILY    0.9% sodium chloride infusion  100 mL/hr IntraVENous CONTINUOUS    sodium chloride (NS) flush 5-40 mL  5-40 mL IntraVENous Q8H    sodium chloride (NS) flush 5-40 mL  5-40 mL IntraVENous PRN    acetaminophen (TYLENOL) tablet 650 mg  650 mg Oral Q6H PRN    Or    acetaminophen (TYLENOL) suppository 650 mg  650 mg Rectal Q6H PRN    polyethylene glycol (MIRALAX) packet 17 g  17 g Oral DAILY PRN    ondansetron (ZOFRAN ODT) tablet 4 mg  4 mg Oral Q8H PRN    Or    ondansetron (ZOFRAN) injection 4 mg  4 mg IntraVENous Q6H PRN    levothyroxine (SYNTHROID) tablet 125 mcg  125 mcg Oral DAILY    montelukast (SINGULAIR) tablet 10 mg  10 mg Oral QHS    gabapentin (NEURONTIN) capsule 300 mg  300 mg Oral BID    cholecalciferol (VITAMIN D3) (1000 Units /25 mcg) tablet 1,000 Units  1,000 Units Oral DAILY    vitamin B complex tablet  1 Tablet Oral DAILY    cetirizine (ZYRTEC) tablet 10 mg  10 mg Oral DAILY PRN    therapeutic multivitamin (THERAGRAN) tablet 1 Tablet  1 Tablet Oral DAILY    latanoprost (XALATAN) 0.005 % ophthalmic solution 1 Drop  1 Drop Both Eyes QHS    pantoprazole (PROTONIX) tablet 40 mg  40 mg Oral ACB    guaiFENesin ER (MUCINEX) tablet 600 mg  600 mg Oral BID PRN    predniSONE (DELTASONE) tablet 20 mg  20 mg Oral DAILY WITH BREAKFAST    clopidogreL (PLAVIX) tablet 75 mg  75 mg Oral DAILY    aspirin chewable tablet 81 mg  81 mg Oral DAILY    atorvastatin (LIPITOR) tablet 80 mg  80 mg Oral QHS    hydrALAZINE (APRESOLINE) 20 mg/mL injection 10 mg  10 mg IntraVENous Q6H PRN     ______________________________________________________________________  EXPECTED LENGTH OF STAY: - - -  ACTUAL LENGTH OF STAY:          0                 Kurt Murrell MD

## 2021-12-28 NOTE — PROGRESS NOTES
Problem: Mobility Impaired (Adult and Pediatric)  Goal: *Acute Goals and Plan of Care (Insert Text)  Description:   FUNCTIONAL STATUS PRIOR TO ADMISSION: Patient was modified independent using a rollator for functional mobility in an independent living facility. She reports receiving assist with meals provided and housekeeping. She reports managing her own medications. When asked she endorsed 4-5 falls in the last 12 months. [facility] HOME SUPPORT PRIOR TO ADMISSION: The patient lived alone with support as noted above. Physical Therapy Goals  Initiated 12/26/2021  1. Patient will move from supine to sit and sit to supine , scoot up and down, and roll side to side in bed with independence within 7 day(s). 2.  Patient will transfer from bed to chair and chair to bed with modified independence using the least restrictive device within 7 day(s). 3.  Patient will perform sit to stand with modified independence within 7 day(s). 4.  Patient will ambulate with modified independence for 200 feet with the least restrictive device within 7 day(s). 5.  Patient will improve Soares Balance score by 7 points within 7 days. Outcome: Progressing Towards Goal     PHYSICAL THERAPY TREATMENT  Patient: Rebeca Cesar (85 y.o. female)  Date: 12/28/2021  Diagnosis: [facility]cere[facility] [I63.9]  TIA (transient ischemic attack) [G45.9] <principal problem not specified>       Precautions: Fall,Skin (droplet plus)  Chart, physical therapy assessment, plan of care and goals were reviewed. ASSESSMENT  Patient continues with skilled PT services and is progressing towards goals. Pt tolerates supine to sit with supervision and assist to stand and pivot to [facility]. Pt tolerates standing off toilet with increased balance pushing off handles. Pt able to perform gait to the chair navigating fair through obstacles with RW. Pt able to tolerate standing after rest break to don brief and purewick.  Set pt up in chair with call bell chargers and phone, stressed calling for assist prior to getting up. Current Level of Function Impacting Discharge (mobility/balance): min A    Other factors to consider for discharge:          PLAN :  Patient continues to benefit from skilled intervention to address the above impairments. Continue treatment per established plan of care. to address goals. Recommendation for discharge: (in order for the patient to meet his/her long term goals)  Physical therapy at least 2 days/week in the home     This discharge recommendation:  Has been made in collaboration with the attending provider and/or case management    IF patient discharges home will need the following DME: rolling walker       SUBJECTIVE:   Patient stated I feel much better now, I dont need the oxygen.     OBJECTIVE DATA SUMMARY:   Critical Behavior:  Neurologic State: Alert  Orientation Level: Oriented X4  Cognition: Follows commands  Safety/Judgement: Awareness of environment  Functional Mobility Training:  Bed Mobility:     Supine to Sit: Supervision  Sit to Supine: Supervision           Transfers:  Sit to Stand: Minimum assistance  Stand to Sit: Minimum assistance                             Balance:  Sitting: Intact  Standing: Impaired; Without support  Standing - Static: Fair;Good;Constant support  Standing - Dynamic : Fair;Constant support  Ambulation/Gait Training:  Distance (ft): 18 Feet (ft)  Assistive Device: Gait belt;Walker, rolling  Ambulation - Level of Assistance: Contact guard assistance        Gait Abnormalities: Decreased step clearance        Base of Support: Widened     Speed/[place]: Shuffled; Slow  Step Length: Left shortened;Right shortened      Pain Rating:  C/o gross generalized pain    Activity Tolerance:   Fair and requires rest breaks    After treatment patient left in no apparent distress:   Sitting in chair and Call bell within reach    COMMUNICATION/COLLABORATION:   The patients plan of care was discussed with: Registered nurse and Case management.      Azam Berumen, PT   Time Calculation: 16 mins

## 2021-12-28 NOTE — PROGRESS NOTES
Bedside shift change report given to [address] [facility] (oncoming nurse) by Rosas Mazariegos RN (offgoing nurse). Report included the following information SBAR, Kardex, ED Summary, Intake/Output, MAR, Recent Results and Cardiac Rhythm NSR.

## 2021-12-28 NOTE — PROGRESS NOTES
Bedside shift change report given to Daisy Crocker RN (oncoming nurse) by Alma Delia Chris RN (offgoing nurse). Report included the following information SBAR, Kardex, Intake/Output, MAR, Recent Results and Cardiac Rhythm Sinus Rhythm.

## 2021-12-28 NOTE — PROGRESS NOTES
Problem: Risk for Spread of Infection  Goal: Prevent transmission of infectious organism to others  Description: Prevent the transmission of infectious organisms to other patients, staff members, and visitors.   Outcome: Progressing Towards Goal     Problem: Airway Clearance - Ineffective  Goal: Achieve or maintain patent airway  Outcome: Progressing Towards Goal     Problem: Gas Exchange - Impaired  Goal: Absence of hypoxia  Outcome: Progressing Towards Goal  Goal: Promote optimal lung function  Outcome: Progressing Towards Goal     Problem: Breathing Pattern - Ineffective  Goal: Ability to achieve and maintain a regular respiratory rate  Outcome: Progressing Towards Goal

## 2021-12-29 PROBLEM — R29.898 WEAKNESS OF RIGHT LEG: Status: ACTIVE | Noted: 2021-12-29

## 2021-12-29 LAB
ALBUMIN SERPL-MCNC: 2.9 G/DL (ref 3.5–5)
ALBUMIN/GLOB SERPL: 1.2 {RATIO} (ref 1.1–2.2)
ALP SERPL-CCNC: 82 U/L (ref 45–117)
ALT SERPL-CCNC: 29 U/L (ref 12–78)
ANION GAP SERPL CALC-SCNC: 4 MMOL/L (ref 5–15)
AST SERPL-CCNC: 35 U/L (ref 15–37)
BILIRUB SERPL-MCNC: 0.4 MG/DL (ref 0.2–1)
BUN SERPL-MCNC: 13 MG/DL (ref 6–20)
BUN/CREAT SERPL: 15 (ref 12–20)
CALCIUM SERPL-MCNC: 9.1 MG/DL (ref 8.5–10.1)
CHLORIDE SERPL-SCNC: 106 MMOL/L (ref 97–108)
CO2 SERPL-SCNC: 27 MMOL/L (ref 21–32)
CREAT SERPL-MCNC: 0.89 MG/DL (ref 0.55–1.02)
CRP SERPL-MCNC: 2.37 MG/DL (ref 0–0.6)
ERYTHROCYTE [DISTWIDTH] IN BLOOD BY AUTOMATED COUNT: 14.6 % (ref 11.5–14.5)
GLOBULIN SER CALC-MCNC: 2.4 G/DL (ref 2–4)
GLUCOSE SERPL-MCNC: 90 MG/DL (ref 65–100)
HCT VFR BLD AUTO: 36.8 % (ref 35–47)
HGB BLD-MCNC: 11.4 G/DL (ref 11.5–16)
MCH RBC QN AUTO: 29 PG (ref 26–34)
MCHC RBC AUTO-ENTMCNC: 31 G/DL (ref 30–36.5)
MCV RBC AUTO: 93.6 FL (ref 80–99)
NRBC # BLD: 0 K/UL (ref 0–0.01)
NRBC BLD-RTO: 0 PER 100 WBC
PLATELET # BLD AUTO: 245 K/UL (ref 150–400)
PMV BLD AUTO: 10.1 FL (ref 8.9–12.9)
POTASSIUM SERPL-SCNC: 4.3 MMOL/L (ref 3.5–5.1)
PROT SERPL-MCNC: 5.3 G/DL (ref 6.4–8.2)
RBC # BLD AUTO: 3.93 M/UL (ref 3.8–5.2)
SODIUM SERPL-SCNC: 137 MMOL/L (ref 136–145)
WBC # BLD AUTO: 11.1 K/UL (ref 3.6–11)

## 2021-12-29 PROCEDURE — 36415 COLL VENOUS BLD VENIPUNCTURE: CPT

## 2021-12-29 PROCEDURE — 96372 THER/PROPH/DIAG INJ SC/IM: CPT

## 2021-12-29 PROCEDURE — 85027 COMPLETE CBC AUTOMATED: CPT

## 2021-12-29 PROCEDURE — 74011636637 HC RX REV CODE- 636/637: Performed by: STUDENT IN AN ORGANIZED HEALTH CARE EDUCATION/TRAINING PROGRAM

## 2021-12-29 PROCEDURE — 86140 C-REACTIVE PROTEIN: CPT

## 2021-12-29 PROCEDURE — 94640 AIRWAY INHALATION TREATMENT: CPT

## 2021-12-29 PROCEDURE — A9270 NON-COVERED ITEM OR SERVICE: HCPCS | Performed by: STUDENT IN AN ORGANIZED HEALTH CARE EDUCATION/TRAINING PROGRAM

## 2021-12-29 PROCEDURE — 74011250636 HC RX REV CODE- 250/636: Performed by: STUDENT IN AN ORGANIZED HEALTH CARE EDUCATION/TRAINING PROGRAM

## 2021-12-29 PROCEDURE — 74011250637 HC RX REV CODE- 250/637: Performed by: HOSPITALIST

## 2021-12-29 PROCEDURE — 74011250637 HC RX REV CODE- 250/637: Performed by: STUDENT IN AN ORGANIZED HEALTH CARE EDUCATION/TRAINING PROGRAM

## 2021-12-29 PROCEDURE — 97535 SELF CARE MNGMENT TRAINING: CPT

## 2021-12-29 PROCEDURE — 80053 COMPREHEN METABOLIC PANEL: CPT

## 2021-12-29 PROCEDURE — G0378 HOSPITAL OBSERVATION PER HR: HCPCS

## 2021-12-29 PROCEDURE — 65270000029 HC RM PRIVATE

## 2021-12-29 RX ADMIN — Medication 2 PUFF: at 21:23

## 2021-12-29 RX ADMIN — GABAPENTIN 300 MG: 100 CAPSULE ORAL at 09:32

## 2021-12-29 RX ADMIN — ATORVASTATIN CALCIUM 80 MG: 40 TABLET, FILM COATED ORAL at 21:21

## 2021-12-29 RX ADMIN — SODIUM CHLORIDE, PRESERVATIVE FREE 10 ML: 5 INJECTION INTRAVENOUS at 18:11

## 2021-12-29 RX ADMIN — MONTELUKAST 10 MG: 10 TABLET, FILM COATED ORAL at 21:22

## 2021-12-29 RX ADMIN — ENOXAPARIN SODIUM 40 MG: 100 INJECTION SUBCUTANEOUS at 21:21

## 2021-12-29 RX ADMIN — LEVOTHYROXINE SODIUM 125 MCG: 0.12 TABLET ORAL at 09:33

## 2021-12-29 RX ADMIN — Medication 2 PUFF: at 10:26

## 2021-12-29 RX ADMIN — BUPROPION HYDROCHLORIDE 200 MG: 100 TABLET, FILM COATED, EXTENDED RELEASE ORAL at 09:33

## 2021-12-29 RX ADMIN — CETIRIZINE HYDROCHLORIDE 10 MG: 10 TABLET, FILM COATED ORAL at 11:14

## 2021-12-29 RX ADMIN — THERA TABS 1 TABLET: TAB at 09:33

## 2021-12-29 RX ADMIN — PREDNISONE 20 MG: 20 TABLET ORAL at 09:33

## 2021-12-29 RX ADMIN — GABAPENTIN 300 MG: 100 CAPSULE ORAL at 18:11

## 2021-12-29 RX ADMIN — SODIUM CHLORIDE, PRESERVATIVE FREE 10 ML: 5 INJECTION INTRAVENOUS at 21:23

## 2021-12-29 RX ADMIN — POTASSIUM CHLORIDE 10 MEQ: 750 TABLET, FILM COATED, EXTENDED RELEASE ORAL at 09:32

## 2021-12-29 RX ADMIN — HYDROCODONE BITARTRATE AND ACETAMINOPHEN 1 TABLET: 5; 325 TABLET ORAL at 11:15

## 2021-12-29 RX ADMIN — GUAIFENESIN 600 MG: 600 TABLET, EXTENDED RELEASE ORAL at 11:15

## 2021-12-29 RX ADMIN — BUPROPION HYDROCHLORIDE 200 MG: 100 TABLET, FILM COATED, EXTENDED RELEASE ORAL at 21:21

## 2021-12-29 RX ADMIN — Medication 2 PUFF: at 10:25

## 2021-12-29 RX ADMIN — Medication 1000 UNITS: at 09:33

## 2021-12-29 RX ADMIN — PANTOPRAZOLE SODIUM 40 MG: 40 TABLET, DELAYED RELEASE ORAL at 04:09

## 2021-12-29 RX ADMIN — SODIUM CHLORIDE, PRESERVATIVE FREE 10 ML: 5 INJECTION INTRAVENOUS at 06:00

## 2021-12-29 RX ADMIN — LOSARTAN POTASSIUM 25 MG: 25 TABLET, FILM COATED ORAL at 09:33

## 2021-12-29 RX ADMIN — ENOXAPARIN SODIUM 40 MG: 100 INJECTION SUBCUTANEOUS at 09:33

## 2021-12-29 RX ADMIN — ACETAMINOPHEN 650 MG: 325 TABLET ORAL at 21:21

## 2021-12-29 RX ADMIN — LATANOPROST 1 DROP: 50 SOLUTION OPHTHALMIC at 21:23

## 2021-12-29 RX ADMIN — Medication 1 TABLET: at 09:33

## 2021-12-29 RX ADMIN — ASPIRIN 81 MG CHEWABLE TABLET 81 MG: 81 TABLET CHEWABLE at 09:33

## 2021-12-29 RX ADMIN — CLOPIDOGREL BISULFATE 75 MG: 75 TABLET ORAL at 09:33

## 2021-12-29 NOTE — PROGRESS NOTES
Bedside shift change report given to Areli Grewal (oncoming nurse) by Jack Novoa RN (offgoing nurse). Report included the following information SBAR, Kardex, MAR, Recent Results and Cardiac Rhythm NSR.

## 2021-12-29 NOTE — PROGRESS NOTES
Problem: Self Care Deficits Care Plan (Adult)  Goal: *Acute Goals and Plan of Care (Insert Text)  Description: PLOF: Patient was modified independent with Rollator. Managed own meds. After [facility], then transferred to [facility] in which she states is where she contracted COVID, discharged home, home one day and then to [facility]. Patient is vaccinated. Baseline arthritis, RTC tear right and repair left. States extensive [place] OT June 2021 and again at rehab recently. Home support: Lives alone. Has hired help for meals and housekeeping. Daughter lives nearby. Occupational Therapy Goals  Initiated 12/27/2021  1. Patient will perform lower body dressing with modified independence within 7 day(s). 2.  Patient will perform bathing with modified independence within 7 day(s). 3.  Patient will perform standing ADLs 2 mins with RW with modified independence within 7 day(s). 4.  Patient will perform toilet transfers with modified independence within 7 day(s). 5.  Patient will perform all aspects of toileting with modified independence within 7 day(s). 6.  Patient will participate in upper extremity therapeutic exercise/activities with modified independence within 7 day(s). 7.  Patient will utilize energy conservation techniques during functional activities with verbal cues within 7 day(s). Outcome: Progressing Towards Goal   OCCUPATIONAL THERAPY TREATMENT  Patient: Blaze Xiong (32 y.o. female)  Date: 12/29/2021  Diagnosis: CVA (cerebral vascular accident) ([facility] [I63.9]  TIA (transient ischemic attack) [G45.9]  Weakness of right leg [R29.898] <principal problem not specified>       Precautions: Fall,Skin (droplet plus)  Chart, occupational therapy assessment, plan of care, and goals were reviewed. ASSESSMENT  Patient continues with skilled OT services and is progressing towards goals. Patient semi supine in bed upon OT arrival, agreeable to working with therapy.  Patient performing sup <> sit transfer with supervision, once sitting edge of bed patient becoming tearful and voices concerns about discharging home at this time. Per pt, she was home for a day and a half and had a fall at home immediately prior to admission and is worried about discharging home alone as she feels she will fall again. Discussed discharge recommendation with patient who is undecided at this time between discharge back home with [facility] or going to SNF for rehab stay, patient may benefit from rehab prior to discharging home to decrease fall risk so discharge recommendation changed to SNF vs . Patient then ambulating in room with gait belt and RW with no LOB and CGA, patient transferred to chair with CGA, pt requesting to not be left in chair at end of session as she got cold when sitting in chair the day prior, patient encouraged to sit up and educated on benefits but requesting return to bed at this time. While semi supine with HOB raised, patient setup assist to brush teeth and comb hair with assist from left upper extremity to raise right upper extremity to comb back of head. Patient would benefit from skilled OT services during admission to improve independence with self care and functional mobility/transfers. Recommend discharge to SNF vs  at this time. Current Level of Function Impacting Discharge (ADLs): setup A upper extremity activities of daily living, min A lower extremity activities of daily living, supervision bed mobility, CGA out of bed mobility    Other factors to consider for discharge: high fall risk, impaired standing balance, limited supports available at home, fear of falling         PLAN :  Patient continues to benefit from skilled intervention to address the above impairments. Continue treatment per established plan of care to address goals.     Recommend with staff: up to chair for meals 3x/day, BSC for toileting    Recommendation for discharge: (in order for the patient to meet his/her long term goals)  Therapy up to 5 days/week in SNF setting vs HH    This discharge recommendation:  Has been made in collaboration with the attending provider and/or case management    IF patient discharges home will need the following DME: patient owns DME required for discharge       SUBJECTIVE:   Patient stated I just worry, I was home for only a day and I fell.     OBJECTIVE DATA SUMMARY:   Cognitive/Behavioral Status:  Neurologic State: Alert  Orientation Level: Oriented X4  Cognition: Follows commands             Functional Mobility and Transfers for ADLs:  Bed Mobility:  Supine to Sit: Supervision  Sit to Supine: Supervision  Scooting: Supervision    Transfers:  Sit to Stand: Contact guard assistance     Bed to Chair: Contact guard assistance    Balance:  Sitting: Intact  Standing: Impaired; With support  Standing - Static: Fair;Constant support  Standing - Dynamic : Fair;Constant support    ADL Intervention:       Grooming  Position Performed: Long sitting on bed  Brushing Teeth: Set-up  Brushing/Combing Hair: Set-up                   Lower Body Dressing Assistance  Socks: Minimum assistance  Leg Crossed Method Used: Yes  Position Performed: Long sitting on bed  Cues: Verbal cues provided              Therapeutic Exercises:   Patient educated on general active range of motion to complete on all upper extremity joints, verbalized understanding    Pain:  Pain on right side 7/10, and neck 3/10    Activity Tolerance:   Fair and SpO2 stable on RA    After treatment patient left in no apparent distress:   Supine in bed, Call bell within reach and Side rails x 3    COMMUNICATION/COLLABORATION:   The patients plan of care was discussed with: Registered nurse.      Kimberlyn Estrada OTR/L  Time Calculation: 33 mins

## 2021-12-29 NOTE — PROGRESS NOTES
[address] [facility] Adult  Hospitalist Group                                                                                          Hospitalist Progress Note  Apple Juarez MD  Answering service: [phone] from in house phone        Date of Service:  2021  NAME:  Soo Mcdonough  :  1945  MRN:  [id number]      Admission Summary:     Gil Scott a 64 y. o. female  with past medical history of eosinophilic asthma, depression, fibromyalgia, GERD, hypertension, hypothyroidism, bilateral shoulder arthritis, who comes in for right lower weakness and fall .  Patient was recently discharged on the  of this month to [facility] after being in the hospital and in rehab due to weakness and dehydration.  Per the patient's daughter the patient called her last night around 11 PM to say that something was different and she had fallen.  The patient remembers the phone couple does not remember much.  The patient says that she has felt weak ever since she was hospitalized before and this did not get better at the inpatient rehab and SNF. Loni Morales says that her right lower extremity feels different and is weak.  The daughter reports that when she got the phone call the patient said that her right lower extremity was weak so she fell.  When EMS arrived they had to help her walk and saw that her right lower extremity was not responding like her left lower extremity.  The patient does report some weakness in the right upper extremity as well.  She denies any facial droop, facial weakness, numbness, and others.  Her daughter denies that the patient had any type of aphasia or dysarthria when she called her. Nia Po patient's daughter does report that the patient's right upper extremity has been trembling more than normal.  The patient reports a \"weird\" sensation in her right lower extremity that still has not resolved, but denies any actual weakness at this time.     In the ER patient's labs were essentially normal except for LOREN and CT of the head and CTAs of head and neck were normal.  Teleneurology was consulted and code stroke was called.  Patient is out of the window for TPA. Interval history / Subjective:     She said she feels the same, has neck pain worse with movement, lower extremities weakness     Assessment & Plan:     Right lower extremities weakness and fall  -on aspirin, plavix and lipitor  -CT head no acute intracranial abnormality. Chronic small vessel ischemic disease.   -CTA head no evidence of large vessel occlusion or significant vascular disease   -CT head perfusion no perfusion defect  -MRI head no evidence of acute infarct, at least moderate to severe spinal canal stenosis at the craniocervical junction due to extensive retrodental pannus formation.  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 MRI  -hx of s/p Posterior cervicothoracic fusion, C2-T2, using cancellous allograft, demineralized bone matrix, and [employer] CerviFix instrumentation on 07/6/2010  -patient with neck pain worse with movement, recurrent fall, and difficult to stands up  -consult to neuro surgeon    COVID 19 positive  -SpO2 % on RA  -no fever or leukocytosis  -chest x ray no acute finding,   -monitor pulse ox     Dehydration  -improved, d/c IVF  -monitor electrolyte      LOREN  -creatinine improvedfrom 1.53 to 0.89  -avoid nephrotoxin   -monitor renal function     Hypokalemia  -replaced and resolved     Chronic asthma  -not bronchospastic, Continue patient's Symbicort, Pulmicort, albuterol     Hypothyroidism  -Continue levothyroxine     Chronic pain  -stable, continue tylenol prn      HTN  -BP not at goal, add losartan, monitor BP    Hx of chronic RA  -on prednisone        Code status: Full Code  DVT prophylaxis:lovenox    Care Plan discussed with: Patient/Family, Nurse and   Anticipated Disposition: SNF/LTC  Anticipated Discharge: Greater than 48 hours     Hospital Problems  Date Reviewed: 12/4/2021          Codes Class Noted POA    TIA (transient ischemic attack) ICD-10-CM: G45.9  ICD-9-CM: 435.9  12/25/2021 Unknown        CVA (cerebral vascular accident) [facility]) ICD-10-CM: I63.9  ICD-9-CM: 434.91  12/25/2021 Unknown        LOREN (acute kidney injury) ([facility] ICD-10-CM: N17.9  ICD-9-CM: 584.9  12/1/2021 Yes        Other chronic pain ICD-10-CM: G89.29  ICD-9-CM: 338.29  Unknown Yes        HTN (hypertension) (Chronic) ICD-10-CM: I10  ICD-9-CM: 401.9  7/8/2011 Yes        Asthma (Chronic) ICD-10-CM: J45.909  ICD-9-CM: 493.90  7/8/2011 Yes        Hypothyroidism ICD-10-CM: E03.9  ICD-9-CM: 244.9  7/8/2011 Yes        Dehydration ICD-10-CM: E86.0  ICD-9-CM: 276.51  7/7/2011 Yes                     Vital Signs:    Last 24hrs VS reviewed since prior progress note. Most recent are:  Visit Vitals  BP (!) 148/86 (BP 1 Location: Right upper arm)   Pulse 97   Temp 97.7 °F (36.5 °C)   Resp 25   Ht 5' (1.524 m)   Wt 75 kg (165 lb 5.5 oz)   SpO2 100%   BMI 32.29 kg/m²         Intake/Output Summary (Last 24 hours) at 12/29/2021 1018  Last data filed at 12/28/2021 1955  Gross per 24 hour   Intake    Output 850 ml   Net -850 ml        Physical Examination:     I had a face to face encounter with this patient and independently examined them on 12/29/2021 as outlined below:          Constitutional:  No acute distress, cooperative, pleasant    ENT:  Oral mucosa moist, oropharynx benign. Resp:  CTA bilaterally. No wheezing/rhonchi/rales. No accessory muscle use   CV:  Regular rhythm, normal rate, no murmurs, gallops, rubs    GI:  Soft, non distended, non tender.  normoactive bowel sounds, no hepatosplenomegaly     Musculoskeletal:  No edema     Neurologic:  Conscious and alert, well oriented, motor 5/5,  CN II-XII reviewed            Data Review:    Review and/or order of clinical lab test  Review and/or order of tests in the radiology section of CPT  Review and/or order of tests in the medicine section of CPT      Labs:     Recent Labs     12/29/21  0406 12/27/21  0956   WBC 11.1* 9.2   HGB 11.4* 12.1   HCT 36.8 38.7    233     Recent Labs     12/29/21  0406 12/27/21  0956    137   K 4.3 3.8  3.7    104   CO2 27 27   BUN 13 13   CREA 0.89 1.02   GLU 90 103*   CA 9.1 9.3     Recent Labs     12/29/21  0406   ALT 29   AP 82   TBILI 0.4   TP 5.3*   ALB 2.9*   GLOB 2.4     No results for input(s): INR, PTP, APTT, INREXT, INREXT in the last 72 hours. No results for input(s): FE, TIBC, PSAT, FERR in the last 72 hours. No results found for: FOL, RBCF   No results for input(s): PH, PCO2, PO2 in the last 72 hours. No results for input(s): CPK, CKNDX, TROIQ in the last 72 hours.     No lab exists for component: CPKMB  No results found for: CHOL, CHOLX, CHLST, CHOLV, HDL, HDLP, LDL, LDLC, DLDLP, TGLX, TRIGL, TRIGP, CHHD, CHHDX  Lab Results   Component Value Date/Time    Glucose (POC) 112 12/25/2021 12:31 PM    Glucose (POC) 126 (H) 02/14/2021 04:29 PM    Glucose (POC) 103 (H) 02/14/2021 11:04 AM    Glucose (POC) 72 02/14/2021 06:15 AM    Glucose (POC) 95 02/13/2021 09:14 PM     Lab Results   Component Value Date/Time    Color DARK YELLOW 11/30/2021 10:45 PM    Appearance TURBID (A) 11/30/2021 10:45 PM    Specific gravity 1.020 11/30/2021 10:45 PM    Specific gravity 1.020 02/08/2021 06:45 PM    pH (UA) 5.0 11/30/2021 10:45 PM    Protein Negative 11/30/2021 10:45 PM    Glucose Negative 11/30/2021 10:45 PM    Ketone Negative 11/30/2021 10:45 PM    Bilirubin Negative 11/30/2021 10:45 PM    Urobilinogen 0.2 11/30/2021 10:45 PM    Nitrites Negative 11/30/2021 10:45 PM    Leukocyte Esterase Negative 11/30/2021 10:45 PM    Epithelial cells MANY (A) 11/30/2021 10:45 PM    Bacteria 1+ (A) 11/30/2021 10:45 PM    WBC 10-20 11/30/2021 10:45 PM    RBC 5-10 11/30/2021 10:45 PM         Medications Reviewed:     Current Facility-Administered Medications   Medication Dose Route Frequency    losartan (COZAAR) tablet 25 mg  25 mg Oral DAILY    buPROPion SR (WELLBUTRIN SR) tablet 200 mg  200 mg Oral Q12H    HYDROcodone-acetaminophen (NORCO) 5-325 mg per tablet 1 Tablet  1 Tablet Oral Q4H PRN    tiotropium bromide (SPIRIVA RESPIMAT) 2.5 mcg /actuation  2 Puff Inhalation DAILY    budesonide-formoteroL (SYMBICORT) 160-4.5 mcg/actuation HFA inhaler 2 Puff  2 Puff Inhalation BID RT    albuterol (PROVENTIL HFA, VENTOLIN HFA, PROAIR HFA) inhaler 1 Puff  1 Puff Inhalation Q6H PRN    enoxaparin (LOVENOX) injection 40 mg  40 mg SubCUTAneous Q12H    sodium chloride (NS) flush 5-40 mL  5-40 mL IntraVENous Q8H    sodium chloride (NS) flush 5-40 mL  5-40 mL IntraVENous PRN    acetaminophen (TYLENOL) tablet 650 mg  650 mg Oral Q6H PRN    Or    acetaminophen (TYLENOL) suppository 650 mg  650 mg Rectal Q6H PRN    polyethylene glycol (MIRALAX) packet 17 g  17 g Oral DAILY PRN    ondansetron (ZOFRAN ODT) tablet 4 mg  4 mg Oral Q8H PRN    Or    ondansetron (ZOFRAN) injection 4 mg  4 mg IntraVENous Q6H PRN    levothyroxine (SYNTHROID) tablet 125 mcg  125 mcg Oral DAILY    montelukast (SINGULAIR) tablet 10 mg  10 mg Oral QHS    gabapentin (NEURONTIN) capsule 300 mg  300 mg Oral BID    cholecalciferol (VITAMIN D3) (1000 Units /25 mcg) tablet 1,000 Units  1,000 Units Oral DAILY    vitamin B complex tablet  1 Tablet Oral DAILY    cetirizine (ZYRTEC) tablet 10 mg  10 mg Oral DAILY PRN    therapeutic multivitamin (THERAGRAN) tablet 1 Tablet  1 Tablet Oral DAILY    latanoprost (XALATAN) 0.005 % ophthalmic solution 1 Drop  1 Drop Both Eyes QHS    pantoprazole (PROTONIX) tablet 40 mg  40 mg Oral ACB    guaiFENesin ER (MUCINEX) tablet 600 mg  600 mg Oral BID PRN    predniSONE (DELTASONE) tablet 20 mg  20 mg Oral DAILY WITH BREAKFAST    clopidogreL (PLAVIX) tablet 75 mg  75 mg Oral DAILY    aspirin chewable tablet 81 mg  81 mg Oral DAILY    atorvastatin (LIPITOR) tablet 80 mg  80 mg Oral QHS    hydrALAZINE (APRESOLINE) 20 mg/mL injection 10 mg  10 mg IntraVENous Q6H PRN     ______________________________________________________________________  EXPECTED LENGTH OF STAY: - - -  ACTUAL LENGTH OF STAY:          0                 Sagrario Gordon MD

## 2021-12-29 NOTE — PROGRESS NOTES
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 Per chart review, patient has a history of C2-T2 fusion surgery in 2010 by Dr. Danny Blackburn and has more recently been followed by [facility] for cervical stenosis and pain. I spoke to her RN today who states that patient is neuro intact. She has mild UE weakness but that seems to be more from pain. MRI reveals \"at least moderate to severe spinal canal stenosis at the craniocervical junction due to extensive retrodental pannus formation. \" Pt is Covid+ with multiple comorbidities. Give that patient is covid+ and has no acute neuro deficits she is not a candidate for urgent neurosurgery. It seems the patient's symptoms are mostly degenerative and it would be appropriate for her to follow up with her established spine surgeon. We will follow and re-evaluate as needed. Plan discussed with Dr. Jose Alfredo Alanis. Please call with any questions. NICOLE Velazquez     As noted above, I have reviewed the limited studies (MRI) and pt apparently could not complete the study. pt would not be a good candidate for further surgery, she has an extensive fusion of the cervical spine already, and in my opinion at this time risks of extending that surgery offer more risk than benefit.  She may follow up if necessary with her spine surgeons at [facility]  Dr Jose Alfredo Alanis

## 2021-12-29 NOTE — PROGRESS NOTES
Problem: Risk for Spread of Infection  Goal: Prevent transmission of infectious organism to others  Description: Prevent the transmission of infectious organisms to other patients, staff members, and visitors. Outcome: Progressing Towards Goal     Problem: Patient Education:  Go to Education Activity  Goal: Patient/Family Education  Outcome: Progressing Towards Goal     Problem: Airway Clearance - Ineffective  Goal: Achieve or maintain patent airway  Outcome: Progressing Towards Goal     Problem: Gas Exchange - Impaired  Goal: Absence of hypoxia  Outcome: Progressing Towards Goal  Goal: Promote optimal lung function  Outcome: Progressing Towards Goal     Problem: Breathing Pattern - Ineffective  Goal: Ability to achieve and maintain a regular respiratory rate  Outcome: Progressing Towards Goal     Problem:  Body Temperature -  Risk of, Imbalanced  Goal: Ability to maintain a body temperature within defined limits  Outcome: Progressing Towards Goal  Goal: Will regain or maintain usual level of consciousness  Outcome: Progressing Towards Goal  Goal: Complications related to the disease process, condition or treatment will be avoided or minimized  Outcome: Progressing Towards Goal     Problem: Isolation Precautions - Risk of Spread of Infection  Goal: Prevent transmission of infectious organism to others  Outcome: Progressing Towards Goal     Problem: Nutrition Deficits  Goal: Optimize nutrtional status  Outcome: Progressing Towards Goal     Problem: Risk for Fluid Volume Deficit  Goal: Maintain normal heart rhythm  Outcome: Progressing Towards Goal  Goal: Maintain absence of muscle cramping  Outcome: Progressing Towards Goal  Goal: Maintain normal serum potassium, sodium, calcium, phosphorus, and pH  Outcome: Progressing Towards Goal     Problem: Loneliness or Risk for Loneliness  Goal: Demonstrate positive use of time alone when socialization is not possible  Outcome: Progressing Towards Goal     Problem: Fatigue  Goal: Verbalize increase energy and improved vitality  Outcome: Progressing Towards Goal     Problem: Patient Education: Go to Patient Education Activity  Goal: Patient/Family Education  Outcome: Progressing Towards Goal     Problem: Falls - Risk of  Goal: *Absence of Falls  Description: Document Doug Lopez Fall Risk and appropriate interventions in the flowsheet. Outcome: Progressing Towards Goal  Note: Fall Risk Interventions:  Mobility Interventions: Assess mobility with egress test,Communicate number of staff needed for ambulation/transfer,Bed/chair exit alarm         Medication Interventions: Assess postural VS orthostatic hypotension,Bed/chair exit alarm,Evaluate medications/consider consulting pharmacy    Elimination Interventions: Bed/chair exit alarm,Elevated toilet seat,Call light in reach    History of Falls Interventions: Bed/chair exit alarm,Door open when patient unattended,Consult care management for discharge planning         Problem: Patient Education: Go to Patient Education Activity  Goal: Patient/Family Education  Outcome: Progressing Towards Goal     Problem: Patient Education: Go to Patient Education Activity  Goal: Patient/Family Education  Outcome: Progressing Towards Goal     Problem: Pressure Injury - Risk of  Goal: *Prevention of pressure injury  Description: Document Vaughn Scale and appropriate interventions in the flowsheet.   Outcome: Progressing Towards Goal     Problem: Patient Education: Go to Patient Education Activity  Goal: Patient/Family Education  Outcome: Progressing Towards Goal     Problem: Patient Education: Go to Patient Education Activity  Goal: Patient/Family Education  Outcome: Progressing Towards Goal

## 2021-12-30 LAB
ALBUMIN SERPL-MCNC: 3.1 G/DL (ref 3.5–5)
ALBUMIN/GLOB SERPL: 1.3 {RATIO} (ref 1.1–2.2)
ALP SERPL-CCNC: 86 U/L (ref 45–117)
ALT SERPL-CCNC: 32 U/L (ref 12–78)
ANION GAP SERPL CALC-SCNC: 6 MMOL/L (ref 5–15)
AST SERPL-CCNC: 28 U/L (ref 15–37)
BILIRUB SERPL-MCNC: 0.4 MG/DL (ref 0.2–1)
BUN SERPL-MCNC: 17 MG/DL (ref 6–20)
BUN/CREAT SERPL: 18 (ref 12–20)
CALCIUM SERPL-MCNC: 9.5 MG/DL (ref 8.5–10.1)
CHLORIDE SERPL-SCNC: 105 MMOL/L (ref 97–108)
CO2 SERPL-SCNC: 27 MMOL/L (ref 21–32)
CREAT SERPL-MCNC: 0.92 MG/DL (ref 0.55–1.02)
ERYTHROCYTE [DISTWIDTH] IN BLOOD BY AUTOMATED COUNT: 14.6 % (ref 11.5–14.5)
GLOBULIN SER CALC-MCNC: 2.4 G/DL (ref 2–4)
GLUCOSE SERPL-MCNC: 101 MG/DL (ref 65–100)
HCT VFR BLD AUTO: 38.8 % (ref 35–47)
HGB BLD-MCNC: 12.1 G/DL (ref 11.5–16)
MCH RBC QN AUTO: 29.4 PG (ref 26–34)
MCHC RBC AUTO-ENTMCNC: 31.2 G/DL (ref 30–36.5)
MCV RBC AUTO: 94.2 FL (ref 80–99)
NRBC # BLD: 0 K/UL (ref 0–0.01)
NRBC BLD-RTO: 0 PER 100 WBC
PLATELET # BLD AUTO: 272 K/UL (ref 150–400)
PMV BLD AUTO: 10.1 FL (ref 8.9–12.9)
POTASSIUM SERPL-SCNC: 4.4 MMOL/L (ref 3.5–5.1)
PROT SERPL-MCNC: 5.5 G/DL (ref 6.4–8.2)
RBC # BLD AUTO: 4.12 M/UL (ref 3.8–5.2)
SODIUM SERPL-SCNC: 138 MMOL/L (ref 136–145)
WBC # BLD AUTO: 11.5 K/UL (ref 3.6–11)

## 2021-12-30 PROCEDURE — 97116 GAIT TRAINING THERAPY: CPT

## 2021-12-30 PROCEDURE — 74011250637 HC RX REV CODE- 250/637: Performed by: HOSPITALIST

## 2021-12-30 PROCEDURE — 74011250637 HC RX REV CODE- 250/637: Performed by: STUDENT IN AN ORGANIZED HEALTH CARE EDUCATION/TRAINING PROGRAM

## 2021-12-30 PROCEDURE — 74011250637 HC RX REV CODE- 250/637: Performed by: NURSE PRACTITIONER

## 2021-12-30 PROCEDURE — 36415 COLL VENOUS BLD VENIPUNCTURE: CPT

## 2021-12-30 PROCEDURE — 80053 COMPREHEN METABOLIC PANEL: CPT

## 2021-12-30 PROCEDURE — 94640 AIRWAY INHALATION TREATMENT: CPT

## 2021-12-30 PROCEDURE — 74011636637 HC RX REV CODE- 636/637: Performed by: STUDENT IN AN ORGANIZED HEALTH CARE EDUCATION/TRAINING PROGRAM

## 2021-12-30 PROCEDURE — 74011250636 HC RX REV CODE- 250/636: Performed by: STUDENT IN AN ORGANIZED HEALTH CARE EDUCATION/TRAINING PROGRAM

## 2021-12-30 PROCEDURE — 85027 COMPLETE CBC AUTOMATED: CPT

## 2021-12-30 PROCEDURE — 65270000029 HC RM PRIVATE

## 2021-12-30 PROCEDURE — 97535 SELF CARE MNGMENT TRAINING: CPT

## 2021-12-30 PROCEDURE — 97530 THERAPEUTIC ACTIVITIES: CPT

## 2021-12-30 RX ORDER — CALCIUM CARBONATE 200(500)MG
200 TABLET,CHEWABLE ORAL
Status: DISCONTINUED | OUTPATIENT
Start: 2021-12-30 | End: 2022-01-03 | Stop reason: HOSPADM

## 2021-12-30 RX ADMIN — BUPROPION HYDROCHLORIDE 200 MG: 100 TABLET, FILM COATED, EXTENDED RELEASE ORAL at 21:14

## 2021-12-30 RX ADMIN — Medication 2 PUFF: at 09:00

## 2021-12-30 RX ADMIN — GUAIFENESIN 600 MG: 600 TABLET, EXTENDED RELEASE ORAL at 15:23

## 2021-12-30 RX ADMIN — ENOXAPARIN SODIUM 40 MG: 100 INJECTION SUBCUTANEOUS at 10:01

## 2021-12-30 RX ADMIN — THERA TABS 1 TABLET: TAB at 10:01

## 2021-12-30 RX ADMIN — LATANOPROST 1 DROP: 50 SOLUTION OPHTHALMIC at 21:16

## 2021-12-30 RX ADMIN — Medication 1000 UNITS: at 10:01

## 2021-12-30 RX ADMIN — ASPIRIN 81 MG CHEWABLE TABLET 81 MG: 81 TABLET CHEWABLE at 10:01

## 2021-12-30 RX ADMIN — ALBUTEROL SULFATE 1 PUFF: 90 AEROSOL, METERED RESPIRATORY (INHALATION) at 09:07

## 2021-12-30 RX ADMIN — GABAPENTIN 300 MG: 100 CAPSULE ORAL at 19:10

## 2021-12-30 RX ADMIN — Medication 2 PUFF: at 21:17

## 2021-12-30 RX ADMIN — ENOXAPARIN SODIUM 40 MG: 100 INJECTION SUBCUTANEOUS at 21:14

## 2021-12-30 RX ADMIN — SODIUM CHLORIDE, PRESERVATIVE FREE 10 ML: 5 INJECTION INTRAVENOUS at 06:00

## 2021-12-30 RX ADMIN — SODIUM CHLORIDE, PRESERVATIVE FREE 10 ML: 5 INJECTION INTRAVENOUS at 22:00

## 2021-12-30 RX ADMIN — ATORVASTATIN CALCIUM 80 MG: 40 TABLET, FILM COATED ORAL at 21:14

## 2021-12-30 RX ADMIN — LOSARTAN POTASSIUM 25 MG: 25 TABLET, FILM COATED ORAL at 10:01

## 2021-12-30 RX ADMIN — Medication 2 PUFF: at 09:08

## 2021-12-30 RX ADMIN — PREDNISONE 20 MG: 20 TABLET ORAL at 10:01

## 2021-12-30 RX ADMIN — CLOPIDOGREL BISULFATE 75 MG: 75 TABLET ORAL at 10:01

## 2021-12-30 RX ADMIN — BUPROPION HYDROCHLORIDE 200 MG: 100 TABLET, FILM COATED, EXTENDED RELEASE ORAL at 10:01

## 2021-12-30 RX ADMIN — CALCIUM CARBONATE (ANTACID) CHEW TAB 500 MG 200 MG: 500 CHEW TAB at 21:31

## 2021-12-30 RX ADMIN — LEVOTHYROXINE SODIUM 125 MCG: 0.12 TABLET ORAL at 10:01

## 2021-12-30 RX ADMIN — Medication 1 TABLET: at 10:01

## 2021-12-30 RX ADMIN — PANTOPRAZOLE SODIUM 40 MG: 40 TABLET, DELAYED RELEASE ORAL at 02:36

## 2021-12-30 RX ADMIN — GABAPENTIN 300 MG: 100 CAPSULE ORAL at 10:01

## 2021-12-30 RX ADMIN — MONTELUKAST 10 MG: 10 TABLET, FILM COATED ORAL at 21:14

## 2021-12-30 RX ADMIN — HYDROCODONE BITARTRATE AND ACETAMINOPHEN 1 TABLET: 5; 325 TABLET ORAL at 15:23

## 2021-12-30 NOTE — PROGRESS NOTES
Transition Plan of Care  RUR 18%-Med  Disposition discussed with patient and daughter Katie Thomas [phone] by phone. At baseline she lives independently ans she was discharged from here on 12/5 to [facility]. Daughter requested a call from attending to get update on medical care. Request related to Dr. Manju Centeno in rounds. Referrals sent to [facility] and [facility] for SNF, but I am not optimistic that they will admit Covid +. Sent referral to [facility] and [facility] via [email or web address]. These are per patient and daughter's request. Attending updated in rounds. CM asked for therapy to see and place updated notes.

## 2021-12-30 NOTE — PROGRESS NOTES
Problem: Risk for Spread of Infection  Goal: Prevent transmission of infectious organism to others  Description: Prevent the transmission of infectious organisms to other patients, staff members, and visitors. Outcome: Progressing Towards Goal     Problem: Patient Education:  Go to Education Activity  Goal: Patient/Family Education  Outcome: Progressing Towards Goal     Problem: Airway Clearance - Ineffective  Goal: Achieve or maintain patent airway  Outcome: Progressing Towards Goal     Problem: Gas Exchange - Impaired  Goal: Absence of hypoxia  Outcome: Progressing Towards Goal  Goal: Promote optimal lung function  Outcome: Progressing Towards Goal     Problem: Breathing Pattern - Ineffective  Goal: Ability to achieve and maintain a regular respiratory rate  Outcome: Progressing Towards Goal     Problem:  Body Temperature -  Risk of, Imbalanced  Goal: Ability to maintain a body temperature within defined limits  Outcome: Progressing Towards Goal  Goal: Will regain or maintain usual level of consciousness  Outcome: Progressing Towards Goal  Goal: Complications related to the disease process, condition or treatment will be avoided or minimized  Outcome: Progressing Towards Goal     Problem: Isolation Precautions - Risk of Spread of Infection  Goal: Prevent transmission of infectious organism to others  Outcome: Progressing Towards Goal     Problem: Nutrition Deficits  Goal: Optimize nutrtional status  Outcome: Progressing Towards Goal     Problem: Risk for Fluid Volume Deficit  Goal: Maintain normal heart rhythm  Outcome: Progressing Towards Goal  Goal: Maintain absence of muscle cramping  Outcome: Progressing Towards Goal  Goal: Maintain normal serum potassium, sodium, calcium, phosphorus, and pH  Outcome: Progressing Towards Goal     Problem: Loneliness or Risk for Loneliness  Goal: Demonstrate positive use of time alone when socialization is not possible  Outcome: Progressing Towards Goal     Problem: Fatigue  Goal: Verbalize increase energy and improved vitality  Outcome: Progressing Towards Goal     Problem: Patient Education: Go to Patient Education Activity  Goal: Patient/Family Education  Outcome: Progressing Towards Goal     Problem: Falls - Risk of  Goal: *Absence of Falls  Description: Document Parker Menchaca Fall Risk and appropriate interventions in the flowsheet. Outcome: Progressing Towards Goal  Note: Fall Risk Interventions:  Mobility Interventions: Assess mobility with egress test,Bed/chair exit alarm,Communicate number of staff needed for ambulation/transfer         Medication Interventions: Assess postural VS orthostatic hypotension,Bed/chair exit alarm,Evaluate medications/consider consulting pharmacy    Elimination Interventions: Bed/chair exit alarm,Call light in reach,Elevated toilet seat    History of Falls Interventions: Bed/chair exit alarm,Consult care management for discharge planning,Door open when patient unattended         Problem: Patient Education: Go to Patient Education Activity  Goal: Patient/Family Education  Outcome: Progressing Towards Goal     Problem: Patient Education: Go to Patient Education Activity  Goal: Patient/Family Education  Outcome: Progressing Towards Goal     Problem: Pressure Injury - Risk of  Goal: *Prevention of pressure injury  Description: Document Vaughn Scale and appropriate interventions in the flowsheet.   Outcome: Progressing Towards Goal     Problem: Patient Education: Go to Patient Education Activity  Goal: Patient/Family Education  Outcome: Progressing Towards Goal     Problem: Patient Education: Go to Patient Education Activity  Goal: Patient/Family Education  Outcome: Progressing Towards Goal

## 2021-12-30 NOTE — PROGRESS NOTES
[address] [facility] Adult  Hospitalist Group                                                                                          Hospitalist Progress Note  Jana Betancourt MD  Answering service: [phone] from in house phone        Date of Service:  2021  NAME:  Kaden Hansen  :  1945  MRN:  [id number]      Admission Summary:     Farideh Petersen a 64 y. o. female  with past medical history of eosinophilic asthma, depression, fibromyalgia, GERD, hypertension, hypothyroidism, bilateral shoulder arthritis, who comes in for right lower weakness and fall .  Patient was recently discharged on the  of this month to [facility] after being in the hospital and in rehab due to weakness and dehydration.  Per the patient's daughter the patient called her last night around 11 PM to say that something was different and she had fallen.  The patient remembers the phone couple does not remember much.  The patient says that she has felt weak ever since she was hospitalized before and this did not get better at the inpatient rehab and SNF. Dru Cox says that her right lower extremity feels different and is weak.  The daughter reports that when she got the phone call the patient said that her right lower extremity was weak so she fell.  When EMS arrived they had to help her walk and saw that her right lower extremity was not responding like her left lower extremity.  The patient does report some weakness in the right upper extremity as well.  She denies any facial droop, facial weakness, numbness, and others.  Her daughter denies that the patient had any type of aphasia or dysarthria when she called her. Will Bakerb patient's daughter does report that the patient's right upper extremity has been trembling more than normal.  The patient reports a \"weird\" sensation in her right lower extremity that still has not resolved, but denies any actual weakness at this time.     In the ER patient's labs were essentially normal except for LOREN and CT of the head and CTAs of head and neck were normal.  Teleneurology was consulted and code stroke was called.  Patient is out of the window for TPA. Interval history / Subjective:     12/30/2021. Saw patient this morning, awake alert and oriented, no apparent distress, pleasant uncooperative with physical examination, still complaining of right lower extremity weakness, frustrated with the lack of progress, patient is afraid to be discharged as she thinks she might fall and not be able to get up, patient denies any fever, chills, nausea, vomiting or new any focal neurologic symptoms. Assessment & Plan:     Right lower extremities weakness and fall  Limited MRI negative for any acute stroke. CT head on admission negative for any acute process. CTA neck negative for any hemodynamically significant stenosis. 2D echo LVEF 50 to 55%. Patient has history of extensive neck surgery, she is status post C2-T12 fusion. Limited MRI head shows moderate to severe spinal canal stenosis at the craniocervical junction due to extensive retrodental pannus formation. Neurosurgery consulted, no intervention  We will order EMG and consult her orthopedic surgeon. Continue PT OT, monitor vitals, fall, aspiration and seizure precaution. Neurology is on board, recommendations noted. Cervical stenosis   Limited MRI positive for moderate to severe cervical spinal canal stenosis. History of C2 - T12 fusion on 07/6/2010    It is likely that patient's recurrent falls and lower extremity weakness is due to cervical myelopathy due to her surgery and postsurgical changes. Neurosurgery consulted, no intervention planned. Orthopedic surgery consulted, pending recommendations. COVID 19 positive  Asymptomatic, SPO2 WNL on RA, no fever, no leukocytosis. Monitor closely, as needed DuoNebs, as needed supplemental oxygen.     Dehydration  Euvolemic, normotensive. Monitor volume status, encourage p.o. intake.      LOREN  Resolved, avoid nephrotoxic meds, renally dose medications.      Hypokalemia  Resolved. Chronic asthma  Not acutely exacerbated. Continue patient's Symbicort, Pulmicort, albuterol     Hypothyroidism  Continue levothyroxine     Chronic pain  stable, continue tylenol prn      HTN  BP not at goal, add losartan, monitor BP    Hx of chronic RA  on prednisone    Code status: Full Code  DVT prophylaxis:lovenox    Care Plan discussed with: Patient/Family, Nurse and   Anticipated Disposition: SNF/LTC  Anticipated Discharge: Greater than 48 hours     Hospital Problems  Date Reviewed: 12/4/2021          Codes Class Noted POA    Weakness of right leg ICD-10-CM: R29.898  ICD-9-CM: 729.89  12/29/2021 Unknown        TIA (transient ischemic attack) ICD-10-CM: G45.9  ICD-9-CM: 435.9  12/25/2021 Unknown        CVA (cerebral vascular accident) [facility]) ICD-10-CM: I63.9  ICD-9-CM: 434.91  12/25/2021 Unknown        LOREN (acute kidney injury) ([facility] [address] ICD-10-CM: N17.9  ICD-9-CM: 584.9  12/1/2021 Yes        Other chronic pain ICD-10-CM: G89.29  ICD-9-CM: 338.29  Unknown Yes        HTN (hypertension) (Chronic) ICD-10-CM: I10  ICD-9-CM: 401.9  7/8/2011 Yes        Asthma (Chronic) ICD-10-CM: J45.909  ICD-9-CM: 493.90  7/8/2011 Yes        Hypothyroidism ICD-10-CM: E03.9  ICD-9-CM: 244.9  7/8/2011 Yes        Dehydration ICD-10-CM: E86.0  ICD-9-CM: 276.51  7/7/2011 Yes                     Vital Signs:    Last 24hrs VS reviewed since prior progress note.  Most recent are:  Visit Vitals  BP (!) 160/110 (BP 1 Location: Right upper arm, BP Patient Position: At rest)   Pulse (!) 118   Temp 97.9 °F (36.6 °C)   Resp 17   Ht 5' (1.524 m)   Wt 75 kg (165 lb 5.5 oz)   SpO2 97%   BMI 32.29 kg/m²         Intake/Output Summary (Last 24 hours) at 12/30/2021 1537  Last data filed at 12/30/2021 1000  Gross per 24 hour   Intake 480 ml   Output 700 ml   Net -220 ml        Physical Examination:     I had a face to face encounter with this patient and independently examined them on 12/30/2021 as outlined below:          Constitutional:  No acute distress, cooperative, pleasant    ENT:  Oral mucosa moist, oropharynx benign. Resp:  CTA bilaterally. No wheezing/rhonchi/rales. No accessory muscle use   CV:  Regular rhythm, normal rate, no murmurs, gallops, rubs    GI:  Soft, non distended, non tender. normoactive bowel sounds, no hepatosplenomegaly     Musculoskeletal:  No edema     Neurologic:  Conscious and alert, well oriented, motor 5/5,  CN II-XII reviewed            Data Review:    Review and/or order of clinical lab test  Review and/or order of tests in the radiology section of CPT  Review and/or order of tests in the medicine section of CPT      Labs:     Recent Labs     12/30/21 0235 12/29/21  0406   WBC 11.5* 11.1*   HGB 12.1 11.4*   HCT 38.8 36.8    245     Recent Labs     12/30/21 0235 12/29/21  0406    137   K 4.4 4.3    106   CO2 27 27   BUN 17 13   CREA 0.92 0.89   * 90   CA 9.5 9.1     Recent Labs     12/30/21 0235 12/29/21  0406   ALT 32 29   AP 86 82   TBILI 0.4 0.4   TP 5.5* 5.3*   ALB 3.1* 2.9*   GLOB 2.4 2.4     No results for input(s): INR, PTP, APTT, INREXT, INREXT in the last 72 hours. No results for input(s): FE, TIBC, PSAT, FERR in the last 72 hours. No results found for: FOL, RBCF   No results for input(s): PH, PCO2, PO2 in the last 72 hours. No results for input(s): CPK, CKNDX, TROIQ in the last 72 hours.     No lab exists for component: CPKMB  No results found for: CHOL, CHOLX, CHLST, CHOLV, HDL, HDLP, LDL, LDLC, DLDLP, TGLX, TRIGL, TRIGP, CHHD, CHHDX  Lab Results   Component Value Date/Time    Glucose (POC) 112 12/25/2021 12:31 PM    Glucose (POC) 126 (H) 02/14/2021 04:29 PM    Glucose (POC) 103 (H) 02/14/2021 11:04 AM    Glucose (POC) 72 02/14/2021 06:15 AM    Glucose (POC) 95 02/13/2021 09:14 PM     Lab Results   Component Value Date/Time    Color DARK YELLOW 11/30/2021 10:45 PM Appearance TURBID (A) 11/30/2021 10:45 PM    Specific gravity 1.020 11/30/2021 10:45 PM    Specific gravity 1.020 02/08/2021 06:45 PM    pH (UA) 5.0 11/30/2021 10:45 PM    Protein Negative 11/30/2021 10:45 PM    Glucose Negative 11/30/2021 10:45 PM    Ketone Negative 11/30/2021 10:45 PM    Bilirubin Negative 11/30/2021 10:45 PM    Urobilinogen 0.2 11/30/2021 10:45 PM    Nitrites Negative 11/30/2021 10:45 PM    Leukocyte Esterase Negative 11/30/2021 10:45 PM    Epithelial cells MANY (A) 11/30/2021 10:45 PM    Bacteria 1+ (A) 11/30/2021 10:45 PM    WBC 10-20 11/30/2021 10:45 PM    RBC 5-10 11/30/2021 10:45 PM         Medications Reviewed:     Current Facility-Administered Medications   Medication Dose Route Frequency    losartan (COZAAR) tablet 25 mg  25 mg Oral DAILY    buPROPion SR (WELLBUTRIN SR) tablet 200 mg  200 mg Oral Q12H    HYDROcodone-acetaminophen (NORCO) 5-325 mg per tablet 1 Tablet  1 Tablet Oral Q4H PRN    tiotropium bromide (SPIRIVA RESPIMAT) 2.5 mcg /actuation  2 Puff Inhalation DAILY    budesonide-formoteroL (SYMBICORT) 160-4.5 mcg/actuation HFA inhaler 2 Puff  2 Puff Inhalation BID RT    albuterol (PROVENTIL HFA, VENTOLIN HFA, PROAIR HFA) inhaler 1 Puff  1 Puff Inhalation Q6H PRN    enoxaparin (LOVENOX) injection 40 mg  40 mg SubCUTAneous Q12H    sodium chloride (NS) flush 5-40 mL  5-40 mL IntraVENous Q8H    sodium chloride (NS) flush 5-40 mL  5-40 mL IntraVENous PRN    acetaminophen (TYLENOL) tablet 650 mg  650 mg Oral Q6H PRN    Or    acetaminophen (TYLENOL) suppository 650 mg  650 mg Rectal Q6H PRN    polyethylene glycol (MIRALAX) packet 17 g  17 g Oral DAILY PRN    ondansetron (ZOFRAN ODT) tablet 4 mg  4 mg Oral Q8H PRN    Or    ondansetron (ZOFRAN) injection 4 mg  4 mg IntraVENous Q6H PRN    levothyroxine (SYNTHROID) tablet 125 mcg  125 mcg Oral DAILY    montelukast (SINGULAIR) tablet 10 mg  10 mg Oral QHS    gabapentin (NEURONTIN) capsule 300 mg  300 mg Oral BID    cholecalciferol (VITAMIN D3) (1000 Units /25 mcg) tablet 1,000 Units  1,000 Units Oral DAILY    vitamin B complex tablet  1 Tablet Oral DAILY    cetirizine (ZYRTEC) tablet 10 mg  10 mg Oral DAILY PRN    therapeutic multivitamin (THERAGRAN) tablet 1 Tablet  1 Tablet Oral DAILY    latanoprost (XALATAN) 0.005 % ophthalmic solution 1 Drop  1 Drop Both Eyes QHS    pantoprazole (PROTONIX) tablet 40 mg  40 mg Oral ACB    guaiFENesin ER (MUCINEX) tablet 600 mg  600 mg Oral BID PRN    predniSONE (DELTASONE) tablet 20 mg  20 mg Oral DAILY WITH BREAKFAST    clopidogreL (PLAVIX) tablet 75 mg  75 mg Oral DAILY    aspirin chewable tablet 81 mg  81 mg Oral DAILY    atorvastatin (LIPITOR) tablet 80 mg  80 mg Oral QHS    hydrALAZINE (APRESOLINE) 20 mg/mL injection 10 mg  10 mg IntraVENous Q6H PRN     ______________________________________________________________________  EXPECTED LENGTH OF STAY: - - -  ACTUAL LENGTH OF STAY:          1                 Cyrus Del Real MD

## 2021-12-30 NOTE — PROGRESS NOTES
Problem: Mobility Impaired (Adult and Pediatric)  Goal: *Acute Goals and Plan of Care (Insert Text)  Description:   FUNCTIONAL STATUS PRIOR TO ADMISSION: Patient was modified independent using a rollator for functional mobility in an independent living facility. She reports receiving assist with meals provided and housekeeping. She reports managing her own medications. When asked she endorsed 4-5 falls in the last 12 months. Brayan Mohamud HOME SUPPORT PRIOR TO ADMISSION: The patient lived alone with support as noted above. Physical Therapy Goals  Initiated 12/26/2021  1. Patient will move from supine to sit and sit to supine , scoot up and down, and roll side to side in bed with independence within 7 day(s). 2.  Patient will transfer from bed to chair and chair to bed with modified independence using the least restrictive device within 7 day(s). 3.  Patient will perform sit to stand with modified independence within 7 day(s). 4.  Patient will ambulate with modified independence for 200 feet with the least restrictive device within 7 day(s). 5.  Patient will improve Soares Balance score by 7 points within 7 days. Outcome: Progressing Towards Goal     PHYSICAL THERAPY TREATMENT  Patient: Henry Darnell (78 y.o. female)  Date: 12/30/2021  Diagnosis: CVA (cerebral vascular accident) ([facility] [address] [I63.9]  TIA (transient ischemic attack) [G45.9]  Weakness of right leg [R29.898] <principal problem not specified>       Precautions: Fall,Skin (droplet plus)  Chart, physical therapy assessment, plan of care and goals were reviewed. ASSESSMENT  Patient continues with skilled PT services and is progressing towards goals. Received patient sitting up in the chair, agreeable to therapy though waiting on pain meds. Offered to reach out to RN re: pain meds. Pt informed that the RN stated she was on her way.   Patient's functional mobility remains limited by weakness, shoulder pain, impaired balance, fall risk, and decreased activity tolerance. Patient is CGA for transfers and CGA to Min A ambulating 30 ft with the RW. Ambulation distance is limited by LE weakness and activity tolerance. Min A was required at times for walker management during turns. Of note, pt ambulates with a rollator walker (new since previous IPR) at home. O2 sat upper 90's w/ activity while on RA, HR up to 120 bpm.       Patient is below her functional baseline of independent. Given the patient lives alone, has h/o LE's giving way resulting in a fall when she returned home from rehab just prior to this admission, and the level of assist currently required, she may benefit from returning to [facility] before discharging home. If unable to place, then SNF vs home with HHPT and increased caregiver assist.      Current Level of Function Impacting Discharge (mobility/balance): CGA for transfers; Up to Min A to ambulate with a RW    Other factors to consider for discharge: Lives alone, falls         PLAN :  Patient continues to benefit from skilled intervention to address the above impairments. Continue treatment per established plan of care. to address goals. Recommendation for discharge: (in order for the patient to meet his/her long term goals)  Therapy 3 hours per day 5-7 days per week. (Previous recommendation was for home w/ HHPT. Given pt is not yet independent with transfers and ambulation and her fall history/ continued risk, she would benefit from brief rehab stay before returning home. If unable to place, then SNF vs home with HHPT. This discharge recommendation:  Has been made in collaboration with the attending provider and/or case management    IF patient discharges home will need the following DME: to be determined (TBD) - Pt owns rollator walker. Pt inquired re: power WC. Discussed pros/ cons (safety and fall prevention vs increasing weakness over time)       SUBJECTIVE:   Patient stated I don't think rehab is going to help.  (pt referring to her stenosis - wants to consult with MD re: treatment options and has already reached out)    OBJECTIVE DATA SUMMARY:   Critical Behavior:  Neurologic State: Alert  Orientation Level: Oriented X4  Cognition: Follows commands,Appropriate decision making,Appropriate for age attention/concentration,Appropriate safety awareness  Safety/Judgement: Awareness of environment  Functional Mobility Training:  Bed Mobility:  Supine to Sit: Supervision  Scooting: Supervision  Transfers:  Sit to Stand: Contact guard assistance  Stand to Sit: Contact guard assistance  Bed to Chair: Contact guard assistance  Balance:  Sitting: Intact; Without support  Standing: Impaired; Without support  Standing - Static: Fair;Constant support  Standing - Dynamic : Fair;Constant support  Ambulation/Gait Training:  Distance (ft): 30 Feet (ft)  Assistive Device: Gait belt;Walker, rolling  Ambulation - Level of Assistance: Contact guard assistance;Minimal assistance;Assist x1;Adaptive equipment (Min A for walker mgmt x2 w/ turns otherwise CGA)  Gait Abnormalities: Decreased step clearance        Base of Support: Widened        Step Length: Right shortened;Left shortened            Pain Rating:  RN arrived w/ pain meds    Activity Tolerance:   Fair, SpO2 stable on RA and requires rest breaks    After treatment patient left in no apparent distress:   Sitting in chair and Call bell within reach    COMMUNICATION/COLLABORATION:   The patients plan of care was discussed with: Occupational therapist and Registered nurse.      John August PT   Time Calculation: 31 mins

## 2021-12-30 NOTE — PROGRESS NOTES
Problem: Self Care Deficits Care Plan (Adult)  Goal: *Acute Goals and Plan of Care (Insert Text)  Description: PLOF: Patient was modified independent with Rollator. Managed own meds. After hospitalization DC to [facility] 12/4/2021, then transferred to [facility] in which she states is where she contracted COVID, discharged home, home one day and then to [facility]. Patient is vaccinated. Baseline arthritis, RTC tear right and repair left. [facility] and again at rehab recently. Home support: Lives alone. Has hired help for meals and housekeeping. Daughter lives nearby. Occupational Therapy Goals  Initiated 12/27/2021  1. Patient will perform lower body dressing with modified independence within 7 day(s). 2.  Patient will perform bathing with modified independence within 7 day(s). 3.  Patient will perform standing ADLs 2 mins with RW with modified independence within 7 day(s). 4.  Patient will perform toilet transfers with modified independence within 7 day(s). 5.  Patient will perform all aspects of toileting with modified independence within 7 day(s). 6.  Patient will participate in upper extremity therapeutic exercise/activities with modified independence within 7 day(s). 7.  Patient will utilize energy conservation techniques during functional activities with verbal cues within 7 day(s). Outcome: Progressing Towards Goal   OCCUPATIONAL THERAPY TREATMENT  Patient: Last Arce (10 y.o. female)  Date: 12/30/2021  Diagnosis: CVA (cerebral vascular accident) ([facility] [address] [I63.9]  TIA (transient ischemic attack) [G45.9]  Weakness of right leg [R29.898] <principal problem not specified>       Precautions: Fall,Skin (droplet plus)  Chart, occupational therapy assessment, plan of care, and goals were reviewed. ASSESSMENT  Patient continues with skilled OT services and is progressing towards goals. Patient semi supine in bed upon OT arrival and frustrated with overall care.  Patient complaints of brief being soiled and needing to be changed, also requesting medications, nursing made aware of medication request and patient assisted with bed level toileting prior to mobility this day. Patient able to perform pericare, requiring assist for brief management and bowel care at bed level. Patient performed bed mobility and sup -> sit transfer with supervision. Patient CGA sit <> stand and ambulating bathroom distance x2 with seated rest break, oxygen stable on room air maintaining at 96% throughout session. Patient declining grooming and toileting needs at this time and agreeable to sit up in chair at end of session. Patient overall progressing well towards goals and would benefit from continued skilled OT services during admission to improve independence with self care and functional mobility/transfers. Recommend discharge to IPR at this time. Patient and daughter would prefer IPR compared to SNF, patient would likely tolerate 3 hours/day but if patient is unable to discharge to IPR then would at minimum need SNF or . Current Level of Function Impacting Discharge (ADLs): setup A upper extremity activities of daily living, setup pericare, mod to max A cloth management and bowel care    Other factors to consider for discharge: prior level of function independent, admitted from home, was at home ~1 day since SNF discharge         PLAN :  Patient continues to benefit from skilled intervention to address the above impairments. Continue treatment per established plan of care to address goals.     Recommend with staff: up to chair 3x/day for meals, functional mobility to and from bathroom for toileting    Recommendation for discharge: (in order for the patient to meet his/her long term goals)  Therapy 3 hours per day 5-7 days per week    This discharge recommendation:  Has been made in collaboration with the attending provider and/or case management    IF patient discharges home will need the following DME: patient owns DME required for discharge       SUBJECTIVE:   Patient stated I need medication for my asthma.     OBJECTIVE DATA SUMMARY:   Cognitive/Behavioral Status:  Neurologic State: Alert  Orientation Level: Oriented X4                Functional Mobility and Transfers for ADLs:  Bed Mobility:  Supine to Sit: Supervision  Scooting: Supervision    Transfers:  Sit to Stand: Contact guard assistance     Bed to Chair: Contact guard assistance    Balance:  Sitting: Intact  Standing: Impaired; With support  Standing - Static: Fair;Constant support  Standing - Dynamic : Fair;Constant support    ADL Intervention:                 Lower Body Bathing  Perineal  : Set-up  Position Performed: Supine              Toileting  Bladder Hygiene: Set-up  Bowel Hygiene: Maximum assistance  Clothing Management: Moderate assistance  Cues: Verbal cues provided         Pain:  Pain in right side with bed flat, did not rate    Activity Tolerance:   Fair, SpO2 stable on RA and requires rest breaks    After treatment patient left in no apparent distress:   Sitting in chair and Call bell within reach    COMMUNICATION/COLLABORATION:   The patients plan of care was discussed with: Physical therapist and Registered nurse.      Deon Johns OTR/L  Time Calculation: 30 mins

## 2021-12-31 LAB
ALBUMIN SERPL-MCNC: 3 G/DL (ref 3.5–5)
ALBUMIN/GLOB SERPL: 1 {RATIO} (ref 1.1–2.2)
ALP SERPL-CCNC: 78 U/L (ref 45–117)
ALT SERPL-CCNC: 28 U/L (ref 12–78)
ANION GAP SERPL CALC-SCNC: 7 MMOL/L (ref 5–15)
AST SERPL-CCNC: 14 U/L (ref 15–37)
BILIRUB SERPL-MCNC: 0.5 MG/DL (ref 0.2–1)
BUN SERPL-MCNC: 21 MG/DL (ref 6–20)
BUN/CREAT SERPL: 21 (ref 12–20)
CALCIUM SERPL-MCNC: 9.9 MG/DL (ref 8.5–10.1)
CHLORIDE SERPL-SCNC: 102 MMOL/L (ref 97–108)
CO2 SERPL-SCNC: 27 MMOL/L (ref 21–32)
CREAT SERPL-MCNC: 1.01 MG/DL (ref 0.55–1.02)
ERYTHROCYTE [DISTWIDTH] IN BLOOD BY AUTOMATED COUNT: 14.6 % (ref 11.5–14.5)
GLOBULIN SER CALC-MCNC: 3 G/DL (ref 2–4)
GLUCOSE SERPL-MCNC: 87 MG/DL (ref 65–100)
HCT VFR BLD AUTO: 40.7 % (ref 35–47)
HGB BLD-MCNC: 12.6 G/DL (ref 11.5–16)
MCH RBC QN AUTO: 29.2 PG (ref 26–34)
MCHC RBC AUTO-ENTMCNC: 31 G/DL (ref 30–36.5)
MCV RBC AUTO: 94.2 FL (ref 80–99)
NRBC # BLD: 0 K/UL (ref 0–0.01)
NRBC BLD-RTO: 0 PER 100 WBC
PLATELET # BLD AUTO: 278 K/UL (ref 150–400)
PMV BLD AUTO: 9.9 FL (ref 8.9–12.9)
POTASSIUM SERPL-SCNC: 3.9 MMOL/L (ref 3.5–5.1)
PROT SERPL-MCNC: 6 G/DL (ref 6.4–8.2)
RBC # BLD AUTO: 4.32 M/UL (ref 3.8–5.2)
SODIUM SERPL-SCNC: 136 MMOL/L (ref 136–145)
WBC # BLD AUTO: 12.2 K/UL (ref 3.6–11)

## 2021-12-31 PROCEDURE — 94640 AIRWAY INHALATION TREATMENT: CPT

## 2021-12-31 PROCEDURE — 85027 COMPLETE CBC AUTOMATED: CPT

## 2021-12-31 PROCEDURE — 80053 COMPREHEN METABOLIC PANEL: CPT

## 2021-12-31 PROCEDURE — 65270000029 HC RM PRIVATE

## 2021-12-31 PROCEDURE — 36415 COLL VENOUS BLD VENIPUNCTURE: CPT

## 2021-12-31 PROCEDURE — 74011636637 HC RX REV CODE- 636/637: Performed by: STUDENT IN AN ORGANIZED HEALTH CARE EDUCATION/TRAINING PROGRAM

## 2021-12-31 PROCEDURE — 74011250636 HC RX REV CODE- 250/636: Performed by: STUDENT IN AN ORGANIZED HEALTH CARE EDUCATION/TRAINING PROGRAM

## 2021-12-31 PROCEDURE — 74011250637 HC RX REV CODE- 250/637: Performed by: HOSPITALIST

## 2021-12-31 PROCEDURE — 74011250637 HC RX REV CODE- 250/637: Performed by: STUDENT IN AN ORGANIZED HEALTH CARE EDUCATION/TRAINING PROGRAM

## 2021-12-31 RX ORDER — DIAZEPAM 5 MG/1
5 TABLET ORAL ONCE
Status: DISPENSED | OUTPATIENT
Start: 2021-12-31 | End: 2022-01-01

## 2021-12-31 RX ADMIN — CLOPIDOGREL BISULFATE 75 MG: 75 TABLET ORAL at 09:28

## 2021-12-31 RX ADMIN — Medication 2 PUFF: at 21:37

## 2021-12-31 RX ADMIN — ATORVASTATIN CALCIUM 80 MG: 40 TABLET, FILM COATED ORAL at 21:00

## 2021-12-31 RX ADMIN — ASPIRIN 81 MG CHEWABLE TABLET 81 MG: 81 TABLET CHEWABLE at 09:27

## 2021-12-31 RX ADMIN — GUAIFENESIN 600 MG: 600 TABLET, EXTENDED RELEASE ORAL at 20:59

## 2021-12-31 RX ADMIN — THERA TABS 1 TABLET: TAB at 09:28

## 2021-12-31 RX ADMIN — GABAPENTIN 300 MG: 100 CAPSULE ORAL at 09:27

## 2021-12-31 RX ADMIN — Medication 2 PUFF: at 07:46

## 2021-12-31 RX ADMIN — PANTOPRAZOLE SODIUM 40 MG: 40 TABLET, DELAYED RELEASE ORAL at 07:03

## 2021-12-31 RX ADMIN — LOSARTAN POTASSIUM 25 MG: 25 TABLET, FILM COATED ORAL at 09:28

## 2021-12-31 RX ADMIN — SODIUM CHLORIDE, PRESERVATIVE FREE 10 ML: 5 INJECTION INTRAVENOUS at 18:21

## 2021-12-31 RX ADMIN — GUAIFENESIN 600 MG: 600 TABLET, EXTENDED RELEASE ORAL at 11:37

## 2021-12-31 RX ADMIN — MONTELUKAST 10 MG: 10 TABLET, FILM COATED ORAL at 21:01

## 2021-12-31 RX ADMIN — ENOXAPARIN SODIUM 40 MG: 100 INJECTION SUBCUTANEOUS at 09:28

## 2021-12-31 RX ADMIN — GABAPENTIN 300 MG: 100 CAPSULE ORAL at 18:10

## 2021-12-31 RX ADMIN — LATANOPROST 1 DROP: 50 SOLUTION OPHTHALMIC at 21:05

## 2021-12-31 RX ADMIN — SODIUM CHLORIDE, PRESERVATIVE FREE 10 ML: 5 INJECTION INTRAVENOUS at 21:02

## 2021-12-31 RX ADMIN — ACETAMINOPHEN 650 MG: 325 TABLET ORAL at 21:00

## 2021-12-31 RX ADMIN — Medication 1 TABLET: at 09:28

## 2021-12-31 RX ADMIN — BUPROPION HYDROCHLORIDE 200 MG: 100 TABLET, FILM COATED, EXTENDED RELEASE ORAL at 21:01

## 2021-12-31 RX ADMIN — ENOXAPARIN SODIUM 40 MG: 100 INJECTION SUBCUTANEOUS at 21:02

## 2021-12-31 RX ADMIN — LEVOTHYROXINE SODIUM 125 MCG: 0.12 TABLET ORAL at 09:28

## 2021-12-31 RX ADMIN — Medication 1000 UNITS: at 09:28

## 2021-12-31 RX ADMIN — SODIUM CHLORIDE, PRESERVATIVE FREE 10 ML: 5 INJECTION INTRAVENOUS at 09:27

## 2021-12-31 RX ADMIN — PREDNISONE 20 MG: 20 TABLET ORAL at 09:28

## 2021-12-31 RX ADMIN — BUPROPION HYDROCHLORIDE 200 MG: 100 TABLET, FILM COATED, EXTENDED RELEASE ORAL at 09:28

## 2021-12-31 NOTE — PROGRESS NOTES
TRANSFER - OUT REPORT:    Verbal report given to GERALDO Shook(name) on Yonatan Valdivia  being transferred to (unit) for routine progression of care       Report consisted of patients Situation, Background, Assessment and   Recommendations(SBAR). Information from the following report(s) SBAR, OR Summary, Procedure Summary, Intake/Output, MAR, Recent Results and Med Rec Status was reviewed with the receiving nurse. Lines:   Peripheral IV 12/27/21 Left Antecubital (Active)   Site Assessment Clean, dry, & intact 12/31/21 0100   Phlebitis Assessment 0 12/31/21 0100   Infiltration Assessment 0 12/31/21 0100   Dressing Status Clean, dry, & intact 12/31/21 0100   Dressing Type Transparent 12/31/21 0100   Hub Color/Line Status Capped 12/31/21 0100   Action Taken Open ports on tubing capped 12/30/21 2023   Alcohol Cap Used Yes 12/30/21 2023        Opportunity for questions and clarification was provided.       Patient transported with:   [employer]

## 2021-12-31 NOTE — PROGRESS NOTES
[facility] Adult  Hospitalist Group                                                                                          Hospitalist Progress Note  Mustapha Coello MD  Answering service: [phone] from in house phone        Date of Service:  2021  NAME:  Soo Mcdonough  :  1945  MRN:  [id number]      Admission Summary:     Gil Scott a 64 y. o. female  with past medical history of eosinophilic asthma, depression, fibromyalgia, GERD, hypertension, hypothyroidism, bilateral shoulder arthritis, who comes in for right lower weakness and fall .  Patient was recently discharged on the  of this month to [facility] after being in the hospital and in rehab due to weakness and dehydration.  Per the patient's daughter the patient called her last night around 11 PM to say that something was different and she had fallen.  The patient remembers the phone couple does not remember much.  The patient says that she has felt weak ever since she was hospitalized before and this did not get better at the inpatient rehab and SNF. Loni Morales says that her right lower extremity feels different and is weak.  The daughter reports that when she got the phone call the patient said that her right lower extremity was weak so she fell.  When EMS arrived they had to help her walk and saw that her right lower extremity was not responding like her left lower extremity.  The patient does report some weakness in the right upper extremity as well.  She denies any facial droop, facial weakness, numbness, and others.  Her daughter denies that the patient had any type of aphasia or dysarthria when she called her. Nia Po patient's daughter does report that the patient's right upper extremity has been trembling more than normal.  The patient reports a \"weird\" sensation in her right lower extremity that still has not resolved, but denies any actual weakness at this time.     In the ER patient's labs were essentially normal except for LOREN and CT of the head and CTAs of head and neck were normal.  Teleneurology was consulted and code stroke was called.  Patient is out of the window for TPA. Interval history / Subjective:     12/30/2021. Saw patient this morning, awake alert and oriented, no apparent distress, pleasant uncooperative with physical examination, still complaining of right lower extremity weakness, frustrated with the lack of progress, patient is afraid to be discharged as she thinks she might fall and not be able to get up, patient denies any fever, chills, nausea, vomiting or new any focal neurologic symptoms. 12/31/2021. Saw patient this morning, awake alert and oriented, pleasant and cooperative with physical examination, still complaining of neck pain, denies any fever, chills, nausea, vomiting. EMG has been ordered but unfortunately cannot be done as inpatient, patient is pending MRI brain,  has been working to find a replacement, patient has a place to go however family is very concerned about her frequent falls and he still wants more work-up before being discharged. Assessment & Plan:     Right lower extremities weakness and fall  Limited MRI negative for any acute stroke. CT head on admission negative for any acute process. CTA neck negative for any hemodynamically significant stenosis. 2D echo LVEF 50 to 55%. Patient has history of extensive neck surgery, she is status post C2-T12 fusion. Limited MRI head shows moderate to severe spinal canal stenosis at the craniocervical junction due to extensive retrodental pannus formation. Neurosurgery consulted, no intervention  EMG has been ordered but unfortunately cannot be done inpatient. Continue PT OT, monitor vitals, fall, aspiration and seizure precaution. Pending MRI neck. Neurology is on board, recommendations noted. Cervical stenosis   Limited MRI positive for moderate to severe cervical spinal canal stenosis.    History of C2 - T12 fusion on 07/6/2010    It is likely that patient's recurrent falls and lower extremity weakness is due to cervical myelopathy due to her surgery and postsurgical changes. Neurosurgery consulted, no intervention planned. Orthopedic surgery consulted, has not seen patient yet. Javier Mendez COVID 19 positive  Asymptomatic, SPO2 WNL on RA, no fever, no leukocytosis. Monitor closely, as needed DuoNebs, as needed supplemental oxygen.     Dehydration  Euvolemic, normotensive. Monitor volume status, encourage p.o. intake.      LOREN  Resolved, avoid nephrotoxic meds, renally dose medications.      Hypokalemia  Resolved. Chronic asthma  Not acutely exacerbated.   Continue patient's Symbicort, Pulmicort, albuterol     Hypothyroidism  Continue levothyroxine     Chronic pain  stable, continue tylenol prn      HTN  BP not at goal, add losartan, monitor BP    Hx of chronic RA  on prednisone    Code status: Full Code  DVT prophylaxis:lovenox    Care Plan discussed with: Patient/Family, Nurse and   Anticipated Disposition: SNF/LTC  Anticipated Discharge: Greater than 48 hours     Hospital Problems  Date Reviewed: 12/4/2021          Codes Class Noted POA    Weakness of right leg ICD-10-CM: R29.898  ICD-9-CM: 729.89  12/29/2021 Unknown        TIA (transient ischemic attack) ICD-10-CM: G45.9  ICD-9-CM: 435.9  12/25/2021 Unknown        CVA (cerebral vascular accident) [facility]) ICD-10-CM: I63.9  ICD-9-CM: 434.91  12/25/2021 Unknown        LOREN (acute kidney injury) ([facility] ICD-10-CM: N17.9  ICD-9-CM: 584.9  12/1/2021 Yes        Other chronic pain ICD-10-CM: G89.29  ICD-9-CM: 338.29  Unknown Yes        HTN (hypertension) (Chronic) ICD-10-CM: I10  ICD-9-CM: 401.9  7/8/2011 Yes        Asthma (Chronic) ICD-10-CM: J45.909  ICD-9-CM: 493.90  7/8/2011 Yes        Hypothyroidism ICD-10-CM: E03.9  ICD-9-CM: 244.9  7/8/2011 Yes        Dehydration ICD-10-CM: E86.0  ICD-9-CM: 276.51  7/7/2011 Yes                     Vital Signs: Last 24hrs VS reviewed since prior progress note. Most recent are:  Visit Vitals  BP (!) 145/100 (BP 1 Location: Left arm, BP Patient Position: At rest)   Pulse (!) 112   Temp 98.6 °F (37 °C)   Resp 19   Ht 5' (1.524 m)   Wt 75 kg (165 lb 5.5 oz)   SpO2 100%   BMI 32.29 kg/m²         Intake/Output Summary (Last 24 hours) at 12/31/2021 1719  Last data filed at 12/31/2021 1148  Gross per 24 hour   Intake    Output 450 ml   Net -450 ml        Physical Examination:     I had a face to face encounter with this patient and independently examined them on 12/31/2021 as outlined below:          Constitutional:  No acute distress, cooperative, pleasant    ENT:  Oral mucosa moist, oropharynx benign. Resp:  CTA bilaterally. No wheezing/rhonchi/rales. No accessory muscle use   CV:  Regular rhythm, normal rate, no murmurs, gallops, rubs    GI:  Soft, non distended, non tender. normoactive bowel sounds, no hepatosplenomegaly     Musculoskeletal:  No edema     Neurologic:  Conscious and alert, well oriented, motor 5/5,  CN II-XII reviewed            Data Review:    Review and/or order of clinical lab test  Review and/or order of tests in the radiology section of CPT  Review and/or order of tests in the medicine section of CPT      Labs:     Recent Labs     12/31/21  0504 12/30/21  0235   WBC 12.2* 11.5*   HGB 12.6 12.1   HCT 40.7 38.8    272     Recent Labs     12/31/21  0504 12/30/21  0235 12/29/21  0406    138 137   K 3.9 4.4 4.3    105 106   CO2 27 27 27   BUN 21* 17 13   CREA 1.01 0.92 0.89   GLU 87 101* 90   CA 9.9 9.5 9.1     Recent Labs     12/31/21  0504 12/30/21  0235 12/29/21  0406   ALT 28 32 29   AP 78 86 82   TBILI 0.5 0.4 0.4   TP 6.0* 5.5* 5.3*   ALB 3.0* 3.1* 2.9*   GLOB 3.0 2.4 2.4     No results for input(s): INR, PTP, APTT, INREXT, INREXT in the last 72 hours. No results for input(s): FE, TIBC, PSAT, FERR in the last 72 hours.    No results found for: FOL, RBCF   No results for input(s): PH, PCO2, PO2 in the last 72 hours. No results for input(s): CPK, CKNDX, TROIQ in the last 72 hours.     No lab exists for component: CPKMB  No results found for: CHOL, CHOLX, CHLST, CHOLV, HDL, HDLP, LDL, LDLC, DLDLP, TGLX, TRIGL, TRIGP, CHHD, CHHDX  Lab Results   Component Value Date/Time    Glucose (POC) 112 12/25/2021 12:31 PM    Glucose (POC) 126 (H) 02/14/2021 04:29 PM    Glucose (POC) 103 (H) 02/14/2021 11:04 AM    Glucose (POC) 72 02/14/2021 06:15 AM    Glucose (POC) 95 02/13/2021 09:14 PM     Lab Results   Component Value Date/Time    Color DARK YELLOW 11/30/2021 10:45 PM    Appearance TURBID (A) 11/30/2021 10:45 PM    Specific gravity 1.020 11/30/2021 10:45 PM    Specific gravity 1.020 02/08/2021 06:45 PM    pH (UA) 5.0 11/30/2021 10:45 PM    Protein Negative 11/30/2021 10:45 PM    Glucose Negative 11/30/2021 10:45 PM    Ketone Negative 11/30/2021 10:45 PM    Bilirubin Negative 11/30/2021 10:45 PM    Urobilinogen 0.2 11/30/2021 10:45 PM    Nitrites Negative 11/30/2021 10:45 PM    Leukocyte Esterase Negative 11/30/2021 10:45 PM    Epithelial cells MANY (A) 11/30/2021 10:45 PM    Bacteria 1+ (A) 11/30/2021 10:45 PM    WBC 10-20 11/30/2021 10:45 PM    RBC 5-10 11/30/2021 10:45 PM         Medications Reviewed:     Current Facility-Administered Medications   Medication Dose Route Frequency    diazePAM (VALIUM) tablet 5 mg  5 mg Oral ONCE    calcium carbonate (TUMS) chewable tablet 200 mg [elemental]  200 mg Oral TID PRN    losartan (COZAAR) tablet 25 mg  25 mg Oral DAILY    buPROPion SR (WELLBUTRIN SR) tablet 200 mg  200 mg Oral Q12H    HYDROcodone-acetaminophen (NORCO) 5-325 mg per tablet 1 Tablet  1 Tablet Oral Q4H PRN    tiotropium bromide (SPIRIVA RESPIMAT) 2.5 mcg /actuation  2 Puff Inhalation DAILY    budesonide-formoteroL (SYMBICORT) 160-4.5 mcg/actuation HFA inhaler 2 Puff  2 Puff Inhalation BID RT    albuterol (PROVENTIL HFA, VENTOLIN HFA, PROAIR HFA) inhaler 1 Puff  1 Puff Inhalation Q6H PRN  enoxaparin (LOVENOX) injection 40 mg  40 mg SubCUTAneous Q12H    sodium chloride (NS) flush 5-40 mL  5-40 mL IntraVENous Q8H    sodium chloride (NS) flush 5-40 mL  5-40 mL IntraVENous PRN    acetaminophen (TYLENOL) tablet 650 mg  650 mg Oral Q6H PRN    Or    acetaminophen (TYLENOL) suppository 650 mg  650 mg Rectal Q6H PRN    polyethylene glycol (MIRALAX) packet 17 g  17 g Oral DAILY PRN    ondansetron (ZOFRAN ODT) tablet 4 mg  4 mg Oral Q8H PRN    Or    ondansetron (ZOFRAN) injection 4 mg  4 mg IntraVENous Q6H PRN    levothyroxine (SYNTHROID) tablet 125 mcg  125 mcg Oral DAILY    montelukast (SINGULAIR) tablet 10 mg  10 mg Oral QHS    gabapentin (NEURONTIN) capsule 300 mg  300 mg Oral BID    cholecalciferol (VITAMIN D3) (1000 Units /25 mcg) tablet 1,000 Units  1,000 Units Oral DAILY    vitamin B complex tablet  1 Tablet Oral DAILY    cetirizine (ZYRTEC) tablet 10 mg  10 mg Oral DAILY PRN    therapeutic multivitamin (THERAGRAN) tablet 1 Tablet  1 Tablet Oral DAILY    latanoprost (XALATAN) 0.005 % ophthalmic solution 1 Drop  1 Drop Both Eyes QHS    pantoprazole (PROTONIX) tablet 40 mg  40 mg Oral ACB    guaiFENesin ER (MUCINEX) tablet 600 mg  600 mg Oral BID PRN    predniSONE (DELTASONE) tablet 20 mg  20 mg Oral DAILY WITH BREAKFAST    clopidogreL (PLAVIX) tablet 75 mg  75 mg Oral DAILY    aspirin chewable tablet 81 mg  81 mg Oral DAILY    atorvastatin (LIPITOR) tablet 80 mg  80 mg Oral QHS    hydrALAZINE (APRESOLINE) 20 mg/mL injection 10 mg  10 mg IntraVENous Q6H PRN     ______________________________________________________________________  EXPECTED LENGTH OF STAY: - - -  ACTUAL LENGTH OF STAY:          2                 Veronika Hanna MD

## 2021-12-31 NOTE — PROGRESS NOTES
Transition Plan of Care  RUR 18%-Med  Disposition-recommendations for rehab sent to the following  [facility]-declined  [facility] accepting Covid +  [facility]-not accepting Covid +  [facility] still evaluating, if accepted does not need authorization. Update-spoke with liaison from [facility] Yared Amato  and they have approval  for admit and bed available today. Spoke with Dr Jossie Johnson and and also spoke with POA daughter Allen Hughes [phone]. Daughter has multiple questions regarding completion of testing before she discharging. Attending will speak with daughter. CM spoke with liaison and they can admit tomorrow. Patient on WILL CALL for transport.

## 2021-12-31 NOTE — PROGRESS NOTES
Bedside and Verbal shift change report given to Melanie (oncoming nurse) by Savage Garcia (offgoing nurse). Report included the following information SBAR.

## 2021-12-31 NOTE — PROGRESS NOTES
TRANSFER - IN REPORT:    Verbal report received from Melanie RN(name) on Domingo Arellano  being received from 4 IMCU(unit) for routine progression of care      Report consisted of patients Situation, Background, Assessment and   Recommendations(SBAR). Information from the following report(s) SBAR, MAR and Med Rec Status was reviewed with the receiving nurse. Opportunity for questions and clarification was provided. Assessment completed upon patients arrival to unit and care assumed.

## 2022-01-01 LAB
ERYTHROCYTE [DISTWIDTH] IN BLOOD BY AUTOMATED COUNT: 14.5 % (ref 11.5–14.5)
HCT VFR BLD AUTO: 37.8 % (ref 35–47)
HGB BLD-MCNC: 12.3 G/DL (ref 11.5–16)
MCH RBC QN AUTO: 29.4 PG (ref 26–34)
MCHC RBC AUTO-ENTMCNC: 32.5 G/DL (ref 30–36.5)
MCV RBC AUTO: 90.4 FL (ref 80–99)
NRBC # BLD: 0 K/UL (ref 0–0.01)
NRBC BLD-RTO: 0 PER 100 WBC
PLATELET # BLD AUTO: 306 K/UL (ref 150–400)
PMV BLD AUTO: 10.1 FL (ref 8.9–12.9)
RBC # BLD AUTO: 4.18 M/UL (ref 3.8–5.2)
WBC # BLD AUTO: 11.6 K/UL (ref 3.6–11)

## 2022-01-01 PROCEDURE — 74011250637 HC RX REV CODE- 250/637: Performed by: INTERNAL MEDICINE

## 2022-01-01 PROCEDURE — 74011250637 HC RX REV CODE- 250/637: Performed by: HOSPITALIST

## 2022-01-01 PROCEDURE — 65270000029 HC RM PRIVATE

## 2022-01-01 PROCEDURE — 85027 COMPLETE CBC AUTOMATED: CPT

## 2022-01-01 PROCEDURE — 74011250637 HC RX REV CODE- 250/637: Performed by: STUDENT IN AN ORGANIZED HEALTH CARE EDUCATION/TRAINING PROGRAM

## 2022-01-01 PROCEDURE — 74011636637 HC RX REV CODE- 636/637: Performed by: STUDENT IN AN ORGANIZED HEALTH CARE EDUCATION/TRAINING PROGRAM

## 2022-01-01 PROCEDURE — 74011250636 HC RX REV CODE- 250/636: Performed by: STUDENT IN AN ORGANIZED HEALTH CARE EDUCATION/TRAINING PROGRAM

## 2022-01-01 PROCEDURE — 36415 COLL VENOUS BLD VENIPUNCTURE: CPT

## 2022-01-01 RX ORDER — LOSARTAN POTASSIUM 50 MG/1
50 TABLET ORAL DAILY
Status: DISCONTINUED | OUTPATIENT
Start: 2022-01-02 | End: 2022-01-01

## 2022-01-01 RX ORDER — METOPROLOL SUCCINATE 25 MG/1
12.5 TABLET, EXTENDED RELEASE ORAL DAILY
Status: DISCONTINUED | OUTPATIENT
Start: 2022-01-01 | End: 2022-01-02

## 2022-01-01 RX ORDER — OXYMETAZOLINE HCL 0.05 %
2 SPRAY, NON-AEROSOL (ML) NASAL 2 TIMES DAILY
Status: DISCONTINUED | OUTPATIENT
Start: 2022-01-01 | End: 2022-01-03 | Stop reason: HOSPADM

## 2022-01-01 RX ORDER — MICONAZOLE NITRATE 2 %
POWDER (GRAM) TOPICAL 2 TIMES DAILY
Status: DISCONTINUED | OUTPATIENT
Start: 2022-01-01 | End: 2022-01-03 | Stop reason: HOSPADM

## 2022-01-01 RX ORDER — LOSARTAN POTASSIUM 50 MG/1
50 TABLET ORAL DAILY
Status: DISCONTINUED | OUTPATIENT
Start: 2022-01-01 | End: 2022-01-03 | Stop reason: HOSPADM

## 2022-01-01 RX ADMIN — OXYMETAZOLINE HCL 2 SPRAY: 0.05 SPRAY NASAL at 18:01

## 2022-01-01 RX ADMIN — MICONAZOLE NITRATE 2 % TOPICAL POWDER: at 17:25

## 2022-01-01 RX ADMIN — ASPIRIN 81 MG CHEWABLE TABLET 81 MG: 81 TABLET CHEWABLE at 10:00

## 2022-01-01 RX ADMIN — GUAIFENESIN 600 MG: 600 TABLET, EXTENDED RELEASE ORAL at 21:40

## 2022-01-01 RX ADMIN — LOSARTAN POTASSIUM 50 MG: 50 TABLET, FILM COATED ORAL at 11:39

## 2022-01-01 RX ADMIN — THERA TABS 1 TABLET: TAB at 10:00

## 2022-01-01 RX ADMIN — MICONAZOLE NITRATE 2 % TOPICAL POWDER: at 11:39

## 2022-01-01 RX ADMIN — SODIUM CHLORIDE, PRESERVATIVE FREE 10 ML: 5 INJECTION INTRAVENOUS at 07:57

## 2022-01-01 RX ADMIN — ATORVASTATIN CALCIUM 80 MG: 40 TABLET, FILM COATED ORAL at 21:40

## 2022-01-01 RX ADMIN — SODIUM CHLORIDE, PRESERVATIVE FREE 10 ML: 5 INJECTION INTRAVENOUS at 21:42

## 2022-01-01 RX ADMIN — BUPROPION HYDROCHLORIDE 200 MG: 100 TABLET, FILM COATED, EXTENDED RELEASE ORAL at 21:40

## 2022-01-01 RX ADMIN — GABAPENTIN 300 MG: 100 CAPSULE ORAL at 10:00

## 2022-01-01 RX ADMIN — ENOXAPARIN SODIUM 40 MG: 100 INJECTION SUBCUTANEOUS at 09:59

## 2022-01-01 RX ADMIN — Medication 1 TABLET: at 10:08

## 2022-01-01 RX ADMIN — PREDNISONE 20 MG: 20 TABLET ORAL at 07:56

## 2022-01-01 RX ADMIN — CLOPIDOGREL BISULFATE 75 MG: 75 TABLET ORAL at 10:00

## 2022-01-01 RX ADMIN — LEVOTHYROXINE SODIUM 125 MCG: 0.12 TABLET ORAL at 10:00

## 2022-01-01 RX ADMIN — Medication 2 PUFF: at 17:25

## 2022-01-01 RX ADMIN — METOPROLOL SUCCINATE 12.5 MG: 25 TABLET, FILM COATED, EXTENDED RELEASE ORAL at 16:03

## 2022-01-01 RX ADMIN — Medication 2 PUFF: at 21:41

## 2022-01-01 RX ADMIN — GABAPENTIN 300 MG: 100 CAPSULE ORAL at 17:24

## 2022-01-01 RX ADMIN — Medication 1000 UNITS: at 10:00

## 2022-01-01 RX ADMIN — BUPROPION HYDROCHLORIDE 200 MG: 100 TABLET, FILM COATED, EXTENDED RELEASE ORAL at 10:00

## 2022-01-01 RX ADMIN — ACETAMINOPHEN 650 MG: 325 TABLET ORAL at 17:24

## 2022-01-01 RX ADMIN — MONTELUKAST 10 MG: 10 TABLET, FILM COATED ORAL at 21:40

## 2022-01-01 RX ADMIN — PANTOPRAZOLE SODIUM 40 MG: 40 TABLET, DELAYED RELEASE ORAL at 07:56

## 2022-01-01 RX ADMIN — ALBUTEROL SULFATE 1 PUFF: 90 AEROSOL, METERED RESPIRATORY (INHALATION) at 10:09

## 2022-01-01 RX ADMIN — GUAIFENESIN 600 MG: 600 TABLET, EXTENDED RELEASE ORAL at 10:08

## 2022-01-01 RX ADMIN — SODIUM CHLORIDE, PRESERVATIVE FREE 10 ML: 5 INJECTION INTRAVENOUS at 13:18

## 2022-01-01 RX ADMIN — ENOXAPARIN SODIUM 40 MG: 100 INJECTION SUBCUTANEOUS at 21:39

## 2022-01-01 RX ADMIN — LATANOPROST 1 DROP: 50 SOLUTION OPHTHALMIC at 21:41

## 2022-01-01 RX ADMIN — Medication 2 PUFF: at 10:01

## 2022-01-01 NOTE — PROGRESS NOTES
Pt is laying in bed with even and unlabored respirations on room air. No complaints of pain at this time. No distress noted. Pt had her bp meds increased due to having high bp. Pt was given mucinex during the shift for her cough and congestion. Plan of care is for the pt to go to [facility].  was asked if the pt could be transferred to [facility] today and the  stated they are not excepting anymore pts today and will try again tomorrow. All safety precautions are in place. Bed in low position and locked. Call bell within reach. Problem: Risk for Spread of Infection  Goal: Prevent transmission of infectious organism to others  Description: Prevent the transmission of infectious organisms to other patients, staff members, and visitors. Outcome: Progressing Towards Goal     Problem: Patient Education:  Go to Education Activity  Goal: Patient/Family Education  Outcome: Progressing Towards Goal     Problem: Airway Clearance - Ineffective  Goal: Achieve or maintain patent airway  Outcome: Progressing Towards Goal     Problem: Gas Exchange - Impaired  Goal: Absence of hypoxia  Outcome: Progressing Towards Goal  Goal: Promote optimal lung function  Outcome: Progressing Towards Goal     Problem: Breathing Pattern - Ineffective  Goal: Ability to achieve and maintain a regular respiratory rate  Outcome: Progressing Towards Goal     Problem:  Body Temperature -  Risk of, Imbalanced  Goal: Ability to maintain a body temperature within defined limits  Outcome: Progressing Towards Goal  Goal: Will regain or maintain usual level of consciousness  Outcome: Progressing Towards Goal  Goal: Complications related to the disease process, condition or treatment will be avoided or minimized  Outcome: Progressing Towards Goal     Problem: Isolation Precautions - Risk of Spread of Infection  Goal: Prevent transmission of infectious organism to others  Outcome: Progressing Towards Goal     Problem: Nutrition Deficits  Goal: Optimize nutrtional status  Outcome: Progressing Towards Goal     Problem: Risk for Fluid Volume Deficit  Goal: Maintain normal heart rhythm  Outcome: Progressing Towards Goal  Goal: Maintain absence of muscle cramping  Outcome: Progressing Towards Goal  Goal: Maintain normal serum potassium, sodium, calcium, phosphorus, and pH  Outcome: Progressing Towards Goal     Problem: Loneliness or Risk for Loneliness  Goal: Demonstrate positive use of time alone when socialization is not possible  Outcome: Progressing Towards Goal     Problem: Fatigue  Goal: Verbalize increase energy and improved vitality  Outcome: Progressing Towards Goal     Problem: Patient Education: Go to Patient Education Activity  Goal: Patient/Family Education  Outcome: Progressing Towards Goal     Problem: Falls - Risk of  Goal: *Absence of Falls  Description: Document Leonardo Fall Risk and appropriate interventions in the flowsheet. Outcome: Progressing Towards Goal  Note: Fall Risk Interventions:  Mobility Interventions: Communicate number of staff needed for ambulation/transfer         Medication Interventions: Evaluate medications/consider consulting pharmacy    Elimination Interventions: Call light in reach,Patient to call for help with toileting needs,Toileting schedule/hourly rounds    History of Falls Interventions: Evaluate medications/consider consulting pharmacy         Problem: Patient Education: Go to Patient Education Activity  Goal: Patient/Family Education  Outcome: Progressing Towards Goal     Problem: Patient Education: Go to Patient Education Activity  Goal: Patient/Family Education  Outcome: Progressing Towards Goal     Problem: Pressure Injury - Risk of  Goal: *Prevention of pressure injury  Description: Document Vaughn Scale and appropriate interventions in the flowsheet.   Outcome: Progressing Towards Goal     Problem: Patient Education: Go to Patient Education Activity  Goal: Patient/Family Education  Outcome: Progressing Towards Goal     Problem: Patient Education: Go to Patient Education Activity  Goal: Patient/Family Education  Outcome: Progressing Towards Goal

## 2022-01-01 NOTE — PROGRESS NOTES
[facility] Adult  Hospitalist Group                                                                                          Hospitalist Progress Note  Monica Duran MD  Answering service: [phone] from in house phone        Date of Service:  2022  NAME:  Kyle Burnham  :  1945  MRN:  [id number]      Admission Summary:   Marisela Brownlee a 64 y. o. female  with past medical history of eosinophilic asthma, depression, fibromyalgia, GERD, hypertension, hypothyroidism, bilateral shoulder arthritis, who comes in for right lower weakness and fall .  Patient was recently discharged on the  of this month to [facility] after being in the hospital and in rehab due to weakness and dehydration.  Per the patient's daughter the patient called her last night around 11 PM to say that something was different and she had fallen.  The patient remembers the phone couple does not remember much.  The patient says that she has felt weak ever since she was hospitalized before and this did not get better at the inpatient rehab and SNF. Maria L Mcneal says that her right lower extremity feels different and is weak.  The daughter reports that when she got the phone call the patient said that her right lower extremity was weak so she fell.  When EMS arrived they had to help her walk and saw that her right lower extremity was not responding like her left lower extremity.  The patient does report some weakness in the right upper extremity as well.  She denies any facial droop, facial weakness, numbness, and others.  Her daughter denies that the patient had any type of aphasia or dysarthria when she called her. Jose M Van patient's daughter does report that the patient's right upper extremity has been trembling more than normal.  The patient reports a \"weird\" sensation in her right lower extremity that still has not resolved, but denies any actual weakness at this time.     In the ER patient's labs were essentially normal except for LOREN and CT of the head and CTAs of head and neck were normal.  Teleneurology was consulted and code stroke was called.  Patient is out of the window for TPA. Interval history / Subjective:     12/30/2021. Saw patient this morning, awake alert and oriented, no apparent distress, pleasant uncooperative with physical examination, still complaining of right lower extremity weakness, frustrated with the lack of progress, patient is afraid to be discharged as she thinks she might fall and not be able to get up, patient denies any fever, chills, nausea, vomiting or new any focal neurologic symptoms. 12/31/2021. Saw patient this morning, awake alert and oriented, pleasant and cooperative with physical examination, still complaining of neck pain, denies any fever, chills, nausea, vomiting. EMG has been ordered but unfortunately cannot be done as inpatient, patient is pending MRI brain,  has been working to find a replacement, patient has a place to go however family is very concerned about her frequent falls and he still wants more work-up before being discharged. 1/1/2022. Saw patient this morning, awake alert and oriented, complaining of back pain, concerned about her high blood pressure, denies any fever, chills, nausea, vomiting. Patient is agreeable to being discharged to rehab, patient has a pending EEG and MRI but he stated that she can do it as outpatient, unfortunately the rehab does not have a bed available today. Assessment & Plan:     Right lower extremities weakness and fall  Limited MRI negative for any acute stroke. CT head on admission negative for any acute process. CTA neck negative for any hemodynamically significant stenosis. 2D echo LVEF 50 to 55%. Patient has history of extensive neck surgery, she is status post C2-T12 fusion.   Limited MRI head shows moderate to severe spinal canal stenosis at the craniocervical junction due to extensive retrodental pannus formation. Neurosurgery consulted, no intervention  EMG has been ordered but unfortunately cannot be done inpatient. Continue PT OT, monitor vitals, fall, aspiration and seizure precaution. Pending MRI neck. Neurology is on board, recommendations noted. Cervical stenosis   Limited MRI positive for moderate to severe cervical spinal canal stenosis. History of C2 - T12 fusion on 07/6/2010    It is likely that patient's recurrent falls and lower extremity weakness is due to cervical myelopathy due to her surgery and postsurgical changes. Neurosurgery consulted, no intervention planned. Orthopedic surgery consulted, has not seen patient yet. Clement Fly COVID 19 positive  Asymptomatic, SPO2 WNL on RA, no fever, no leukocytosis. Monitor closely, as needed DuoNebs, as needed supplemental oxygen.     Dehydration  Euvolemic, normotensive. Monitor volume status, encourage p.o. intake.      LOREN  Resolved, avoid nephrotoxic meds, renally dose medications.      Hypokalemia  Resolved. Chronic asthma  Not acutely exacerbated. Continue patient's Symbicort, Pulmicort, albuterol     Hypothyroidism  Continue levothyroxine     Chronic pain  stable, continue tylenol prn      HTN  BP not at goal, increase losartan to 50 mg p.o. daily, add low-dose metoprolol. Monitor closely, adjust meds as needed. Outpatient PCP follow-up.      Hx of chronic RA  on prednisone    Code status: Full Code  DVT prophylaxis:lovenox    Care Plan discussed with: Patient/Family, Nurse and   Anticipated Disposition: SNF/LTC  Anticipated Discharge: Greater than 48 hours     Hospital Problems  Date Reviewed: 12/4/2021          Codes Class Noted POA    Weakness of right leg ICD-10-CM: R29.898  ICD-9-CM: 729.89  12/29/2021 Unknown        TIA (transient ischemic attack) ICD-10-CM: G45.9  ICD-9-CM: 435.9  12/25/2021 Unknown        CVA (cerebral vascular accident) [facility]) ICD-10-CM: I63.9  ICD-9-CM: 434.91  12/25/2021 Unknown        LOREN (acute kidney injury) ([facility] [address] ICD-10-CM: N17.9  ICD-9-CM: 584.9  12/1/2021 Yes        Other chronic pain ICD-10-CM: G89.29  ICD-9-CM: 338.29  Unknown Yes        HTN (hypertension) (Chronic) ICD-10-CM: I10  ICD-9-CM: 401.9  7/8/2011 Yes        Asthma (Chronic) ICD-10-CM: J45.909  ICD-9-CM: 493.90  7/8/2011 Yes        Hypothyroidism ICD-10-CM: E03.9  ICD-9-CM: 244.9  7/8/2011 Yes        Dehydration ICD-10-CM: E86.0  ICD-9-CM: 276.51  7/7/2011 Yes                     Vital Signs:    Last 24hrs VS reviewed since prior progress note. Most recent are:  Visit Vitals  BP (!) 141/91   Pulse (!) 116   Temp 97.6 °F (36.4 °C)   Resp 16   Ht 5' (1.524 m)   Wt 75 kg (165 lb 5.5 oz)   SpO2 95%   BMI 32.29 kg/m²         Intake/Output Summary (Last 24 hours) at 1/1/2022 1611  Last data filed at 1/1/2022 0801  Gross per 24 hour   Intake    Output 800 ml   Net -800 ml        Physical Examination:     I had a face to face encounter with this patient and independently examined them on 1/1/2022 as outlined below:          Constitutional:  No acute distress, cooperative, pleasant    ENT:  Oral mucosa moist, oropharynx benign. Resp:  CTA bilaterally. No wheezing/rhonchi/rales. No accessory muscle use   CV:  Regular rhythm, normal rate, no murmurs, gallops, rubs    GI:  Soft, non distended, non tender.  normoactive bowel sounds, no hepatosplenomegaly     Musculoskeletal:  No edema     Neurologic:  Conscious and alert, well oriented, motor 5/5,  CN II-XII reviewed            Data Review:    Review and/or order of clinical lab test  Review and/or order of tests in the radiology section of CPT  Review and/or order of tests in the medicine section of CPT      Labs:     Recent Labs     01/01/22  0025 12/31/21  0504   WBC 11.6* 12.2*   HGB 12.3 12.6   HCT 37.8 40.7    278     Recent Labs     12/31/21  0504 12/30/21  0235    138   K 3.9 4.4    105   CO2 27 27   BUN 21* 17   CREA 1.01 0.92   GLU 87 101*   CA 9.9 9.5     Recent Labs     12/31/21  0504 12/30/21  0235   ALT 28 32   AP 78 86   TBILI 0.5 0.4   TP 6.0* 5.5*   ALB 3.0* 3.1*   GLOB 3.0 2.4     No results for input(s): INR, PTP, APTT, INREXT, INREXT in the last 72 hours. No results for input(s): FE, TIBC, PSAT, FERR in the last 72 hours. No results found for: FOL, RBCF   No results for input(s): PH, PCO2, PO2 in the last 72 hours. No results for input(s): CPK, CKNDX, TROIQ in the last 72 hours.     No lab exists for component: CPKMB  No results found for: CHOL, CHOLX, CHLST, CHOLV, HDL, HDLP, LDL, LDLC, DLDLP, TGLX, TRIGL, TRIGP, CHHD, CHHDX  Lab Results   Component Value Date/Time    Glucose (POC) 112 12/25/2021 12:31 PM    Glucose (POC) 126 (H) 02/14/2021 04:29 PM    Glucose (POC) 103 (H) 02/14/2021 11:04 AM    Glucose (POC) 72 02/14/2021 06:15 AM    Glucose (POC) 95 02/13/2021 09:14 PM     Lab Results   Component Value Date/Time    Color DARK YELLOW 11/30/2021 10:45 PM    Appearance TURBID (A) 11/30/2021 10:45 PM    Specific gravity 1.020 11/30/2021 10:45 PM    Specific gravity 1.020 02/08/2021 06:45 PM    pH (UA) 5.0 11/30/2021 10:45 PM    Protein Negative 11/30/2021 10:45 PM    Glucose Negative 11/30/2021 10:45 PM    Ketone Negative 11/30/2021 10:45 PM    Bilirubin Negative 11/30/2021 10:45 PM    Urobilinogen 0.2 11/30/2021 10:45 PM    Nitrites Negative 11/30/2021 10:45 PM    Leukocyte Esterase Negative 11/30/2021 10:45 PM    Epithelial cells MANY (A) 11/30/2021 10:45 PM    Bacteria 1+ (A) 11/30/2021 10:45 PM    WBC 10-20 11/30/2021 10:45 PM    RBC 5-10 11/30/2021 10:45 PM         Medications Reviewed:     Current Facility-Administered Medications   Medication Dose Route Frequency    miconazole (MICOTIN) 2 % powder   Topical BID    losartan (COZAAR) tablet 50 mg  50 mg Oral DAILY    metoprolol succinate (TOPROL-XL) XL tablet 12.5 mg  12.5 mg Oral DAILY    oxymetazoline (AFRIN) 0.05 % nasal spray 2 Spray  2 Spray Both Nostrils BID    calcium carbonate (TUMS) chewable tablet 200 mg [elemental]  200 mg Oral TID PRN    buPROPion SR (WELLBUTRIN SR) tablet 200 mg  200 mg Oral Q12H    HYDROcodone-acetaminophen (NORCO) 5-325 mg per tablet 1 Tablet  1 Tablet Oral Q4H PRN    tiotropium bromide (SPIRIVA RESPIMAT) 2.5 mcg /actuation  2 Puff Inhalation DAILY    budesonide-formoteroL (SYMBICORT) 160-4.5 mcg/actuation HFA inhaler 2 Puff  2 Puff Inhalation BID RT    albuterol (PROVENTIL HFA, VENTOLIN HFA, PROAIR HFA) inhaler 1 Puff  1 Puff Inhalation Q6H PRN    enoxaparin (LOVENOX) injection 40 mg  40 mg SubCUTAneous Q12H    sodium chloride (NS) flush 5-40 mL  5-40 mL IntraVENous Q8H    sodium chloride (NS) flush 5-40 mL  5-40 mL IntraVENous PRN    acetaminophen (TYLENOL) tablet 650 mg  650 mg Oral Q6H PRN    Or    acetaminophen (TYLENOL) suppository 650 mg  650 mg Rectal Q6H PRN    polyethylene glycol (MIRALAX) packet 17 g  17 g Oral DAILY PRN    ondansetron (ZOFRAN ODT) tablet 4 mg  4 mg Oral Q8H PRN    Or    ondansetron (ZOFRAN) injection 4 mg  4 mg IntraVENous Q6H PRN    levothyroxine (SYNTHROID) tablet 125 mcg  125 mcg Oral DAILY    montelukast (SINGULAIR) tablet 10 mg  10 mg Oral QHS    gabapentin (NEURONTIN) capsule 300 mg  300 mg Oral BID    cholecalciferol (VITAMIN D3) (1000 Units /25 mcg) tablet 1,000 Units  1,000 Units Oral DAILY    vitamin B complex tablet  1 Tablet Oral DAILY    cetirizine (ZYRTEC) tablet 10 mg  10 mg Oral DAILY PRN    therapeutic multivitamin (THERAGRAN) tablet 1 Tablet  1 Tablet Oral DAILY    latanoprost (XALATAN) 0.005 % ophthalmic solution 1 Drop  1 Drop Both Eyes QHS    pantoprazole (PROTONIX) tablet 40 mg  40 mg Oral ACB    guaiFENesin ER (MUCINEX) tablet 600 mg  600 mg Oral BID PRN    predniSONE (DELTASONE) tablet 20 mg  20 mg Oral DAILY WITH BREAKFAST    clopidogreL (PLAVIX) tablet 75 mg  75 mg Oral DAILY    aspirin chewable tablet 81 mg  81 mg Oral DAILY    atorvastatin (LIPITOR) tablet 80 mg  80 mg Oral QHS    hydrALAZINE (APRESOLINE) 20 mg/mL injection 10 mg  10 mg IntraVENous Q6H PRN     ______________________________________________________________________  EXPECTED LENGTH OF STAY: - - -  ACTUAL LENGTH OF STAY:          Jonny Zambrano MD

## 2022-01-01 NOTE — PROGRESS NOTES
Pt was transferred from [facility]. Pt is laying in bed with even and unlabored respirations on room air. Pt is having pain in her neck and right shoulder. Pt was repositioned. Plan is for the pt to have a MRI. [facility] [address] [place] [address] she stated the doctor placed orders for the pt to have medication prior to the MRI. Pt has orders for valium and it was placed as a scheduled medication. Medication was pulled and placed in the locked drawer for when the pt goes to MRI. There has been no calls from MRI since pt has arrived to the unit. Skin assessment was done with Conrado Miranda. Pt has bruising to BUE and left abdomen. Pt has skin tears on RUE and mepilex are in place. Pt has redness to sacrum, abdominal folds, and breast folds. Pure wick was placed and is set up to suction. Plan is for the pt to have the MRI done. All safety precautions are in place. Bed in low position and locked. Call bell within reach. Problem: Risk for Spread of Infection  Goal: Prevent transmission of infectious organism to others  Description: Prevent the transmission of infectious organisms to other patients, staff members, and visitors. Outcome: Progressing Towards Goal     Problem: Patient Education:  Go to Education Activity  Goal: Patient/Family Education  Outcome: Progressing Towards Goal     Problem: Airway Clearance - Ineffective  Goal: Achieve or maintain patent airway  Outcome: Progressing Towards Goal     Problem: Gas Exchange - Impaired  Goal: Absence of hypoxia  Outcome: Progressing Towards Goal  Goal: Promote optimal lung function  Outcome: Progressing Towards Goal     Problem: Breathing Pattern - Ineffective  Goal: Ability to achieve and maintain a regular respiratory rate  Outcome: Progressing Towards Goal     Problem:  Body Temperature -  Risk of, Imbalanced  Goal: Ability to maintain a body temperature within defined limits  Outcome: Progressing Towards Goal  Goal: Will regain or maintain usual level of consciousness  Outcome: Progressing Towards Goal  Goal: Complications related to the disease process, condition or treatment will be avoided or minimized  Outcome: Progressing Towards Goal     Problem: Isolation Precautions - Risk of Spread of Infection  Goal: Prevent transmission of infectious organism to others  Outcome: Progressing Towards Goal     Problem: Nutrition Deficits  Goal: Optimize nutrtional status  Outcome: Progressing Towards Goal     Problem: Risk for Fluid Volume Deficit  Goal: Maintain normal heart rhythm  Outcome: Progressing Towards Goal  Goal: Maintain absence of muscle cramping  Outcome: Progressing Towards Goal  Goal: Maintain normal serum potassium, sodium, calcium, phosphorus, and pH  Outcome: Progressing Towards Goal     Problem: Loneliness or Risk for Loneliness  Goal: Demonstrate positive use of time alone when socialization is not possible  Outcome: Progressing Towards Goal     Problem: Fatigue  Goal: Verbalize increase energy and improved vitality  Outcome: Progressing Towards Goal     Problem: Patient Education: Go to Patient Education Activity  Goal: Patient/Family Education  Outcome: Progressing Towards Goal     Problem: Falls - Risk of  Goal: *Absence of Falls  Description: Document Leonardo Fall Risk and appropriate interventions in the flowsheet.   Outcome: Progressing Towards Goal  Note: Fall Risk Interventions:  Mobility Interventions: Communicate number of staff needed for ambulation/transfer         Medication Interventions: Evaluate medications/consider consulting pharmacy    Elimination Interventions: Call light in reach,Patient to call for help with toileting needs    History of Falls Interventions: Evaluate medications/consider consulting pharmacy         Problem: Patient Education: Go to Patient Education Activity  Goal: Patient/Family Education  Outcome: Progressing Towards Goal     Problem: Patient Education: Go to Patient Education Activity  Goal: Patient/Family Education  Outcome: Progressing Towards Goal     Problem: Pressure Injury - Risk of  Goal: *Prevention of pressure injury  Description: Document Vaughn Scale and appropriate interventions in the flowsheet.   Outcome: Progressing Towards Goal     Problem: Patient Education: Go to Patient Education Activity  Goal: Patient/Family Education  Outcome: Progressing Towards Goal     Problem: Patient Education: Go to Patient Education Activity  Goal: Patient/Family Education  Outcome: Progressing Towards Goal

## 2022-01-02 LAB
ALBUMIN SERPL-MCNC: 3.2 G/DL (ref 3.5–5)
ALBUMIN/GLOB SERPL: 1.3 {RATIO} (ref 1.1–2.2)
ALP SERPL-CCNC: 81 U/L (ref 45–117)
ALT SERPL-CCNC: 32 U/L (ref 12–78)
ANION GAP SERPL CALC-SCNC: 6 MMOL/L (ref 5–15)
AST SERPL-CCNC: 24 U/L (ref 15–37)
BILIRUB SERPL-MCNC: 0.5 MG/DL (ref 0.2–1)
BUN SERPL-MCNC: 20 MG/DL (ref 6–20)
BUN/CREAT SERPL: 22 (ref 12–20)
CALCIUM SERPL-MCNC: 9.4 MG/DL (ref 8.5–10.1)
CHLORIDE SERPL-SCNC: 103 MMOL/L (ref 97–108)
CO2 SERPL-SCNC: 25 MMOL/L (ref 21–32)
COMMENT, HOLDF: NORMAL
CREAT SERPL-MCNC: 0.89 MG/DL (ref 0.55–1.02)
GLOBULIN SER CALC-MCNC: 2.5 G/DL (ref 2–4)
GLUCOSE SERPL-MCNC: 97 MG/DL (ref 65–100)
POTASSIUM SERPL-SCNC: 3.9 MMOL/L (ref 3.5–5.1)
PROT SERPL-MCNC: 5.7 G/DL (ref 6.4–8.2)
SAMPLES BEING HELD,HOLD: NORMAL
SODIUM SERPL-SCNC: 134 MMOL/L (ref 136–145)

## 2022-01-02 PROCEDURE — 74011250637 HC RX REV CODE- 250/637: Performed by: INTERNAL MEDICINE

## 2022-01-02 PROCEDURE — 74011250637 HC RX REV CODE- 250/637: Performed by: STUDENT IN AN ORGANIZED HEALTH CARE EDUCATION/TRAINING PROGRAM

## 2022-01-02 PROCEDURE — 74011250637 HC RX REV CODE- 250/637: Performed by: HOSPITALIST

## 2022-01-02 PROCEDURE — 74011250636 HC RX REV CODE- 250/636: Performed by: STUDENT IN AN ORGANIZED HEALTH CARE EDUCATION/TRAINING PROGRAM

## 2022-01-02 PROCEDURE — 36415 COLL VENOUS BLD VENIPUNCTURE: CPT

## 2022-01-02 PROCEDURE — 74011000250 HC RX REV CODE- 250: Performed by: STUDENT IN AN ORGANIZED HEALTH CARE EDUCATION/TRAINING PROGRAM

## 2022-01-02 PROCEDURE — 80053 COMPREHEN METABOLIC PANEL: CPT

## 2022-01-02 PROCEDURE — 65270000029 HC RM PRIVATE

## 2022-01-02 PROCEDURE — 74011636637 HC RX REV CODE- 636/637: Performed by: STUDENT IN AN ORGANIZED HEALTH CARE EDUCATION/TRAINING PROGRAM

## 2022-01-02 RX ORDER — METOPROLOL SUCCINATE 25 MG/1
25 TABLET, EXTENDED RELEASE ORAL DAILY
Status: DISCONTINUED | OUTPATIENT
Start: 2022-01-02 | End: 2022-01-03 | Stop reason: HOSPADM

## 2022-01-02 RX ORDER — DIAZEPAM 5 MG/1
5 TABLET ORAL ONCE
Status: ACTIVE | OUTPATIENT
Start: 2022-01-03 | End: 2022-01-03

## 2022-01-02 RX ADMIN — BUPROPION HYDROCHLORIDE 200 MG: 100 TABLET, FILM COATED, EXTENDED RELEASE ORAL at 10:25

## 2022-01-02 RX ADMIN — ACETAMINOPHEN 650 MG: 325 TABLET ORAL at 21:58

## 2022-01-02 RX ADMIN — Medication 2 PUFF: at 10:28

## 2022-01-02 RX ADMIN — LATANOPROST 1 DROP: 50 SOLUTION OPHTHALMIC at 21:58

## 2022-01-02 RX ADMIN — Medication 1000 UNITS: at 10:26

## 2022-01-02 RX ADMIN — GABAPENTIN 300 MG: 100 CAPSULE ORAL at 10:24

## 2022-01-02 RX ADMIN — ATORVASTATIN CALCIUM 80 MG: 40 TABLET, FILM COATED ORAL at 21:55

## 2022-01-02 RX ADMIN — PREDNISONE 20 MG: 20 TABLET ORAL at 07:45

## 2022-01-02 RX ADMIN — LOSARTAN POTASSIUM 50 MG: 50 TABLET, FILM COATED ORAL at 10:25

## 2022-01-02 RX ADMIN — ENOXAPARIN SODIUM 40 MG: 100 INJECTION SUBCUTANEOUS at 21:56

## 2022-01-02 RX ADMIN — ASPIRIN 81 MG CHEWABLE TABLET 81 MG: 81 TABLET CHEWABLE at 10:25

## 2022-01-02 RX ADMIN — Medication 2 PUFF: at 10:27

## 2022-01-02 RX ADMIN — GABAPENTIN 300 MG: 100 CAPSULE ORAL at 19:15

## 2022-01-02 RX ADMIN — OXYMETAZOLINE HCL 2 SPRAY: 0.05 SPRAY NASAL at 18:00

## 2022-01-02 RX ADMIN — MONTELUKAST 10 MG: 10 TABLET, FILM COATED ORAL at 21:55

## 2022-01-02 RX ADMIN — LEVOTHYROXINE SODIUM 125 MCG: 0.12 TABLET ORAL at 10:24

## 2022-01-02 RX ADMIN — GUAIFENESIN 600 MG: 600 TABLET, EXTENDED RELEASE ORAL at 12:05

## 2022-01-02 RX ADMIN — BUPROPION HYDROCHLORIDE 200 MG: 100 TABLET, FILM COATED, EXTENDED RELEASE ORAL at 21:55

## 2022-01-02 RX ADMIN — SODIUM CHLORIDE, PRESERVATIVE FREE 10 ML: 5 INJECTION INTRAVENOUS at 22:08

## 2022-01-02 RX ADMIN — OXYMETAZOLINE HCL 2 SPRAY: 0.05 SPRAY NASAL at 09:00

## 2022-01-02 RX ADMIN — THERA TABS 1 TABLET: TAB at 10:25

## 2022-01-02 RX ADMIN — Medication 2 PUFF: at 20:43

## 2022-01-02 RX ADMIN — ACETAMINOPHEN 650 MG: 325 TABLET ORAL at 12:05

## 2022-01-02 RX ADMIN — Medication 1 TABLET: at 09:00

## 2022-01-02 RX ADMIN — SODIUM CHLORIDE, PRESERVATIVE FREE 10 ML: 5 INJECTION INTRAVENOUS at 07:45

## 2022-01-02 RX ADMIN — GUAIFENESIN 600 MG: 600 TABLET, EXTENDED RELEASE ORAL at 21:58

## 2022-01-02 RX ADMIN — ENOXAPARIN SODIUM 40 MG: 100 INJECTION SUBCUTANEOUS at 10:24

## 2022-01-02 RX ADMIN — MICONAZOLE NITRATE 2 % TOPICAL POWDER: at 18:00

## 2022-01-02 RX ADMIN — MICONAZOLE NITRATE 2 % TOPICAL POWDER: at 09:00

## 2022-01-02 RX ADMIN — SODIUM CHLORIDE, PRESERVATIVE FREE 10 ML: 5 INJECTION INTRAVENOUS at 14:00

## 2022-01-02 RX ADMIN — HYDROCODONE BITARTRATE AND ACETAMINOPHEN 1 TABLET: 5; 325 TABLET ORAL at 01:25

## 2022-01-02 RX ADMIN — CLOPIDOGREL BISULFATE 75 MG: 75 TABLET ORAL at 10:25

## 2022-01-02 RX ADMIN — METOPROLOL SUCCINATE 25 MG: 25 TABLET, FILM COATED, EXTENDED RELEASE ORAL at 10:25

## 2022-01-02 RX ADMIN — PANTOPRAZOLE SODIUM 40 MG: 40 TABLET, DELAYED RELEASE ORAL at 07:45

## 2022-01-02 NOTE — PROGRESS NOTES
[address] [facility] Adult  Hospitalist Group                                                                                          Hospitalist Progress Note  Alireza Donnelly MD  Answering service: [phone] from in house phone        Date of Service:  2022  NAME:  Mary Kate Hilario  :  1945  MRN:  [id number]      Admission Summary:   Jacqui Isaac a 64 y. o. female  with past medical history of eosinophilic asthma, depression, fibromyalgia, GERD, hypertension, hypothyroidism, bilateral shoulder arthritis, who comes in for right lower weakness and fall .  Patient was recently discharged on the  of this month to [facility] after being in the hospital and in rehab due to weakness and dehydration.  Per the patient's daughter the patient called her last night around 11 PM to say that something was different and she had fallen.  The patient remembers the phone couple does not remember much.  The patient says that she has felt weak ever since she was hospitalized before and this did not get better at the inpatient rehab and SNF. Isabelle Graham says that her right lower extremity feels different and is weak.  The daughter reports that when she got the phone call the patient said that her right lower extremity was weak so she fell.  When EMS arrived they had to help her walk and saw that her right lower extremity was not responding like her left lower extremity.  The patient does report some weakness in the right upper extremity as well.  She denies any facial droop, facial weakness, numbness, and others.  Her daughter denies that the patient had any type of aphasia or dysarthria when she called her. Sonali Francisco patient's daughter does report that the patient's right upper extremity has been trembling more than normal.  The patient reports a \"weird\" sensation in her right lower extremity that still has not resolved, but denies any actual weakness at this time.     In the ER patient's labs were essentially normal except for LOREN and CT of the head and CTAs of head and neck were normal.  Teleneurology was consulted and code stroke was called.  Patient is out of the window for TPA. Interval history / Subjective:     12/30/2021. Saw patient this morning, awake alert and oriented, no apparent distress, pleasant uncooperative with physical examination, still complaining of right lower extremity weakness, frustrated with the lack of progress, patient is afraid to be discharged as she thinks she might fall and not be able to get up, patient denies any fever, chills, nausea, vomiting or new any focal neurologic symptoms. 12/31/2021. Saw patient this morning, awake alert and oriented, pleasant and cooperative with physical examination, still complaining of neck pain, denies any fever, chills, nausea, vomiting. EMG has been ordered but unfortunately cannot be done as inpatient, patient is pending MRI brain,  has been working to find a replacement, patient has a place to go however family is very concerned about her frequent falls and he still wants more work-up before being discharged. 1/1/2022. Saw patient this morning, awake alert and oriented, complaining of back pain, concerned about her high blood pressure, denies any fever, chills, nausea, vomiting. Patient is agreeable to being discharged to rehab, patient has a pending EEG and MRI but he stated that she can do it as outpatient, unfortunately the rehab does not have a bed available today. 1/2/2022. Saw patient this morning, awake alert and oriented, no apparent distress, still complaining of neck pain, denies any fever, chills, nausea, vomiting. Patient is agreeable to be transferred to rehab however is still waiting for room assignment. Assessment & Plan:     Right lower extremities weakness and fall  Limited MRI negative for any acute stroke. CT head on admission negative for any acute process.   CTA neck negative for any hemodynamically significant stenosis. 2D echo LVEF 50 to 55%. Patient has history of extensive neck surgery, she is status post C2-T12 fusion. Limited MRI head shows moderate to severe spinal canal stenosis at the craniocervical junction due to extensive retrodental pannus formation. Neurosurgery consulted, no intervention  EMG has been ordered but unfortunately cannot be done inpatient. Continue PT OT, monitor vitals, fall, aspiration and seizure precaution. Pending MRI neck. Neurology is on board, recommendations noted. Cervical stenosis   Limited MRI positive for moderate to severe cervical spinal canal stenosis. History of C2 - T12 fusion on 07/6/2010    It is likely that patient's recurrent falls and lower extremity weakness is due to cervical myelopathy due to her surgery and postsurgical changes. Neurosurgery consulted, no intervention planned. Orthopedic surgery consulted, has not seen patient yet. Adri Rued COVID 19 positive  Asymptomatic, SPO2 WNL on RA, no fever, no leukocytosis. Monitor closely, as needed DuoNebs, as needed supplemental oxygen.     Dehydration  Euvolemic, normotensive. Monitor volume status, encourage p.o. intake.      LOREN  Resolved, avoid nephrotoxic meds, renally dose medications.      Hypokalemia  Resolved. Chronic asthma  Not acutely exacerbated. Continue patient's Symbicort, Pulmicort, albuterol     Hypothyroidism  Continue levothyroxine     Chronic pain  stable, continue tylenol prn      HTN  BP not at goal, increase losartan to 50 mg p.o. daily, add low-dose metoprolol. Monitor closely, adjust meds as needed. Outpatient PCP follow-up.      Hx of chronic RA  on prednisone    Code status: Full Code  DVT prophylaxis:lovenox    Care Plan discussed with: Patient/Family, Nurse and   Anticipated Disposition: SNF/LTC  Anticipated Discharge: Greater than 48 hours     Hospital Problems  Date Reviewed: 12/4/2021          Codes Class Noted POA    Weakness of right leg ICD-10-CM: K43.655  ICD-9-CM: 729.89  12/29/2021 Unknown        TIA (transient ischemic attack) ICD-10-CM: G45.9  ICD-9-CM: 435.9  12/25/2021 Unknown        CVA (cerebral vascular accident) [facility]) ICD-10-CM: I63.9  ICD-9-CM: 434.91  12/25/2021 Unknown        LOREN (acute kidney injury) ([facility] [address] ICD-10-CM: N17.9  ICD-9-CM: 584.9  12/1/2021 Yes        Other chronic pain ICD-10-CM: G89.29  ICD-9-CM: 338.29  Unknown Yes        HTN (hypertension) (Chronic) ICD-10-CM: I10  ICD-9-CM: 401.9  7/8/2011 Yes        Asthma (Chronic) ICD-10-CM: J45.909  ICD-9-CM: 493.90  7/8/2011 Yes        Hypothyroidism ICD-10-CM: E03.9  ICD-9-CM: 244.9  7/8/2011 Yes        Dehydration ICD-10-CM: E86.0  ICD-9-CM: 276.51  7/7/2011 Yes                     Vital Signs:    Last 24hrs VS reviewed since prior progress note. Most recent are:  Visit Vitals  BP (!) 141/89 (BP 1 Location: Left arm, BP Patient Position: At rest)   Pulse 81   Temp 97.9 °F (36.6 °C)   Resp 18   Ht 5' (1.524 m)   Wt 75 kg (165 lb 5.5 oz)   SpO2 94%   BMI 32.29 kg/m²         Intake/Output Summary (Last 24 hours) at 1/2/2022 1033  Last data filed at 1/1/2022 1820  Gross per 24 hour   Intake    Output 720 ml   Net -720 ml        Physical Examination:     I had a face to face encounter with this patient and independently examined them on 1/2/2022 as outlined below:          Constitutional:  No acute distress, cooperative, pleasant    ENT:  Oral mucosa moist, oropharynx benign. Resp:  CTA bilaterally. No wheezing/rhonchi/rales. No accessory muscle use   CV:  Regular rhythm, normal rate, no murmurs, gallops, rubs    GI:  Soft, non distended, non tender.  normoactive bowel sounds, no hepatosplenomegaly     Musculoskeletal:  No edema     Neurologic:  Conscious and alert, well oriented, motor 5/5,  CN II-XII reviewed            Data Review:    Review and/or order of clinical lab test  Review and/or order of tests in the radiology section of CPT  Review and/or order of tests in the medicine section of [facility]      Labs:     Recent Labs     01/01/22  0025 12/31/21  0504   WBC 11.6* 12.2*   HGB 12.3 12.6   HCT 37.8 40.7    278     Recent Labs     01/02/22  0051 12/31/21  0504   * 136   K 3.9 3.9    102   CO2 25 27   BUN 20 21*   CREA 0.89 1.01   GLU 97 87   CA 9.4 9.9     Recent Labs     01/02/22  0051 12/31/21  0504   ALT 32 28   AP 81 78   TBILI 0.5 0.5   TP 5.7* 6.0*   ALB 3.2* 3.0*   GLOB 2.5 3.0     No results for input(s): INR, PTP, APTT, INREXT, INREXT in the last 72 hours. No results for input(s): FE, TIBC, PSAT, FERR in the last 72 hours. No results found for: FOL, RBCF   No results for input(s): PH, PCO2, PO2 in the last 72 hours. No results for input(s): CPK, CKNDX, TROIQ in the last 72 hours.     No lab exists for component: CPKMB  No results found for: CHOL, CHOLX, CHLST, CHOLV, HDL, HDLP, LDL, LDLC, DLDLP, TGLX, TRIGL, TRIGP, CHHD, CHHDX  Lab Results   Component Value Date/Time    Glucose (POC) 112 12/25/2021 12:31 PM    Glucose (POC) 126 (H) 02/14/2021 04:29 PM    Glucose (POC) 103 (H) 02/14/2021 11:04 AM    Glucose (POC) 72 02/14/2021 06:15 AM    Glucose (POC) 95 02/13/2021 09:14 PM     Lab Results   Component Value Date/Time    Color DARK YELLOW 11/30/2021 10:45 PM    Appearance TURBID (A) 11/30/2021 10:45 PM    Specific gravity 1.020 11/30/2021 10:45 PM    Specific gravity 1.020 02/08/2021 06:45 PM    pH (UA) 5.0 11/30/2021 10:45 PM    Protein Negative 11/30/2021 10:45 PM    Glucose Negative 11/30/2021 10:45 PM    Ketone Negative 11/30/2021 10:45 PM    Bilirubin Negative 11/30/2021 10:45 PM    Urobilinogen 0.2 11/30/2021 10:45 PM    Nitrites Negative 11/30/2021 10:45 PM    Leukocyte Esterase Negative 11/30/2021 10:45 PM    Epithelial cells MANY (A) 11/30/2021 10:45 PM    Bacteria 1+ (A) 11/30/2021 10:45 PM    WBC 10-20 11/30/2021 10:45 PM    RBC 5-10 11/30/2021 10:45 PM         Medications Reviewed:     Current Facility-Administered Medications   Medication Dose Route Frequency    metoprolol succinate (TOPROL-XL) XL tablet 25 mg  25 mg Oral DAILY    miconazole (MICOTIN) 2 % powder   Topical BID    losartan (COZAAR) tablet 50 mg  50 mg Oral DAILY    oxymetazoline (AFRIN) 0.05 % nasal spray 2 Spray  2 Spray Both Nostrils BID    calcium carbonate (TUMS) chewable tablet 200 mg [elemental]  200 mg Oral TID PRN    buPROPion SR (WELLBUTRIN SR) tablet 200 mg  200 mg Oral Q12H    HYDROcodone-acetaminophen (NORCO) 5-325 mg per tablet 1 Tablet  1 Tablet Oral Q4H PRN    tiotropium bromide (SPIRIVA RESPIMAT) 2.5 mcg /actuation  2 Puff Inhalation DAILY    budesonide-formoteroL (SYMBICORT) 160-4.5 mcg/actuation HFA inhaler 2 Puff  2 Puff Inhalation BID RT    albuterol (PROVENTIL HFA, VENTOLIN HFA, PROAIR HFA) inhaler 1 Puff  1 Puff Inhalation Q6H PRN    enoxaparin (LOVENOX) injection 40 mg  40 mg SubCUTAneous Q12H    sodium chloride (NS) flush 5-40 mL  5-40 mL IntraVENous Q8H    sodium chloride (NS) flush 5-40 mL  5-40 mL IntraVENous PRN    acetaminophen (TYLENOL) tablet 650 mg  650 mg Oral Q6H PRN    Or    acetaminophen (TYLENOL) suppository 650 mg  650 mg Rectal Q6H PRN    polyethylene glycol (MIRALAX) packet 17 g  17 g Oral DAILY PRN    ondansetron (ZOFRAN ODT) tablet 4 mg  4 mg Oral Q8H PRN    Or    ondansetron (ZOFRAN) injection 4 mg  4 mg IntraVENous Q6H PRN    levothyroxine (SYNTHROID) tablet 125 mcg  125 mcg Oral DAILY    montelukast (SINGULAIR) tablet 10 mg  10 mg Oral QHS    gabapentin (NEURONTIN) capsule 300 mg  300 mg Oral BID    cholecalciferol (VITAMIN D3) (1000 Units /25 mcg) tablet 1,000 Units  1,000 Units Oral DAILY    vitamin B complex tablet  1 Tablet Oral DAILY    cetirizine (ZYRTEC) tablet 10 mg  10 mg Oral DAILY PRN    therapeutic multivitamin (THERAGRAN) tablet 1 Tablet  1 Tablet Oral DAILY    latanoprost (XALATAN) 0.005 % ophthalmic solution 1 Drop  1 Drop Both Eyes QHS    pantoprazole (PROTONIX) tablet 40 mg  40 mg Oral ACB    guaiFENesin ER (MUCINEX) tablet 600 mg  600 mg Oral BID PRN    predniSONE (DELTASONE) tablet 20 mg  20 mg Oral DAILY WITH BREAKFAST    clopidogreL (PLAVIX) tablet 75 mg  75 mg Oral DAILY    aspirin chewable tablet 81 mg  81 mg Oral DAILY    atorvastatin (LIPITOR) tablet 80 mg  80 mg Oral QHS    hydrALAZINE (APRESOLINE) 20 mg/mL injection 10 mg  10 mg IntraVENous Q6H PRN     ______________________________________________________________________  EXPECTED LENGTH OF STAY: - - -  ACTUAL LENGTH OF STAY:          4                 Michael Patel MD

## 2022-01-02 NOTE — PROGRESS NOTES
Transition of Care Plan  RUR 18%    CM talked with Corey Lan  [phone], liaison, with [facility] regarding patient being admitted today--- No beds available--  Hopefully tomorrow. CM informed nurse     Disposition   [facility]  Liaison  Corey Lan  [phone]    Transportation   On call with [facility] ([facility]) phone [phone]    Contact  POA  Daughter  Sheng Galindo [phone]    CM will follow and assist with patient being discharged to [facility]-  Hopefully tomorrow ,per liaison Corey Lan. CM will contact daughter when admission day determined.       CM will complete CM portion of Em[facility] and   Arrange transport with [facility]

## 2022-01-03 VITALS
OXYGEN SATURATION: 93 % | RESPIRATION RATE: 16 BRPM | HEIGHT: 60 IN | BODY MASS INDEX: 32.46 KG/M2 | TEMPERATURE: 97.5 F | SYSTOLIC BLOOD PRESSURE: 143 MMHG | WEIGHT: 165.34 LBS | HEART RATE: 82 BPM | DIASTOLIC BLOOD PRESSURE: 91 MMHG

## 2022-01-03 PROCEDURE — 74011250637 HC RX REV CODE- 250/637: Performed by: HOSPITALIST

## 2022-01-03 PROCEDURE — 74011250637 HC RX REV CODE- 250/637: Performed by: STUDENT IN AN ORGANIZED HEALTH CARE EDUCATION/TRAINING PROGRAM

## 2022-01-03 PROCEDURE — 74011250637 HC RX REV CODE- 250/637: Performed by: INTERNAL MEDICINE

## 2022-01-03 PROCEDURE — 77030038269 HC DRN EXT URIN PURWCK BARD -A

## 2022-01-03 PROCEDURE — 74011636637 HC RX REV CODE- 636/637: Performed by: STUDENT IN AN ORGANIZED HEALTH CARE EDUCATION/TRAINING PROGRAM

## 2022-01-03 PROCEDURE — 74011250636 HC RX REV CODE- 250/636: Performed by: STUDENT IN AN ORGANIZED HEALTH CARE EDUCATION/TRAINING PROGRAM

## 2022-01-03 PROCEDURE — 74011000250 HC RX REV CODE- 250: Performed by: STUDENT IN AN ORGANIZED HEALTH CARE EDUCATION/TRAINING PROGRAM

## 2022-01-03 RX ORDER — LOSARTAN POTASSIUM 50 MG/1
50 TABLET ORAL DAILY
Qty: 30 TABLET | Refills: 0 | Status: SHIPPED | OUTPATIENT
Start: 2022-01-04 | End: 2022-02-03

## 2022-01-03 RX ORDER — CLOPIDOGREL BISULFATE 75 MG/1
75 TABLET ORAL DAILY
Qty: 30 TABLET | Refills: 0 | Status: SHIPPED | OUTPATIENT
Start: 2022-01-04 | End: 2022-02-03

## 2022-01-03 RX ORDER — HYDROCODONE BITARTRATE AND ACETAMINOPHEN 5; 325 MG/1; MG/1
1 TABLET ORAL
Qty: 12 TABLET | Refills: 0 | Status: SHIPPED | OUTPATIENT
Start: 2022-01-03 | End: 2022-01-06

## 2022-01-03 RX ORDER — GUAIFENESIN 100 MG/5ML
81 LIQUID (ML) ORAL DAILY
Qty: 30 TABLET | Refills: 0 | Status: SHIPPED | OUTPATIENT
Start: 2022-01-04 | End: 2022-02-03

## 2022-01-03 RX ORDER — MICONAZOLE NITRATE 2 %
POWDER (GRAM) TOPICAL 2 TIMES DAILY
Qty: 1 EACH | Refills: 0 | Status: SHIPPED | OUTPATIENT
Start: 2022-01-03

## 2022-01-03 RX ORDER — ATORVASTATIN CALCIUM 80 MG/1
80 TABLET, FILM COATED ORAL
Qty: 30 TABLET | Refills: 2 | Status: SHIPPED | OUTPATIENT
Start: 2022-01-03 | End: 2022-03-24

## 2022-01-03 RX ORDER — METOPROLOL SUCCINATE 25 MG/1
12.5 TABLET, EXTENDED RELEASE ORAL DAILY
Qty: 15 TABLET | Refills: 0 | Status: SHIPPED | OUTPATIENT
Start: 2022-01-04 | End: 2022-02-03

## 2022-01-03 RX ADMIN — ENOXAPARIN SODIUM 40 MG: 100 INJECTION SUBCUTANEOUS at 09:23

## 2022-01-03 RX ADMIN — THERA TABS 1 TABLET: TAB at 09:23

## 2022-01-03 RX ADMIN — Medication 2 PUFF: at 08:17

## 2022-01-03 RX ADMIN — ASPIRIN 81 MG CHEWABLE TABLET 81 MG: 81 TABLET CHEWABLE at 09:23

## 2022-01-03 RX ADMIN — BUPROPION HYDROCHLORIDE 200 MG: 100 TABLET, FILM COATED, EXTENDED RELEASE ORAL at 09:23

## 2022-01-03 RX ADMIN — LOSARTAN POTASSIUM 50 MG: 50 TABLET, FILM COATED ORAL at 09:00

## 2022-01-03 RX ADMIN — Medication 1 TABLET: at 09:23

## 2022-01-03 RX ADMIN — METOPROLOL SUCCINATE 25 MG: 25 TABLET, FILM COATED, EXTENDED RELEASE ORAL at 09:23

## 2022-01-03 RX ADMIN — SODIUM CHLORIDE, PRESERVATIVE FREE 10 ML: 5 INJECTION INTRAVENOUS at 13:50

## 2022-01-03 RX ADMIN — SODIUM CHLORIDE, PRESERVATIVE FREE 10 ML: 5 INJECTION INTRAVENOUS at 06:19

## 2022-01-03 RX ADMIN — PANTOPRAZOLE SODIUM 40 MG: 40 TABLET, DELAYED RELEASE ORAL at 06:31

## 2022-01-03 RX ADMIN — CLOPIDOGREL BISULFATE 75 MG: 75 TABLET ORAL at 09:23

## 2022-01-03 RX ADMIN — LEVOTHYROXINE SODIUM 125 MCG: 0.12 TABLET ORAL at 09:23

## 2022-01-03 RX ADMIN — HYDROCODONE BITARTRATE AND ACETAMINOPHEN 1 TABLET: 5; 325 TABLET ORAL at 15:25

## 2022-01-03 RX ADMIN — GABAPENTIN 300 MG: 100 CAPSULE ORAL at 09:23

## 2022-01-03 RX ADMIN — GUAIFENESIN 600 MG: 600 TABLET, EXTENDED RELEASE ORAL at 15:28

## 2022-01-03 RX ADMIN — HYDROCODONE BITARTRATE AND ACETAMINOPHEN 1 TABLET: 5; 325 TABLET ORAL at 10:41

## 2022-01-03 RX ADMIN — PREDNISONE 20 MG: 20 TABLET ORAL at 09:23

## 2022-01-03 RX ADMIN — HYDROCODONE BITARTRATE AND ACETAMINOPHEN 1 TABLET: 5; 325 TABLET ORAL at 06:19

## 2022-01-03 RX ADMIN — Medication 1000 UNITS: at 09:23

## 2022-01-03 RX ADMIN — MICONAZOLE NITRATE 2 % TOPICAL POWDER: at 09:28

## 2022-01-03 NOTE — PROGRESS NOTES
MAE:    RUR 18%    Disposition: [facility]. Kelly Whelan [phone]    Transportation: Referral sent to [facility] for 1pm transport.     Follow up: PCP/Specialist    Primary contact: Audi Adams(Daughter) [phone]    RN call report to [phone]    [facility]    Accepting physician DO Maren Diego, RN/CRM  [phone]

## 2022-01-03 NOTE — WOUND CARE
Wound Care Note:     New consult placed by nurse request for wound to RUE and lot of bruising    Patient is on Droplet Plus Precautions for COVID-19 positive  PPE:  N95, face shield, gown and gloves    Chart shows:  Admitted for dehydration, HTN, asthma, hypothyroidism, LOREN, TIA, CVA and weakness of right leg    Past Medical History:   Diagnosis Date    Asthma     Depression     Fibromyalgia     GERD (gastroesophageal reflux disease)     Hypertension     Hypothyroid     Menopause 1995    Rotator cuff tear      WBC = 11.6 on 1/1/22  Admitted from [facility]    Assessment:   Patient is A&O x 4, communicative, incontinent with some assistance needed in repositioning. Bed: Total Care  Patient wearing briefs for incontinence. Diet: Adult diet regular  Patient pre-medicated for pain by RN. Bilateral heels and sacral skin intact and without erythema. 1. POA right upper arm skin tear measures 4.5 cm x 3 cm x 0.1 cm, wound bed is burgundy, wound edges are open, felisha-wound with ecchymosis. Xeroform gauze, 4 x 4 and roll gauze applied. 2.  Advised by RN small open area on buttock, zinc oxide covering skin, removed most of cream, no wound visible, patient unable to tolerate being on side due to pain. Z guard paste to be continued. Patient repositioned on right side. Heels offloaded on pillow. Recommendations:    Right upper arm- Every other day cleanse with normal saline, apply a piece of Xeroform gauze that has been folded in half, cover with 4 x 4's and secure with roll gauze and tape. Skin Care & Pressure Prevention:  Minimize layers of linen/pads under patient to optimize support surface. Turn/reposition approximately every 2 hours and offload heels.   Manage incontinence / promote continence   Nourishing Skin Cream to dry skin, minimize use of briefs when able    Discussed above plan with patient & Yessy Melo RN    Transition of Care: Patient being discharged to [facility] today. Wound care will sign off.       SAURAV OmalleyN, RN, [facility], [facility].  office [phone]  pager [phone] or call  to page

## 2022-01-03 NOTE — DISCHARGE INSTRUCTIONS
Wound Care:  Right upper arm- Every other day cleanse with normal saline, apply a piece of Xeroform gauze that has been folded in half, cover with 4 x 4's and secure with roll gauze and tape.

## 2022-01-04 NOTE — DISCHARGE SUMMARY
Discharge Summary       PATIENT ID: Lawson Stoddard  MRN: [id number]   YOB: 1945    DATE OF ADMISSION: 12/25/2021 12:25 PM    DATE OF DISCHARGE: 1/3/2022  PRIMARY CARE PROVIDER: Krystal Mayers MD     ATTENDING PHYSICIAN: Sabino Luna MD  DISCHARGING PROVIDER: Sabino Luna MD    To contact this individual call [phone] and ask the  to page. If unavailable ask to be transferred the Adult Hospitalist Department. CONSULTATIONS: IP CONSULT TO NEUROLOGY    PROCEDURES/SURGERIES: * No surgery found *    ADMITTING DIAGNOSES & HOSPITAL COURSE:   \"Gabriella Sarmiento is a 64 y. o. female  with past medical history of eosinophilic asthma, depression, fibromyalgia, GERD, hypertension, hypothyroidism, bilateral shoulder arthritis, who comes in for right lower weakness and fall . Patient was recently discharged on the 23rd of this month to [facility] after being in the hospital and in rehab due to weakness and dehydration.  Per the patient's daughter the patient called her last night around 11 PM to say that something was different and she had fallen.  The patient remembers the phone couple does not remember much.  The patient says that she has felt weak ever since she was hospitalized before and this did not get better at the inpatient rehab and SNF. Aashish Loco says that her right lower extremity feels different and is weak.  The daughter reports that when she got the phone call the patient said that her right lower extremity was weak so she fell.  When EMS arrived they had to help her walk and saw that her right lower extremity was not responding like her left lower extremity.  The patient does report some weakness in the right upper extremity as well.  She denies any facial droop, facial weakness, numbness, and others.  Her daughter denies that the patient had any type of aphasia or dysarthria when she called her. Armand Phoenix patient's daughter does report that the patient's right upper extremity has been trembling more than normal.  The patient reports a \"weird\" sensation in her right lower extremity that still has not resolved, but denies any actual weakness at this time.     In the ER patient's labs were essentially normal except for LOREN and CT of the head and CTAs of head and neck were normal.  Teleneurology was consulted and code stroke was called.  Patient is out of the window for TPA. \"        DISCHARGE DIAGNOSES / PLAN:      Right lower extremities weakness and fall  Limited MRI negative for any acute stroke. CT head on admission negative for any acute process. CTA neck negative for any hemodynamically significant stenosis. 2D echo LVEF 50 to 55%.     Patient has history of extensive neck surgery, she is status post C2-T12 fusion. Limited MRI head shows moderate to severe spinal canal stenosis at the craniocervical junction due to extensive retrodental pannus formation. Neurosurgery consulted, no intervention  EMG has been ordered but unfortunately cannot be done inpatient. Continue PT OT, monitor vitals, fall, aspiration and seizure precaution. Pending MRI neck. Neurology is on board, recommendations noted.      Cervical stenosis   Limited MRI positive for moderate to severe cervical spinal canal stenosis. History of C2 - T12 fusion on 07/6/2010    It is likely that patient's recurrent falls and lower extremity weakness is due to cervical myelopathy due to her surgery and postsurgical changes. Neurosurgery consulted, no intervention planned. Orthopedic surgery consulted, has not seen patient yet. .     COVID 19 positive  Asymptomatic, SPO2 WNL on RA, no fever, no leukocytosis. Monitor closely, as needed DuoNebs, as needed supplemental oxygen.     Dehydration  Euvolemic, normotensive. Monitor volume status, encourage p.o. intake.      LOREN  Resolved, avoid nephrotoxic meds, renally dose medications.      Hypokalemia  Resolved. Chronic asthma  Not acutely exacerbated.   Continue patient's Symbicort, Pulmicort, albuterol     Hypothyroidism  Continue levothyroxine     Chronic pain  stable, continue tylenol prn      HTN  BP not at goal, increase losartan to 50 mg p.o. daily, add low-dose metoprolol. Monitor closely, adjust meds as needed. Outpatient PCP follow-up.      Hx of chronic RA  on prednisone       PENDING TEST RESULTS:   At the time of discharge the following test results are still pendin    FOLLOW UP APPOINTMENTS:    Follow-up Information     Follow up With Specialties Details Why Contact Info    Karina Mo MD Family Medicine   [address] [place] [address]  [address] [id number]             ADDITIONAL CARE RECOMMENDATIONS: Neurology, PCP and cardiology follow-up    DIET: Cardiac Diet  Oral Nutritional Supplements: Boost Glucose ControlOnce daily    ACTIVITY: Activity as tolerated      DISCHARGE MEDICATIONS:  Discharge Medication List as of 1/3/2022  1:46 PM      START taking these medications    Details   aspirin 81 mg chewable tablet Take 1 Tablet by mouth daily for 30 days. , Normal, Disp-30 Tablet, R-0      atorvastatin (LIPITOR) 80 mg tablet Take 1 Tablet by mouth nightly for 80 days. , Normal, Disp-30 Tablet, R-2      clopidogreL (PLAVIX) 75 mg tab Take 1 Tablet by mouth daily for 30 days. , Normal, Disp-30 Tablet, R-0      HYDROcodone-acetaminophen (NORCO) 5-325 mg per tablet Take 1 Tablet by mouth every six (6) hours as needed for Pain for up to 3 days. Max Daily Amount: 4 Tablets., Normal, Disp-12 Tablet, R-0      metoprolol succinate (TOPROL-XL) 25 mg XL tablet Take 0.5 Tablets by mouth daily for 30 days. , Normal, Disp-15 Tablet, R-0      miconazole (MICOTIN) 2 % topical powder Apply  to affected area two (2) times a day., Normal, Disp-1 Each, R-0         CONTINUE these medications which have CHANGED    Details   losartan (COZAAR) 50 mg tablet Take 1 Tablet by mouth daily for 30 days. , Normal, Disp-30 Tablet, R-0         CONTINUE these medications which have NOT CHANGED    Details levothyroxine (LevoxyL) 125 mcg tablet Take 125 mcg by mouth Daily (before breakfast). Indications: a condition with low thyroid hormone levels, Historical Med      L.acidoph & parac-S.therm-Bifido (JACKY Q2/RISAQUAD-2) 16 billion cell cap cap Take 1 Capsule by mouth daily. , No Print, Disp-5 Capsule, R-0      b complex vitamins tablet Take 1 Tablet by mouth daily. , Historical Med      loratadine (CLARITIN) 10 mg tablet Take 10 mg by mouth daily as needed for Allergies. , Historical Med      guaifenesin (MUCINEX PO) Take 1 Tablet by mouth two (2) times daily as needed for PRN Reason (Other) (Cough, congestion). , Historical Med      budesonide (PULMICORT) 0.5 mg/2 mL nbsp two (2) times daily as needed for PRN Reason (Other). , Historical Med      PreviDent 5000 Plus 1.1 % crea USE AS DIRECTED AFTER MEALS, Historical Med, ANJALI      latanoprost (XALATAN) 0.005 % ophthalmic solution Administer 1 Drop to both eyes nightly., Historical Med      multivitamin (ONE A DAY) tablet Take 1 Tablet by mouth daily. , Historical Med      omalizumab Merlynn Zoroastrian[employer] SC) by SubCUTAneous route. Every 2 week injections. , Historical Med      tiotropium bromide (SPIRIVA RESPIMAT) 1.25 mcg/actuation inhaler Take 2 Puffs by inhalation daily. , Historical Med      predniSONE (DELTASONE) 10 mg tablet Take 10 mg by mouth daily. Take 6 tablets by mouth for 2 days, then take 4 tablets by mouth for 3 days, then take 3 tablets by mouth for 3 days, then take 2 tablets by mouth for 3 days, then take 1 talet by mouth for 3 days. , Historical Med      montelukast (SINGULAIR) 10 mg tablet Take 10 mg by mouth At bedtime. , Historical Med      albuterol (PROVENTIL VENTOLIN) 2.5 mg /3 mL (0.083 %) nebu 1 Each by Nebulization route every four to six (4-6) hours as needed for Wheezing., Historical Med      gabapentin (NEURONTIN) 100 mg capsule Take 300 mg by mouth two (2) times a day., Historical Med      albuterol (PROVENTIL HFA) 90 mcg/Actuation inhaler Take 1 Puff by inhalation two (2) times a day., Historical Med      CALCIUM CARBONATE (CALCIUM 600 PO) Take 1 Tablet by mouth daily. , Historical Med      cholecalciferol, vitamin d3, (VITAMIN D) 1,000 unit tablet Take 1,000 Units by mouth daily. , Historical Med      polyethylene glycol (MIRALAX) 17 gram packet Take 8.5 g by mouth daily. , Historical Med      ACETAMINOPHEN (TYLENOL PO) Take 1,000 mg by mouth two (2) times a day., Historical Med      PSEUDOEPHEDRINE HCL (SUDOPHED PO) Take 2 Tabs by mouth every twelve (12) hours as needed for Other (allergies). , Historical Med      lansoprazole (PREVACID) 30 mg disintegrating tablet Take 30 mg by mouth daily. , Historical Med      BUPROPION HCL (WELLBUTRIN XL PO) Take 450 mg by mouth daily. , Historical Med      TRAZODONE HCL (TRAZODONE PO) Take 75 mg by mouth nightly., Historical Med      budesonide-formoterol (SYMBICORT) 160-4.5 mcg/Actuation HFA inhaler Take 2 Puffs by inhalation two (2) times a day., Historical Med      cyclosporine (RESTASIS) 0.05 % ophthalmic emulsion Administer 1 Drop to both eyes two (2) times a day., Historical Med         STOP taking these medications       cefdinir (OMNICEF) 300 mg capsule Comments:   Reason for Stopping:         diphenhydrAMINE (BenadryL) 25 mg capsule Comments:   Reason for Stopping:         meloxicam (Mobic) 15 mg tablet Comments:   Reason for Stopping:                 NOTIFY YOUR PHYSICIAN FOR ANY OF THE FOLLOWING:   Fever over 101 degrees for 24 hours. Chest pain, shortness of breath, fever, chills, nausea, vomiting, diarrhea, change in mentation, falling, weakness, bleeding. Severe pain or pain not relieved by medications. Or, any other signs or symptoms that you may have questions about.     DISPOSITION:    Home With:   OT  PT  HH  RN       Long term SNF/Inpatient Rehab    Independent/assisted living    Hospice    Other:       PATIENT CONDITION AT DISCHARGE:     Functional status    Poor     Deconditioned     Independent Cognition     Lucid     Forgetful     Dementia      Catheters/lines (plus indication)    Godinez     PICC     PEG     None      Code status     Full code     DNR      PHYSICAL EXAMINATION AT DISCHARGE:   Refer to Progress Note while inpatient      CHRONIC MEDICAL DIAGNOSES:  Problem List as of 1/3/2022 Date Reviewed: 12/4/2021          Codes Class Noted - Resolved    Weakness of right leg ICD-10-CM: R29.898  ICD-9-CM: 729.89  12/29/2021 - Present        TIA (transient ischemic attack) ICD-10-CM: G45.9  ICD-9-CM: 435.9  12/25/2021 - Present        CVA (cerebral vascular accident) [facility]) ICD-10-CM: I63.9  ICD-9-CM: 434.91  12/25/2021 - Present        Orthostatic hypotension ICD-10-CM: I95.1  ICD-9-CM: 458.0  12/1/2021 - Present        Weakness ICD-10-CM: R53.1  ICD-9-CM: 780.79  12/1/2021 - Present        UTI (urinary tract infection) ICD-10-CM: N39.0  ICD-9-CM: 599.0  12/1/2021 - Present        LOREN (acute kidney injury) ([facility] [address] ICD-10-CM: N17.9  ICD-9-CM: 584.9  12/1/2021 - Present        Goals of care, counseling/discussion ICD-10-CM: Z71.89  ICD-9-CM: V65.49  Unknown - Present        Other chronic pain ICD-10-CM: G89.29  ICD-9-CM: 338.29  Unknown - Present        Cough ICD-10-CM: R05.9  ICD-9-CM: 786.2  Unknown - Present        Impaired mobility ICD-10-CM: Z74.09  ICD-9-CM: 799.89  Unknown - Present        Status asthmaticus ICD-10-CM: J45.902  ICD-9-CM: 493.91  2/8/2021 - Present        Moderate persistent asthma with acute exacerbation ICD-10-CM: J45.41  ICD-9-CM: 493.92  2/8/2021 - Present        IBS (irritable bowel syndrome) (Chronic) ICD-10-CM: K58.9  ICD-9-CM: 564.1  7/8/2011 - Present        HTN (hypertension) (Chronic) ICD-10-CM: I10  ICD-9-CM: 401.9  7/8/2011 - Present        Asthma (Chronic) ICD-10-CM: J45.909  ICD-9-CM: 493.90  7/8/2011 - Present        Hypothyroidism ICD-10-CM: E03.9  ICD-9-CM: 244.9  7/8/2011 - Present        Syncope ICD-10-CM: R55  ICD-9-CM: 780.2  7/7/2011 - Present Dehydration ICD-10-CM: E86.0  ICD-9-CM: 276.51  7/7/2011 - Present              Greater than 35 minutes were spent with the patient on counseling and coordination of care    Signed:   Germain Mcdermott MD  1/4/2022  3:46 PM

## 2022-01-04 NOTE — PROGRESS NOTES
Pt is laying in bed with even and unlabored respirations on room air. No complaints of pain at this time. No distress noted. Lawrence from [facility] was called and given SBARR . All questions were answered. EMTALA was filled out and sent with transport. Vital signs remain stable at this time. IV was removed with little to no bleeding noted. All pts belongings were packed in belongings/personal bags. [facility] came to take the pt. All safety precautions are in place. Bed in low position and locked. Call bell within reach. Problem: Risk for Spread of Infection  Goal: Prevent transmission of infectious organism to others  Description: Prevent the transmission of infectious organisms to other patients, staff members, and visitors. Outcome: Resolved/Met     Problem: Patient Education:  Go to Education Activity  Goal: Patient/Family Education  Outcome: Resolved/Met     Problem: Airway Clearance - Ineffective  Goal: Achieve or maintain patent airway  Outcome: Resolved/Met     Problem: Gas Exchange - Impaired  Goal: Absence of hypoxia  Outcome: Resolved/Met  Goal: Promote optimal lung function  Outcome: Resolved/Met     Problem: Breathing Pattern - Ineffective  Goal: Ability to achieve and maintain a regular respiratory rate  Outcome: Resolved/Met     Problem:  Body Temperature -  Risk of, Imbalanced  Goal: Ability to maintain a body temperature within defined limits  Outcome: Resolved/Met  Goal: Will regain or maintain usual level of consciousness  Outcome: Resolved/Met  Goal: Complications related to the disease process, condition or treatment will be avoided or minimized  Outcome: Resolved/Met     Problem: Isolation Precautions - Risk of Spread of Infection  Goal: Prevent transmission of infectious organism to others  Outcome: Resolved/Met     Problem: Nutrition Deficits  Goal: Optimize nutrtional status  Outcome: Resolved/Met     Problem: Risk for Fluid Volume Deficit  Goal: Maintain normal heart rhythm  Outcome: Resolved/Met  Goal: Maintain absence of muscle cramping  Outcome: Resolved/Met  Goal: Maintain normal serum potassium, sodium, calcium, phosphorus, and pH  Outcome: Resolved/Met     Problem: Loneliness or Risk for Loneliness  Goal: Demonstrate positive use of time alone when socialization is not possible  Outcome: Resolved/Met     Problem: Fatigue  Goal: Verbalize increase energy and improved vitality  Outcome: Resolved/Met     Problem: Patient Education: Go to Patient Education Activity  Goal: Patient/Family Education  Outcome: Resolved/Met     Problem: Falls - Risk of  Goal: *Absence of Falls  Description: Document Leonardo Fall Risk and appropriate interventions in the flowsheet. Outcome: Resolved/Met  Note: Fall Risk Interventions:  Mobility Interventions: Communicate number of staff needed for ambulation/transfer         Medication Interventions: Evaluate medications/consider consulting pharmacy    Elimination Interventions: Bed/chair exit alarm,Patient to call for help with toileting needs    History of Falls Interventions: Evaluate medications/consider consulting pharmacy         Problem: Patient Education: Go to Patient Education Activity  Goal: Patient/Family Education  Outcome: Resolved/Met     Problem: Patient Education: Go to Patient Education Activity  Goal: Patient/Family Education  Outcome: Resolved/Met     Problem: Pressure Injury - Risk of  Goal: *Prevention of pressure injury  Description: Document Vaughn Scale and appropriate interventions in the flowsheet.   Outcome: Resolved/Met  Note: Pressure Injury Interventions:  Sensory Interventions: Assess changes in LOC    Moisture Interventions: Apply protective barrier, creams and emollients,Absorbent underpads    Activity Interventions: Pressure redistribution bed/mattress(bed type),Increase time out of bed    Mobility Interventions: Chair cushion,Float heels,HOB 30 degrees or less    Nutrition Interventions: Document food/fluid/supplement intake,Offer support with meals,snacks and hydration    Friction and Shear Interventions: Apply protective barrier, creams and emollients,Feet elevated on foot rest,Foam dressings/transparent film/skin sealants,HOB 30 degrees or less                Problem: Patient Education: Go to Patient Education Activity  Goal: Patient/Family Education  Outcome: Resolved/Met     Problem: Patient Education: Go to Patient Education Activity  Goal: Patient/Family Education  Outcome: Resolved/Met

## 2022-01-25 NOTE — PROGRESS NOTES
Physician Progress Note      Marge Hoskins  CSN #:                  [id number]  :                       1945  ADMIT DATE:       2021 12:25 PM  [address] DATE:        1/3/2022 4:15 PM  RESPONDING  PROVIDER #:        Aria Madera MD          QUERY TEXT:    Patient was admitted with lower extremity weakness. Consideration was given for a TIA. Discharge summary noted, \"Right lower extremities weakness and fall Limited MRI negative for any acute stroke. CT head on admission negative for any acute process. Patient has history of extensive neck surgery and is status post C2-T12 fusion. Limited MRI head shows moderate to severe spinal canal stenosis at the craniocervical junction due to extensive retrodental pannus formation. Neurosurgery consulted, no intervention. Discharge summary states \"It is likely that patient's recurrent falls and lower extremity weakness is due to cervical myelopathy due to her surgery and postsurgical changes. \"    After study, can the suspected etiology of the patient's weakness be further specified as: The medical record reflects the following:  Risk Factors: 76F with multiple spinal surgeries and stenosis on imaging presents with weakness  Clinical Indicators:     Discharge Summary  Right lower extremities weakness and fall  Limited MRI negative for any acute stroke. CT head on admission negative for any acute process. CTA neck negative for any hemodynamically significant stenosis. 2D echo LVEF 50 to 55%. Patient has history of extensive neck surgery, she is status post C2-T12 fusion. Limited MRI head shows moderate to severe spinal canal stenosis at the craniocervical junction due to extensive retrodental pannus formation. ? Neurosurgery consulted, no intervention  EMG has been ordered but unfortunately cannot be done inpatient. ??  Continue PT OT, monitor vitals, fall, aspiration and seizure precaution. Pending MRI neck.   Neurology is on board, recommendations noted. ? Cervical stenosis  Limited MRI positive for moderate to severe cervical spinal canal stenosis. History of C2 - T12 fusion on 07/6/2010  ? It is likely that patient's recurrent falls and lower extremity weakness is due to cervical myelopathy due to her surgery and postsurgical changes. Neurosurgery consulted, no intervention planned. Orthopedic surgery consulted, has not seen patient yet. ?.      Treatment: As mentioned in the above quoted documentation    Thank you    Carlos MANNING RN [facility]  Clinical Documentation Improvement Specialist  [phone]  Options provided:  -- Myelopathy as a postoperative complication of prior spinal surgery  -- Myelopathy due to cervical spinal stenosis  -- Myelopathy due to, document suspected etiology  -- Other - I will add my own diagnosis  -- Disagree - Not applicable / Not valid  -- Disagree - Clinically unable to determine / Unknown  -- Refer to Clinical Documentation Reviewer    PROVIDER RESPONSE TEXT:    Myelopathy due to cervical spinal stenosis.     Query created by: Moody Parker on 1/11/2022 10:10 AM      Electronically signed by:  Carly Munson MD 1/25/2022 11:39 AM

## 2022-03-18 PROBLEM — I95.1 ORTHOSTATIC HYPOTENSION: Status: ACTIVE | Noted: 2021-12-01

## 2022-03-19 PROBLEM — R29.898 WEAKNESS OF RIGHT LEG: Status: ACTIVE | Noted: 2021-12-29

## 2022-03-19 PROBLEM — I63.9 CVA (CEREBRAL VASCULAR ACCIDENT) (HCC): Status: ACTIVE | Noted: 2021-12-25

## 2022-03-19 PROBLEM — J45.41 MODERATE PERSISTENT ASTHMA WITH ACUTE EXACERBATION: Status: ACTIVE | Noted: 2021-02-08

## 2022-03-19 PROBLEM — R53.1 WEAKNESS: Status: ACTIVE | Noted: 2021-12-01

## 2022-03-19 PROBLEM — J45.902 STATUS ASTHMATICUS: Status: ACTIVE | Noted: 2021-02-08

## 2022-03-20 PROBLEM — G45.9 TIA (TRANSIENT ISCHEMIC ATTACK): Status: ACTIVE | Noted: 2021-12-25

## 2022-03-20 PROBLEM — N17.9 AKI (ACUTE KIDNEY INJURY) (HCC): Status: ACTIVE | Noted: 2021-12-01

## 2022-03-20 PROBLEM — N39.0 UTI (URINARY TRACT INFECTION): Status: ACTIVE | Noted: 2021-12-01

## 2023-01-17 ENCOUNTER — APPOINTMENT (OUTPATIENT)
Dept: CT IMAGING | Age: 78
DRG: 189 | End: 2023-01-17
Attending: EMERGENCY MEDICINE
Payer: MEDICARE

## 2023-01-17 ENCOUNTER — HOSPITAL ENCOUNTER (INPATIENT)
Age: 78
LOS: 4 days | Discharge: NURSING, REHAB OR LONG-TERM CARE | DRG: 189 | End: 2023-01-22
Attending: EMERGENCY MEDICINE | Admitting: FAMILY MEDICINE
Payer: MEDICARE

## 2023-01-17 ENCOUNTER — APPOINTMENT (OUTPATIENT)
Dept: GENERAL RADIOLOGY | Age: 78
DRG: 189 | End: 2023-01-17
Attending: EMERGENCY MEDICINE
Payer: MEDICARE

## 2023-01-17 DIAGNOSIS — R06.02 SOB (SHORTNESS OF BREATH): ICD-10-CM

## 2023-01-17 DIAGNOSIS — I21.4 NSTEMI (NON-ST ELEVATED MYOCARDIAL INFARCTION) (HCC): Primary | ICD-10-CM

## 2023-01-17 DIAGNOSIS — R09.02 HYPOXIA: ICD-10-CM

## 2023-01-17 DIAGNOSIS — I21.4 NON-STEMI (NON-ST ELEVATED MYOCARDIAL INFARCTION) (HCC): ICD-10-CM

## 2023-01-17 LAB
ALBUMIN SERPL-MCNC: 3.5 G/DL (ref 3.5–5)
ALBUMIN/GLOB SERPL: 1.1 (ref 1.1–2.2)
ALP SERPL-CCNC: 110 U/L (ref 45–117)
ALT SERPL-CCNC: 24 U/L (ref 12–78)
ANION GAP SERPL CALC-SCNC: 5 MMOL/L (ref 5–15)
AST SERPL-CCNC: 14 U/L (ref 15–37)
BASOPHILS # BLD: 0 K/UL (ref 0–0.1)
BASOPHILS NFR BLD: 0 % (ref 0–1)
BILIRUB SERPL-MCNC: 0.4 MG/DL (ref 0.2–1)
BUN SERPL-MCNC: 17 MG/DL (ref 6–20)
BUN/CREAT SERPL: 20 (ref 12–20)
CALCIUM SERPL-MCNC: 9.5 MG/DL (ref 8.5–10.1)
CHLORIDE SERPL-SCNC: 98 MMOL/L (ref 97–108)
CO2 SERPL-SCNC: 31 MMOL/L (ref 21–32)
COMMENT, HOLDF: NORMAL
CREAT SERPL-MCNC: 0.86 MG/DL (ref 0.55–1.02)
D DIMER PPP FEU-MCNC: 2.13 MG/L FEU (ref 0–0.65)
DIFFERENTIAL METHOD BLD: ABNORMAL
EOSINOPHIL # BLD: 0 K/UL (ref 0–0.4)
EOSINOPHIL NFR BLD: 0 % (ref 0–7)
ERYTHROCYTE [DISTWIDTH] IN BLOOD BY AUTOMATED COUNT: 15.2 % (ref 11.5–14.5)
GLOBULIN SER CALC-MCNC: 3.2 G/DL (ref 2–4)
GLUCOSE SERPL-MCNC: 147 MG/DL (ref 65–100)
HCT VFR BLD AUTO: 45.1 % (ref 35–47)
HGB BLD-MCNC: 13.8 G/DL (ref 11.5–16)
IMM GRANULOCYTES # BLD AUTO: 0.1 K/UL (ref 0–0.04)
IMM GRANULOCYTES NFR BLD AUTO: 1 % (ref 0–0.5)
LYMPHOCYTES # BLD: 1 K/UL (ref 0.8–3.5)
LYMPHOCYTES NFR BLD: 7 % (ref 12–49)
MCH RBC QN AUTO: 29.2 PG (ref 26–34)
MCHC RBC AUTO-ENTMCNC: 30.6 G/DL (ref 30–36.5)
MCV RBC AUTO: 95.3 FL (ref 80–99)
MONOCYTES # BLD: 0.7 K/UL (ref 0–1)
MONOCYTES NFR BLD: 6 % (ref 5–13)
NEUTS SEG # BLD: 11.1 K/UL (ref 1.8–8)
NEUTS SEG NFR BLD: 86 % (ref 32–75)
NRBC # BLD: 0 K/UL (ref 0–0.01)
NRBC BLD-RTO: 0 PER 100 WBC
PLATELET # BLD AUTO: 303 K/UL (ref 150–400)
PMV BLD AUTO: 9.4 FL (ref 8.9–12.9)
POTASSIUM SERPL-SCNC: 4.3 MMOL/L (ref 3.5–5.1)
PROT SERPL-MCNC: 6.7 G/DL (ref 6.4–8.2)
RBC # BLD AUTO: 4.73 M/UL (ref 3.8–5.2)
SAMPLES BEING HELD,HOLD: NORMAL
SODIUM SERPL-SCNC: 134 MMOL/L (ref 136–145)
TROPONIN-HIGH SENSITIVITY: 545 NG/L (ref 0–51)
WBC # BLD AUTO: 12.9 K/UL (ref 3.6–11)

## 2023-01-17 PROCEDURE — 84484 ASSAY OF TROPONIN QUANT: CPT

## 2023-01-17 PROCEDURE — 74011000250 HC RX REV CODE- 250: Performed by: EMERGENCY MEDICINE

## 2023-01-17 PROCEDURE — 85730 THROMBOPLASTIN TIME PARTIAL: CPT

## 2023-01-17 PROCEDURE — 85379 FIBRIN DEGRADATION QUANT: CPT

## 2023-01-17 PROCEDURE — 80053 COMPREHEN METABOLIC PANEL: CPT

## 2023-01-17 PROCEDURE — 85025 COMPLETE CBC W/AUTO DIFF WBC: CPT

## 2023-01-17 PROCEDURE — 74011000636 HC RX REV CODE- 636: Performed by: STUDENT IN AN ORGANIZED HEALTH CARE EDUCATION/TRAINING PROGRAM

## 2023-01-17 PROCEDURE — 74011250636 HC RX REV CODE- 250/636: Performed by: EMERGENCY MEDICINE

## 2023-01-17 PROCEDURE — 36415 COLL VENOUS BLD VENIPUNCTURE: CPT

## 2023-01-17 PROCEDURE — 71275 CT ANGIOGRAPHY CHEST: CPT

## 2023-01-17 PROCEDURE — 99285 EMERGENCY DEPT VISIT HI MDM: CPT

## 2023-01-17 PROCEDURE — 71045 X-RAY EXAM CHEST 1 VIEW: CPT

## 2023-01-17 PROCEDURE — 5A09357 ASSISTANCE WITH RESPIRATORY VENTILATION, LESS THAN 24 CONSECUTIVE HOURS, CONTINUOUS POSITIVE AIRWAY PRESSURE: ICD-10-PCS | Performed by: FAMILY MEDICINE

## 2023-01-17 PROCEDURE — 93005 ELECTROCARDIOGRAM TRACING: CPT

## 2023-01-17 PROCEDURE — 94640 AIRWAY INHALATION TREATMENT: CPT

## 2023-01-17 PROCEDURE — 85520 HEPARIN ASSAY: CPT

## 2023-01-17 PROCEDURE — 96374 THER/PROPH/DIAG INJ IV PUSH: CPT

## 2023-01-17 PROCEDURE — 94660 CPAP INITIATION&MGMT: CPT

## 2023-01-17 RX ORDER — HEPARIN SODIUM 10000 [USP'U]/100ML
12-25 INJECTION, SOLUTION INTRAVENOUS
Status: DISPENSED | OUTPATIENT
Start: 2023-01-17 | End: 2023-01-20

## 2023-01-17 RX ORDER — IPRATROPIUM BROMIDE AND ALBUTEROL SULFATE 2.5; .5 MG/3ML; MG/3ML
3 SOLUTION RESPIRATORY (INHALATION)
Status: COMPLETED | OUTPATIENT
Start: 2023-01-17 | End: 2023-01-17

## 2023-01-17 RX ORDER — HEPARIN SODIUM 1000 [USP'U]/ML
4000 INJECTION, SOLUTION INTRAVENOUS; SUBCUTANEOUS ONCE
Status: COMPLETED | OUTPATIENT
Start: 2023-01-17 | End: 2023-01-18

## 2023-01-17 RX ADMIN — IPRATROPIUM BROMIDE AND ALBUTEROL SULFATE 3 ML: 2.5; .5 SOLUTION RESPIRATORY (INHALATION) at 19:33

## 2023-01-17 RX ADMIN — IOPAMIDOL 80 ML: 755 INJECTION, SOLUTION INTRAVENOUS at 21:31

## 2023-01-17 RX ADMIN — METHYLPREDNISOLONE SODIUM SUCCINATE 125 MG: 125 INJECTION, POWDER, FOR SOLUTION INTRAMUSCULAR; INTRAVENOUS at 19:44

## 2023-01-17 NOTE — Clinical Note
Status[de-identified] INPATIENT [101]   Type of Bed: Stepdown [17]   Cardiac Monitoring Required?: Yes   Inpatient Hospitalization Certified Necessary for the Following Reasons: 9.  Other (further clarification in H&P documentation)   Admitting Diagnosis: NSTEMI (non-ST elevated myocardial infarction) ([facility] [address] [[id number]]   Admitting Diagnosis: [facility] failure with [facility] [[id number]]   Admitting Diagnosis: Acute chest pain [[id number]]   Admitting Diagnosis: Tachycardia [025259]   Admitting Diagnosis: Thoracic compression fracture ([facility] [address] [[id number]]   Admitting Diagnosis: Unspecified fracture of sternum, initial encounter for closed fracture [[id number]]   Admitting Physician: [address] [place] [address], [address] [place] [address]   Attending Physician: Edgar Roberts   Estimated Length of Stay: 3-4 Midnights   Discharge Plan[de-identified] Extended Care Facility (e.g. Adult Home, Nursing Home, etc.)

## 2023-01-17 NOTE — Clinical Note
Right radial artery. Accessed successfully. Radial access needle used. Using ultrasound guidance. Number of attempts =  3.

## 2023-01-18 PROBLEM — I21.4 NSTEMI (NON-ST ELEVATED MYOCARDIAL INFARCTION) (HCC): Status: ACTIVE | Noted: 2023-01-18

## 2023-01-18 PROBLEM — J96.01 ACUTE RESPIRATORY FAILURE WITH HYPOXIA (HCC): Status: ACTIVE | Noted: 2023-01-18

## 2023-01-18 PROBLEM — R07.9 ACUTE CHEST PAIN: Status: ACTIVE | Noted: 2023-01-18

## 2023-01-18 PROBLEM — S22.000A THORACIC COMPRESSION FRACTURE (HCC): Status: ACTIVE | Noted: 2023-01-18

## 2023-01-18 PROBLEM — R00.0 TACHYCARDIA: Status: ACTIVE | Noted: 2023-01-18

## 2023-01-18 PROBLEM — S22.20XA UNSPECIFIED FRACTURE OF STERNUM, INITIAL ENCOUNTER FOR CLOSED FRACTURE: Status: ACTIVE | Noted: 2023-01-18

## 2023-01-18 LAB
APTT PPP: 26 SEC (ref 22.1–31)
ARTERIAL PATENCY WRIST A: POSITIVE
ATRIAL RATE: 125 BPM
B PERT DNA SPEC QL NAA+PROBE: NOT DETECTED
BASE EXCESS BLD CALC-SCNC: 4.9 MMOL/L
BASOPHILS # BLD: 0 K/UL (ref 0–0.1)
BASOPHILS NFR BLD: 0 % (ref 0–1)
BDY SITE: ABNORMAL
BNP SERPL-MCNC: 2045 PG/ML
BORDETELLA PARAPERTUSSIS PCR, BORPAR: NOT DETECTED
C PNEUM DNA SPEC QL NAA+PROBE: NOT DETECTED
CALCULATED P AXIS, ECG09: 59 DEGREES
CALCULATED R AXIS, ECG10: -33 DEGREES
CALCULATED T AXIS, ECG11: 93 DEGREES
COMMENT, HOLDF: NORMAL
DIAGNOSIS, 93000: NORMAL
DIFFERENTIAL METHOD BLD: ABNORMAL
EOSINOPHIL # BLD: 0 K/UL (ref 0–0.4)
EOSINOPHIL NFR BLD: 0 % (ref 0–7)
ERYTHROCYTE [DISTWIDTH] IN BLOOD BY AUTOMATED COUNT: 15.1 % (ref 11.5–14.5)
FLUAV H1 2009 PAND RNA SPEC QL NAA+PROBE: NOT DETECTED
FLUAV H1 RNA SPEC QL NAA+PROBE: NOT DETECTED
FLUAV H3 RNA SPEC QL NAA+PROBE: NOT DETECTED
FLUAV SUBTYP SPEC NAA+PROBE: NOT DETECTED
FLUBV RNA SPEC QL NAA+PROBE: NOT DETECTED
GAS FLOW.O2 O2 DELIVERY SYS: ABNORMAL
HADV DNA SPEC QL NAA+PROBE: NOT DETECTED
HCO3 BLD-SCNC: 29.2 MMOL/L (ref 22–26)
HCOV 229E RNA SPEC QL NAA+PROBE: NOT DETECTED
HCOV HKU1 RNA SPEC QL NAA+PROBE: NOT DETECTED
HCOV NL63 RNA SPEC QL NAA+PROBE: NOT DETECTED
HCOV OC43 RNA SPEC QL NAA+PROBE: NOT DETECTED
HCT VFR BLD AUTO: 43.4 % (ref 35–47)
HGB BLD-MCNC: 13.3 G/DL (ref 11.5–16)
HMPV RNA SPEC QL NAA+PROBE: NOT DETECTED
HPIV1 RNA SPEC QL NAA+PROBE: NOT DETECTED
HPIV2 RNA SPEC QL NAA+PROBE: NOT DETECTED
HPIV3 RNA SPEC QL NAA+PROBE: NOT DETECTED
HPIV4 RNA SPEC QL NAA+PROBE: NOT DETECTED
IMM GRANULOCYTES # BLD AUTO: 0.1 K/UL (ref 0–0.04)
IMM GRANULOCYTES NFR BLD AUTO: 1 % (ref 0–0.5)
LYMPHOCYTES # BLD: 0.8 K/UL (ref 0.8–3.5)
LYMPHOCYTES NFR BLD: 6 % (ref 12–49)
M PNEUMO DNA SPEC QL NAA+PROBE: NOT DETECTED
MCH RBC QN AUTO: 29.1 PG (ref 26–34)
MCHC RBC AUTO-ENTMCNC: 30.6 G/DL (ref 30–36.5)
MCV RBC AUTO: 95 FL (ref 80–99)
MONOCYTES # BLD: 0.5 K/UL (ref 0–1)
MONOCYTES NFR BLD: 4 % (ref 5–13)
NEUTS SEG # BLD: 11.7 K/UL (ref 1.8–8)
NEUTS SEG NFR BLD: 89 % (ref 32–75)
NRBC # BLD: 0 K/UL (ref 0–0.01)
NRBC BLD-RTO: 0 PER 100 WBC
O2/TOTAL GAS SETTING VFR VENT: 5 %
P-R INTERVAL, ECG05: 142 MS
PCO2 BLD: 40.9 MMHG (ref 35–45)
PH BLD: 7.46 (ref 7.35–7.45)
PLATELET # BLD AUTO: 282 K/UL (ref 150–400)
PMV BLD AUTO: 9.6 FL (ref 8.9–12.9)
PO2 BLD: 109 MMHG (ref 80–100)
Q-T INTERVAL, ECG07: 328 MS
QRS DURATION, ECG06: 106 MS
QTC CALCULATION (BEZET), ECG08: 473 MS
RBC # BLD AUTO: 4.57 M/UL (ref 3.8–5.2)
RBC MORPH BLD: ABNORMAL
RSV RNA SPEC QL NAA+PROBE: NOT DETECTED
RV+EV RNA SPEC QL NAA+PROBE: NOT DETECTED
SAMPLES BEING HELD,HOLD: NORMAL
SAO2 % BLD: 98.5 % (ref 92–97)
SARS-COV-2 PCR, COVPCR: NOT DETECTED
SPECIMEN TYPE: ABNORMAL
THERAPEUTIC RANGE,PTTT: NORMAL SECS (ref 58–77)
TROPONIN-HIGH SENSITIVITY: 716 NG/L (ref 0–51)
TROPONIN-HIGH SENSITIVITY: 845 NG/L (ref 0–51)
UFH PPP CHRO-ACNC: 0.43 IU/ML
UFH PPP CHRO-ACNC: 0.49 IU/ML
UFH PPP CHRO-ACNC: 0.78 IU/ML
UFH PPP CHRO-ACNC: <0.1 IU/ML
VENTRICULAR RATE, ECG03: 125 BPM
WBC # BLD AUTO: 13.1 K/UL (ref 3.6–11)

## 2023-01-18 PROCEDURE — 82803 BLOOD GASES ANY COMBINATION: CPT

## 2023-01-18 PROCEDURE — 36415 COLL VENOUS BLD VENIPUNCTURE: CPT

## 2023-01-18 PROCEDURE — 36600 WITHDRAWAL OF ARTERIAL BLOOD: CPT

## 2023-01-18 PROCEDURE — 65660000001 HC RM ICU INTERMED STEPDOWN

## 2023-01-18 PROCEDURE — 85520 HEPARIN ASSAY: CPT

## 2023-01-18 PROCEDURE — 74011000250 HC RX REV CODE- 250: Performed by: HOSPITALIST

## 2023-01-18 PROCEDURE — 74011250636 HC RX REV CODE- 250/636: Performed by: FAMILY MEDICINE

## 2023-01-18 PROCEDURE — 74011250636 HC RX REV CODE- 250/636: Performed by: EMERGENCY MEDICINE

## 2023-01-18 PROCEDURE — 74011000250 HC RX REV CODE- 250: Performed by: FAMILY MEDICINE

## 2023-01-18 PROCEDURE — 74011250637 HC RX REV CODE- 250/637: Performed by: FAMILY MEDICINE

## 2023-01-18 PROCEDURE — 74011250636 HC RX REV CODE- 250/636: Performed by: INTERNAL MEDICINE

## 2023-01-18 PROCEDURE — 83880 ASSAY OF NATRIURETIC PEPTIDE: CPT

## 2023-01-18 PROCEDURE — 74011250637 HC RX REV CODE- 250/637: Performed by: HOSPITALIST

## 2023-01-18 PROCEDURE — 94640 AIRWAY INHALATION TREATMENT: CPT

## 2023-01-18 PROCEDURE — 0202U NFCT DS 22 TRGT SARS-COV-2: CPT

## 2023-01-18 PROCEDURE — 94664 DEMO&/EVAL PT USE INHALER: CPT

## 2023-01-18 PROCEDURE — 74011250637 HC RX REV CODE- 250/637: Performed by: INTERNAL MEDICINE

## 2023-01-18 PROCEDURE — 77010033678 HC OXYGEN DAILY

## 2023-01-18 PROCEDURE — 84484 ASSAY OF TROPONIN QUANT: CPT

## 2023-01-18 PROCEDURE — 85025 COMPLETE CBC W/AUTO DIFF WBC: CPT

## 2023-01-18 RX ORDER — BACLOFEN 5 MG/1
5 TABLET ORAL EVERY 12 HOURS
Status: ON HOLD | COMMUNITY

## 2023-01-18 RX ORDER — BUPROPION HYDROCHLORIDE 150 MG/1
150 TABLET, EXTENDED RELEASE ORAL EVERY 12 HOURS
Status: DISCONTINUED | OUTPATIENT
Start: 2023-01-18 | End: 2023-01-22 | Stop reason: HOSPADM

## 2023-01-18 RX ORDER — GABAPENTIN 400 MG/1
400 CAPSULE ORAL 3 TIMES DAILY
Status: ON HOLD | COMMUNITY

## 2023-01-18 RX ORDER — HYDROCODONE BITARTRATE AND ACETAMINOPHEN 5; 325 MG/1; MG/1
1 TABLET ORAL
Status: ON HOLD | COMMUNITY
Start: 2022-12-24

## 2023-01-18 RX ORDER — ARFORMOTEROL TARTRATE 15 UG/2ML
15 SOLUTION RESPIRATORY (INHALATION)
Status: DISCONTINUED | OUTPATIENT
Start: 2023-01-18 | End: 2023-01-22 | Stop reason: HOSPADM

## 2023-01-18 RX ORDER — BUDESONIDE 0.5 MG/2ML
500 INHALANT ORAL
Status: DISCONTINUED | OUTPATIENT
Start: 2023-01-18 | End: 2023-01-18 | Stop reason: CLARIF

## 2023-01-18 RX ORDER — BUDESONIDE 0.5 MG/2ML
500 INHALANT ORAL
Status: DISCONTINUED | OUTPATIENT
Start: 2023-01-18 | End: 2023-01-22 | Stop reason: HOSPADM

## 2023-01-18 RX ORDER — NYSTATIN 100000 U/G
CREAM TOPICAL 2 TIMES DAILY
COMMUNITY
End: 2023-01-27

## 2023-01-18 RX ORDER — ACETAMINOPHEN 500 MG
1000 TABLET ORAL 3 TIMES DAILY
Status: ON HOLD | COMMUNITY

## 2023-01-18 RX ORDER — SODIUM CHLORIDE 0.9 % (FLUSH) 0.9 %
5-40 SYRINGE (ML) INJECTION AS NEEDED
Status: DISCONTINUED | OUTPATIENT
Start: 2023-01-18 | End: 2023-01-22 | Stop reason: HOSPADM

## 2023-01-18 RX ORDER — ACETAMINOPHEN 325 MG/1
650 TABLET ORAL
Status: DISCONTINUED | OUTPATIENT
Start: 2023-01-18 | End: 2023-01-22 | Stop reason: HOSPADM

## 2023-01-18 RX ORDER — BACLOFEN 10 MG/1
10 TABLET ORAL
COMMUNITY
End: 2023-01-18

## 2023-01-18 RX ORDER — CARVEDILOL 3.12 MG/1
3.12 TABLET ORAL 2 TIMES DAILY WITH MEALS
Status: DISCONTINUED | OUTPATIENT
Start: 2023-01-18 | End: 2023-01-19

## 2023-01-18 RX ORDER — ACETAMINOPHEN 650 MG/1
650 SUPPOSITORY RECTAL
Status: DISCONTINUED | OUTPATIENT
Start: 2023-01-18 | End: 2023-01-22 | Stop reason: HOSPADM

## 2023-01-18 RX ORDER — GABAPENTIN 400 MG/1
400 CAPSULE ORAL 3 TIMES DAILY
Status: DISCONTINUED | OUTPATIENT
Start: 2023-01-18 | End: 2023-01-22 | Stop reason: HOSPADM

## 2023-01-18 RX ORDER — TRAZODONE HYDROCHLORIDE 50 MG/1
25 TABLET ORAL EVERY OTHER DAY
COMMUNITY
Start: 2023-01-17 | End: 2023-01-27

## 2023-01-18 RX ORDER — CYCLOSPORINE 0.5 MG/ML
1 EMULSION OPHTHALMIC 2 TIMES DAILY
Status: DISCONTINUED | OUTPATIENT
Start: 2023-01-18 | End: 2023-01-18 | Stop reason: CLARIF

## 2023-01-18 RX ORDER — MONTELUKAST SODIUM 10 MG/1
10 TABLET ORAL
Status: DISCONTINUED | OUTPATIENT
Start: 2023-01-18 | End: 2023-01-22 | Stop reason: HOSPADM

## 2023-01-18 RX ORDER — IPRATROPIUM BROMIDE AND ALBUTEROL SULFATE 2.5; .5 MG/3ML; MG/3ML
3 SOLUTION RESPIRATORY (INHALATION)
Status: DISCONTINUED | OUTPATIENT
Start: 2023-01-18 | End: 2023-01-19

## 2023-01-18 RX ORDER — GABAPENTIN 300 MG/1
300 CAPSULE ORAL 2 TIMES DAILY
Status: DISCONTINUED | OUTPATIENT
Start: 2023-01-18 | End: 2023-01-18

## 2023-01-18 RX ORDER — HONEY 100 %
PASTE (ML) TOPICAL DAILY
Status: ON HOLD | COMMUNITY

## 2023-01-18 RX ORDER — SIMETHICONE 125 MG
125 TABLET,CHEWABLE ORAL
COMMUNITY
End: 2023-01-27

## 2023-01-18 RX ORDER — ETANERCEPT 50 MG/ML
50 SOLUTION SUBCUTANEOUS
COMMUNITY
End: 2023-01-18

## 2023-01-18 RX ORDER — GUAIFENESIN 100 MG/5ML
81 LIQUID (ML) ORAL DAILY
Status: ON HOLD | COMMUNITY

## 2023-01-18 RX ORDER — LEVOTHYROXINE SODIUM 137 UG/1
137 TABLET ORAL
Status: ON HOLD | COMMUNITY

## 2023-01-18 RX ORDER — MELATONIN
1000 DAILY
Status: DISCONTINUED | OUTPATIENT
Start: 2023-01-18 | End: 2023-01-22 | Stop reason: HOSPADM

## 2023-01-18 RX ORDER — FUROSEMIDE 10 MG/ML
40 INJECTION INTRAMUSCULAR; INTRAVENOUS EVERY 12 HOURS
Status: DISCONTINUED | OUTPATIENT
Start: 2023-01-18 | End: 2023-01-19

## 2023-01-18 RX ORDER — ALBUTEROL SULFATE 0.83 MG/ML
2.5 SOLUTION RESPIRATORY (INHALATION) 4 TIMES DAILY
Status: ON HOLD | COMMUNITY

## 2023-01-18 RX ORDER — ONDANSETRON 2 MG/ML
4 INJECTION INTRAMUSCULAR; INTRAVENOUS
Status: DISCONTINUED | OUTPATIENT
Start: 2023-01-18 | End: 2023-01-22 | Stop reason: HOSPADM

## 2023-01-18 RX ORDER — BUPROPION HYDROCHLORIDE 150 MG/1
300 TABLET, EXTENDED RELEASE ORAL DAILY
Status: ON HOLD | COMMUNITY

## 2023-01-18 RX ORDER — LATANOPROST 50 UG/ML
1 SOLUTION/ DROPS OPHTHALMIC
Status: DISCONTINUED | OUTPATIENT
Start: 2023-01-18 | End: 2023-01-22 | Stop reason: HOSPADM

## 2023-01-18 RX ORDER — ONDANSETRON 4 MG/1
4 TABLET, ORALLY DISINTEGRATING ORAL
Status: DISCONTINUED | OUTPATIENT
Start: 2023-01-18 | End: 2023-01-22 | Stop reason: HOSPADM

## 2023-01-18 RX ORDER — BUDESONIDE AND FORMOTEROL FUMARATE DIHYDRATE 160; 4.5 UG/1; UG/1
2 AEROSOL RESPIRATORY (INHALATION) 2 TIMES DAILY
COMMUNITY
End: 2023-01-18

## 2023-01-18 RX ORDER — PANTOPRAZOLE SODIUM 40 MG/1
40 TABLET, DELAYED RELEASE ORAL
Status: DISCONTINUED | OUTPATIENT
Start: 2023-01-18 | End: 2023-01-22 | Stop reason: HOSPADM

## 2023-01-18 RX ORDER — POLYETHYLENE GLYCOL 3350 17 G/17G
17 POWDER, FOR SOLUTION ORAL DAILY PRN
Status: DISCONTINUED | OUTPATIENT
Start: 2023-01-18 | End: 2023-01-22 | Stop reason: HOSPADM

## 2023-01-18 RX ORDER — ALBUTEROL SULFATE 0.83 MG/ML
2.5 SOLUTION RESPIRATORY (INHALATION)
Status: ON HOLD | COMMUNITY

## 2023-01-18 RX ORDER — BUDESONIDE AND FORMOTEROL FUMARATE DIHYDRATE 160; 4.5 UG/1; UG/1
2 AEROSOL RESPIRATORY (INHALATION) 2 TIMES DAILY
Status: DISCONTINUED | OUTPATIENT
Start: 2023-01-18 | End: 2023-01-18 | Stop reason: CLARIF

## 2023-01-18 RX ORDER — TRAMADOL HYDROCHLORIDE 50 MG/1
25 TABLET ORAL
COMMUNITY
Start: 2022-12-23 | End: 2023-01-27

## 2023-01-18 RX ORDER — SODIUM CHLORIDE 0.9 % (FLUSH) 0.9 %
5-40 SYRINGE (ML) INJECTION EVERY 8 HOURS
Status: DISCONTINUED | OUTPATIENT
Start: 2023-01-18 | End: 2023-01-22 | Stop reason: HOSPADM

## 2023-01-18 RX ORDER — PREDNISONE 10 MG/1
20 TABLET ORAL DAILY
Status: ON HOLD | COMMUNITY

## 2023-01-18 RX ORDER — LEVOTHYROXINE SODIUM 50 UG/1
125 TABLET ORAL
Status: DISCONTINUED | OUTPATIENT
Start: 2023-01-18 | End: 2023-01-18

## 2023-01-18 RX ORDER — POLYETHYLENE GLYCOL 3350 17 G/17G
8.5 POWDER, FOR SOLUTION ORAL DAILY
Status: DISCONTINUED | OUTPATIENT
Start: 2023-01-18 | End: 2023-01-22 | Stop reason: HOSPADM

## 2023-01-18 RX ORDER — BACLOFEN 10 MG/1
10 TABLET ORAL
Status: ON HOLD | COMMUNITY

## 2023-01-18 RX ORDER — IPRATROPIUM BROMIDE AND ALBUTEROL SULFATE 2.5; .5 MG/3ML; MG/3ML
3 SOLUTION RESPIRATORY (INHALATION)
Status: DISCONTINUED | OUTPATIENT
Start: 2023-01-18 | End: 2023-01-22 | Stop reason: HOSPADM

## 2023-01-18 RX ADMIN — GABAPENTIN 400 MG: 400 CAPSULE ORAL at 21:53

## 2023-01-18 RX ADMIN — IPRATROPIUM BROMIDE AND ALBUTEROL SULFATE 3 ML: 2.5; .5 SOLUTION RESPIRATORY (INHALATION) at 16:55

## 2023-01-18 RX ADMIN — BUDESONIDE 500 MCG: 0.5 INHALANT RESPIRATORY (INHALATION) at 20:09

## 2023-01-18 RX ADMIN — HEPARIN SODIUM AND DEXTROSE 12 UNITS/KG/HR: 10000; 5 INJECTION INTRAVENOUS at 01:12

## 2023-01-18 RX ADMIN — ARFORMOTEROL TARTRATE 15 MCG: 15 SOLUTION RESPIRATORY (INHALATION) at 20:09

## 2023-01-18 RX ADMIN — Medication 1000 UNITS: at 11:40

## 2023-01-18 RX ADMIN — METHYLPREDNISOLONE SODIUM SUCCINATE 60 MG: 40 INJECTION, POWDER, FOR SOLUTION INTRAMUSCULAR; INTRAVENOUS at 13:48

## 2023-01-18 RX ADMIN — BUPROPION HYDROCHLORIDE 150 MG: 150 TABLET, EXTENDED RELEASE ORAL at 21:53

## 2023-01-18 RX ADMIN — IPRATROPIUM BROMIDE AND ALBUTEROL SULFATE 3 ML: .5; 3 SOLUTION RESPIRATORY (INHALATION) at 03:24

## 2023-01-18 RX ADMIN — MONTELUKAST 10 MG: 10 TABLET, FILM COATED ORAL at 21:53

## 2023-01-18 RX ADMIN — IPRATROPIUM BROMIDE AND ALBUTEROL SULFATE 3 ML: 2.5; .5 SOLUTION RESPIRATORY (INHALATION) at 23:51

## 2023-01-18 RX ADMIN — IPRATROPIUM BROMIDE AND ALBUTEROL SULFATE 3 ML: 2.5; .5 SOLUTION RESPIRATORY (INHALATION) at 13:55

## 2023-01-18 RX ADMIN — SODIUM CHLORIDE, PRESERVATIVE FREE 10 ML: 5 INJECTION INTRAVENOUS at 13:49

## 2023-01-18 RX ADMIN — HEPARIN SODIUM 4000 UNITS: 1000 INJECTION INTRAVENOUS; SUBCUTANEOUS at 01:11

## 2023-01-18 RX ADMIN — ACETAMINOPHEN 650 MG: 325 TABLET ORAL at 11:39

## 2023-01-18 RX ADMIN — METHYLPREDNISOLONE SODIUM SUCCINATE 60 MG: 40 INJECTION, POWDER, FOR SOLUTION INTRAMUSCULAR; INTRAVENOUS at 06:01

## 2023-01-18 RX ADMIN — CARVEDILOL 3.12 MG: 3.12 TABLET, FILM COATED ORAL at 11:40

## 2023-01-18 RX ADMIN — PANTOPRAZOLE SODIUM 40 MG: 40 TABLET, DELAYED RELEASE ORAL at 11:40

## 2023-01-18 RX ADMIN — FUROSEMIDE 40 MG: 10 INJECTION, SOLUTION INTRAMUSCULAR; INTRAVENOUS at 21:53

## 2023-01-18 RX ADMIN — METHYLPREDNISOLONE SODIUM SUCCINATE 60 MG: 40 INJECTION, POWDER, FOR SOLUTION INTRAMUSCULAR; INTRAVENOUS at 21:53

## 2023-01-18 RX ADMIN — LATANOPROST 1 DROP: 50 SOLUTION OPHTHALMIC at 22:45

## 2023-01-18 RX ADMIN — IPRATROPIUM BROMIDE AND ALBUTEROL SULFATE 3 ML: 2.5; .5 SOLUTION RESPIRATORY (INHALATION) at 20:09

## 2023-01-18 RX ADMIN — CARVEDILOL 3.12 MG: 3.12 TABLET, FILM COATED ORAL at 18:11

## 2023-01-18 RX ADMIN — FUROSEMIDE 40 MG: 10 INJECTION, SOLUTION INTRAMUSCULAR; INTRAVENOUS at 11:40

## 2023-01-18 RX ADMIN — SODIUM CHLORIDE, PRESERVATIVE FREE 10 ML: 5 INJECTION INTRAVENOUS at 21:53

## 2023-01-18 NOTE — PROGRESS NOTES
Patient was admitted today, 1/18/2023 by Dr. Stephany Bamberger.  I have seen and examined patient in the ED room 12. Subjective: Complains of shortness of breath, nonproductive cough and wheezing. She was able to speak in complete sentence. She denied chest pain    Objectively: On oxygen via nasal collar. Not tachypneic. Chest: Diffuse rhonchi and wheezing  Extremity: No edema  CNS: Alert x3. Pertinent lab and imaging findings reviewed. A/P  Acute asthma exacerbation, hypoxia; acute CHF: Add scheduled bronchodilators, continue inhaled steroids and IV Solu-Medrol. IV Lasix. CT negative for pneumonia or PE  Acute CHF, non-STEMI: IV Lasix, heparin, aspirin and carvedilol. Dr. Clifford Gipson seen patient. See the admission H&P for other details.     Alonza Cushing, MD

## 2023-01-18 NOTE — PROGRESS NOTES
Pt's daughter states pt is on Enbrel 50mg SubQ every Thursday per her Reumatologist.  They are currently using samples from the [address] [place] [address] office because they are waiting for insurance to approve the prescription.   Please add to active at home medication list.

## 2023-01-18 NOTE — ED NOTES
Bedside and Verbal shift change report given to Bassam Kunz (oncoming nurse) by Parish Mccabe (offgoing nurse). Report included the following information SBAR, Kardex, ED Summary, Intake/Output, and Recent Results.

## 2023-01-18 NOTE — PROGRESS NOTES
Admission Medication Reconciliation:      PTA med list compiled from [address] transfer documents, administration times updated via same. Transfer documents returned to ED-12. Recommend reviewing inhaler regimen for appropriateness. Medication changes (since last review)ed today): Added:  Albuterol sulfate nebs  Aspirin 81 mg  Baclofen  MediHoney  Nystatin    Revised:  Acetaminophen  Albuterol inhaler instructions  Spiriva Respimat dose  Prednisone to 20 mg dose  Bupropion dose (reduced per pharmacy recommendation)  Gabapentin to 400 mg dose  Levothyroxine to 137 mcg  Tramadol dose  Trazodone dose and frequency  Loratadine frequency    Removed:  Xolair  Calcium carbonate  Mucinex  Probiotic  Prevident  Pseudoephedrine    Thank you for allowing me to participate in the care of your patient. Carissa Rondon PharmD, RN # [phone]     [facility] pharmacy benefit data reflects medications filled and processed through the patient's insurance, however   this data does NOT capture whether the medication was picked up or is currently being taken by the patient. Allergies:  Pcn [penicillins]    Significant PMH/Disease States:   Past Medical History:   Diagnosis Date    Acute kidney failure (HCC)     Asthma     Depression     Fibromyalgia     GERD (gastroesophageal reflux disease)     GERD (gastroesophageal reflux disease)     Hypertension     Hypothyroid     Insomnia     Menopause     Neurogenic bladder     Rheumatoid arteritis (HCC)     Rotator cuff tear      Chief Complaint for this Admission:    Chief Complaint   Patient presents with    Wheezing    Shortness of Breath     Prior to Admission Medications:   Prior to Admission Medications   Prescriptions Last Dose Informant Taking? HYDROcodone-acetaminophen (NORCO) 5-325 mg per tablet 2023  Yes   Si Tablet every six (6) hours as needed for Pain.    acetaminophen (TYLENOL) 500 mg tablet 2023  Yes   Sig: Take 1,000 mg by mouth three (3) times daily. albuterol (PROVENTIL HFA, VENTOLIN HFA, PROAIR HFA) 90 mcg/actuation inhaler 2023  Yes   Sig: Take 2 Puffs by inhalation every four (4) hours as needed. albuterol (PROVENTIL VENTOLIN) 2.5 mg /3 mL (0.083 %) nebu 2022  Yes   Si.5 mg by Nebulization route every two (2) hours as needed for Wheezing. Eosinophilic asthma   albuterol (PROVENTIL VENTOLIN) 2.5 mg /3 mL (0.083 %) nebu 2023  Yes   Si.5 mg by Nebulization route four (4) times daily. Indications: chronic obstructive pulmonary disease   aspirin delayed-release 81 mg tablet 2023  Yes   Sig: Take 81 mg by mouth daily. baclofen (LIORESAL) 10 mg tablet 2023  Yes   Sig: Take 5 mg by mouth two (2) times a day. baclofen (LIORESAL) 10 mg tablet 2023  Yes   Sig: Take 10 mg by mouth two (2) times daily as needed for Muscle Spasm(s). buPROPion SR (WELLBUTRIN SR) 150 mg SR tablet 2023  Yes   Sig: Take 300 mg by mouth daily. budesonide-formoteroL (SYMBICORT) 160-4.5 mcg/actuation [facility] 2023  Yes   Sig: Take 2 Puffs by inhalation two (2) times a day. budesonide-formoteroL (Symbicort) 160-4.5 mcg/actuation AA 2023  Yes   Sig: Take 2 Puffs by inhalation two (2) times a day. cholecalciferol (VITAMIN D3) (1000 Units /25 mcg) tablet 2023  Yes   Sig: Take 1,000 Units by mouth daily. cycloSPORINE (RESTASIS) 0.05 % dpet 2023  Yes   Sig: Administer 1 Drop to both eyes two (2) times a day.   gabapentin (NEURONTIN) 400 mg capsule 2023  Yes   Sig: Take 400 mg by mouth three (3) times daily. honey (MediHoney, honey,) 100 % topical paste 2023  Yes   Sig: Apply  to affected area daily. Topical, once daily, wound care   lansoprazole (PREVACID SOLUTAB) 30 mg disintegrating tablet 2023  Yes   Sig: Take 30 mg by mouth daily. latanoprost (XALATAN) 0.005 % ophthalmic solution 2023  Yes   Sig: Administer 1 Drop to both eyes nightly.    levothyroxine (SYNTHROID) 137 mcg tablet 1/17/2023  Yes   Sig: Take 137 mcg by mouth Daily (before breakfast). loratadine (CLARITIN) 10 mg tablet 1/17/2023  Yes   Sig: Take 10 mg by mouth daily. miconazole (MICOTIN) 2 % topical powder 1/17/2023  Yes   Sig: Apply  to affected area two (2) times a day. montelukast (SINGULAIR) 10 mg tablet 1/11/2023  Yes   Sig: Take 10 mg by mouth At bedtime. multivitamin (ONE A DAY) tablet 1/11/2023  Yes   Sig: Take 1 Tablet by mouth daily. nystatin (MYCOSTATIN) topical cream 1/16/2023  Yes   Sig: Apply  to affected area two (2) times a day. Abdominal folds   polyethylene glycol (MIRALAX) 17 gram packet 1/11/2023  Yes   Sig: Take 8.5 g by mouth daily. predniSONE (DELTASONE) 10 mg tablet 1/17/2023  Yes   Sig: Take 20 mg by mouth daily. simethicone (GAS-X) 125 mg chewable tablet 1/17/2023  Yes   Sig: Take 125 mg by mouth every six (6) hours as needed for Flatulence. tiotropium bromide (SPIRIVA RESPIMAT) 2.5 mcg/actuation inhaler 1/17/2023  Yes   Sig: Take 2 Puffs by inhalation every evening. traMADoL (ULTRAM) 50 mg tablet 1/13/2023  No   Sig: Take 25 mg by mouth every four (4) hours as needed. traZODone (DESYREL) 50 mg tablet 1/17/2023  Yes   Sig: Take 25 mg by mouth every other day. QHS- attempted dose reduction      Facility-Administered Medications: None     Please contact the main inpatient pharmacy with any questions or concerns at [phone] and we will direct you to the clinical pharmacist covering this patient's care while in-house.    KOBE Ba

## 2023-01-18 NOTE — ED PROVIDER NOTES
Date of Service:  1/17/2023    Patient:  Len York    Chief Complaint:  Wheezing and Shortness of Breath       HPI:  Len York is a 76 y.o.  female who presents for evaluation of shortness of breath. Patient was eating dinner and shortly after finishing dinner she had sudden onset shortness of breath. Patient has a history of asthma and she states that this feels like her asthma attack. She was found to be 82% on room air for EMS; improvement with supplemental oxygen. Patient arrives here 92% on 2 L nasal cannula. Patient states that she is starting to feel better. She describes chest tightness but no cardiac chest pain that is new since this event. She has had some costochondritis secondary to cough over the last several weeks. She denies any other acute complaints since this event started at dinner. During my evaluation, patient began to feel short of breath again noting that she was starting to feel like it is getting tight to breathe       Past Medical History:   Diagnosis Date    Acute kidney failure (HCC)     Asthma     Depression     Fibromyalgia     GERD (gastroesophageal reflux disease)     GERD (gastroesophageal reflux disease)     Hypertension     Hypothyroid     Insomnia     Menopause 1995    Neurogenic bladder     Rheumatoid arteritis ([facility] [address]     Rotator cuff tear        Past Surgical History:   Procedure Laterality Date    HX CHOLECYSTECTOMY      HX GYN      HX ORTHOPAEDIC      HX TONSIL AND ADENOIDECTOMY           History reviewed. No pertinent family history.     Social History     Socioeconomic History    Marital status:      Spouse name: Not on file    Number of children: Not on file    Years of education: Not on file    Highest education level: Not on file   Occupational History    Not on file   Tobacco Use    Smoking status: Former     Packs/day: 1.00     Years: 15.00     Pack years: 15.00     Types: Cigarettes    Smokeless tobacco: Never   Substance and Sexual Activity Alcohol use: No    Drug use: No    Sexual activity: Not on file   Other Topics Concern    Not on file   Social History Narrative    Not on file     Social Determinants of Health     Financial Resource Strain: Not on file   Food Insecurity: Not on file   Transportation Needs: Not on file   Physical Activity: Not on file   Stress: Not on file   Social Connections: Not on file   Intimate Partner Violence: Not on file   Housing Stability: Not on file         ALLERGIES: Pcn [penicillins]    Review of Systems   All other systems reviewed and are negative. Vitals:    01/17/23 1914   BP: (!) 141/84   Pulse: (!) 122   Resp: 28   Temp: 98.7 °F (37.1 °C)   SpO2: 92%            Physical Exam  Vitals and nursing note reviewed. Constitutional:       General: She is in acute distress. Appearance: She is well-developed. She is not ill-appearing, toxic-appearing or diaphoretic. HENT:      Head: Normocephalic and atraumatic. Cardiovascular:      Rate and Rhythm: Regular rhythm. Tachycardia present. Pulmonary:      Effort: Respiratory distress present. Breath sounds: Wheezing present. Chest:      Chest wall: No mass or tenderness. Musculoskeletal:      Right lower leg: No edema. Left lower leg: No edema. Skin:     General: Skin is warm. Neurological:      Mental Status: She is alert and oriented to person, place, and time. Psychiatric:         Mood and Affect: Mood normal.        Medical Decision Making  Amount and/or Complexity of Data Reviewed  Independent Historian: spouse  Labs: ordered. Decision-making details documented in ED Course. Radiology: ordered. Decision-making details documented in ED Course. ECG/medicine tests: ordered and independent interpretation performed. Decision-making details documented in ED Course. Risk  Prescription drug management. Decision regarding hospitalization.     Critical Care  Total time providing critical care: 30-74 minutes    ED Course as of 01/17/23 6454 Tue Jan 17, 2023 1908 EKG 1901  Independent evaluation reveals sinus tachycardia at 125 bpm with a left axis deviation. Incomplete bundle branch block pattern without acute STEMI [GG]   2118 Troponin-High Sensitivity(!!): 545 [GG]   2118 eGFR: >60 [GG]      ED Course User Index  [GG] Tammie Mathur DO     VITAL SIGNS:  Patient Vitals for the past 4 hrs:   SpO2   01/17/23 2006 97 %           LABS:  The Following labs have been ordered while in the emergency department and I have independently evaluated them. Recent Results (from the past 6 hour(s))   EKG, 12 LEAD, INITIAL    Collection Time: 01/17/23  7:01 PM   Result Value Ref Range    Ventricular Rate 125 BPM    Atrial Rate 125 BPM    P-R Interval 142 ms    QRS Duration 106 ms    Q-T Interval 328 ms    QTC Calculation (Bezet) 473 ms    Calculated P Axis 59 degrees    Calculated R Axis -33 degrees    Calculated T Axis 93 degrees    Diagnosis       Sinus tachycardia  Left axis deviation  Left ventricular hypertrophy with repolarization abnormality ( R in aVL ,   [place] product , [employer]age )  Abnormal ECG  When compared with ECG of 25-DEC-2021 14:53,  Vent. rate has increased BY  54 BPM  Questionable change in QRS duration     CBC WITH AUTOMATED DIFF    Collection Time: 01/17/23  8:09 PM   Result Value Ref Range    WBC 12.9 (H) 3.6 - 11.0 K/uL    RBC 4.73 3.80 - 5.20 M/uL    HGB 13.8 11.5 - 16.0 g/dL    HCT 45.1 35.0 - 47.0 %    MCV 95.3 80.0 - 99.0 FL    MCH 29.2 26.0 - 34.0 PG    MCHC 30.6 30.0 - 36.5 g/dL    RDW 15.2 (H) 11.5 - 14.5 %    PLATELET 132 576 - 889 K/uL    MPV 9.4 8.9 - 12.9 FL    NRBC 0.0 0  WBC    ABSOLUTE NRBC 0.00 0.00 - 0.01 K/uL    NEUTROPHILS 86 (H) 32 - 75 %    LYMPHOCYTES 7 (L) 12 - 49 %    MONOCYTES 6 5 - 13 %    EOSINOPHILS 0 0 - 7 %    BASOPHILS 0 0 - 1 %    IMMATURE GRANULOCYTES 1 (H) 0.0 - 0.5 %    ABS. NEUTROPHILS 11.1 (H) 1.8 - 8.0 K/UL    ABS. LYMPHOCYTES 1.0 0.8 - 3.5 K/UL    ABS.  MONOCYTES 0.7 0.0 - 1.0 K/UL ABS. EOSINOPHILS 0.0 0.0 - 0.4 K/UL    ABS. BASOPHILS 0.0 0.0 - 0.1 K/UL    ABS. IMM. GRANS. 0.1 (H) 0.00 - 0.04 K/UL    DF AUTOMATED     METABOLIC PANEL, COMPREHENSIVE    Collection Time: 01/17/23  8:09 PM   Result Value Ref Range    Sodium 134 (L) 136 - 145 mmol/L    Potassium 4.3 3.5 - 5.1 mmol/L    Chloride 98 97 - 108 mmol/L    CO2 31 21 - 32 mmol/L    Anion gap 5 5 - 15 mmol/L    Glucose 147 (H) 65 - 100 mg/dL    BUN 17 6 - 20 MG/DL    Creatinine 0.86 0.55 - 1.02 MG/DL    BUN/Creatinine ratio 20 12 - 20      eGFR >60 >60 ml/min/1.73m2    Calcium 9.5 8.5 - 10.1 MG/DL    Bilirubin, total 0.4 0.2 - 1.0 MG/DL    ALT (SGPT) 24 12 - 78 U/L    AST (SGOT) 14 (L) 15 - 37 U/L    Alk. phosphatase 110 45 - 117 U/L    Protein, total 6.7 6.4 - 8.2 g/dL    Albumin 3.5 3.5 - 5.0 g/dL    Globulin 3.2 2.0 - 4.0 g/dL    A-G Ratio 1.1 1.1 - 2.2     SAMPLES BEING HELD    Collection Time: 01/17/23  8:09 PM   Result Value Ref Range    SAMPLES BEING HELD 1RED     COMMENT        Add-on orders for these samples will be processed based on acceptable specimen integrity and analyte stability, which may vary by analyte. TROPONIN-HIGH SENSITIVITY    Collection Time: 01/17/23  8:09 PM   Result Value Ref Range    Troponin-High Sensitivity 545 (HH) 0 - 51 ng/L   D DIMER    Collection Time: 01/17/23  8:09 PM   Result Value Ref Range    D-dimer 2.13 (H) 0.00 - 0.65 mg/L FEU          IMAGING:  The Following imaging studies have been ordered while in the emergency department and I have reviewed them. CTA CHEST W OR W WO CONT   Final Result   1. No acute pulmonary embolism. Clear lungs. 2. Age-indeterminate compression fractures at T6 and T7 that are new since   2/8/2021. Age indeterminate but possibly acute nondisplaced sternal fracture. XR CHEST PORT   Final Result   Apparent mediastinal widening, nonspecific and may be due to   completion of patient's rotated positioning and atelectatic arch unwinding.    However, a mediastinal mass lesion including thoracic aortic aneurysm or   hematoma is not completely excluded. Clinical correlation is advised. Cardiomegaly. Pulmonary vascular congestion with mild pulmonary edema. Medications During Visit:  I ordered/approved the ordering of the following medicines for the patient while in the emergency department. Medications   heparin (porcine) 1,000 unit/mL injection 4,000 Units (has no administration in time range)   heparin 25,000 units in D5W 250 ml infusion (has no administration in time range)   albuterol-ipratropium (DUO-NEB) 2.5 MG-0.5 MG/3 ML (3 mL Nebulization Given 1/17/23 1933)   methylPREDNISolone (PF) (Solu-MEDROL) injection 125 mg (125 mg IntraVENous Given 1/17/23 1944)   iopamidoL (ISOVUE-370) 370 mg iodine /mL (76 %) injection 100 mL (80 mL IntraVENous Given 1/17/23 2131)         DECISION MAKING:  Rose Mary Floyd is a 76 y.o. female who comes in as above. Patient came in short of breath with signs of respiratory distress. Stating that she felt like she was having worsening asthma attack. Here, patient has some decreased air movement bilaterally. No resolution with albuterol. Eventually placed on BiPAP has been doing well on BiPAP now. Will wean in the moment. Patient is found to have NSTEMI elevated troponin, no PE on CT scan. Vital signs seem to be stabilizing. We will start patient on heparin. Patient resting comfortably. Pain-free    Unclear the underlying reason for acute shortness of breath other than acute NSTEMI    Total critical care time (not including time spent performing separately reportable procedures): 60 minutes    IMPRESSION:  1. NSTEMI (non-ST elevated myocardial infarction) ([address]    2. Hypoxia    3.  SOB (shortness of breath)        DISPOSITION:  Admitted      Procedures        Perfect Serve Consult for Admission  11:48 PM    ED Room Number: ER12/12  Patient Name and age:  Rose Mary Floyd 76 y.o.  female  Working Diagnosis: 1. NSTEMI (non-ST elevated myocardial infarction) ([address]    2. Hypoxia    3. SOB (shortness of breath)        COVID-19 Suspicion:  no  Sepsis present:  no  Reassessment needed: no  Code Status:  Full Code  Readmission: no  Isolation Requirements:  no  Recommended Level of Care:  step down  Department:[facility] Adult ED - 21   Other:  Patient came in short of breath with signs of respiratory distress. Stating that she felt like she was having worsening asthma attack. Here, patient has some decreased air movement bilaterally. No resolution with albuterol. Eventually placed on BiPAP has been doing well on BiPAP now. Will wean in the moment. Patient is found to have NSTEMI elevated troponin, no PE on CT scan. Vital signs seem to be stabilizing. We will start patient on heparin. Patient resting comfortably.   Pain-free    Unclear the underlying reason for acute shortness of breath other than acute NSTEMI

## 2023-01-18 NOTE — CONSULTS
Cardiology Consult Note    CC: SOB  Reason for consult:  NSTEMI/CHF  Requesting MD:  Dr. Ab Day:      Date of  Admission: 1/17/2023  7:18 PM     Admission type:Emergency    Brain Mancera is a 76 y.o. female admitted for NSTEMI (non-ST elevated myocardial infarction) ([facility] [address] [I21.4]  Acute respiratory failure with hypoxia ([address] [J96.01]  Acute chest pain [R07.9]  Tachycardia [R00.0]  Thoracic compression fracture ([address] [S22.000A]  Unspecified fracture of sternum, initial encounter for closed fracture [S22.20XA]. Patient complains of SOB, much worse last night with orthopnea and cough. She had developed CP when she had unspecified sternal fracture two weeks ago but lately she has had some Lt upper chest pain radiating into her Lt shoulder. She has RA affecting multiple joints including her shoulders. Her CTA was negative for PE but noted age-indeterminate compression fractures of T6 T7. Her weight has gone up by over twenty pounds since last summer due to inactivity. She has baseline SOB from her asthma/COPD but no sputum production or fever or chill.     Patient Active Problem List    Diagnosis Date Noted    Tachycardia 01/18/2023    Acute chest pain 01/18/2023    NSTEMI (non-ST elevated myocardial infarction) ([facility] [address] 01/18/2023    Thoracic compression fracture ([facility] [address] 01/18/2023    Acute respiratory failure with hypoxia ([facility] [address] 01/18/2023    Unspecified fracture of sternum, initial encounter for closed fracture 01/18/2023    Weakness of right leg 12/29/2021    TIA (transient ischemic attack) 12/25/2021    CVA (cerebral vascular accident) ([address] 12/25/2021    Orthostatic hypotension 12/01/2021    Weakness 12/01/2021    UTI (urinary tract infection) 12/01/2021    LOREN (acute kidney injury) ([address] 12/01/2021    Goals of care, counseling/discussion     Other chronic pain     Cough     Impaired mobility     Status asthmaticus 02/08/2021    Moderate persistent asthma with acute exacerbation 02/08/2021    IBS (irritable bowel syndrome) 07/08/2011    HTN (hypertension) 07/08/2011    Asthma 07/08/2011    Hypothyroidism 07/08/2011    Syncope 07/07/2011    Dehydration 07/07/2011      Дмитрий Dalton MD  Past Medical History:   Diagnosis Date    Acute kidney failure ([facility] [address]     Asthma     Depression     Fibromyalgia     GERD (gastroesophageal reflux disease)     GERD (gastroesophageal reflux disease)     Hypertension     Hypothyroid     Insomnia     Menopause 1995    Neurogenic bladder     Rheumatoid arteritis (HCC)     Rotator cuff tear       Past Surgical History:   Procedure Laterality Date    HX CHOLECYSTECTOMY      HX GYN      HX ORTHOPAEDIC      HX TONSIL AND ADENOIDECTOMY       Shx; negative for smoking or ETOH    Allergies   Allergen Reactions    Pcn [Penicillins] Hives      History reviewed. No pertinent family history. Current Facility-Administered Medications   Medication Dose Route Frequency    budesonide (PULMICORT) 500 mcg/2 ml nebulizer suspension  500 mcg Nebulization BID PRN    budesonide-formoteroL (SYMBICORT) 160-4.5 mcg/actuation HFA inhaler 2 Puff  2 Puff Inhalation BID    . PHARMACY TO SUBSTITUTE PER PROTOCOL (Reordered from: BUPROPION HCL (WELLBUTRIN XL PO))    Per Protocol    . PHARMACY TO SUBSTITUTE PER PROTOCOL (Reordered from: CALCIUM CARBONATE (CALCIUM 600 PO))    Per Protocol    cholecalciferol (VITAMIN D3) (1000 Units /25 mcg) tablet 1,000 Units  1,000 Units Oral DAILY    cycloSPORINE (RESTASIS) 0.05 % ophthalmic emulsion 1 Drop  1 Drop Both Eyes BID    gabapentin (NEURONTIN) capsule 300 mg  300 mg Oral BID    pantoprazole (PROTONIX) tablet 40 mg  40 mg Oral ACB    levothyroxine (SYNTHROID) tablet 125 mcg  125 mcg Oral ACB    latanoprost (XALATAN) 0.005 % ophthalmic solution 1 Drop  1 Drop Both Eyes QHS    montelukast (SINGULAIR) tablet 10 mg  10 mg Oral QHS    polyethylene glycol (MIRALAX) packet 8.5 g  8.5 g Oral DAILY    tiotropium bromide (SPIRIVA RESPIMAT) 1.25 mcg/ actuation  2 Puff Inhalation DAILY sodium chloride (NS) flush 5-40 mL  5-40 mL IntraVENous Q8H    sodium chloride (NS) flush 5-40 mL  5-40 mL IntraVENous PRN    acetaminophen (TYLENOL) tablet 650 mg  650 mg Oral Q6H PRN    Or    acetaminophen (TYLENOL) suppository 650 mg  650 mg Rectal Q6H PRN    polyethylene glycol (MIRALAX) packet 17 g  17 g Oral DAILY PRN    ondansetron (ZOFRAN ODT) tablet 4 mg  4 mg Oral Q8H PRN    Or    ondansetron (ZOFRAN) injection 4 mg  4 mg IntraVENous Q6H PRN    albuterol-ipratropium (DUO-NEB) 2.5 MG-0.5 MG/3 ML  3 mL Nebulization Q4H PRN    methylPREDNISolone (PF) (SOLU-MEDROL) injection 60 mg  60 mg IntraVENous Q8H    heparin 25,000 units in D5W 250 ml infusion  12-25 Units/kg/hr IntraVENous TITRATE     Current Outpatient Medications   Medication Sig    predniSONE (DELTASONE) 10 mg tablet Take 5 mg by mouth daily. baclofen (LIORESAL) 10 mg tablet Take 5 mg by mouth two (2) times a day. baclofen (LIORESAL) 10 mg tablet Take 10 mg by mouth as needed for Muscle Spasm(s). HYDROcodone-acetaminophen (NORCO) 5-325 mg per tablet 1 Tablet every six (6) hours as needed for Pain. miconazole (MICOTIN) 2 % topical powder Apply  to affected area two (2) times a day. levothyroxine (SYNTHROID) 125 mcg tablet Take 125 mcg by mouth Daily (before breakfast). Indications: a condition with low thyroid hormone levels    b complex vitamins tablet Take 1 Tablet by mouth daily. loratadine (CLARITIN) 10 mg tablet Take 10 mg by mouth daily as needed for Allergies. guaifenesin (MUCINEX PO) Take 1 Tablet by mouth two (2) times daily as needed for PRN Reason (Other) (Cough, congestion). budesonide (PULMICORT) 0.5 mg/2 mL nbsp two (2) times daily as needed for PRN Reason (Other). latanoprost (XALATAN) 0.005 % ophthalmic solution Administer 1 Drop to both eyes nightly. multivitamin (ONE A DAY) tablet Take 1 Tablet by mouth daily.     tiotropium bromide (SPIRIVA RESPIMAT) 1.25 mcg/actuation inhaler Take 2 Puffs by inhalation daily. montelukast (SINGULAIR) 10 mg tablet Take 10 mg by mouth At bedtime. gabapentin (NEURONTIN) 100 mg capsule Take 300 mg by mouth two (2) times a day. albuterol (PROVENTIL HFA, VENTOLIN HFA, PROAIR HFA) 90 mcg/actuation inhaler Take 1 Puff by inhalation two (2) times a day. cholecalciferol (VITAMIN D3) (1000 Units /25 mcg) tablet Take 1,000 Units by mouth daily. polyethylene glycol (MIRALAX) 17 gram packet Take 8.5 g by mouth daily. ACETAMINOPHEN (TYLENOL PO) Take 1,000 mg by mouth two (2) times a day. PSEUDOEPHEDRINE HCL (SUDOPHED PO) Take 2 Tabs by mouth every twelve (12) hours as needed for Other (allergies). lansoprazole (PREVACID SOLUTAB) 30 mg disintegrating tablet Take 30 mg by mouth daily. BUPROPION HCL (WELLBUTRIN XL PO) Take 450 mg by mouth daily. TRAZODONE HCL (TRAZODONE PO) Take 75 mg by mouth nightly. budesonide-formoteroL (SYMBICORT) 160-4.5 mcg/actuation HFAA Take 2 Puffs by inhalation two (2) times a day. cycloSPORINE (RESTASIS) 0.05 % dpet Administer 1 Drop to both eyes two (2) times a day. traMADoL (ULTRAM) 50 mg tablet     CALCIUM CARBONATE (CALCIUM 600 PO) Take 1 Tablet by mouth daily. Prior to Admission Medications:  Prior to Admission medications    Medication Sig Start Date End Date Taking? Authorizing Provider   predniSONE (DELTASONE) 10 mg tablet Take 5 mg by mouth daily. Yes Temitope, MD Marlyn   baclofen (LIORESAL) 10 mg tablet Take 5 mg by mouth two (2) times a day. Yes Temitope, MD Marlyn   baclofen (LIORESAL) 10 mg tablet Take 10 mg by mouth as needed for Muscle Spasm(s). Yes Temitope, MD Marlyn   HYDROcodone-acetaminophen (NORCO) 5-325 mg per tablet 1 Tablet every six (6) hours as needed for Pain. 12/24/22  Yes Marlyn Linn MD   miconazole (MICOTIN) 2 % topical powder Apply  to affected area two (2) times a day. 1/3/22  Yes Wendy Millard MD   levothyroxine (SYNTHROID) 125 mcg tablet Take 125 mcg by mouth Daily (before breakfast). Indications: a condition with low thyroid hormone levels   Yes Provider, Historical   b complex vitamins tablet Take 1 Tablet by mouth daily. Yes Provider, Historical   loratadine (CLARITIN) 10 mg tablet Take 10 mg by mouth daily as needed for Allergies. Yes Provider, Historical   guaifenesin (MUCINEX PO) Take 1 Tablet by mouth two (2) times daily as needed for PRN Reason (Other) (Cough, congestion). Yes Provider, Historical   budesonide (PULMICORT) 0.5 mg/2 mL nbsp two (2) times daily as needed for PRN Reason (Other). 7/22/21  Yes Other, MD Marlyn   latanoprost (XALATAN) 0.005 % ophthalmic solution Administer 1 Drop to both eyes nightly. 9/7/21  Yes Other, MD Marlyn   multivitamin (ONE A DAY) tablet Take 1 Tablet by mouth daily. Yes Other, MD Marlyn   tiotropium bromide (SPIRIVA RESPIMAT) 1.25 mcg/actuation inhaler Take 2 Puffs by inhalation daily. 3/23/21  Yes Provider, Historical   montelukast (SINGULAIR) 10 mg tablet Take 10 mg by mouth At bedtime. Yes Provider, Historical   gabapentin (NEURONTIN) 100 mg capsule Take 300 mg by mouth two (2) times a day. 7/8/11  Yes Provider, Historical   albuterol (PROVENTIL HFA, VENTOLIN HFA, PROAIR HFA) 90 mcg/actuation inhaler Take 1 Puff by inhalation two (2) times a day. 7/7/11  Yes Provider, Historical   cholecalciferol (VITAMIN D3) (1000 Units /25 mcg) tablet Take 1,000 Units by mouth daily. Yes Provider, Historical   polyethylene glycol (MIRALAX) 17 gram packet Take 8.5 g by mouth daily. Yes Provider, Historical   ACETAMINOPHEN (TYLENOL PO) Take 1,000 mg by mouth two (2) times a day. 7/7/11  Yes Provider, Historical   PSEUDOEPHEDRINE HCL (SUDOPHED PO) Take 2 Tabs by mouth every twelve (12) hours as needed for Other (allergies). 7/7/11  Yes Provider, Historical   lansoprazole (PREVACID SOLUTAB) 30 mg disintegrating tablet Take 30 mg by mouth daily. Yes Other, MD Marlyn   BUPROPION HCL (WELLBUTRIN XL PO) Take 450 mg by mouth daily.  6/10/11  Yes Other, Phys, MD TRAZODONE HCL (TRAZODONE PO) Take 75 mg by mouth nightly. 6/10/11  Yes Other, MD Marlyn   budesonide-formoteroL (SYMBICORT) 160-4.5 mcg/actuation HFAA Take 2 Puffs by inhalation two (2) times a day. Yes Other, MD Marlyn   cycloSPORINE (RESTASIS) 0.05 % dpet Administer 1 Drop to both eyes two (2) times a day. Yes Other, MD Marlyn   traMADoL (ULTRAM) 50 mg tablet  22   Other, MD Marlyn   CALCIUM CARBONATE (CALCIUM 600 PO) Take 1 Tablet by mouth daily. 11   Provider, Historical        Review of Symptoms:  Except as noted in HPI, patient denies recent fever or chills, nausea, vomiting, diarrhea, hemoptysis, hematemesis, dysuria, myalgias, focal neurologic symptoms, ecchymosis, angioedema, odynophagia, dysphagia, sore throat, earache,rash, melena, hematochezia, depression, GERD, cold intolerance, petechia, bleeding gums, or significant weight loss. A comprehensive review of systems was negative except for that written in the HPI.      Subjective:    24 hr VS reviewed, overall VSSAF  Temp (24hrs), Av.7 °F (37.1 °C), Min:98.7 °F (37.1 °C), Max:98.7 °F (37.1 °C)    Patient Vitals for the past 8 hrs:   Pulse   23 0804 (!) 108   23 0600 98   23 0500 (!) 102   23 0400 (!) 111   23 0300 (!) 105   23 0200 (!) 111    Patient Vitals for the past 8 hrs:   Resp   23 0804 20   23 0600 16   23 0500 11   23 0400 22   23 0300 16   23 0200 17    Patient Vitals for the past 8 hrs:   BP   23 0804 (!) 155/101   23 0600 (!) 153/97   23 0500 (!) 154/103   23 0400 (!) 143/125   23 0300 (!) 148/89   23 0200 (!) 152/106          Intake/Output Summary (Last 24 hours) at 2023 0937  Last data filed at 2023 0100  Gross per 24 hour   Intake --   Output 450 ml   Net -450 ml         Physical Exam (complete single organ system exam)          Visit Vitals  BP (!) 155/101   Pulse (!) 108   Temp 98.7 °F (37.1 °C)   Resp 20 Ht 5' (1.524 m)   Wt 83.3 kg (183 lb 9.6 oz)   SpO2 95%   BMI 35.86 kg/m²     General Appearance:  Well developed, well nourished,alert and oriented x 3, and individual in no acute distress. Ears/Nose/Mouth/Throat:   Hearing grossly normal.         Neck: Supple. Chest:   Lungs rales at bases   Cardiovascular:  Regular rate and rhythm, tachycardic,S1, S2 normal, no murmur. S4 gallop   Abdomen:   Soft, non-tender, bowel sounds are active. Extremities: 1+edema bilaterally. Skin: Warm and dry. Cardiographics    Telemetry: normal sinus rhythm  ECG: normal EKG, normal sinus rhythm, unchanged from previous tracings  Echocardiogram: Not done    Labs:   Recent Results (from the past 24 hour(s))   EKG, 12 LEAD, INITIAL    Collection Time: 01/17/23  7:01 PM   Result Value Ref Range    Ventricular Rate 125 BPM    Atrial Rate 125 BPM    P-R Interval 142 ms    QRS Duration 106 ms    Q-T Interval 328 ms    QTC Calculation (Bezet) 473 ms    Calculated P Axis 59 degrees    Calculated R Axis -33 degrees    Calculated T Axis 93 degrees    Diagnosis       Sinus tachycardia  Left axis deviation  Left ventricular hypertrophy with repolarization abnormality ( R in aVL ,   [place] product , [employer] )  Abnormal ECG  When compared with ECG of 25-DEC-2021 14:53,  Vent.  rate has increased BY  54 BPM  Questionable change in QRS duration     CBC WITH AUTOMATED DIFF    Collection Time: 01/17/23  8:09 PM   Result Value Ref Range    WBC 12.9 (H) 3.6 - 11.0 K/uL    RBC 4.73 3.80 - 5.20 M/uL    HGB 13.8 11.5 - 16.0 g/dL    HCT 45.1 35.0 - 47.0 %    MCV 95.3 80.0 - 99.0 FL    MCH 29.2 26.0 - 34.0 PG    MCHC 30.6 30.0 - 36.5 g/dL    RDW 15.2 (H) 11.5 - 14.5 %    PLATELET 361 530 - 416 K/uL    MPV 9.4 8.9 - 12.9 FL    NRBC 0.0 0  WBC    ABSOLUTE NRBC 0.00 0.00 - 0.01 K/uL    NEUTROPHILS 86 (H) 32 - 75 %    LYMPHOCYTES 7 (L) 12 - 49 %    MONOCYTES 6 5 - 13 %    EOSINOPHILS 0 0 - 7 %    BASOPHILS 0 0 - 1 %    IMMATURE GRANULOCYTES 1 (H) 0.0 - 0.5 %    ABS. NEUTROPHILS 11.1 (H) 1.8 - 8.0 K/UL    ABS. LYMPHOCYTES 1.0 0.8 - 3.5 K/UL    ABS. MONOCYTES 0.7 0.0 - 1.0 K/UL    ABS. EOSINOPHILS 0.0 0.0 - 0.4 K/UL    ABS. BASOPHILS 0.0 0.0 - 0.1 K/UL    ABS. IMM. GRANS. 0.1 (H) 0.00 - 0.04 K/UL    DF AUTOMATED     METABOLIC PANEL, COMPREHENSIVE    Collection Time: 01/17/23  8:09 PM   Result Value Ref Range    Sodium 134 (L) 136 - 145 mmol/L    Potassium 4.3 3.5 - 5.1 mmol/L    Chloride 98 97 - 108 mmol/L    CO2 31 21 - 32 mmol/L    Anion gap 5 5 - 15 mmol/L    Glucose 147 (H) 65 - 100 mg/dL    BUN 17 6 - 20 MG/DL    Creatinine 0.86 0.55 - 1.02 MG/DL    BUN/Creatinine ratio 20 12 - 20      eGFR >60 >60 ml/min/1.73m2    Calcium 9.5 8.5 - 10.1 MG/DL    Bilirubin, total 0.4 0.2 - 1.0 MG/DL    ALT (SGPT) 24 12 - 78 U/L    AST (SGOT) 14 (L) 15 - 37 U/L    Alk. phosphatase 110 45 - 117 U/L    Protein, total 6.7 6.4 - 8.2 g/dL    Albumin 3.5 3.5 - 5.0 g/dL    Globulin 3.2 2.0 - 4.0 g/dL    A-G Ratio 1.1 1.1 - 2.2     SAMPLES BEING HELD    Collection Time: 01/17/23  8:09 PM   Result Value Ref Range    SAMPLES BEING HELD 1RED     COMMENT        Add-on orders for these samples will be processed based on acceptable specimen integrity and analyte stability, which may vary by analyte.    TROPONIN-HIGH SENSITIVITY    Collection Time: 01/17/23  8:09 PM   Result Value Ref Range    Troponin-High Sensitivity 545 (HH) 0 - 51 ng/L   D DIMER    Collection Time: 01/17/23  8:09 PM   Result Value Ref Range    D-dimer 2.13 (H) 0.00 - 0.65 mg/L FEU   ANTI-XA UNFRACTIONATED AND LMW HEPARIN    Collection Time: 01/17/23 11:55 PM   Result Value Ref Range    Heparin Xa,Unfrac. and LMW <0.10 IU/mL   PTT    Collection Time: 01/17/23 11:55 PM   Result Value Ref Range    aPTT 26.0 22.1 - 31.0 sec    aPTT, therapeutic range     58.0 - 77.0 SECS   CBC WITH AUTOMATED DIFF    Collection Time: 01/18/23  7:15 AM   Result Value Ref Range    WBC 13.1 (H) 3.6 - 11.0 K/uL    RBC 4.57 3.80 - 5.20 M/uL    HGB 13.3 11.5 - 16.0 g/dL    HCT 43.4 35.0 - 47.0 %    MCV 95.0 80.0 - 99.0 FL    MCH 29.1 26.0 - 34.0 PG    MCHC 30.6 30.0 - 36.5 g/dL    RDW 15.1 (H) 11.5 - 14.5 %    PLATELET 438 018 - 075 K/uL    MPV 9.6 8.9 - 12.9 FL    NRBC 0.0 0  WBC    ABSOLUTE NRBC 0.00 0.00 - 0.01 K/uL    NEUTROPHILS 89 (H) 32 - 75 %    LYMPHOCYTES 6 (L) 12 - 49 %    MONOCYTES 4 (L) 5 - 13 %    EOSINOPHILS 0 0 - 7 %    BASOPHILS 0 0 - 1 %    IMMATURE GRANULOCYTES 1 (H) 0.0 - 0.5 %    ABS. NEUTROPHILS 11.7 (H) 1.8 - 8.0 K/UL    ABS. LYMPHOCYTES 0.8 0.8 - 3.5 K/UL    ABS. MONOCYTES 0.5 0.0 - 1.0 K/UL    ABS. EOSINOPHILS 0.0 0.0 - 0.4 K/UL    ABS. BASOPHILS 0.0 0.0 - 0.1 K/UL    ABS. IMM.  GRANS. 0.1 (H) 0.00 - 0.04 K/UL    DF SMEAR SCANNED      RBC COMMENTS ANISOCYTOSIS  1+       NT-PRO BNP    Collection Time: 01/18/23  7:15 AM   Result Value Ref Range    NT pro-BNP 2,045 (H) <450 PG/ML   RESPIRATORY VIRUS PANEL W/COVID-19, PCR    Collection Time: 01/18/23  7:15 AM    Specimen: Nasopharyngeal   Result Value Ref Range    Adenovirus Not detected NOTD      Coronavirus 229E Not detected NOTD      Coronavirus HKU1 Not detected NOTD      Coronavirus CVNL63 Not detected NOTD      Coronavirus OC43 Not detected NOTD      SARS-CoV-2, PCR Not detected NOTD      Metapneumovirus Not detected NOTD      Rhinovirus and Enterovirus Not detected NOTD      Influenza A Not detected NOTD      Influenza A, subtype H1 Not detected NOTD      Influenza A, subtype H3 Not detected NOTD      INFLUENZA A H1N1 PCR Not detected NOTD      Influenza B Not detected NOTD      Parainfluenza 1 Not detected NOTD      Parainfluenza 2 Not detected NOTD      Parainfluenza 3 Not detected NOTD      Parainfluenza virus 4 Not detected NOTD      RSV by PCR Not detected NOTD      B. parapertussis, PCR Not detected NOTD      Bordetella pertussis - PCR Not detected NOTD      Chlamydophila pneumoniae DNA, QL, PCR Not detected NOTD      Mycoplasma pneumoniae DNA, QL, PCR Not detected NOTD     SAMPLES BEING HELD    Collection Time: 01/18/23  7:15 AM   Result Value Ref Range    SAMPLES BEING HELD 1BLUE 1RED     COMMENT        Add-on orders for these samples will be processed based on acceptable specimen integrity and analyte stability, which may vary by analyte.    ANTI-XA UNFRACTIONATED AND LMW HEPARIN    Collection Time: 01/18/23  7:15 AM   Result Value Ref Range    Heparin Xa,Unfrac. and LMW 0.78 IU/mL        Assessment:     Assessment:   SOB; definitely a component is CHF  CHF; acute diastolic HF  Elevated troponin; consistent with NSTEMI  COPD/asthma        Plan:   Tele  IV lasix  IV Heparin  Serial enzymes  Echo  Coreg/ASA    Jose Spring MD

## 2023-01-18 NOTE — H&P
[address] [facility]'s Adult  Hospitalist Group  History and Physical    Date of Service:  1/18/2023  Primary Care Provider: Prabhjot Jovel MD  Source of information: The patient and Chart review    Chief Complaint: Wheezing and Shortness of Breath      History of Presenting Illness:   Chen Graham is a 76 y.o. female with past medical history of asthma, depression,  fibromyalgia, GERD, hypertension, hypothyroidism, neurogenic bladder, rheumatoid arthritis, insomnia presented to the emergency department chief complaints of shortness of breath and wheezing. Symptoms onset reported began approximate 1 hour prior to admission, remain constant, severe, without specific alleviating factors, exacerbated with activity. EMS was called to [address] [facility] of [address] [place] [address], where patient was noted to be hypoxic with O2 saturation 82%. -Per reports patient initially admitted to having chest tightness however she later notes that she has a known history of chest pain which she associates with prior history of sternal fracture. On arrival in the emergency department today, initial recorded vital signs were temperature 98.7 °F, /84, heart rate 122, respiratory 20, O2 saturation 92% on room air. Patient had been placed on BiPAP with increased work of breathing. Abnormal labs included D-dimer 2.13, WBC 12.9, neutrophils 86%, blood glucose 147, sodium 134, and high-sensitivity troponin 545. CTA chest with IV contrast was negative for PE however noted age-indeterminate compression fracture of T6 and T7 new since 2/8/2021 and age indeterminate possibly of acute nondisplaced sternal fracture. ED ordered DuoNeb 2.5 mg per 3 mL nebulizer treatment x1, Solu-Medrol 125 mg IV x1 dose and started heparin IV drip. Patient is now seen for admission to the hospitalist service. REVIEW OF SYSTEMS:  A comprehensive review of systems was negative except for that written in the History of Present Illness.      Past Medical History:   Diagnosis Date    Acute kidney failure (HCC)     Asthma     Depression     Fibromyalgia     GERD (gastroesophageal reflux disease)     GERD (gastroesophageal reflux disease)     Hypertension     Hypothyroid     Insomnia     Menopause 1995    Neurogenic bladder     Rheumatoid arteritis (HCC)     Rotator cuff tear       Past Surgical History:   Procedure Laterality Date    HX CHOLECYSTECTOMY      HX GYN      HX ORTHOPAEDIC      HX TONSIL AND ADENOIDECTOMY       MEDICATIONS:  Prior to Admission medications    Medication Sig Start Date End Date Taking? Authorizing Provider   miconazole (MICOTIN) 2 % topical powder Apply  to affected area two (2) times a day. 1/3/22   Army Gage MD   levothyroxine (LevoxyL) 125 mcg tablet Take 125 mcg by mouth Daily (before breakfast). Indications: a condition with low thyroid hormone levels    Provider, Historical   L.acidoph & parac-S.therm-Bifido (JACKY Q2/RISAQUAD-2) 16 billion cell cap cap Take 1 Capsule by mouth daily. Patient not taking: Reported on 12/25/2021 12/5/21   Eliu Kitchen, NP   b complex vitamins tablet Take 1 Tablet by mouth daily. Provider, Historical   loratadine (CLARITIN) 10 mg tablet Take 10 mg by mouth daily as needed for Allergies. Provider, Historical   guaifenesin (MUCINEX PO) Take 1 Tablet by mouth two (2) times daily as needed for PRN Reason (Other) (Cough, congestion). Provider, Historical   budesonide (PULMICORT) 0.5 mg/2 mL nbsp two (2) times daily as needed for PRN Reason (Other). 7/22/21   Temitope, MD Marlyn   PreviDent 5000 Plus 1.1 % crea USE AS DIRECTED AFTER MEALS  Patient not taking: Reported on 12/25/2021 9/16/21   Temitope, MD Marlyn   latanoprost (XALATAN) 0.005 % ophthalmic solution Administer 1 Drop to both eyes nightly. 9/7/21   Marlyn Linn MD   multivitamin (ONE A DAY) tablet Take 1 Tablet by mouth daily. Temitope, MD Marlyn   omalizumab Eileen Beny SC) by SubCUTAneous route. Every 2 week injections.     Marlyn Linn MD tiotropium bromide (SPIRIVA RESPIMAT) 1.25 mcg/actuation inhaler Take 2 Puffs by inhalation daily. 3/23/21   Provider, Historical   predniSONE (DELTASONE) 10 mg tablet Take 10 mg by mouth daily. Take 6 tablets by mouth for 2 days, then take 4 tablets by mouth for 3 days, then take 3 tablets by mouth for 3 days, then take 2 tablets by mouth for 3 days, then take 1 talet by mouth for 3 days. 3/16/21   Provider, Historical   montelukast (SINGULAIR) 10 mg tablet Take 10 mg by mouth At bedtime. Provider, Historical   albuterol (PROVENTIL VENTOLIN) 2.5 mg /3 mL (0.083 %) nebu 1 Each by Nebulization route every four to six (4-6) hours as needed for Wheezing. 12/1/20   Provider, Historical   gabapentin (NEURONTIN) 100 mg capsule Take 300 mg by mouth two (2) times a day. 7/8/11   Provider, Historical   albuterol (PROVENTIL HFA) 90 mcg/Actuation inhaler Take 1 Puff by inhalation two (2) times a day. 7/7/11   Provider, Historical   CALCIUM CARBONATE (CALCIUM 600 PO) Take 1 Tablet by mouth daily. 7/7/11   Provider, Historical   cholecalciferol, vitamin d3, (VITAMIN D) 1,000 unit tablet Take 1,000 Units by mouth daily. Provider, Historical   polyethylene glycol (MIRALAX) 17 gram packet Take 8.5 g by mouth daily. Provider, Historical   ACETAMINOPHEN (TYLENOL PO) Take 1,000 mg by mouth two (2) times a day. 7/7/11   Provider, Historical   PSEUDOEPHEDRINE HCL (SUDOPHED PO) Take 2 Tabs by mouth every twelve (12) hours as needed for Other (allergies). 7/7/11   Provider, Historical   lansoprazole (PREVACID) 30 mg disintegrating tablet Take 30 mg by mouth daily. Other, MD Marlyn   BUPROPION HCL (WELLBUTRIN XL PO) Take 450 mg by mouth daily. 6/10/11   Marlyn Linn MD   TRAZODONE HCL (TRAZODONE PO) Take 75 mg by mouth nightly. 6/10/11   Temitpoe, MD Marlyn   budesonide-formoterol (SYMBICORT) 160-4.5 mcg/Actuation HFA inhaler Take 2 Puffs by inhalation two (2) times a day.     Other, MD Marlyn   cyclosporine (RESTASIS) 0.05 % ophthalmic emulsion Administer 1 Drop to both eyes two (2) times a day. Other, MD Marlyn     Allergies   Allergen Reactions    Pcn [Penicillins] Hives      FAMILY HISTORY:  Unknown regarding family history of heart disease    Social History:   quit smoking ~ 45 years ago. formerly 15.00 pack-year smoking history. does not drink alcohol and does not use drugs. Social Determinants of Health     Tobacco Use: Medium Risk    Smoking Tobacco Use: Former           Alcohol Use: denies   Financial Resource Strain: Not on file   Food Insecurity: Not on file   Transportation Needs: Not on file   Physical Activity: Not on file   Stress: Not on file   Social Connections: Not on file   Intimate Partner Violence: Not on file   Depression: Not at risk    PHQ-2 Score: 0   Housing Stability: Not on file          Objective:   Visit Vitals  BP (!) 138/89   Pulse (!) 105   Temp 98.7 °F (37.1 °C)   Resp 30   Ht 5' (1.524 m)   Wt 83.3 kg (183 lb 9.6 oz)   SpO2 98%   BMI 35.86 kg/m²      O2 Device: BIPAP current settings:  IPAP 10/ EPAP 5/ RR 4/ FiO2 50%    PHYSICAL EXAM:   General:  Patient in no acute respiratory distress; tachypneic. Head:  Normocephalic, without obvious abnormality, atraumatic   Eyes:  Conjunctivae/corneas clear. Pupils 2 mm reactive bilateral.   E/N/M/T: Nares normal. Septum midline. No nasal drainage or sinus tenderness  Tongue midline/ non-edematous  Clear oropharynx   Neck: Normal appearance and movements, symmetrical, trachea midline  No palpable adenopathy  No thyroid enlargement, tenderness or nodules  No carotid bruit   No JVD  Trachea midline   Lungs:   Symmetrical chest expansion and respiratory effort  Rhonchi to auscultation bilaterally   Chest wall:  No tenderness or deformity   Heart:  Tachycardic/ regular rhythm  Normal S1 and S2; no murmur, click, rub or gallop   Abdomen:   Soft, obese.   no tenderness  No rebound, guarding, or rigidity  Non-distended   Bowel sounds normal  No masses or hepatosplenomegaly  No hernias present   Back: No costovertebral angle tenderness  No step-off deformity   Extremities: Chronic deformity of both feet  Right foot externally rotated  Left foot drop  No cyanosis   Non-pitting edema BLE     Vascular/  Pulses: 2+ radial/ 1+ DP bilateral pulses   Integument/  Skin: Small open wound on posterior-medial right leg  Large fluctuant area on anterior left leg with brownish old appearing discoloration of overlying skin  Warm and dry   Musculo-      skeletal: Gait not tested  Normal symmetry, ROM, strength and tone  No calf tenderness   Neuro: GCS 15. Moves all extremities x 4 with generalized weakness. No slurred speech. No facial droop. Sensation grossly intact. Psych: Alert, oriented x 3              Data Review: All diagnostic labs and studies have been reviewed. Abnormal Labs Reviewed   CBC WITH AUTOMATED DIFF - Abnormal; Notable for the following components:       Result Value    WBC 12.9 (*)     RDW 15.2 (*)     NEUTROPHILS 86 (*)     LYMPHOCYTES 7 (*)     IMMATURE GRANULOCYTES 1 (*)     ABS. NEUTROPHILS 11.1 (*)     ABS. IMM. GRANS. 0.1 (*)     All other components within normal limits   METABOLIC PANEL, COMPREHENSIVE - Abnormal; Notable for the following components:    Sodium 134 (*)     Glucose 147 (*)     AST (SGOT) 14 (*)     All other components within normal limits   TROPONIN-HIGH SENSITIVITY - Abnormal; Notable for the following components:    Troponin-High Sensitivity 545 (*)     All other components within normal limits   D DIMER - Abnormal; Notable for the following components:    D-dimer 2.13 (*)     All other components within normal limits       All Micro Results       None            IMAGING:   CTA CHEST W OR W WO CONT   Final Result   1. No acute pulmonary embolism. Clear lungs. 2. Age-indeterminate compression fractures at T6 and T7 that are new since   2/8/2021. Age indeterminate but possibly acute nondisplaced sternal fracture. XR CHEST PORT   Final Result   Apparent mediastinal widening, nonspecific and may be due to   completion of patient's rotated positioning and atelectatic arch unwinding. However, a mediastinal mass lesion including thoracic aortic aneurysm or   hematoma is not completely excluded. Clinical correlation is advised. Cardiomegaly. Pulmonary vascular congestion with mild pulmonary edema. ECG/ECHO:    Results for orders placed or performed during the hospital encounter of 01/17/23   EKG, 12 LEAD, INITIAL   Result Value Ref Range    Ventricular Rate 125 BPM    Atrial Rate 125 BPM    P-R Interval 142 ms    QRS Duration 106 ms    Q-T Interval 328 ms    QTC Calculation (Bezet) 473 ms    Calculated P Axis 59 degrees    Calculated R Axis -33 degrees    Calculated T Axis 93 degrees    Diagnosis       Sinus tachycardia  Left axis deviation  Left ventricular hypertrophy with repolarization abnormality ( R in aVL ,   Windber product , Romhilt-Sage )  Abnormal ECG  When compared with ECG of 25-DEC-2021 14:53,  Vent. rate has increased BY  54 BPM  Questionable change in QRS duration          Assessment / Plan:   Given the patient's current clinical presentation, there is a high level of concern for decompensation if discharged from the emergency department. Complex decision making was performed, which includes reviewing the patient's available past medical records, laboratory results, and imaging studies. Active Problems:    Acute respiratory failure hypoxia  -admit to IMCU/ intermediate floor  -currently on BIPAP  -Titrate oxygen settings to keep O2 saturation greater than 94%  -No history of COPD  -Past history of asthma  -Continuous pulse oximetry monitoring  -Consult pulmonologist in a.m.; patient is followed by Dr. Em Case as outpatient  -Order respiratory viral panel including COVID-19 and influenza testing    2.   NSTEMI  -Elevated troponin  -High-sensitivity troponin 545  -Repeat/trend high-sensitivity troponin levels  -ED already started patient on heparin IV drip which may be continued for now  -Consult cardiologist in a.m.    3.  Elevated D-dimer  -CTA chest with IV contrast negative for PE  -Order venous duplex of lower extremities rule out DVT  -Continue heparin for now    4. Leukocytosis unspecified  -WBC 12.9, neutrophils 86%  -Repeat WBC    5. Asthma  -acute exacerbation  -Order Solu-Medrol 60 mg IV every 8 hours  -DuoNeb nebulizers every 4 hours as needed    6. Unspecified fracture of sternum, unspecified chronicity for closed fracture  -Age-indeterminate nondisplaced renal fracture  -patient reports that she has known sternal fracture  -continue support cares/ pain medication while inpatient as needed    7. Thoracic compression fracuture  -consult spinal surgeon for further evaluation    8. Tachycardia  -continue telemetry monitoring    9. Obesity  -BMI 35.86 kg/m2  -Urged weight loss, heart healthy diet, lifestyle modification    10. General debility  -Nonambulatory; uses a wheelchair at baseline      DIET:  heart healthy once off BIPAP  ISOLATION PRECAUTIONS: There are currently no Active Isolations    DVT PROPHYLAXIS: Heparin  FUNCTIONAL STATUS PRIOR TO HOSPITALIZATION: Capable of only limited self-care; confined to bed or chair likely more than 50% of waking hours. Ambulatory status/function: Ambulates with assistance:  Wheelchair   EARLY MOBILITY ASSESSMENT: Recommend an assessment from physical therapy and/or occupational therapy  ANTICIPATED DISCHARGE: Greater than 48 hours. ANTICIPATED DISPOSITION: SNF  EMERGENCY CONTACT/SURROGATE DECISION MAKER:  Maribel Rodríguez, daughter [phone]. CRITICAL CARE WAS PERFORMED FOR THIS ENCOUNTER: NO.    ADVANCED DIRECTIVE/ CODE STATUS:  FULL CODE as per discussion with patient.     Signed By: Kylie Willett MD     January 18, 2023         Please note that this dictation may have been completed with Dragon, the eoSemi voice recognition software. Quite often unanticipated grammatical, syntax, homophones, and other interpretive errors are inadvertently transcribed by the computer software. Please disregard these errors. Please excuse any errors that have escaped final proofreading.

## 2023-01-18 NOTE — ED TRIAGE NOTES
Pt c/o sudden onset SOB/wheezing 1 hr PTA. Per EMS, nursing home stated pt was 82% on 2L. Pt arrives 92% on RA, responded to 4L NC to 98%. Pt states she is feeling improvement with oxygen use but still feels chest tightness and shortness of breath.

## 2023-01-19 ENCOUNTER — APPOINTMENT (OUTPATIENT)
Dept: NON INVASIVE DIAGNOSTICS | Age: 78
DRG: 189 | End: 2023-01-19
Attending: INTERNAL MEDICINE
Payer: MEDICARE

## 2023-01-19 LAB
ALBUMIN SERPL-MCNC: 3.3 G/DL (ref 3.5–5)
ALBUMIN/GLOB SERPL: 1.1 (ref 1.1–2.2)
ALP SERPL-CCNC: 104 U/L (ref 45–117)
ALT SERPL-CCNC: 25 U/L (ref 12–78)
ANION GAP SERPL CALC-SCNC: 6 MMOL/L (ref 5–15)
AST SERPL-CCNC: 22 U/L (ref 15–37)
BASOPHILS # BLD: 0 K/UL (ref 0–0.1)
BASOPHILS NFR BLD: 0 % (ref 0–1)
BILIRUB SERPL-MCNC: 0.6 MG/DL (ref 0.2–1)
BUN SERPL-MCNC: 21 MG/DL (ref 6–20)
BUN/CREAT SERPL: 28 (ref 12–20)
CALCIUM SERPL-MCNC: 9.6 MG/DL (ref 8.5–10.1)
CHLORIDE SERPL-SCNC: 95 MMOL/L (ref 97–108)
CO2 SERPL-SCNC: 33 MMOL/L (ref 21–32)
COMMENT, HOLDF: NORMAL
CREAT SERPL-MCNC: 0.74 MG/DL (ref 0.55–1.02)
DIFFERENTIAL METHOD BLD: ABNORMAL
EOSINOPHIL # BLD: 0 K/UL (ref 0–0.4)
EOSINOPHIL NFR BLD: 0 % (ref 0–7)
ERYTHROCYTE [DISTWIDTH] IN BLOOD BY AUTOMATED COUNT: 14.9 % (ref 11.5–14.5)
GLOBULIN SER CALC-MCNC: 3.1 G/DL (ref 2–4)
GLUCOSE BLD STRIP.AUTO-MCNC: 202 MG/DL (ref 65–117)
GLUCOSE SERPL-MCNC: 172 MG/DL (ref 65–100)
HCT VFR BLD AUTO: 44.3 % (ref 35–47)
HGB BLD-MCNC: 14.3 G/DL (ref 11.5–16)
IMM GRANULOCYTES # BLD AUTO: 0.1 K/UL (ref 0–0.04)
IMM GRANULOCYTES NFR BLD AUTO: 1 % (ref 0–0.5)
LYMPHOCYTES # BLD: 0.7 K/UL (ref 0.8–3.5)
LYMPHOCYTES NFR BLD: 5 % (ref 12–49)
MAGNESIUM SERPL-MCNC: 2 MG/DL (ref 1.6–2.4)
MCH RBC QN AUTO: 29.9 PG (ref 26–34)
MCHC RBC AUTO-ENTMCNC: 32.3 G/DL (ref 30–36.5)
MCV RBC AUTO: 92.7 FL (ref 80–99)
MONOCYTES # BLD: 0.5 K/UL (ref 0–1)
MONOCYTES NFR BLD: 4 % (ref 5–13)
NEUTS SEG # BLD: 11.9 K/UL (ref 1.8–8)
NEUTS SEG NFR BLD: 90 % (ref 32–75)
NRBC # BLD: 0 K/UL (ref 0–0.01)
NRBC BLD-RTO: 0 PER 100 WBC
PLATELET # BLD AUTO: 278 K/UL (ref 150–400)
PLATELET COMMENTS,PCOM: ABNORMAL
PMV BLD AUTO: 9.6 FL (ref 8.9–12.9)
POTASSIUM SERPL-SCNC: 3.4 MMOL/L (ref 3.5–5.1)
PROT SERPL-MCNC: 6.4 G/DL (ref 6.4–8.2)
RBC # BLD AUTO: 4.78 M/UL (ref 3.8–5.2)
RBC MORPH BLD: ABNORMAL
SAMPLES BEING HELD,HOLD: NORMAL
SERVICE CMNT-IMP: ABNORMAL
SODIUM SERPL-SCNC: 134 MMOL/L (ref 136–145)
UFH PPP CHRO-ACNC: 0.5 IU/ML
WBC # BLD AUTO: 13.2 K/UL (ref 3.6–11)

## 2023-01-19 PROCEDURE — 74011000250 HC RX REV CODE- 250: Performed by: HOSPITALIST

## 2023-01-19 PROCEDURE — 83735 ASSAY OF MAGNESIUM: CPT

## 2023-01-19 PROCEDURE — C8929 TTE W OR WO FOL WCON,DOPPLER: HCPCS

## 2023-01-19 PROCEDURE — 74011250637 HC RX REV CODE- 250/637: Performed by: FAMILY MEDICINE

## 2023-01-19 PROCEDURE — 74011250636 HC RX REV CODE- 250/636: Performed by: EMERGENCY MEDICINE

## 2023-01-19 PROCEDURE — 74011250637 HC RX REV CODE- 250/637: Performed by: HOSPITALIST

## 2023-01-19 PROCEDURE — 82962 GLUCOSE BLOOD TEST: CPT

## 2023-01-19 PROCEDURE — 85520 HEPARIN ASSAY: CPT

## 2023-01-19 PROCEDURE — 74011000250 HC RX REV CODE- 250: Performed by: FAMILY MEDICINE

## 2023-01-19 PROCEDURE — 36415 COLL VENOUS BLD VENIPUNCTURE: CPT

## 2023-01-19 PROCEDURE — 74011250636 HC RX REV CODE- 250/636: Performed by: FAMILY MEDICINE

## 2023-01-19 PROCEDURE — 74011250637 HC RX REV CODE- 250/637: Performed by: INTERNAL MEDICINE

## 2023-01-19 PROCEDURE — 85025 COMPLETE CBC W/AUTO DIFF WBC: CPT

## 2023-01-19 PROCEDURE — 74011250637 HC RX REV CODE- 250/637

## 2023-01-19 PROCEDURE — 65660000001 HC RM ICU INTERMED STEPDOWN

## 2023-01-19 PROCEDURE — 80053 COMPREHEN METABOLIC PANEL: CPT

## 2023-01-19 PROCEDURE — 94640 AIRWAY INHALATION TREATMENT: CPT

## 2023-01-19 PROCEDURE — 74011250636 HC RX REV CODE- 250/636: Performed by: HOSPITALIST

## 2023-01-19 RX ORDER — CARVEDILOL 6.25 MG/1
6.25 TABLET ORAL 2 TIMES DAILY WITH MEALS
Status: DISCONTINUED | OUTPATIENT
Start: 2023-01-19 | End: 2023-01-21

## 2023-01-19 RX ORDER — PREDNISONE 20 MG/1
40 TABLET ORAL
Status: DISCONTINUED | OUTPATIENT
Start: 2023-01-20 | End: 2023-01-22 | Stop reason: HOSPADM

## 2023-01-19 RX ORDER — POTASSIUM CHLORIDE 750 MG/1
40 TABLET, FILM COATED, EXTENDED RELEASE ORAL
Status: COMPLETED | OUTPATIENT
Start: 2023-01-19 | End: 2023-01-19

## 2023-01-19 RX ORDER — IPRATROPIUM BROMIDE AND ALBUTEROL SULFATE 2.5; .5 MG/3ML; MG/3ML
3 SOLUTION RESPIRATORY (INHALATION)
Status: DISCONTINUED | OUTPATIENT
Start: 2023-01-19 | End: 2023-01-20

## 2023-01-19 RX ORDER — FUROSEMIDE 10 MG/ML
40 INJECTION INTRAMUSCULAR; INTRAVENOUS 2 TIMES DAILY
Status: DISCONTINUED | OUTPATIENT
Start: 2023-01-19 | End: 2023-01-20

## 2023-01-19 RX ORDER — LANOLIN ALCOHOL/MO/W.PET/CERES
3 CREAM (GRAM) TOPICAL
Status: DISCONTINUED | OUTPATIENT
Start: 2023-01-19 | End: 2023-01-22 | Stop reason: HOSPADM

## 2023-01-19 RX ORDER — POTASSIUM CHLORIDE 750 MG/1
20 TABLET, FILM COATED, EXTENDED RELEASE ORAL
Status: DISCONTINUED | OUTPATIENT
Start: 2023-01-19 | End: 2023-01-19

## 2023-01-19 RX ADMIN — Medication 3 MG: at 22:52

## 2023-01-19 RX ADMIN — GABAPENTIN 400 MG: 400 CAPSULE ORAL at 18:45

## 2023-01-19 RX ADMIN — SODIUM CHLORIDE, PRESERVATIVE FREE 10 ML: 5 INJECTION INTRAVENOUS at 22:50

## 2023-01-19 RX ADMIN — LATANOPROST 1 DROP: 50 SOLUTION OPHTHALMIC at 22:50

## 2023-01-19 RX ADMIN — BUPROPION HYDROCHLORIDE 150 MG: 150 TABLET, EXTENDED RELEASE ORAL at 22:49

## 2023-01-19 RX ADMIN — LEVOTHYROXINE SODIUM 137 MCG: 0.11 TABLET ORAL at 09:02

## 2023-01-19 RX ADMIN — SODIUM CHLORIDE, PRESERVATIVE FREE 5 ML: 5 INJECTION INTRAVENOUS at 16:00

## 2023-01-19 RX ADMIN — BUDESONIDE 500 MCG: 0.5 INHALANT RESPIRATORY (INHALATION) at 20:21

## 2023-01-19 RX ADMIN — HEPARIN SODIUM AND DEXTROSE 11 UNITS/KG/HR: 10000; 5 INJECTION INTRAVENOUS at 03:33

## 2023-01-19 RX ADMIN — POTASSIUM CHLORIDE 40 MEQ: 750 TABLET, FILM COATED, EXTENDED RELEASE ORAL at 08:54

## 2023-01-19 RX ADMIN — ACETAMINOPHEN 650 MG: 325 TABLET ORAL at 05:43

## 2023-01-19 RX ADMIN — IPRATROPIUM BROMIDE AND ALBUTEROL SULFATE 3 ML: 2.5; .5 SOLUTION RESPIRATORY (INHALATION) at 04:41

## 2023-01-19 RX ADMIN — ARFORMOTEROL TARTRATE 15 MCG: 15 SOLUTION RESPIRATORY (INHALATION) at 20:21

## 2023-01-19 RX ADMIN — ARFORMOTEROL TARTRATE 15 MCG: 15 SOLUTION RESPIRATORY (INHALATION) at 07:10

## 2023-01-19 RX ADMIN — BUDESONIDE 500 MCG: 0.5 INHALANT RESPIRATORY (INHALATION) at 07:10

## 2023-01-19 RX ADMIN — FUROSEMIDE 40 MG: 10 INJECTION, SOLUTION INTRAMUSCULAR; INTRAVENOUS at 09:02

## 2023-01-19 RX ADMIN — ACETAMINOPHEN 650 MG: 325 TABLET ORAL at 18:55

## 2023-01-19 RX ADMIN — IPRATROPIUM BROMIDE AND ALBUTEROL SULFATE 3 ML: .5; 3 SOLUTION RESPIRATORY (INHALATION) at 15:55

## 2023-01-19 RX ADMIN — CARVEDILOL 3.12 MG: 3.12 TABLET, FILM COATED ORAL at 09:08

## 2023-01-19 RX ADMIN — IPRATROPIUM BROMIDE AND ALBUTEROL SULFATE 3 ML: 2.5; .5 SOLUTION RESPIRATORY (INHALATION) at 07:09

## 2023-01-19 RX ADMIN — MONTELUKAST 10 MG: 10 TABLET, FILM COATED ORAL at 22:49

## 2023-01-19 RX ADMIN — METHYLPREDNISOLONE SODIUM SUCCINATE 60 MG: 40 INJECTION, POWDER, FOR SOLUTION INTRAMUSCULAR; INTRAVENOUS at 05:43

## 2023-01-19 RX ADMIN — CARVEDILOL 6.25 MG: 6.25 TABLET, FILM COATED ORAL at 18:45

## 2023-01-19 RX ADMIN — SODIUM CHLORIDE, PRESERVATIVE FREE 10 ML: 5 INJECTION INTRAVENOUS at 05:44

## 2023-01-19 RX ADMIN — IPRATROPIUM BROMIDE AND ALBUTEROL SULFATE 3 ML: .5; 3 SOLUTION RESPIRATORY (INHALATION) at 20:21

## 2023-01-19 RX ADMIN — GABAPENTIN 400 MG: 400 CAPSULE ORAL at 22:49

## 2023-01-19 RX ADMIN — FUROSEMIDE 40 MG: 10 INJECTION, SOLUTION INTRAMUSCULAR; INTRAVENOUS at 18:45

## 2023-01-19 NOTE — PROGRESS NOTES
Hospitalist Progress Note      Hospital summary: 76 y.o lady w/ asthma, HTN, RA, who presented with shortness of breath. She was found to have an NSTEMI. Assessment/Plan:  NSTEMI:  -continue heparin infusion  -trend biomarkers  -cardiology consulted and plans for [facility] today    Acute respiratory failure w/ hypoxia:   -off bipap and on NC today  -suspected component of CHF; continue IV diuretics  -follow-up echo    Acute asthma exacerbation:  -continue inhaled bronchodilators  -wean systemic steroids  -may need to change carvedilol to metoprolol if bronchospasm recurs    History of sternal and thoracic vertebral compression fractures. Functional paraplegia and neurogenic bladder w/ chronic goyal catehter. HTN    RA    Fibromyalgia    Obesity    Patient is critically-ill and at risk for decline, CCT 35 min. Code status: full  DVT prophylaxis: anticoagulated  Disposition: nursing home PTA; will likely need rehab  ----------------------------------------------    CC: shortness of breath    S: remains on O2, breathing feels worse this AM per the pt, urinating more with diuretics, denies cp aside from her chronic cp related to the sternal fracture, no n/v/d, abdomen feels swollen     Review of Systems:  Pertinent items are noted in HPI.     O:  Visit Vitals  BP (!) 141/101 (BP 1 Location: Left upper arm, BP Patient Position: At rest)   Pulse (!) 115   Temp 98.7 °F (37.1 °C)   Resp 23   Ht 5' (1.524 m)   Wt 83.3 kg (183 lb 9.6 oz)   SpO2 96%   BMI 35.86 kg/m²       PHYSICAL EXAM:  Gen: NAD, non-toxic, obese  HEENT: anicteric sclerae, normal conjunctiva, oropharynx clear, MM moist  Neck: supple, trachea midline, no adenopathy  Heart: RRR, no MRG, no JVD, no peripheral edema  Lungs: b/l crackles, no wheezing today, non-labored respirations on O2  Abd: soft, NT, ND, BS+  Extr: warm  Skin: dry, no rash  Neuro: CN II-XII grossly intact, normal speech, functional paraplegia of lower extremities  Psych: normal mood, appropriate affect      Intake/Output Summary (Last 24 hours) at 1/19/2023 1038  Last data filed at 1/19/2023 0850  Gross per 24 hour   Intake --   Output 2595 ml   Net -2595 ml        Recent labs & imaging reviewed:  Recent Results (from the past 24 hour(s))   TROPONIN-HIGH SENSITIVITY    Collection Time: 01/18/23  1:50 PM   Result Value Ref Range    Troponin-High Sensitivity 716 (HH) 0 - 51 ng/L   ANTI-XA UNFRACTIONATED AND LMW HEPARIN    Collection Time: 01/18/23  1:50 PM   Result Value Ref Range    Heparin Xa,Unfrac. and LMW 0.43 IU/mL   ANTI-XA UNFRACTIONATED AND LMW HEPARIN    Collection Time: 01/18/23  7:46 PM   Result Value Ref Range    Heparin Xa,Unfrac. and LMW 0.49 IU/mL   CBC WITH AUTOMATED DIFF    Collection Time: 01/19/23  1:32 AM   Result Value Ref Range    WBC 13.2 (H) 3.6 - 11.0 K/uL    RBC 4.78 3.80 - 5.20 M/uL    HGB 14.3 11.5 - 16.0 g/dL    HCT 44.3 35.0 - 47.0 %    MCV 92.7 80.0 - 99.0 FL    MCH 29.9 26.0 - 34.0 PG    MCHC 32.3 30.0 - 36.5 g/dL    RDW 14.9 (H) 11.5 - 14.5 %    PLATELET 203 655 - 130 K/uL    MPV 9.6 8.9 - 12.9 FL    NRBC 0.0 0  WBC    ABSOLUTE NRBC 0.00 0.00 - 0.01 K/uL    NEUTROPHILS 90 (H) 32 - 75 %    LYMPHOCYTES 5 (L) 12 - 49 %    MONOCYTES 4 (L) 5 - 13 %    EOSINOPHILS 0 0 - 7 %    BASOPHILS 0 0 - 1 %    IMMATURE GRANULOCYTES 1 (H) 0.0 - 0.5 %    ABS. NEUTROPHILS 11.9 (H) 1.8 - 8.0 K/UL    ABS. LYMPHOCYTES 0.7 (L) 0.8 - 3.5 K/UL    ABS. MONOCYTES 0.5 0.0 - 1.0 K/UL    ABS. EOSINOPHILS 0.0 0.0 - 0.4 K/UL    ABS. BASOPHILS 0.0 0.0 - 0.1 K/UL    ABS. IMM.  GRANS. 0.1 (H) 0.00 - 0.04 K/UL    DF SMEAR SCANNED      PLATELET COMMENTS Large Platelets      RBC COMMENTS ANISOCYTOSIS  1+       MAGNESIUM    Collection Time: 01/19/23  1:32 AM   Result Value Ref Range    Magnesium 2.0 1.6 - 2.4 mg/dL   METABOLIC PANEL, COMPREHENSIVE    Collection Time: 01/19/23  1:32 AM   Result Value Ref Range    Sodium 134 (L) 136 - 145 mmol/L    Potassium 3.4 (L) 3.5 - 5.1 mmol/L Chloride 95 (L) 97 - 108 mmol/L    CO2 33 (H) 21 - 32 mmol/L    Anion gap 6 5 - 15 mmol/L    Glucose 172 (H) 65 - 100 mg/dL    BUN 21 (H) 6 - 20 MG/DL    Creatinine 0.74 0.55 - 1.02 MG/DL    BUN/Creatinine ratio 28 (H) 12 - 20      eGFR >60 >60 ml/min/1.73m2    Calcium 9.6 8.5 - 10.1 MG/DL    Bilirubin, total 0.6 0.2 - 1.0 MG/DL    ALT (SGPT) 25 12 - 78 U/L    AST (SGOT) 22 15 - 37 U/L    Alk. phosphatase 104 45 - 117 U/L    Protein, total 6.4 6.4 - 8.2 g/dL    Albumin 3.3 (L) 3.5 - 5.0 g/dL    Globulin 3.1 2.0 - 4.0 g/dL    A-G Ratio 1.1 1.1 - 2.2     ANTI-XA UNFRACTIONATED AND LMW HEPARIN    Collection Time: 01/19/23  1:32 AM   Result Value Ref Range    Heparin Xa,Unfrac. and LMW 0.50 IU/mL   SAMPLES BEING HELD    Collection Time: 01/19/23  1:32 AM   Result Value Ref Range    SAMPLES BEING HELD 1RED     COMMENT        Add-on orders for these samples will be processed based on acceptable specimen integrity and analyte stability, which may vary by analyte. Recent Labs     01/19/23  0132 01/18/23  0715   WBC 13.2* 13.1*   HGB 14.3 13.3   HCT 44.3 43.4    282     Recent Labs     01/19/23 0132 01/17/23 2009   * 134*   K 3.4* 4.3   CL 95* 98   CO2 33* 31   BUN 21* 17   CREA 0.74 0.86   * 147*   CA 9.6 9.5   MG 2.0  --      Recent Labs     01/19/23  0132 01/17/23 2009   ALT 25 24    110   TBILI 0.6 0.4   TP 6.4 6.7   ALB 3.3* 3.5   GLOB 3.1 3.2     Recent Labs     01/17/23  2355   APTT 26.0      No results for input(s): FE, TIBC, PSAT, FERR in the last 72 hours. No results found for: FOL, RBCF   No results for input(s): PH, PCO2, PO2 in the last 72 hours. No results for input(s): CPK, CKNDX, TROIQ in the last 72 hours.     No lab exists for component: CPKMB  No results found for: CHOL, CHOLX, CHLST, CHOLV, HDL, HDLP, LDL, LDLC, DLDLP, TGLX, TRIGL, TRIGP, CHHD, CHHDX  Lab Results   Component Value Date/Time    Glucose (POC) 112 12/25/2021 12:31 PM    Glucose (POC) 126 (H) 02/14/2021 04:29 PM    Glucose (POC) 103 (H) 02/14/2021 11:04 AM    Glucose (POC) 72 02/14/2021 06:15 AM    Glucose (POC) 95 02/13/2021 09:14 PM     Lab Results   Component Value Date/Time    Color DARK YELLOW 11/30/2021 10:45 PM    Appearance TURBID (A) 11/30/2021 10:45 PM    Specific gravity 1.020 11/30/2021 10:45 PM    Specific gravity 1.020 02/08/2021 06:45 PM    pH (UA) 5.0 11/30/2021 10:45 PM    Protein Negative 11/30/2021 10:45 PM    Glucose Negative 11/30/2021 10:45 PM    Ketone Negative 11/30/2021 10:45 PM    Bilirubin Negative 11/30/2021 10:45 PM    Urobilinogen 0.2 11/30/2021 10:45 PM    Nitrites Negative 11/30/2021 10:45 PM    Leukocyte Esterase Negative 11/30/2021 10:45 PM    Epithelial cells MANY (A) 11/30/2021 10:45 PM    Bacteria 1+ (A) 11/30/2021 10:45 PM    WBC 10-20 11/30/2021 10:45 PM    RBC 5-10 11/30/2021 10:45 PM       Med list reviewed  Current Facility-Administered Medications   Medication Dose Route Frequency    furosemide (LASIX) injection 40 mg  40 mg IntraVENous BID    albuterol-ipratropium (DUO-NEB) 2.5 MG-0.5 MG/3 ML  3 mL Nebulization Q6H RT    buPROPion SR (WELLBUTRIN SR) tablet 150 mg  150 mg Oral Q12H    cholecalciferol (VITAMIN D3) (1000 Units /25 mcg) tablet 1,000 Units  1,000 Units Oral DAILY    pantoprazole (PROTONIX) tablet 40 mg  40 mg Oral ACB    latanoprost (XALATAN) 0.005 % ophthalmic solution 1 Drop  1 Drop Both Eyes QHS    montelukast (SINGULAIR) tablet 10 mg  10 mg Oral QHS    polyethylene glycol (MIRALAX) packet 8.5 g  8.5 g Oral DAILY    sodium chloride (NS) flush 5-40 mL  5-40 mL IntraVENous Q8H    sodium chloride (NS) flush 5-40 mL  5-40 mL IntraVENous PRN    acetaminophen (TYLENOL) tablet 650 mg  650 mg Oral Q6H PRN    Or    acetaminophen (TYLENOL) suppository 650 mg  650 mg Rectal Q6H PRN    polyethylene glycol (MIRALAX) packet 17 g  17 g Oral DAILY PRN    ondansetron (ZOFRAN ODT) tablet 4 mg  4 mg Oral Q8H PRN    Or    ondansetron (ZOFRAN) injection 4 mg  4 mg IntraVENous Q6H PRN    albuterol-ipratropium (DUO-NEB) 2.5 MG-0.5 MG/3 ML  3 mL Nebulization Q4H PRN    methylPREDNISolone (PF) (SOLU-MEDROL) injection 60 mg  60 mg IntraVENous Q8H    carvediloL (COREG) tablet 3.125 mg  3.125 mg Oral BID WITH MEALS    arformoteroL (BROVANA) neb solution 15 mcg  15 mcg Nebulization BID RT    And    budesonide (PULMICORT) 500 mcg/2 ml nebulizer suspension  500 mcg Nebulization BID RT    gabapentin (NEURONTIN) capsule 400 mg  400 mg Oral TID    levothyroxine (SYNTHROID) tablet 137 mcg  137 mcg Oral ACB    heparin 25,000 units in D5W 250 ml infusion  12-25 Units/kg/hr IntraVENous TITRATE       Care Plan discussed with:  Patient/Family    Keenan Rodriguez MD  Internal Medicine  Date of Service: 1/19/2023

## 2023-01-19 NOTE — PROGRESS NOTES
Patient/family seen: YES       Informed patient/family of BPCI-A Bundle Program. Also advised of potential outreach by Care Transitions Team.    Bundle Payment Care Improvement Beneficiary Letter Delivered to Beneficiary or Representative:YES.  Date BCPI -A was given:01/19/23

## 2023-01-19 NOTE — NURSE NAVIGATOR
HEART FAILURE NURSE NAVIGATOR NOTE  [facility]    Patient chart was reviewed by Heart Failure (HF) Nurse Navigators for compliance of prescribed treatment with guidelines directed medical therapy (GDMT) and HF database completed. Please, review beneath recommendations for symptomatic patients with HF with Preserved Ejection Fraction ? 50% (HFpEF) for your consideration when taking care of this patient. Current HF Medical Therapy:      Name iLly Cochran    1945   LVEF Pending; prev EF 50-55% (echo dated 21)   NYHA Functional Class Not documented   ARNi/ACEi/ARB    Aldosterone Antagonist    SGLT2 inhibitor    Consulting Cardiologist Dr Peggy Anderson ([facility])     Recommendations for HF Management:    For patients with HFpEF ? 50%, consider adding the following GDMT, if appropriate:  SGLT2 inhibitor [Class 2a]  ARNi or ARB [Class 2b]  Aldosterone antagonist [Class 2b]  Adjust antihypertensive therapy [Class 1]  Adjust diuretic dose at discharge if hospitalized for volume overload [Class 1]  For patient with hyperkalemia while on RAASi > 5.5, consider adding potassium binders (patiromer, sodium zirconium cycosilicate) [Class 2a]    Patients with suspected cardiac amyloidosis (older > 61years old with LVH > 1.2cm and/or any other signs of amyloidosis) should be offered screening labs and imaging [Class 1]: (a) serum gammopathy profile and UPEP with immunofixation, and (b) PYP test. If PYP test is positive patient should have genetic testing done for inherited ATTR amyloidosis. If any findings are positive or you need genetic testing ordered, please, consider in-patient consultation or referral to [address] [place] [address]. Note that the following medications may be potentially harmful in heart failure [Class 3].   Calcium channel blockers (doxazosin, diltiazem, verapamil, nifedipine)  Antiarrhythmics (flecanide, disopyrimide, sotalol, dronedarone)  Diabetes medications (thiasolidinediones, saxagliptin, alogliptin)  NSAIDs and MORALES 2 inhibitors    Consider vaccinations for respiratory illnesses (flu, pneumonia, covid) [Class 2b]    Due to h/o recurrent hospitalizations this patient may benefit from referral to Advanced Heart Failure Program to assess suitability for advanced therapies, such as left-ventricular assist device, heart transplantation, palliative inotropes or palliation [Class 1]. To obtain in-patient consultation or refer to [address] [place] [address], call [phone]    Patient Heart Failure Education:     Teach back in heart failure education provided, including information about medical therapy, lifestyle modifications, diet and fluid restrictions, physical activity. Educational resources provided: Living with Heart Failure booklet; Signs/Symptoms magnet; Weight Calendar; Dispatch Health flyer; Preparing for Successful Discharge check list.  Information provided about HF support group. Heart failure avoiding triggers on discharge instructions. Plan for Transitional Care:    Post discharge follow up phone call to be made within 48-72 hours of discharge. Patient will follow-up with PCP. Patient will follow-up with Primary Cardiologist.  Patient screened for obstructive sleep apnea and referred to Sleep Medicine. Referral/follow-up with Cardiac Rehabilitation. Referral/follow-up with Advanced Heart Failure Specialist.  Referral/follow-up with Palliative Care Specialist.        Heart Failure Nurse Navigator  Phone: [phone] / [phone]    *Recommendations listed above are based on the guidelines: 2022 AHA/ACC/HFSA Guideline for the Management of Heart Failure: A Report of the [address] on Clinical Practice Guidelines. Circulation 2022; [id number].  and 2017 AHA/ACC/HRS guideline for management of patients with ventricular arrhythmias and the prevention of sudden cardiac death: A Report of the [address] Task Force on Clinical Practice Guidelines and the Heart Rhythm Society.  Heart Rhythm, Vol 15, No 10, October 2018 *Class of Recommendation: Class 1 (strong), Class 2a (moderate), Class 2b (weak), Class 3 (not recommended, potentially harmful)

## 2023-01-19 NOTE — PROGRESS NOTES
Cardiology Progress Note                                        Admit Date: 1/17/2023    Assessment/Plan:     NSTENI; stable  CAD; suspected; cath is now moved to am  CHF; acute diastolic HF; continue IV Laisx and increase Coreg    Foreign Orellana is a 76 y.o. female with     PROBLEM LIST:  Patient Active Problem List    Diagnosis Date Noted    Tachycardia 01/18/2023    Acute chest pain 01/18/2023    NSTEMI (non-ST elevated myocardial infarction) ([facility] 01/18/2023    Thoracic compression fracture ([facility] 01/18/2023    Acute respiratory failure with hypoxia ([facility] 01/18/2023    Unspecified fracture of sternum, initial encounter for closed fracture 01/18/2023    Weakness of right leg 12/29/2021    TIA (transient ischemic attack) 12/25/2021    CVA (cerebral vascular accident) ([facility] 12/25/2021    Orthostatic hypotension 12/01/2021    Weakness 12/01/2021    UTI (urinary tract infection) 12/01/2021    LOREN (acute kidney injury) ([facility] 12/01/2021    Goals of care, counseling/discussion     Other chronic pain     Cough     Impaired mobility     Status asthmaticus 02/08/2021    Moderate persistent asthma with acute exacerbation 02/08/2021    IBS (irritable bowel syndrome) 07/08/2011    HTN (hypertension) 07/08/2011    Asthma 07/08/2011    Hypothyroidism 07/08/2011    Syncope 07/07/2011    Dehydration 07/07/2011         Subjective:     Foreign Orellana reports dyspnea, better    Visit Vitals  BP (!) 150/95 (BP Patient Position: At rest)   Pulse 100   Temp 99.6 °F (37.6 °C)   Resp 15   Ht 5' (1.524 m)   Wt 83.3 kg (183 lb 9.6 oz)   SpO2 93%   BMI 35.86 kg/m²       Intake/Output Summary (Last 24 hours) at 1/19/2023 1535  Last data filed at 1/19/2023 1200  Gross per 24 hour   Intake --   Output 3595 ml   Net -3595 ml       Objective:      Physical Exam:  HEENT: Perrla, EOMI  Neck: No JVD,  No thyroidmegaly  Resp: some rales  CV: RRR s1s2 No murmur no s3  Abd:Soft, Nontender  Ext: 1+edema  Neuro: Alert and oriented; Nonfocal  Skin: Warm, Dry, Intact  Pulses: 2+ DP/PT/Rad      Telemetry: normal sinus rhythm    Current Facility-Administered Medications   Medication Dose Route Frequency    furosemide (LASIX) injection 40 mg  40 mg IntraVENous BID    albuterol-ipratropium (DUO-NEB) 2.5 MG-0.5 MG/3 ML  3 mL Nebulization Q6H RT    [START ON 1/20/2023] predniSONE (DELTASONE) tablet 40 mg  40 mg Oral DAILY WITH BREAKFAST    carvediloL (COREG) tablet 6.25 mg  6.25 mg Oral BID WITH MEALS    buPROPion SR (WELLBUTRIN SR) tablet 150 mg  150 mg Oral Q12H    cholecalciferol (VITAMIN D3) (1000 Units /25 mcg) tablet 1,000 Units  1,000 Units Oral DAILY    pantoprazole (PROTONIX) tablet 40 mg  40 mg Oral ACB    latanoprost (XALATAN) 0.005 % ophthalmic solution 1 Drop  1 Drop Both Eyes QHS    montelukast (SINGULAIR) tablet 10 mg  10 mg Oral QHS    polyethylene glycol (MIRALAX) packet 8.5 g  8.5 g Oral DAILY    sodium chloride (NS) flush 5-40 mL  5-40 mL IntraVENous Q8H    sodium chloride (NS) flush 5-40 mL  5-40 mL IntraVENous PRN    acetaminophen (TYLENOL) tablet 650 mg  650 mg Oral Q6H PRN    Or    acetaminophen (TYLENOL) suppository 650 mg  650 mg Rectal Q6H PRN    polyethylene glycol (MIRALAX) packet 17 g  17 g Oral DAILY PRN    ondansetron (ZOFRAN ODT) tablet 4 mg  4 mg Oral Q8H PRN    Or    ondansetron (ZOFRAN) injection 4 mg  4 mg IntraVENous Q6H PRN    albuterol-ipratropium (DUO-NEB) 2.5 MG-0.5 MG/3 ML  3 mL Nebulization Q4H PRN    arformoteroL (BROVANA) neb solution 15 mcg  15 mcg Nebulization BID RT    And    budesonide (PULMICORT) 500 mcg/2 ml nebulizer suspension  500 mcg Nebulization BID RT    gabapentin (NEURONTIN) capsule 400 mg  400 mg Oral TID    levothyroxine (SYNTHROID) tablet 137 mcg  137 mcg Oral ACB    heparin 25,000 units in D5W 250 ml infusion  12-25 Units/kg/hr IntraVENous TITRATE         Data Review:   Labs:    Recent Results (from the past 24 hour(s))   ANTI-XA UNFRACTIONATED AND LMW HEPARIN    Collection Time: 01/18/23  7:46 PM   Result Value Ref Range    Heparin Xa,Unfrac. and LMW 0.49 IU/mL   CBC WITH AUTOMATED DIFF    Collection Time: 01/19/23  1:32 AM   Result Value Ref Range    WBC 13.2 (H) 3.6 - 11.0 K/uL    RBC 4.78 3.80 - 5.20 M/uL    HGB 14.3 11.5 - 16.0 g/dL    HCT 44.3 35.0 - 47.0 %    MCV 92.7 80.0 - 99.0 FL    MCH 29.9 26.0 - 34.0 PG    MCHC 32.3 30.0 - 36.5 g/dL    RDW 14.9 (H) 11.5 - 14.5 %    PLATELET 481 551 - 670 K/uL    MPV 9.6 8.9 - 12.9 FL    NRBC 0.0 0  WBC    ABSOLUTE NRBC 0.00 0.00 - 0.01 K/uL    NEUTROPHILS 90 (H) 32 - 75 %    LYMPHOCYTES 5 (L) 12 - 49 %    MONOCYTES 4 (L) 5 - 13 %    EOSINOPHILS 0 0 - 7 %    BASOPHILS 0 0 - 1 %    IMMATURE GRANULOCYTES 1 (H) 0.0 - 0.5 %    ABS. NEUTROPHILS 11.9 (H) 1.8 - 8.0 K/UL    ABS. LYMPHOCYTES 0.7 (L) 0.8 - 3.5 K/UL    ABS. MONOCYTES 0.5 0.0 - 1.0 K/UL    ABS. EOSINOPHILS 0.0 0.0 - 0.4 K/UL    ABS. BASOPHILS 0.0 0.0 - 0.1 K/UL    ABS. IMM. GRANS. 0.1 (H) 0.00 - 0.04 K/UL    DF SMEAR SCANNED      PLATELET COMMENTS Large Platelets      RBC COMMENTS ANISOCYTOSIS  1+       MAGNESIUM    Collection Time: 01/19/23  1:32 AM   Result Value Ref Range    Magnesium 2.0 1.6 - 2.4 mg/dL   METABOLIC PANEL, COMPREHENSIVE    Collection Time: 01/19/23  1:32 AM   Result Value Ref Range    Sodium 134 (L) 136 - 145 mmol/L    Potassium 3.4 (L) 3.5 - 5.1 mmol/L    Chloride 95 (L) 97 - 108 mmol/L    CO2 33 (H) 21 - 32 mmol/L    Anion gap 6 5 - 15 mmol/L    Glucose 172 (H) 65 - 100 mg/dL    BUN 21 (H) 6 - 20 MG/DL    Creatinine 0.74 0.55 - 1.02 MG/DL    BUN/Creatinine ratio 28 (H) 12 - 20      eGFR >60 >60 ml/min/1.73m2    Calcium 9.6 8.5 - 10.1 MG/DL    Bilirubin, total 0.6 0.2 - 1.0 MG/DL    ALT (SGPT) 25 12 - 78 U/L    AST (SGOT) 22 15 - 37 U/L    Alk.  phosphatase 104 45 - 117 U/L    Protein, total 6.4 6.4 - 8.2 g/dL    Albumin 3.3 (L) 3.5 - 5.0 g/dL    Globulin 3.1 2.0 - 4.0 g/dL    A-G Ratio 1.1 1.1 - 2.2     ANTI-XA UNFRACTIONATED AND LMW HEPARIN    Collection Time: 01/19/23  1:32 AM   Result Value Ref Range    Heparin Xa,Unfrac. and LMW 0.50 IU/mL   SAMPLES BEING HELD    Collection Time: 01/19/23  1:32 AM   Result Value Ref Range    SAMPLES BEING HELD 1RED     COMMENT        Add-on orders for these samples will be processed based on acceptable specimen integrity and analyte stability, which may vary by analyte.

## 2023-01-19 NOTE — PROGRESS NOTES
SLP Contact Note    SLP consult received. Pt NPO for procedure 1:00 pm. However, no swallow screen has been documented. Pt with no risk factors for oropharyngeal dysphagia and CT does not show any pneumonias or concerns for aspiration. Discussed with RN who will complete the [address] and will let SLP know if there are difficulties.        Thank you,  CARITO MichaelEd, [address] [facility]  Speech-Language Pathologist

## 2023-01-19 NOTE — PROGRESS NOTES
0800:Bedside and Verbal shift change report given to [address] [place] [address] (oncoming nurse) by Danelle Linares RN (offgoing nurse). Report included the following information SBAR, Kardex, and ED Summary. 0900: Heparin gtt stopped per Dr. Bobbi Tse, PO potassium replaced. Consent for heart catheterization signed and in pt's chart. 1200: Pt off the floor to CCL via RN's x2 with supplemental oxygen. 1530: Pt back on floor from CCL, did not perform procedure. Rescheduled for 0700, will follow up with Dr. Annette Aponte about restarting heparin gtt.     1600: Bedside and Verbal shift change report given to Kimberly CHOW (oncoming nurse) by [address] [place] [address] (offgoing nurse). Report included the following information SBAR and Kardex.

## 2023-01-19 NOTE — DISCHARGE INSTRUCTIONS
Download the Heart Failure Princeton Chad: Search in your [employer] (Android) or [employer] Chad Store (Starriserphone): Search for- HF [place] Chad.    [employer]    HF Princeton is a brand-new phone chad that helps you track daily symptoms, vitals, mood, energy level and more. You can even add your heart failure care team members to view your data and monitor your condition at home.     HF Princeton lets you:  Track symptoms, medications and more  Share health information with your health care team  Connect with others living with heart failure

## 2023-01-19 NOTE — PROGRESS NOTES
1600: Bedside and Verbal shift change report given to GERALDO Harris (oncoming nurse) by Kingsley So RN (offgoing nurse). Report included the following information SBAR, Kardex, MAR, Recent Results, and Cardiac Rhythm NSR . Pt arrived back to unit. Pt reconnected to monitor and VS obtained. Heparin gtt restarted at 11 units per MD Ro. Verbal order received to stop heparin gtt at 3am on 01/20/2023 per MD Ro.     1930: Bedside and Verbal shift change report given to Faye Vázquez RN (oncoming nurse) by Herb Orantes RN (offgoing nurse). Report included the following information SBAR, Kardex, MAR, Recent Results, and Cardiac Rhythm NSR .

## 2023-01-19 NOTE — PROGRESS NOTES
Hospitalist Progress Note    NAME: Yajaira Alarcon   :  1945   MRN:  [id number]     HPI excerpted from admission H&P:  \"Gabriella Hill is a 64 y.o. female with past medical history of asthma, depression, fibromyalgia, GERD, hypertension, hypothyroidism, and worsening functional status who was brought in due to weakness and fall. Per the patient she is started going downhill ever since a while back where she started having neck and back pain. She then reports that yesterday she was washing the dishes when she felt her legs buckle. Per the patient's daughter they were concerned about her status and they think that she needs more than just the physical therapy a couple times a week which she has been doing. In the ER the patient has been comfortable stable. On examination in the ER the patient had positive orthostats, weakness, and dizziness. The patient's mental status is normal and she does not complain of anything except issues with her back. Her cardiologist also evaluated her and wants to adjust her medications due to orthostatic hypotension and dizziness. In the ER the patient's UA did show bacteriuria and due to her weakness and orthostatic hypotension is likely that she has a UTI as well. \"    Assessment / Plan:  Weakness  GLF  Dehydration  - Continue current IVF. - Will need rehab. Orthostatic hypotension in setting of essential HTN  - No orthostatic VS today, request set. - Consider midodrine if patient continues to have orthostatic changes. UTI  - Continue trimethoprim-sulfamethoxazole 160-800 mg po bid. LOREN  - Likely pre-renal.    - Renal function improving.  - Continue to avoid nephrotoxins.  - Monitor. Asthma  - Continue arformoterol/budesonide 15 mcg/0.5 mg nebs bid.  - Continue ipratropium 0.5 mg nebs q6h.   - Continue montelukast 10 mg po daily at hs.   - Continue prednisone, patient reports that she has been weaned to 10 mg po daily from 20 mg.  - Patient on Xolair every Message received from AMANDA Castro, pt requesting home health PT orders for repeated falls. Per Dr Bolton pt does meet qualifications, reasons for home bound status received.     Attempted to call pt to notify, no answer and vm full.  Will try again later.      other week which is non-formulary. Neuropathy   - Continue gabapentin 300 mg po bid. Hypothyroidism  - Continue levothyroxine 125 mcg po daily. GERD  - Continue pantoprazole 40 mg po daily. Glaucoma  - Continue latanoprost 0.005% 1 gtt ou daily at hs. Depression    - Continue bupropion  mg po daily, non-formulary - pharmacy to substitute per protocol. Arthritis  - Continue meloxicam 15 mg po daily. Mild hyperglycemia   - Likely related to steroid use. - No tx indicated. - Monitor with a.m. labs. 30.0 - 39.9 Obese / Body mass index is 31.25 kg/m². Code status: Full  Prophylaxis: Lovenox  Recommended Disposition: SAH/Rehab     Subjective:     Chief Complaint / Reason for Physician Visit  No complaints currently. Plan of care and pertinent events reviewed with bedside nurse. Review of Systems:  Symptom Y/N Comments  Symptom Y/N Comments   Fever/Chills Y   Chest Pain N    Poor Appetite N   Edema N    Cough Y   Abdominal Pain N    Sputum N   Joint Pain Y    SOB/DEWITT N   Pruritis/Rash N    Nausea/vomit N   Tolerating PT/OT Y    Diarrhea N   Tolerating Diet Y    Constipation    Other       Could NOT obtain due to:      Objective:     VITALS:   Last 24hrs VS reviewed since prior progress note. Most recent are:  Patient Vitals for the past 24 hrs:   Temp Pulse Resp BP SpO2   12/02/21 1000  94      12/02/21 0757 97.6 °F (36.4 °C) 89 16 127/74 90 %   12/02/21 0746     90 %   12/02/21 0555  87      12/02/21 0358  88      12/02/21 0155  87      12/02/21 0111 97.4 °F (36.3 °C) 96 16 113/67 91 %   12/01/21 2152 97.6 °F (36.4 °C) 88 16 (!) 156/94 94 %   12/01/21 2047  86 19 118/87      No intake or output data in the 24 hours ending 12/02/21 1139     I had a face to face encounter and independently examined this patient on 12/2/2021, as outlined below:  PHYSICAL EXAM:  General:  A/A/O X 3. NAD. HEENT:  Normocephalic. Sclera anicteric. EOMI.   Mucous membranes moist.    Chest:  Resps even/unlabored with symmetrical CWE. Air entry diminished at bases. Lungs CTA. No use of accessory muscles. CV:  RRR. Normal S1/S2. No M/C/R appreciated. No JVD. Trace left ankle edema. Cap refill < 3 sec. Peripheral pulses 1-2+. GI:  Abdomen soft/NT/ND. No organomegaly. No hernia. ABT X 4.    :  Voiding. Neurologic:  Nonfocal.  CN II-XII grossly intact. Face symmetrical.  Speech normal.     Psych:  Cooperative. No anxiety or agitation. No suicidal/homicidal ideation. Skin:  Warm and color appropriate. No rashes or jaundice. Turgor elastic. Reviewed most current lab test results and cultures  YES  Reviewed most current radiology test results   YES  Review and summation of old records today    NO  Reviewed patient's current orders and MAR    YES  PMH/SH reviewed - no change compared to H&P  ________________________________________________________________________  Care Plan discussed with:    Comments   Patient [address] [place] [address]     Consultant                        Multidiciplinary team rounds were held today with , nursing, pharmacist and clinical coordinator. Patient's plan of care was discussed; medications were reviewed and discharge planning was addressed. ________________________________________________________________________  Javy Matthews NP     Procedures: see electronic medical records for all procedures/Xrays and details which were not copied into this note but were reviewed prior to creation of Plan. LABS:  I reviewed today's most current labs and imaging studies.   Pertinent labs include:  Recent Labs     12/02/21 0120 11/30/21  2245   WBC 9.3 9.6   HGB 11.6 12.5   HCT 37.7 40.0    299     Recent Labs     12/02/21  0120 11/30/21  2245    139   K 3.8 4.0    104   CO2 27 29   * 138*   BUN 22* 24*   CREA 1.06* 1.31*   CA 9.5 9.9   MG  --  2.3   ALB  --  3.5   TBILI  -- 0. 5   ALT  --  26       Signed: Rochelle Ortiz NP

## 2023-01-20 ENCOUNTER — TRANSCRIBE ORDER (OUTPATIENT)
Dept: CARDIAC REHAB | Age: 78
End: 2023-01-20

## 2023-01-20 DIAGNOSIS — I21.4 ACUTE MYOCARDIAL INFARCTION, SUBENDOCARDIAL INFARCTION, INITIAL EPISODE OF CARE (HCC): Primary | ICD-10-CM

## 2023-01-20 PROBLEM — I50.31 ACUTE DIASTOLIC HEART FAILURE (HCC): Status: ACTIVE | Noted: 2023-01-20

## 2023-01-20 LAB
ANION GAP SERPL CALC-SCNC: 8 MMOL/L (ref 5–15)
BUN SERPL-MCNC: 34 MG/DL (ref 6–20)
BUN/CREAT SERPL: 32 (ref 12–20)
CALCIUM SERPL-MCNC: 9.7 MG/DL (ref 8.5–10.1)
CHLORIDE SERPL-SCNC: 94 MMOL/L (ref 97–108)
CO2 SERPL-SCNC: 34 MMOL/L (ref 21–32)
CREAT SERPL-MCNC: 1.07 MG/DL (ref 0.55–1.02)
ERYTHROCYTE [DISTWIDTH] IN BLOOD BY AUTOMATED COUNT: 15 % (ref 11.5–14.5)
GLUCOSE SERPL-MCNC: 121 MG/DL (ref 65–100)
HCT VFR BLD AUTO: 44.9 % (ref 35–47)
HGB BLD-MCNC: 13.9 G/DL (ref 11.5–16)
MAGNESIUM SERPL-MCNC: 2.2 MG/DL (ref 1.6–2.4)
MCH RBC QN AUTO: 29.1 PG (ref 26–34)
MCHC RBC AUTO-ENTMCNC: 31 G/DL (ref 30–36.5)
MCV RBC AUTO: 94.1 FL (ref 80–99)
NRBC # BLD: 0 K/UL (ref 0–0.01)
NRBC BLD-RTO: 0 PER 100 WBC
PLATELET # BLD AUTO: 301 K/UL (ref 150–400)
PMV BLD AUTO: 9.5 FL (ref 8.9–12.9)
POTASSIUM SERPL-SCNC: 3.7 MMOL/L (ref 3.5–5.1)
RBC # BLD AUTO: 4.77 M/UL (ref 3.8–5.2)
SODIUM SERPL-SCNC: 136 MMOL/L (ref 136–145)
UFH PPP CHRO-ACNC: <0.1 IU/ML
WBC # BLD AUTO: 14.2 K/UL (ref 3.6–11)

## 2023-01-20 PROCEDURE — 92610 EVALUATE SWALLOWING FUNCTION: CPT | Performed by: SPEECH-LANGUAGE PATHOLOGIST

## 2023-01-20 PROCEDURE — 77030040934 HC CATH DIAG DXTERITY MEDT -A: Performed by: INTERNAL MEDICINE

## 2023-01-20 PROCEDURE — 77030013744: Performed by: INTERNAL MEDICINE

## 2023-01-20 PROCEDURE — 74011000250 HC RX REV CODE- 250: Performed by: FAMILY MEDICINE

## 2023-01-20 PROCEDURE — 94640 AIRWAY INHALATION TREATMENT: CPT

## 2023-01-20 PROCEDURE — 2709999900 HC NON-CHARGEABLE SUPPLY: Performed by: INTERNAL MEDICINE

## 2023-01-20 PROCEDURE — 74011250636 HC RX REV CODE- 250/636: Performed by: INTERNAL MEDICINE

## 2023-01-20 PROCEDURE — 80048 BASIC METABOLIC PNL TOTAL CA: CPT

## 2023-01-20 PROCEDURE — 93458 L HRT ARTERY/VENTRICLE ANGIO: CPT | Performed by: INTERNAL MEDICINE

## 2023-01-20 PROCEDURE — 74011250637 HC RX REV CODE- 250/637: Performed by: FAMILY MEDICINE

## 2023-01-20 PROCEDURE — 74011250636 HC RX REV CODE- 250/636: Performed by: HOSPITALIST

## 2023-01-20 PROCEDURE — B2111ZZ FLUOROSCOPY OF MULTIPLE CORONARY ARTERIES USING LOW OSMOLAR CONTRAST: ICD-10-PCS | Performed by: INTERNAL MEDICINE

## 2023-01-20 PROCEDURE — 74011250637 HC RX REV CODE- 250/637: Performed by: INTERNAL MEDICINE

## 2023-01-20 PROCEDURE — C1760 CLOSURE DEV, VASC: HCPCS | Performed by: INTERNAL MEDICINE

## 2023-01-20 PROCEDURE — 74011000250 HC RX REV CODE- 250: Performed by: HOSPITALIST

## 2023-01-20 PROCEDURE — 99152 MOD SED SAME PHYS/QHP 5/>YRS: CPT | Performed by: INTERNAL MEDICINE

## 2023-01-20 PROCEDURE — C1769 GUIDE WIRE: HCPCS | Performed by: INTERNAL MEDICINE

## 2023-01-20 PROCEDURE — 99153 MOD SED SAME PHYS/QHP EA: CPT | Performed by: INTERNAL MEDICINE

## 2023-01-20 PROCEDURE — 74011000636 HC RX REV CODE- 636: Performed by: INTERNAL MEDICINE

## 2023-01-20 PROCEDURE — C1894 INTRO/SHEATH, NON-LASER: HCPCS | Performed by: INTERNAL MEDICINE

## 2023-01-20 PROCEDURE — 74011636637 HC RX REV CODE- 636/637: Performed by: HOSPITALIST

## 2023-01-20 PROCEDURE — 83735 ASSAY OF MAGNESIUM: CPT

## 2023-01-20 PROCEDURE — 65270000046 HC RM TELEMETRY

## 2023-01-20 PROCEDURE — 85027 COMPLETE CBC AUTOMATED: CPT

## 2023-01-20 PROCEDURE — 4A023N7 MEASUREMENT OF CARDIAC SAMPLING AND PRESSURE, LEFT HEART, PERCUTANEOUS APPROACH: ICD-10-PCS | Performed by: INTERNAL MEDICINE

## 2023-01-20 PROCEDURE — 77030019569 HC BND COMPR RAD TERU -B: Performed by: INTERNAL MEDICINE

## 2023-01-20 PROCEDURE — 74011000250 HC RX REV CODE- 250: Performed by: INTERNAL MEDICINE

## 2023-01-20 PROCEDURE — 74011250637 HC RX REV CODE- 250/637

## 2023-01-20 PROCEDURE — 36415 COLL VENOUS BLD VENIPUNCTURE: CPT

## 2023-01-20 PROCEDURE — 74011250637 HC RX REV CODE- 250/637: Performed by: HOSPITALIST

## 2023-01-20 PROCEDURE — 85520 HEPARIN ASSAY: CPT

## 2023-01-20 PROCEDURE — B2151ZZ FLUOROSCOPY OF LEFT HEART USING LOW OSMOLAR CONTRAST: ICD-10-PCS | Performed by: INTERNAL MEDICINE

## 2023-01-20 RX ORDER — HEPARIN SODIUM 1000 [USP'U]/ML
4000 INJECTION, SOLUTION INTRAVENOUS; SUBCUTANEOUS ONCE
Status: COMPLETED | OUTPATIENT
Start: 2023-01-20 | End: 2023-01-20

## 2023-01-20 RX ORDER — LIDOCAINE HYDROCHLORIDE 10 MG/ML
INJECTION INFILTRATION; PERINEURAL AS NEEDED
Status: DISCONTINUED | OUTPATIENT
Start: 2023-01-20 | End: 2023-01-20 | Stop reason: HOSPADM

## 2023-01-20 RX ORDER — MIDAZOLAM HYDROCHLORIDE 1 MG/ML
INJECTION, SOLUTION INTRAMUSCULAR; INTRAVENOUS AS NEEDED
Status: DISCONTINUED | OUTPATIENT
Start: 2023-01-20 | End: 2023-01-20 | Stop reason: HOSPADM

## 2023-01-20 RX ORDER — FENTANYL CITRATE 50 UG/ML
INJECTION, SOLUTION INTRAMUSCULAR; INTRAVENOUS AS NEEDED
Status: DISCONTINUED | OUTPATIENT
Start: 2023-01-20 | End: 2023-01-20 | Stop reason: HOSPADM

## 2023-01-20 RX ORDER — FUROSEMIDE 40 MG/1
40 TABLET ORAL
Status: DISCONTINUED | OUTPATIENT
Start: 2023-01-21 | End: 2023-01-22

## 2023-01-20 RX ORDER — VERAPAMIL HYDROCHLORIDE 2.5 MG/ML
INJECTION, SOLUTION INTRAVENOUS AS NEEDED
Status: DISCONTINUED | OUTPATIENT
Start: 2023-01-20 | End: 2023-01-20 | Stop reason: HOSPADM

## 2023-01-20 RX ORDER — IPRATROPIUM BROMIDE AND ALBUTEROL SULFATE 2.5; .5 MG/3ML; MG/3ML
3 SOLUTION RESPIRATORY (INHALATION)
Status: DISCONTINUED | OUTPATIENT
Start: 2023-01-20 | End: 2023-01-22 | Stop reason: HOSPADM

## 2023-01-20 RX ADMIN — ARFORMOTEROL TARTRATE 15 MCG: 15 SOLUTION RESPIRATORY (INHALATION) at 20:28

## 2023-01-20 RX ADMIN — SODIUM CHLORIDE, PRESERVATIVE FREE 5 ML: 5 INJECTION INTRAVENOUS at 14:29

## 2023-01-20 RX ADMIN — IPRATROPIUM BROMIDE AND ALBUTEROL SULFATE 3 ML: .5; 3 SOLUTION RESPIRATORY (INHALATION) at 09:24

## 2023-01-20 RX ADMIN — ACETAMINOPHEN 650 MG: 325 TABLET ORAL at 01:03

## 2023-01-20 RX ADMIN — BUDESONIDE 500 MCG: 0.5 INHALANT RESPIRATORY (INHALATION) at 09:24

## 2023-01-20 RX ADMIN — BUDESONIDE 500 MCG: 0.5 INHALANT RESPIRATORY (INHALATION) at 20:28

## 2023-01-20 RX ADMIN — Medication 1000 UNITS: at 10:36

## 2023-01-20 RX ADMIN — ACETAMINOPHEN 650 MG: 325 TABLET ORAL at 21:00

## 2023-01-20 RX ADMIN — LEVOTHYROXINE SODIUM 137 MCG: 0.11 TABLET ORAL at 07:57

## 2023-01-20 RX ADMIN — MONTELUKAST 10 MG: 10 TABLET, FILM COATED ORAL at 21:00

## 2023-01-20 RX ADMIN — BUPROPION HYDROCHLORIDE 150 MG: 150 TABLET, EXTENDED RELEASE ORAL at 10:36

## 2023-01-20 RX ADMIN — Medication 3 MG: at 21:36

## 2023-01-20 RX ADMIN — ARFORMOTEROL TARTRATE 15 MCG: 15 SOLUTION RESPIRATORY (INHALATION) at 09:24

## 2023-01-20 RX ADMIN — LATANOPROST 1 DROP: 50 SOLUTION OPHTHALMIC at 22:00

## 2023-01-20 RX ADMIN — ACETAMINOPHEN 650 MG: 325 TABLET ORAL at 10:42

## 2023-01-20 RX ADMIN — CARVEDILOL 6.25 MG: 6.25 TABLET, FILM COATED ORAL at 17:15

## 2023-01-20 RX ADMIN — IPRATROPIUM BROMIDE AND ALBUTEROL SULFATE 3 ML: 2.5; .5 SOLUTION RESPIRATORY (INHALATION) at 20:28

## 2023-01-20 RX ADMIN — SODIUM CHLORIDE, PRESERVATIVE FREE 5 ML: 5 INJECTION INTRAVENOUS at 10:40

## 2023-01-20 RX ADMIN — SODIUM CHLORIDE, PRESERVATIVE FREE 10 ML: 5 INJECTION INTRAVENOUS at 21:38

## 2023-01-20 RX ADMIN — HEPARIN SODIUM 4000 UNITS: 1000 INJECTION INTRAVENOUS; SUBCUTANEOUS at 01:45

## 2023-01-20 RX ADMIN — GABAPENTIN 400 MG: 400 CAPSULE ORAL at 10:36

## 2023-01-20 RX ADMIN — IPRATROPIUM BROMIDE AND ALBUTEROL SULFATE 3 ML: .5; 3 SOLUTION RESPIRATORY (INHALATION) at 12:57

## 2023-01-20 RX ADMIN — GABAPENTIN 400 MG: 400 CAPSULE ORAL at 21:00

## 2023-01-20 RX ADMIN — GABAPENTIN 400 MG: 400 CAPSULE ORAL at 17:15

## 2023-01-20 RX ADMIN — PREDNISONE 40 MG: 20 TABLET ORAL at 10:36

## 2023-01-20 RX ADMIN — PANTOPRAZOLE SODIUM 40 MG: 40 TABLET, DELAYED RELEASE ORAL at 07:56

## 2023-01-20 RX ADMIN — BUPROPION HYDROCHLORIDE 150 MG: 150 TABLET, EXTENDED RELEASE ORAL at 21:00

## 2023-01-20 NOTE — PROGRESS NOTES
Transitions of Care Plan  RUR: 16% - moderate  Admission Dx: NSTEMI  Consults: Olga  Baseline: resides at [facility] for LTC; uses a andry to w/c at facility  Barriers to Discharge: medical  Disposition:  [address] - return to LTC room; can accept over the weekend  Estimated Discharge Date: 1/21/23    Reason for Admission:   NSTEMI                  RUR Score:     16% - moderate             PCP: First and Last name:   Naldo Alvarez MD     Name of Practice:    Are you a current patient: Yes/No:    Approximate date of last visit:    Can you participate in a virtual visit if needed:     Do you (patient/family) have any concerns for transition/discharge? No              Plan for utilizing home health:    No    Current Advanced Directive/Advance Care Plan:  Full Code      Healthcare Decision Maker:   Click here to complete [address] including selection of the Healthcare Decision Maker Relationship (ie \"Primary\")            Primary Decision MakerLarupinky Burt  Christiano - [phone]    Transition of Care Plan:          Return to [address] long term care - room 219. RN report# [phone] ext [phone]. [facility] transport. Juaquin Preciado, MPH  Care Manager [facility]  Available via [employer] or      Care Management Interventions  PCP Verified by CM:  Yes  Palliative Care Criteria Met (RRAT>21 & CHF Dx)?: No  Mode of Transport at Discharge: [facility]  Transition of Care Consult (CM Consult): Discharge Planning, [address]: No  Discharge Durable Medical Equipment: No  Health Maintenance Reviewed: Yes  Physical Therapy Consult: Yes  Occupational Therapy Consult: Yes  Speech Therapy Consult: Yes  Support Systems: Other Family Member(s), Debra  Confirm Follow Up Transport: Other (see comment)  The Plan for Transition of Care is Related to the Following Treatment Goals : LTC  Name of the Patient Representative Who was Provided with a Choice of Provider and Agrees with the Discharge Plan: Doris Martinez  Freedom of Choice List was Provided with Basic Dialogue that Supports the Patient's Individualized Plan of Care/Goals, Treatment Preferences and Shares the Quality Data Associated with the Providers?: Yes  Discharge Location  Patient Expects to be Discharged to[de-identified] [address] [place] [address]

## 2023-01-20 NOTE — PROGRESS NOTES
0730: Bedside and Verbal shift change report given to GERALDO Harris (oncoming nurse) by Jose Garcia RN (offgoing nurse). Report included the following information SBAR, Kardex, MAR, Recent Results, and Cardiac Rhythm NSR . Pt off floor in cath lab. 4084: TRANSFER - IN REPORT:    Verbal report received from Elise Vasquez [facility] (name) on Samantha Lyle  being received from Cath lab (unit) for routine progression of care      Report consisted of patients Situation, Background, Assessment and   Recommendations(SBAR). Information from the following report(s) SBAR, Kardex, MAR, and Cardiac Rhythm NSR  was reviewed with the receiving nurse. Opportunity for questions and clarification was provided. Assessment completed upon patients arrival to unit and care assumed. 11 cc of air left in TR band. R radial and groin cath site - CDI, no bleeding or hematoma present, and pulses palpable and sensation present. Scant sanguinous drainage marked on R groin site. 1040: /65, MD Uribe notified. Held scheduled lasix and carvedilol. 1130: R groin shadowed with sanguinous drainage around marked area, additional sanguinous drainage marked and will continue to monitor. 1230: Pt c/o difficulty breathing, SOB, shallow breathing, and wheezing. Pulse ox 88% on 3 L NC, increased HOB and increased oxygen to 4-5 L NC. RT called for PRN nebulizer. 1245: RT at bedside. 1250: R groin has shadowed sanguinous drainage around marked area, dressing removed. Quick clot placed and pressure held for 5 minutes. 1330: R groin CDI, no bleeding or hematoma, pulse and sensation present from baseline. 1657: Pt SOB and wheezing, RT called for PRN nebulizer treatment. MD Uribe notified, no new additional orders at this time. 1930: Bedside and Verbal shift change report given to Dave Miller RN (oncoming nurse) by Cedric Alatorre RN (offgoing nurse).  Report included the following information SBAR, Kardex, MAR, Recent Results, and Cardiac Rhythm NSR . Care Plan:  Problem: Pressure Injury - Risk of  Goal: *Prevention of pressure injury  Description: Document Vaughn Scale and appropriate interventions in the flowsheet. Outcome: Progressing Towards Goal  Note: Pressure Injury Interventions: Activity Interventions: PT/OT evaluation, Pressure redistribution bed/mattress(bed type), Increase time out of bed    Mobility Interventions: PT/OT evaluation, Pressure redistribution bed/mattress (bed type), HOB 30 degrees or less, Float heels    Nutrition Interventions: Document food/fluid/supplement intake    Friction and Shear Interventions: HOB 30 degrees or less, Feet elevated on foot rest, Minimize layers       Problem: Patient Education: Go to Patient Education Activity  Goal: Patient/Family Education  Outcome: Progressing Towards Goal       Problem: Falls - Risk of  Goal: *Absence of Falls  Description: Document Leonardo Fall Risk and appropriate interventions in the flowsheet.   Outcome: Progressing Towards Goal  Note: Fall Risk Interventions:  Medication Interventions: Teach patient to arise slowly, Patient to call before getting OOB, Bed/chair exit alarm, Evaluate medications/consider consulting pharmacy, Utilize gait belt for transfers/ambulation    Elimination Interventions: Bed/chair exit alarm, Call light in reach, Toileting schedule/hourly rounds, Toilet paper/wipes in reach, Patient to call for help with toileting needs       Problem: Patient Education: Go to Patient Education Activity  Goal: Patient/Family Education  Outcome: Progressing Towards Goal

## 2023-01-20 NOTE — PROGRESS NOTES
CATH LAB to RECOVERY ROOM REPORT    Procedure: [facility]     MD:    The procedure was diagnostic only    Verbal Report given to Recovery Nurse on patient being transferred to Cardiac Cath Lab  for routine post-op. Patient stable upon transfer to . Vitals, mental status, MAR, procedural summary discussed with recovery RN.     Medication given during procedure:    Contrast:93mL                          Versed:2mg                                                               Fentanyl:50mcg                                                           Other Meds/Drips given:          Sheaths:    Right radial sheath pulled at 0746 am, band at 13mL of air    Right femoral artery 6fr sheath pulled at 0744 am with   angioseal.

## 2023-01-20 NOTE — PROGRESS NOTES
Occupational Therapy  1/20/2023    OT referral received. Spoke with pt who reported she is andry lifted OOB to w/c every other day. She is bed level and dependent for bathing, dressing. Pt is able to feed herself, and alternates hands depending on which is more functional 2* RA. Pt has a chronic goyal and wears a brief. She no longer receives therapy at her facility and she stated she plateaued. Will sign off at this time as pt is at her baseline for mobility and ADLs.

## 2023-01-20 NOTE — PROGRESS NOTES
TRANSFER - IN REPORT:    Verbal report received from Charles Kumar RN(name) on Nicole Mon  being received from [facility](unit) for ordered procedure      Report consisted of patients Situation, Background, Assessment and   Recommendations(SBAR). Information from the following report(s) SBAR, Intake/Output, MAR, Recent Results, and Cardiac Rhythm Sinus  was reviewed with the receiving nurse. Opportunity for questions and clarification was provided. Assessment completed upon patients arrival to unit and care assumed.

## 2023-01-20 NOTE — PROGRESS NOTES
TRANSFER - IN REPORT:    Verbal report received from Erin Mane on Penny Archer  being received from procedure area for routine post - op. Report consisted of patients Situation, Background, Assessment and Recommendations(SBAR). Information from the following report(s) SBAR, Procedure Summary, Intake/Output, MAR, Med Rec Status, and Cardiac Rhythm Sinus  was reviewed with the receiving clinician. Opportunity for questions and clarification was provided. Assessment completed upon patients arrival to [address] [place] [address]. Cardiac Cath Lab Recovery Arrival Note:    Penny Archer arrived to [facility] recovery area. Patient procedure= LHC. Patient on cardiac monitor, non-invasive blood pressure, SPO2 monitor. On  O2 @ 3 lpm via NC.  IV  of NS on pump at 50 ml/hr. Patient status doing well without problems. Patient is A&Ox 4. Patient reports no complaints. PROCEDURE SITE CHECK:    Procedure site:without any bleeding and no hematoma, no pain/discomfort reported at procedure site. No change in patient status. Continue to monitor patient and status.         Emptied 100mL urine from Godinez bag

## 2023-01-20 NOTE — PROGRESS NOTES
Cardiology Progress Note                                        Admit Date: 1/17/2023    Assessment/Plan:     NSTEMI; due to demand ischemia  CAD; mild by cath this am  Cardiomyopathy; mild EF 45%  CHF; acute diastolic HF; improved; will switch to PO Lasix; continue at current Coreg dose    Marion Feliz is a 76 y.o. female with     PROBLEM LIST:  Patient Active Problem List    Diagnosis Date Noted    Acute diastolic heart failure ([address] 01/20/2023    Tachycardia 01/18/2023    Acute chest pain 01/18/2023    NSTEMI (non-ST elevated myocardial infarction) ([address] 01/18/2023    Thoracic compression fracture ([address] 01/18/2023    Acute respiratory failure with hypoxia ([address] 01/18/2023    Unspecified fracture of sternum, initial encounter for closed fracture 01/18/2023    Weakness of right leg 12/29/2021    TIA (transient ischemic attack) 12/25/2021    CVA (cerebral vascular accident) ([address] 12/25/2021    Orthostatic hypotension 12/01/2021    Weakness 12/01/2021    UTI (urinary tract infection) 12/01/2021    LOREN (acute kidney injury) ([address] 12/01/2021    Goals of care, counseling/discussion     Other chronic pain     Cough     Impaired mobility     Status asthmaticus 02/08/2021    Moderate persistent asthma with acute exacerbation 02/08/2021    IBS (irritable bowel syndrome) 07/08/2011    HTN (hypertension) 07/08/2011    Asthma 07/08/2011    Hypothyroidism 07/08/2011    Syncope 07/07/2011    Dehydration 07/07/2011         Subjective:     Marion Feliz reports none. Visit Vitals  /65   Pulse 94   Temp 97.8 °F (36.6 °C)   Resp 15   Ht 5' (1.524 m)   Wt 83.3 kg (183 lb 9.6 oz)   SpO2 95%   BMI 35.86 kg/m²       Intake/Output Summary (Last 24 hours) at 1/20/2023 1109  Last data filed at 1/20/2023 0755  Gross per 24 hour   Intake 300 ml   Output 2800 ml   Net -2500 ml       Objective:      Physical Exam:  HEENT: Perrla, EOMI  Neck: No JVD,  No thyroidmegaly  Resp: CTA bilaterally;  No wheezes or rales  CV: RRR s1s2 No murmur no s3  Abd:Soft, Nontender  Ext: No edema  Neuro: Alert and oriented; Nonfocal  Skin: Warm, Dry, Intact  Pulses: 2+ DP/PT/Rad      Telemetry: normal sinus rhythm    Current Facility-Administered Medications   Medication Dose Route Frequency    albuterol-ipratropium (DUO-NEB) 2.5 MG-0.5 MG/3 ML  3 mL Nebulization BID RT    [START ON 1/21/2023] furosemide (LASIX) tablet 40 mg  40 mg Oral ACB&D    predniSONE (DELTASONE) tablet 40 mg  40 mg Oral DAILY WITH BREAKFAST    carvediloL (COREG) tablet 6.25 mg  6.25 mg Oral BID WITH MEALS    melatonin tablet 3 mg  3 mg Oral QHS PRN    buPROPion SR (WELLBUTRIN SR) tablet 150 mg  150 mg Oral Q12H    cholecalciferol (VITAMIN D3) (1000 Units /25 mcg) tablet 1,000 Units  1,000 Units Oral DAILY    pantoprazole (PROTONIX) tablet 40 mg  40 mg Oral ACB    latanoprost (XALATAN) 0.005 % ophthalmic solution 1 Drop  1 Drop Both Eyes QHS    montelukast (SINGULAIR) tablet 10 mg  10 mg Oral QHS    polyethylene glycol (MIRALAX) packet 8.5 g  8.5 g Oral DAILY    sodium chloride (NS) flush 5-40 mL  5-40 mL IntraVENous Q8H    sodium chloride (NS) flush 5-40 mL  5-40 mL IntraVENous PRN    acetaminophen (TYLENOL) tablet 650 mg  650 mg Oral Q6H PRN    Or    acetaminophen (TYLENOL) suppository 650 mg  650 mg Rectal Q6H PRN    polyethylene glycol (MIRALAX) packet 17 g  17 g Oral DAILY PRN    ondansetron (ZOFRAN ODT) tablet 4 mg  4 mg Oral Q8H PRN    Or    ondansetron (ZOFRAN) injection 4 mg  4 mg IntraVENous Q6H PRN    albuterol-ipratropium (DUO-NEB) 2.5 MG-0.5 MG/3 ML  3 mL Nebulization Q4H PRN    arformoteroL (BROVANA) neb solution 15 mcg  15 mcg Nebulization BID RT    And    budesonide (PULMICORT) 500 mcg/2 ml nebulizer suspension  500 mcg Nebulization BID RT    gabapentin (NEURONTIN) capsule 400 mg  400 mg Oral TID    levothyroxine (SYNTHROID) tablet 137 mcg  137 mcg Oral ACB         Data Review:   Labs:    Recent Results (from the past 24 hour(s))   GLUCOSE, POC    Collection Time: 01/19/23  4:20 PM   Result Value Ref Range    Glucose (POC) 202 (H) 65 - 117 mg/dL    Performed by [address]  PCT    ANTI-XA UNFRACTIONATED AND LMW HEPARIN    Collection Time: 01/20/23 12:18 AM   Result Value Ref Range    Heparin Xa,Unfrac. and LMW <0.10 IU/mL   CBC W/O DIFF    Collection Time: 01/20/23 12:18 AM   Result Value Ref Range    WBC 14.2 (H) 3.6 - 11.0 K/uL    RBC 4.77 3.80 - 5.20 M/uL    HGB 13.9 11.5 - 16.0 g/dL    HCT 44.9 35.0 - 47.0 %    MCV 94.1 80.0 - 99.0 FL    MCH 29.1 26.0 - 34.0 PG    MCHC 31.0 30.0 - 36.5 g/dL    RDW 15.0 (H) 11.5 - 14.5 %    PLATELET 695 562 - 848 K/uL    MPV 9.5 8.9 - 12.9 FL    NRBC 0.0 0  WBC    ABSOLUTE NRBC 0.00 0.00 - 6.00 K/uL   METABOLIC PANEL, BASIC    Collection Time: 01/20/23 12:18 AM   Result Value Ref Range    Sodium 136 136 - 145 mmol/L    Potassium 3.7 3.5 - 5.1 mmol/L    Chloride 94 (L) 97 - 108 mmol/L    CO2 34 (H) 21 - 32 mmol/L    Anion gap 8 5 - 15 mmol/L    Glucose 121 (H) 65 - 100 mg/dL    BUN 34 (H) 6 - 20 MG/DL    Creatinine 1.07 (H) 0.55 - 1.02 MG/DL    BUN/Creatinine ratio 32 (H) 12 - 20      eGFR 53 (L) >60 ml/min/1.73m2    Calcium 9.7 8.5 - 10.1 MG/DL   MAGNESIUM    Collection Time: 01/20/23 12:18 AM   Result Value Ref Range    Magnesium 2.2 1.6 - 2.4 mg/dL

## 2023-01-20 NOTE — PROGRESS NOTES
Physical Therapy  1/20/2023    Order received, chart reviewed. Patient lives in [address] [place] [address] and is andry lifted to a w/c every other day. Was previously participating in therapy but this was discontinued due to plateau. Recommend OOB to chair via andry lift while admitted and d/c back to LTC. Will complete orders as acute PT not indicated at this time.      Maria C Jett, PT, DPT

## 2023-01-20 NOTE — PROGRESS NOTES
TRANSFER - OUT REPORT:    Verbal report given to GERALDO Harris(name) on Renae Collazo  being transferred to [facility](unit) for routine post - op       Report consisted of patients Situation, Background, Assessment and   Recommendations(SBAR). Information from the following report(s) Procedure Summary, Intake/Output, Recent Results, and Cardiac Rhythm Sinus  was reviewed with the receiving nurse. Lines:   Peripheral IV 01/18/23 Anterior;Right Forearm (Active)   Site Assessment Intact; Ecchymotic (bruised) 01/20/23 0755   Phlebitis Assessment 0 01/20/23 0755   Infiltration Assessment 0 01/20/23 0755   Dressing Status Clean, dry, & intact 01/20/23 0755   Dressing Type Transparent;Tape 01/20/23 0755   Hub Color/Line Status Green;Flushed;Capped 01/20/23 0755   Action Taken Open ports on tubing capped 01/20/23 0755   Alcohol Cap Used Yes 01/20/23 0755       Peripheral IV 01/18/23 Left Wrist (Active)   Site Assessment Drainage (comment); Ecchymotic (bruised) 01/20/23 0755   Phlebitis Assessment 0 01/20/23 0755   Infiltration Assessment 0 01/20/23 0755   Dressing Status New 01/20/23 0755   Dressing Type Transparent;Tape 01/20/23 0755   Hub Color/Line Status Blue;Flushed;Capped 01/20/23 0755   Action Taken Open ports on tubing capped 01/19/23 2010   Alcohol Cap Used Yes 01/20/23 0755        Opportunity for questions and clarification was provided.       Patient transported with:   Monitor  O2 @ 3 liters  Registered Nurse

## 2023-01-20 NOTE — PROGRESS NOTES
Hospitalist Progress Note      Hospital summary: 76 y.o lady w/ asthma, HTN, RA, who presented with shortness of breath. She was found to have an NSTEMI. Assessment/Plan:  NSTEMI: due to demand ischemia in the setting of respiratory failure, not ACS. [facility] 1/20 with mild disease. Acute respiratory failure w/ hypoxia:   -off bipap and on NC today; at baseline wears 3L PRN  -suspected component of CHF; diuretics changed to PO today  -follow-up echo - low-normal LVEF 45-50%    Acute asthma exacerbation:  -continue inhaled bronchodilators  -wean systemic steroids  -may need to change carvedilol to metoprolol if bronchospasm recurs    History of sternal and thoracic vertebral compression fractures. Functional paraplegia and neurogenic bladder w/ chronic goyal catehter. HTN: BP soft today  -monitor on current meds    RA    Fibromyalgia    Obesity    Code status: full  DVT prophylaxis: sc Lovenox  Disposition: prior living arrangement possibly tomorrow  ----------------------------------------------    CC: shortness of breath    S: breathing improved, denies cp, n/v/d. Has a dry cough, otherwise feeling better. Review of Systems:  Pertinent items are noted in HPI.     O:  Visit Vitals  /71 (BP 1 Location: Left upper arm, BP Patient Position: Semi fowlers)   Pulse 97   Temp 97.8 °F (36.6 °C)   Resp 17   Ht 5' (1.524 m)   Wt 83.3 kg (183 lb 9.6 oz)   SpO2 97%   BMI 35.86 kg/m²       PHYSICAL EXAM:  Gen: NAD, non-toxic, obese  HEENT: anicteric sclerae, normal conjunctiva, oropharynx clear, MM moist  Neck: supple, trachea midline, no adenopathy  Heart: RRR, no MRG, no JVD, no peripheral edema  Lungs: b/l rhonchi, no wheezing today, non-labored respirations on O2  Abd: soft, NT, ND, BS+  Extr: warm  Skin: dry, no rash  Neuro: CN II-XII grossly intact, normal speech, functional paraplegia of lower extremities  Psych: normal mood, appropriate affect      Intake/Output Summary (Last 24 hours) at 1/20/2023 1503  Last data filed at 1/20/2023 0755  Gross per 24 hour   Intake 300 ml   Output 1800 ml   Net -1500 ml          Recent labs & imaging reviewed:  Recent Results (from the past 24 hour(s))   GLUCOSE, POC    Collection Time: 01/19/23  4:20 PM   Result Value Ref Range    Glucose (POC) 202 (H) 65 - 117 mg/dL    Performed by [address]  PCT    ANTI-XA UNFRACTIONATED AND LMW HEPARIN    Collection Time: 01/20/23 12:18 AM   Result Value Ref Range    Heparin Xa,Unfrac. and LMW <0.10 IU/mL   CBC W/O DIFF    Collection Time: 01/20/23 12:18 AM   Result Value Ref Range    WBC 14.2 (H) 3.6 - 11.0 K/uL    RBC 4.77 3.80 - 5.20 M/uL    HGB 13.9 11.5 - 16.0 g/dL    HCT 44.9 35.0 - 47.0 %    MCV 94.1 80.0 - 99.0 FL    MCH 29.1 26.0 - 34.0 PG    MCHC 31.0 30.0 - 36.5 g/dL    RDW 15.0 (H) 11.5 - 14.5 %    PLATELET 677 530 - 525 K/uL    MPV 9.5 8.9 - 12.9 FL    NRBC 0.0 0  WBC    ABSOLUTE NRBC 0.00 0.00 - 2.05 K/uL   METABOLIC PANEL, BASIC    Collection Time: 01/20/23 12:18 AM   Result Value Ref Range    Sodium 136 136 - 145 mmol/L    Potassium 3.7 3.5 - 5.1 mmol/L    Chloride 94 (L) 97 - 108 mmol/L    CO2 34 (H) 21 - 32 mmol/L    Anion gap 8 5 - 15 mmol/L    Glucose 121 (H) 65 - 100 mg/dL    BUN 34 (H) 6 - 20 MG/DL    Creatinine 1.07 (H) 0.55 - 1.02 MG/DL    BUN/Creatinine ratio 32 (H) 12 - 20      eGFR 53 (L) >60 ml/min/1.73m2    Calcium 9.7 8.5 - 10.1 MG/DL   MAGNESIUM    Collection Time: 01/20/23 12:18 AM   Result Value Ref Range    Magnesium 2.2 1.6 - 2.4 mg/dL     Recent Labs     01/20/23 0018 01/19/23 0132   WBC 14.2* 13.2*   HGB 13.9 14.3   HCT 44.9 44.3    278       Recent Labs     01/20/23 0018 01/19/23 0132 01/17/23 2009    134* 134*   K 3.7 3.4* 4.3   CL 94* 95* 98   CO2 34* 33* 31   BUN 34* 21* 17   CREA 1.07* 0.74 0.86   * 172* 147*   CA 9.7 9.6 9.5   MG 2.2 2.0  --        Recent Labs     01/19/23 0132 01/17/23 2009   ALT 25 24    110   TBILI 0.6 0.4   TP 6.4 6.7   ALB 3. 3* 3.5   GLOB 3.1 3.2       Recent Labs     01/17/23  2355   APTT 26.0        No results for input(s): FE, TIBC, PSAT, FERR in the last 72 hours. No results found for: FOL, RBCF   No results for input(s): PH, PCO2, PO2 in the last 72 hours. No results for input(s): CPK, CKNDX, TROIQ in the last 72 hours.     No lab exists for component: CPKMB  No results found for: CHOL, CHOLX, CHLST, CHOLV, HDL, HDLP, LDL, LDLC, DLDLP, TGLX, TRIGL, TRIGP, CHHD, CHHDX  Lab Results   Component Value Date/Time    Glucose (POC) 202 (H) 01/19/2023 04:20 PM    Glucose (POC) 112 12/25/2021 12:31 PM    Glucose (POC) 126 (H) 02/14/2021 04:29 PM    Glucose (POC) 103 (H) 02/14/2021 11:04 AM    Glucose (POC) 72 02/14/2021 06:15 AM     Lab Results   Component Value Date/Time    Color DARK YELLOW 11/30/2021 10:45 PM    Appearance TURBID (A) 11/30/2021 10:45 PM    Specific gravity 1.020 11/30/2021 10:45 PM    Specific gravity 1.020 02/08/2021 06:45 PM    pH (UA) 5.0 11/30/2021 10:45 PM    Protein Negative 11/30/2021 10:45 PM    Glucose Negative 11/30/2021 10:45 PM    Ketone Negative 11/30/2021 10:45 PM    Bilirubin Negative 11/30/2021 10:45 PM    Urobilinogen 0.2 11/30/2021 10:45 PM    Nitrites Negative 11/30/2021 10:45 PM    Leukocyte Esterase Negative 11/30/2021 10:45 PM    Epithelial cells MANY (A) 11/30/2021 10:45 PM    Bacteria 1+ (A) 11/30/2021 10:45 PM    WBC 10-20 11/30/2021 10:45 PM    RBC 5-10 11/30/2021 10:45 PM       Med list reviewed  Current Facility-Administered Medications   Medication Dose Route Frequency    albuterol-ipratropium (DUO-NEB) 2.5 MG-0.5 MG/3 ML  3 mL Nebulization BID RT    [START ON 1/21/2023] furosemide (LASIX) tablet 40 mg  40 mg Oral ACB&D    predniSONE (DELTASONE) tablet 40 mg  40 mg Oral DAILY WITH BREAKFAST    carvediloL (COREG) tablet 6.25 mg  6.25 mg Oral BID WITH MEALS    melatonin tablet 3 mg  3 mg Oral QHS PRN    buPROPion SR (WELLBUTRIN SR) tablet 150 mg  150 mg Oral Q12H    cholecalciferol (VITAMIN D3) (1000 Units /25 mcg) tablet 1,000 Units  1,000 Units Oral DAILY    pantoprazole (PROTONIX) tablet 40 mg  40 mg Oral ACB    latanoprost (XALATAN) 0.005 % ophthalmic solution 1 Drop  1 Drop Both Eyes QHS    montelukast (SINGULAIR) tablet 10 mg  10 mg Oral QHS    polyethylene glycol (MIRALAX) packet 8.5 g  8.5 g Oral DAILY    sodium chloride (NS) flush 5-40 mL  5-40 mL IntraVENous Q8H    sodium chloride (NS) flush 5-40 mL  5-40 mL IntraVENous PRN    acetaminophen (TYLENOL) tablet 650 mg  650 mg Oral Q6H PRN    Or    acetaminophen (TYLENOL) suppository 650 mg  650 mg Rectal Q6H PRN    polyethylene glycol (MIRALAX) packet 17 g  17 g Oral DAILY PRN    ondansetron (ZOFRAN ODT) tablet 4 mg  4 mg Oral Q8H PRN    Or    ondansetron (ZOFRAN) injection 4 mg  4 mg IntraVENous Q6H PRN    albuterol-ipratropium (DUO-NEB) 2.5 MG-0.5 MG/3 ML  3 mL Nebulization Q4H PRN    arformoteroL (BROVANA) neb solution 15 mcg  15 mcg Nebulization BID RT    And    budesonide (PULMICORT) 500 mcg/2 ml nebulizer suspension  500 mcg Nebulization BID RT    gabapentin (NEURONTIN) capsule 400 mg  400 mg Oral TID    levothyroxine (SYNTHROID) tablet 137 mcg  137 mcg Oral ACB       Care Plan discussed with:  Patient/Family, Nurse, and     Adan Gustafson MD  Internal Medicine  Date of Service: 1/20/2023

## 2023-01-20 NOTE — PROGRESS NOTES
Problem: Pressure Injury - Risk of  Goal: *Prevention of pressure injury  Description: Document Vaughn Scale and appropriate interventions in the flowsheet. Outcome: Progressing Towards Goal  Note: Pressure Injury Interventions: Activity Interventions: PT/OT evaluation, Increase time out of bed    Mobility Interventions: HOB 30 degrees or less, Pressure redistribution bed/mattress (bed type)    Nutrition Interventions: Document food/fluid/supplement intake, Offer support with meals,snacks and hydration    Friction and Shear Interventions: Foam dressings/transparent film/skin sealants, Minimize layers       Problem: Falls - Risk of  Goal: *Absence of Falls  Description: Document Leonardo Fall Risk and appropriate interventions in the flowsheet.   Outcome: Progressing Towards Goal  Note: Fall Risk Interventions:       Medication Interventions: Teach patient to arise slowly, Patient to call before getting OOB, Bed/chair exit alarm, Evaluate medications/consider consulting pharmacy, Utilize gait belt for transfers/ambulation    Elimination Interventions: Bed/chair exit alarm, Call light in reach, Toileting schedule/hourly rounds, Toilet paper/wipes in reach, Patient to call for help with toileting needs

## 2023-01-20 NOTE — PROGRESS NOTES
Cardiac Cath Lab Procedure Area Arrival Note:    Chen Graham arrived to Cardiac Cath Lab, Procedure Area. Patient identifiers verified with NAME and DATE OF BIRTH. Procedure verified with patient. Consent forms verified. Allergies verified. Patient informed of procedure and plan of care. Questions answered with review. Patient voiced understanding of procedure and plan of care. Patient on cardiac monitor, non-invasive blood pressure, SPO2 monitor. or O2 @ 3 lpm via nc. IV of ns on pump at 50 ml/hr. Patient status doing well without problems. Patient is A&Ox 4. Patient reports no complaints. Patient medicated during procedure with orders obtained and verified by Dr. Jose Chin. Refer to patients Cardiac Cath Lab PROCEDURE REPORT for vital signs, assessment, status, and response during procedure, printed at end of case. Printed report on chart or scanned into chart.

## 2023-01-20 NOTE — PROGRESS NOTES
SPEECH PATHOLOGY BEDSIDE SWALLOW EVALUATION/DISCHARGE  Patient: Renae Collazo (69 y.o. female)  Date: 1/20/2023  Primary Diagnosis: NSTEMI (non-ST elevated myocardial infarction) ([facility] [I21.4]  Acute respiratory failure with hypoxia (HCC) [J96.01]  Acute chest pain [R07.9]  Tachycardia [R00.0]  Thoracic compression fracture ([facility] [address] [S22.000A]  Unspecified fracture of sternum, initial encounter for closed fracture [S22.20XA]  Procedure(s) (LRB):  LEFT HEART CATH / CORONARY ANGIOGRAPHY (N/A) Day of Surgery   Precautions:        ASSESSMENT :  Based on the objective data described below, the patient presents with grossly intact oropharyngeal swallow. Patient reports short of breath while eating and independently and appropriately paused eating. Patient tolerated all PO trials without overt s/s aspiration. Given bedside presentation and tolerance of diet to date feel patient is safe to continue regular diet, thin liquids. Skilled acute therapy provided by a speech-language pathologist is not indicated at this time. PLAN :  Recommendations:  Regular diet  Thin liquids  Sit up for all PO  Small bites  Slow rate  Medication as tolerated  No further SLP treatment warranted  Discharge Recommendations: None     SUBJECTIVE:   Patient stated That's right. Patient agreed to assessment. RN reports coughing.     OBJECTIVE:     Past Medical History:   Diagnosis Date    Acute kidney failure (HCC)     Asthma     Depression     Fibromyalgia     GERD (gastroesophageal reflux disease)     GERD (gastroesophageal reflux disease)     Hypertension     Hypothyroid     Insomnia     Menopause 1995    Neurogenic bladder     Rheumatoid arteritis (HCC)     Rotator cuff tear      Past Surgical History:   Procedure Laterality Date    HX CHOLECYSTECTOMY      HX GYN      HX ORTHOPAEDIC      HX TONSIL AND ADENOIDECTOMY       Prior Level of Function/Home Situation:   Home Situation  Patient Expects to be Discharged to[de-identified] Skilled nursing facility  Diet prior to admission: Regular  Current Diet:  Regular   Cognitive and Communication Status:  Neurologic State: Alert  Orientation Level: Oriented X4  Cognition: Appropriate decision making, Appropriate for age attention/concentration, Appropriate safety awareness, Follows commands           Oral Assessment:     P.O. Trials:  Patient Position: Semi-reclined in bed  Vocal quality prior to P.O.: No impairment  Consistency Presented: Solid;Puree; Thin liquid  How Presented: Self-fed/presented;Straw;Successive swallows     Bolus Acceptance: No impairment  Bolus Formation/Control: No impairment     Propulsion: No impairment  Oral Residue: None        Aspiration Signs/Symptoms: None                Oral Phase Severity: No impairment  Pharyngeal Phase Severity : No impairment  NOMS:   The NOMS functional outcome measure was used to quantify this patient's level of swallowing impairment. Based on the NOMS, the patient was determined to be at level 6 for swallow function     NOMS Swallowing Levels:  Level 1 (CN): NPO  Level 2 (CM): NPO but takes consistency in therapy  Level 3 (CL): Takes less than 50% of nutrition p.o. and continues with nonoral feedings; and/or safe with mod cues; and/or max diet restriction  Level 4 (CK): Safe swallow but needs mod cues; and/or mod diet restriction; and/or still requires some nonoral feeding/supplements  Level 5 (CJ): Safe swallow with min diet restriction; and/or needs min cues  Level 6 (CI): Independent with p.o.; rare cues; usually self cues; may need to avoid some foods or needs extra time  Level 7 ([address] [place] [address]): Independent for all p.o.  DAE. (2003). National Outcomes Measurement System (NOMS): Adult Speech-Language Pathology User's Guide. After treatment:   Patient left in no apparent distress in bed, Call bell within reach, and Nursing notified    COMMUNICATION/EDUCATION:   Patient was educated regarding role of SLP, diet consistency, safe swallow precautions and POC. Patient verbalized understanding. The patient's plan of care including recommendations, planned interventions, and recommended diet changes were discussed with: Registered nurse.      Thank you for this referral.  LAMONT Sheldon  Time Calculation: 18 mins

## 2023-01-20 NOTE — NURSE NAVIGATOR
Follow up scheduled with cardiology with Dr. Kin Shone on 1/26/23 at 10:45 am.  Information on After Visit Summary.

## 2023-01-21 LAB
ANION GAP SERPL CALC-SCNC: 5 MMOL/L (ref 5–15)
BUN SERPL-MCNC: 40 MG/DL (ref 6–20)
BUN/CREAT SERPL: 45 (ref 12–20)
CALCIUM SERPL-MCNC: 9.4 MG/DL (ref 8.5–10.1)
CHLORIDE SERPL-SCNC: 95 MMOL/L (ref 97–108)
CO2 SERPL-SCNC: 35 MMOL/L (ref 21–32)
CREAT SERPL-MCNC: 0.89 MG/DL (ref 0.55–1.02)
ERYTHROCYTE [DISTWIDTH] IN BLOOD BY AUTOMATED COUNT: 14.7 % (ref 11.5–14.5)
GLUCOSE SERPL-MCNC: 113 MG/DL (ref 65–100)
HCT VFR BLD AUTO: 41.7 % (ref 35–47)
HGB BLD-MCNC: 13.4 G/DL (ref 11.5–16)
MAGNESIUM SERPL-MCNC: 2.3 MG/DL (ref 1.6–2.4)
MCH RBC QN AUTO: 30 PG (ref 26–34)
MCHC RBC AUTO-ENTMCNC: 32.1 G/DL (ref 30–36.5)
MCV RBC AUTO: 93.3 FL (ref 80–99)
NRBC # BLD: 0 K/UL (ref 0–0.01)
NRBC BLD-RTO: 0 PER 100 WBC
PLATELET # BLD AUTO: 248 K/UL (ref 150–400)
PMV BLD AUTO: 9.8 FL (ref 8.9–12.9)
POTASSIUM SERPL-SCNC: 3.7 MMOL/L (ref 3.5–5.1)
RBC # BLD AUTO: 4.47 M/UL (ref 3.8–5.2)
SODIUM SERPL-SCNC: 135 MMOL/L (ref 136–145)
WBC # BLD AUTO: 13.5 K/UL (ref 3.6–11)

## 2023-01-21 PROCEDURE — 80048 BASIC METABOLIC PNL TOTAL CA: CPT

## 2023-01-21 PROCEDURE — 74011000250 HC RX REV CODE- 250: Performed by: FAMILY MEDICINE

## 2023-01-21 PROCEDURE — 74011636637 HC RX REV CODE- 636/637: Performed by: HOSPITALIST

## 2023-01-21 PROCEDURE — 74011250637 HC RX REV CODE- 250/637: Performed by: HOSPITALIST

## 2023-01-21 PROCEDURE — 74011000250 HC RX REV CODE- 250: Performed by: HOSPITALIST

## 2023-01-21 PROCEDURE — 94640 AIRWAY INHALATION TREATMENT: CPT

## 2023-01-21 PROCEDURE — 65270000046 HC RM TELEMETRY

## 2023-01-21 PROCEDURE — 36415 COLL VENOUS BLD VENIPUNCTURE: CPT

## 2023-01-21 PROCEDURE — 85027 COMPLETE CBC AUTOMATED: CPT

## 2023-01-21 PROCEDURE — 74011000250 HC RX REV CODE- 250: Performed by: INTERNAL MEDICINE

## 2023-01-21 PROCEDURE — 74011250637 HC RX REV CODE- 250/637: Performed by: INTERNAL MEDICINE

## 2023-01-21 PROCEDURE — 74011250637 HC RX REV CODE- 250/637

## 2023-01-21 PROCEDURE — 74011250636 HC RX REV CODE- 250/636: Performed by: FAMILY MEDICINE

## 2023-01-21 PROCEDURE — 74011250637 HC RX REV CODE- 250/637: Performed by: FAMILY MEDICINE

## 2023-01-21 PROCEDURE — 83735 ASSAY OF MAGNESIUM: CPT

## 2023-01-21 RX ORDER — BACLOFEN 10 MG/1
10 TABLET ORAL
Status: DISCONTINUED | OUTPATIENT
Start: 2023-01-21 | End: 2023-01-22 | Stop reason: HOSPADM

## 2023-01-21 RX ORDER — BACLOFEN 10 MG/1
5 TABLET ORAL 3 TIMES DAILY
Status: DISCONTINUED | OUTPATIENT
Start: 2023-01-21 | End: 2023-01-21

## 2023-01-21 RX ORDER — GUAIFENESIN 600 MG/1
1200 TABLET, EXTENDED RELEASE ORAL EVERY 12 HOURS
Status: DISCONTINUED | OUTPATIENT
Start: 2023-01-21 | End: 2023-01-22 | Stop reason: HOSPADM

## 2023-01-21 RX ORDER — SODIUM CHLORIDE 0.9 % (FLUSH) 0.9 %
5-40 SYRINGE (ML) INJECTION AS NEEDED
Status: DISCONTINUED | OUTPATIENT
Start: 2023-01-21 | End: 2023-01-22 | Stop reason: HOSPADM

## 2023-01-21 RX ORDER — ACETAMINOPHEN 500 MG
1000 TABLET ORAL 3 TIMES DAILY
Status: DISCONTINUED | OUTPATIENT
Start: 2023-01-21 | End: 2023-01-21

## 2023-01-21 RX ORDER — HYDROCODONE BITARTRATE AND ACETAMINOPHEN 5; 325 MG/1; MG/1
1 TABLET ORAL
Status: DISCONTINUED | OUTPATIENT
Start: 2023-01-21 | End: 2023-01-22 | Stop reason: HOSPADM

## 2023-01-21 RX ORDER — TRAMADOL HYDROCHLORIDE 50 MG/1
50 TABLET ORAL
Status: COMPLETED | OUTPATIENT
Start: 2023-01-21 | End: 2023-01-21

## 2023-01-21 RX ORDER — CARVEDILOL 12.5 MG/1
12.5 TABLET ORAL 2 TIMES DAILY WITH MEALS
Status: DISCONTINUED | OUTPATIENT
Start: 2023-01-21 | End: 2023-01-22 | Stop reason: HOSPADM

## 2023-01-21 RX ORDER — POTASSIUM CHLORIDE 750 MG/1
20 TABLET, FILM COATED, EXTENDED RELEASE ORAL DAILY
Status: DISCONTINUED | OUTPATIENT
Start: 2023-01-21 | End: 2023-01-22

## 2023-01-21 RX ORDER — SODIUM CHLORIDE 0.9 % (FLUSH) 0.9 %
5-40 SYRINGE (ML) INJECTION EVERY 8 HOURS
Status: DISCONTINUED | OUTPATIENT
Start: 2023-01-21 | End: 2023-01-22 | Stop reason: HOSPADM

## 2023-01-21 RX ORDER — BACLOFEN 10 MG/1
5 TABLET ORAL 2 TIMES DAILY
Status: DISCONTINUED | OUTPATIENT
Start: 2023-01-21 | End: 2023-01-22 | Stop reason: HOSPADM

## 2023-01-21 RX ORDER — TRAMADOL HYDROCHLORIDE 50 MG/1
25 TABLET ORAL
Status: DISCONTINUED | OUTPATIENT
Start: 2023-01-21 | End: 2023-01-22 | Stop reason: HOSPADM

## 2023-01-21 RX ADMIN — ACETAMINOPHEN 650 MG: 325 TABLET ORAL at 04:55

## 2023-01-21 RX ADMIN — SODIUM CHLORIDE, PRESERVATIVE FREE 10 ML: 5 INJECTION INTRAVENOUS at 09:33

## 2023-01-21 RX ADMIN — ONDANSETRON HYDROCHLORIDE 4 MG: 2 SOLUTION INTRAMUSCULAR; INTRAVENOUS at 11:52

## 2023-01-21 RX ADMIN — ARFORMOTEROL TARTRATE 15 MCG: 15 SOLUTION RESPIRATORY (INHALATION) at 19:33

## 2023-01-21 RX ADMIN — BUPROPION HYDROCHLORIDE 150 MG: 150 TABLET, EXTENDED RELEASE ORAL at 21:26

## 2023-01-21 RX ADMIN — BACLOFEN 5 MG: 10 TABLET ORAL at 09:33

## 2023-01-21 RX ADMIN — CARVEDILOL 12.5 MG: 12.5 TABLET, FILM COATED ORAL at 07:42

## 2023-01-21 RX ADMIN — SODIUM CHLORIDE, PRESERVATIVE FREE 10 ML: 5 INJECTION INTRAVENOUS at 21:26

## 2023-01-21 RX ADMIN — LEVOTHYROXINE SODIUM 137 MCG: 0.11 TABLET ORAL at 07:42

## 2023-01-21 RX ADMIN — SODIUM CHLORIDE, PRESERVATIVE FREE 10 ML: 5 INJECTION INTRAVENOUS at 06:14

## 2023-01-21 RX ADMIN — IPRATROPIUM BROMIDE AND ALBUTEROL SULFATE 3 ML: 2.5; .5 SOLUTION RESPIRATORY (INHALATION) at 19:33

## 2023-01-21 RX ADMIN — GABAPENTIN 400 MG: 400 CAPSULE ORAL at 15:27

## 2023-01-21 RX ADMIN — GABAPENTIN 400 MG: 400 CAPSULE ORAL at 09:32

## 2023-01-21 RX ADMIN — GUAIFENESIN 1200 MG: 600 TABLET, EXTENDED RELEASE ORAL at 15:27

## 2023-01-21 RX ADMIN — BACLOFEN 5 MG: 10 TABLET ORAL at 17:02

## 2023-01-21 RX ADMIN — BUDESONIDE 500 MCG: 0.5 INHALANT RESPIRATORY (INHALATION) at 19:33

## 2023-01-21 RX ADMIN — HYDROCODONE BITARTRATE AND ACETAMINOPHEN 1 TABLET: 5; 325 TABLET ORAL at 17:02

## 2023-01-21 RX ADMIN — MONTELUKAST 10 MG: 10 TABLET, FILM COATED ORAL at 21:26

## 2023-01-21 RX ADMIN — CARVEDILOL 12.5 MG: 12.5 TABLET, FILM COATED ORAL at 17:03

## 2023-01-21 RX ADMIN — SODIUM CHLORIDE, PRESERVATIVE FREE 10 ML: 5 INJECTION INTRAVENOUS at 13:12

## 2023-01-21 RX ADMIN — FUROSEMIDE 40 MG: 40 TABLET ORAL at 07:42

## 2023-01-21 RX ADMIN — POTASSIUM CHLORIDE 20 MEQ: 750 TABLET, FILM COATED, EXTENDED RELEASE ORAL at 11:15

## 2023-01-21 RX ADMIN — HYDROCODONE BITARTRATE AND ACETAMINOPHEN 1 TABLET: 5; 325 TABLET ORAL at 13:12

## 2023-01-21 RX ADMIN — HYDROCODONE BITARTRATE AND ACETAMINOPHEN 1 TABLET: 5; 325 TABLET ORAL at 22:09

## 2023-01-21 RX ADMIN — BACLOFEN 10 MG: 10 TABLET ORAL at 22:09

## 2023-01-21 RX ADMIN — GABAPENTIN 400 MG: 400 CAPSULE ORAL at 21:26

## 2023-01-21 RX ADMIN — GUAIFENESIN 1200 MG: 600 TABLET, EXTENDED RELEASE ORAL at 21:26

## 2023-01-21 RX ADMIN — FUROSEMIDE 40 MG: 40 TABLET ORAL at 17:03

## 2023-01-21 RX ADMIN — PANTOPRAZOLE SODIUM 40 MG: 40 TABLET, DELAYED RELEASE ORAL at 07:41

## 2023-01-21 RX ADMIN — Medication 3 MG: at 22:09

## 2023-01-21 RX ADMIN — Medication 1000 UNITS: at 09:32

## 2023-01-21 RX ADMIN — POLYETHYLENE GLYCOL 3350 8.5 G: 17 POWDER, FOR SOLUTION ORAL at 09:33

## 2023-01-21 RX ADMIN — BUPROPION HYDROCHLORIDE 150 MG: 150 TABLET, EXTENDED RELEASE ORAL at 09:32

## 2023-01-21 RX ADMIN — PREDNISONE 40 MG: 20 TABLET ORAL at 09:32

## 2023-01-21 RX ADMIN — LATANOPROST 1 DROP: 50 SOLUTION OPHTHALMIC at 21:26

## 2023-01-21 RX ADMIN — TRAMADOL HYDROCHLORIDE 50 MG: 50 TABLET ORAL at 06:14

## 2023-01-21 NOTE — PROGRESS NOTES
Cardiology Progress Note                                        Admit Date: 1/17/2023    Assessment/Plan:     CHF; acute diastolic HF; her BPs are still relatively high; will maximize Coreg as she seems to be tolerating BB despite her asthma  CAD; mild; asymptomatic  Cardiomyopathy; very mild; EF 45 to 50%  HTN; will increase her Coreg today  Debility; will need to work with PT    Pia Purvis is a 76 y.o. female with     PROBLEM LIST:  Patient Active Problem List    Diagnosis Date Noted    Acute diastolic heart failure ([address] 01/20/2023    Tachycardia 01/18/2023    Acute chest pain 01/18/2023    NSTEMI (non-ST elevated myocardial infarction) ([address] 01/18/2023    Thoracic compression fracture ([address] 01/18/2023    Acute respiratory failure with hypoxia ([address] 01/18/2023    Unspecified fracture of sternum, initial encounter for closed fracture 01/18/2023    Weakness of right leg 12/29/2021    TIA (transient ischemic attack) 12/25/2021    CVA (cerebral vascular accident) ([address] 12/25/2021    Orthostatic hypotension 12/01/2021    Weakness 12/01/2021    UTI (urinary tract infection) 12/01/2021    LOREN (acute kidney injury) ([address] 12/01/2021    Goals of care, counseling/discussion     Other chronic pain     Cough     Impaired mobility     Status asthmaticus 02/08/2021    Moderate persistent asthma with acute exacerbation 02/08/2021    IBS (irritable bowel syndrome) 07/08/2011    HTN (hypertension) 07/08/2011    Asthma 07/08/2011    Hypothyroidism 07/08/2011    Syncope 07/07/2011    Dehydration 07/07/2011         Subjective:     Pia Zofiaoli reports none.     Visit Vitals  BP (!) 121/90 (BP Patient Position: Semi fowlers)   Pulse 84   Temp 98.6 °F (37 °C)   Resp 16   Ht 5' (1.524 m)   Wt 83.3 kg (183 lb 9.6 oz)   SpO2 95%   BMI 35.86 kg/m²       Intake/Output Summary (Last 24 hours) at 1/21/2023 0629  Last data filed at 1/20/2023 2104  Gross per 24 hour   Intake 480 ml   Output 325 ml   Net 155 ml       Objective:      Physical Exam:  HEENT: Perrla, EOMI  Neck: No JVD,  No thyroidmegaly  Resp: some rales  CV: RRR s1s2 No murmur no s3  Abd:Soft, Nontender  Ext: 1+ edema  Neuro: Alert and oriented; Nonfocal  Skin: Warm, Dry, Intact  Pulses: 2+ DP/PT/Rad      Telemetry: normal sinus rhythm    Current Facility-Administered Medications   Medication Dose Route Frequency    baclofen (LIORESAL) tablet 5 mg  5 mg Oral TID    carvediloL (COREG) tablet 12.5 mg  12.5 mg Oral BID WITH MEALS    albuterol-ipratropium (DUO-NEB) 2.5 MG-0.5 MG/3 ML  3 mL Nebulization BID RT    furosemide (LASIX) tablet 40 mg  40 mg Oral ACB&D    predniSONE (DELTASONE) tablet 40 mg  40 mg Oral DAILY WITH BREAKFAST    melatonin tablet 3 mg  3 mg Oral QHS PRN    buPROPion SR (WELLBUTRIN SR) tablet 150 mg  150 mg Oral Q12H    cholecalciferol (VITAMIN D3) (1000 Units /25 mcg) tablet 1,000 Units  1,000 Units Oral DAILY    pantoprazole (PROTONIX) tablet 40 mg  40 mg Oral ACB    latanoprost (XALATAN) 0.005 % ophthalmic solution 1 Drop  1 Drop Both Eyes QHS    montelukast (SINGULAIR) tablet 10 mg  10 mg Oral QHS    polyethylene glycol (MIRALAX) packet 8.5 g  8.5 g Oral DAILY    sodium chloride (NS) flush 5-40 mL  5-40 mL IntraVENous Q8H    sodium chloride (NS) flush 5-40 mL  5-40 mL IntraVENous PRN    acetaminophen (TYLENOL) tablet 650 mg  650 mg Oral Q6H PRN    Or    acetaminophen (TYLENOL) suppository 650 mg  650 mg Rectal Q6H PRN    polyethylene glycol (MIRALAX) packet 17 g  17 g Oral DAILY PRN    ondansetron (ZOFRAN ODT) tablet 4 mg  4 mg Oral Q8H PRN    Or    ondansetron (ZOFRAN) injection 4 mg  4 mg IntraVENous Q6H PRN    albuterol-ipratropium (DUO-NEB) 2.5 MG-0.5 MG/3 ML  3 mL Nebulization Q4H PRN    arformoteroL (BROVANA) neb solution 15 mcg  15 mcg Nebulization BID RT    And    budesonide (PULMICORT) 500 mcg/2 ml nebulizer suspension  500 mcg Nebulization BID RT    gabapentin (NEURONTIN) capsule 400 mg  400 mg Oral TID    levothyroxine (SYNTHROID) tablet 137 mcg  137 mcg Oral ACB         Data Review:   Labs:    Recent Results (from the past 24 hour(s))   CBC W/O DIFF    Collection Time: 01/21/23  3:29 AM   Result Value Ref Range    WBC 13.5 (H) 3.6 - 11.0 K/uL    RBC 4.47 3.80 - 5.20 M/uL    HGB 13.4 11.5 - 16.0 g/dL    HCT 41.7 35.0 - 47.0 %    MCV 93.3 80.0 - 99.0 FL    MCH 30.0 26.0 - 34.0 PG    MCHC 32.1 30.0 - 36.5 g/dL    RDW 14.7 (H) 11.5 - 14.5 %    PLATELET 195 651 - 251 K/uL    MPV 9.8 8.9 - 12.9 FL    NRBC 0.0 0  WBC    ABSOLUTE NRBC 0.00 0.00 - 3.53 K/uL   METABOLIC PANEL, BASIC    Collection Time: 01/21/23  3:29 AM   Result Value Ref Range    Sodium 135 (L) 136 - 145 mmol/L    Potassium 3.7 3.5 - 5.1 mmol/L    Chloride 95 (L) 97 - 108 mmol/L    CO2 35 (H) 21 - 32 mmol/L    Anion gap 5 5 - 15 mmol/L    Glucose 113 (H) 65 - 100 mg/dL    BUN 40 (H) 6 - 20 MG/DL    Creatinine 0.89 0.55 - 1.02 MG/DL    BUN/Creatinine ratio 45 (H) 12 - 20      eGFR >60 >60 ml/min/1.73m2    Calcium 9.4 8.5 - 10.1 MG/DL   MAGNESIUM    Collection Time: 01/21/23  3:29 AM   Result Value Ref Range    Magnesium 2.3 1.6 - 2.4 mg/dL

## 2023-01-21 NOTE — PROGRESS NOTES
Rt Radial and rt fem access sites are WNL with some bruising noted to right wrist, dressing to both sites are CDI, no bleeding, no hematoma noted, and distal pulses palpable. VSS, No acute distress noted. Pt is alert and orietned x 4. Pt continues to have dyspnea on exertion and remains on O2 @4LPM NC. PIV patent, flushes easily. Dressing intact. Call bell and personal belongings at bedside. MD notified of pain and orders were received and medications administered as ordered.

## 2023-01-21 NOTE — PROGRESS NOTES
0730: Bedside shift change report given to Aashish Paige RN (oncoming nurse) by Cassandra Beckman RN (offgoing nurse). Report included the following information SBAR, MAR, and Recent Results. 1930: Bedside shift change report given to Jasmin Roblero RN (oncoming nurse) by Aashish Paige RN (offgoing nurse). Report included the following information SBAR, MAR, and Recent Results.

## 2023-01-21 NOTE — PROGRESS NOTES
Hospitalist Progress Note      Hospital summary: 76 y.o lady w/ asthma, HTN, RA, who presented with shortness of breath. She was found to have an NSTEMI. Assessment/Plan:  NSTEMI: due to demand ischemia in the setting of respiratory failure, not ACS. [facility] 1/20 with mild disease. Acute respiratory failure w/ hypoxia:   -off bipap and on NC ; at baseline wears 3L PRN  -suspected component of CHF; diuretics now changed to PO  -follow-up echo - low-normal LVEF 45-50%  -add muclolytics for chest congestion    Acute asthma exacerbation:  -continue inhaled bronchodilators, being weaned  -wean systemic steroids  -may need to change carvedilol to metoprolol if bronchospasm recurs    History of sternal and thoracic vertebral compression fractures. Functional paraplegia and neurogenic bladder w/ chronic goyal catehter. HTN: stable  -monitor on current meds    RA    Fibromyalgia:  -home meds resumed    Obesity    Code status: full  DVT prophylaxis: sc Lovenox  Disposition: prior living arrangement possibly tomorrow  ----------------------------------------------    CC: shortness of breath    S: breathing improved but chest feels congested, denies cp, n/v/d    Review of Systems:  Pertinent items are noted in HPI.     O:  Visit Vitals  /79 (BP 1 Location: Left upper arm, BP Patient Position: At rest)   Pulse 83   Temp 97.9 °F (36.6 °C)   Resp 16   Ht 5' (1.524 m)   Wt 83.3 kg (183 lb 9.6 oz)   SpO2 96%   BMI 35.86 kg/m²       PHYSICAL EXAM:  Gen: NAD, non-toxic, obese  HEENT: anicteric sclerae, normal conjunctiva, oropharynx clear, MM moist  Neck: supple, trachea midline, no adenopathy  Heart: RRR, no MRG, no JVD, no peripheral edema  Lungs: b/l rhonchi, no wheezing today, non-labored respirations on O2  Abd: soft, NT, ND, BS+  Extr: warm  Skin: dry, no rash  Neuro: CN II-XII grossly intact, normal speech, functional paraplegia of lower extremities  Psych: normal mood, appropriate affect      Intake/Output Summary (Last 24 hours) at 1/21/2023 1350  Last data filed at 1/20/2023 2104  Gross per 24 hour   Intake 240 ml   Output 225 ml   Net 15 ml          Recent labs & imaging reviewed:  Recent Results (from the past 24 hour(s))   CBC W/O DIFF    Collection Time: 01/21/23  3:29 AM   Result Value Ref Range    WBC 13.5 (H) 3.6 - 11.0 K/uL    RBC 4.47 3.80 - 5.20 M/uL    HGB 13.4 11.5 - 16.0 g/dL    HCT 41.7 35.0 - 47.0 %    MCV 93.3 80.0 - 99.0 FL    MCH 30.0 26.0 - 34.0 PG    MCHC 32.1 30.0 - 36.5 g/dL    RDW 14.7 (H) 11.5 - 14.5 %    PLATELET 908 164 - 472 K/uL    MPV 9.8 8.9 - 12.9 FL    NRBC 0.0 0  WBC    ABSOLUTE NRBC 0.00 0.00 - 8.00 K/uL   METABOLIC PANEL, BASIC    Collection Time: 01/21/23  3:29 AM   Result Value Ref Range    Sodium 135 (L) 136 - 145 mmol/L    Potassium 3.7 3.5 - 5.1 mmol/L    Chloride 95 (L) 97 - 108 mmol/L    CO2 35 (H) 21 - 32 mmol/L    Anion gap 5 5 - 15 mmol/L    Glucose 113 (H) 65 - 100 mg/dL    BUN 40 (H) 6 - 20 MG/DL    Creatinine 0.89 0.55 - 1.02 MG/DL    BUN/Creatinine ratio 45 (H) 12 - 20      eGFR >60 >60 ml/min/1.73m2    Calcium 9.4 8.5 - 10.1 MG/DL   MAGNESIUM    Collection Time: 01/21/23  3:29 AM   Result Value Ref Range    Magnesium 2.3 1.6 - 2.4 mg/dL     Recent Labs     01/21/23 0329 01/20/23  0018   WBC 13.5* 14.2*   HGB 13.4 13.9   HCT 41.7 44.9    301       Recent Labs     01/21/23  0329 01/20/23  0018 01/19/23  0132   * 136 134*   K 3.7 3.7 3.4*   CL 95* 94* 95*   CO2 35* 34* 33*   BUN 40* 34* 21*   CREA 0.89 1.07* 0.74   * 121* 172*   CA 9.4 9.7 9.6   MG 2.3 2.2 2.0       Recent Labs     01/19/23  0132   ALT 25      TBILI 0.6   TP 6.4   ALB 3.3*   GLOB 3.1       No results for input(s): INR, PTP, APTT, INREXT, INREXT in the last 72 hours. No results for input(s): FE, TIBC, PSAT, FERR in the last 72 hours. No results found for: FOL, RBCF   No results for input(s): PH, PCO2, PO2 in the last 72 hours.   No results for input(s): CPK, CKNDX, TROIQ in the last 72 hours.     No lab exists for component: CPKMB  No results found for: CHOL, CHOLX, CHLST, CHOLV, HDL, HDLP, LDL, LDLC, DLDLP, TGLX, TRIGL, TRIGP, CHHD, CHHDX  Lab Results   Component Value Date/Time    Glucose (POC) 202 (H) 01/19/2023 04:20 PM    Glucose (POC) 112 12/25/2021 12:31 PM    Glucose (POC) 126 (H) 02/14/2021 04:29 PM    Glucose (POC) 103 (H) 02/14/2021 11:04 AM    Glucose (POC) 72 02/14/2021 06:15 AM     Lab Results   Component Value Date/Time    Color DARK YELLOW 11/30/2021 10:45 PM    Appearance TURBID (A) 11/30/2021 10:45 PM    Specific gravity 1.020 11/30/2021 10:45 PM    Specific gravity 1.020 02/08/2021 06:45 PM    pH (UA) 5.0 11/30/2021 10:45 PM    Protein Negative 11/30/2021 10:45 PM    Glucose Negative 11/30/2021 10:45 PM    Ketone Negative 11/30/2021 10:45 PM    Bilirubin Negative 11/30/2021 10:45 PM    Urobilinogen 0.2 11/30/2021 10:45 PM    Nitrites Negative 11/30/2021 10:45 PM    Leukocyte Esterase Negative 11/30/2021 10:45 PM    Epithelial cells MANY (A) 11/30/2021 10:45 PM    Bacteria 1+ (A) 11/30/2021 10:45 PM    WBC 10-20 11/30/2021 10:45 PM    RBC 5-10 11/30/2021 10:45 PM       Med list reviewed  Current Facility-Administered Medications   Medication Dose Route Frequency    sodium chloride (NS) flush 5-40 mL  5-40 mL IntraVENous Q8H    sodium chloride (NS) flush 5-40 mL  5-40 mL IntraVENous PRN    carvediloL (COREG) tablet 12.5 mg  12.5 mg Oral BID WITH MEALS    potassium chloride SR (KLOR-CON 10) tablet 20 mEq  20 mEq Oral DAILY    baclofen (LIORESAL) tablet 5 mg  5 mg Oral BID    baclofen (LIORESAL) tablet 10 mg  10 mg Oral BID PRN    traMADoL (ULTRAM) tablet 25 mg  25 mg Oral Q4H PRN    HYDROcodone-acetaminophen (NORCO) 5-325 mg per tablet 1 Tablet  1 Tablet Oral Q4H PRN    albuterol-ipratropium (DUO-NEB) 2.5 MG-0.5 MG/3 ML  3 mL Nebulization BID RT    furosemide (LASIX) tablet 40 mg  40 mg Oral ACB&D    predniSONE (DELTASONE) tablet 40 mg 40 mg Oral DAILY WITH BREAKFAST    melatonin tablet 3 mg  3 mg Oral QHS PRN    buPROPion SR (WELLBUTRIN SR) tablet 150 mg  150 mg Oral Q12H    cholecalciferol (VITAMIN D3) (1000 Units /25 mcg) tablet 1,000 Units  1,000 Units Oral DAILY    pantoprazole (PROTONIX) tablet 40 mg  40 mg Oral ACB    latanoprost (XALATAN) 0.005 % ophthalmic solution 1 Drop  1 Drop Both Eyes QHS    montelukast (SINGULAIR) tablet 10 mg  10 mg Oral QHS    polyethylene glycol (MIRALAX) packet 8.5 g  8.5 g Oral DAILY    sodium chloride (NS) flush 5-40 mL  5-40 mL IntraVENous Q8H    sodium chloride (NS) flush 5-40 mL  5-40 mL IntraVENous PRN    acetaminophen (TYLENOL) tablet 650 mg  650 mg Oral Q6H PRN    Or    acetaminophen (TYLENOL) suppository 650 mg  650 mg Rectal Q6H PRN    polyethylene glycol (MIRALAX) packet 17 g  17 g Oral DAILY PRN    ondansetron (ZOFRAN ODT) tablet 4 mg  4 mg Oral Q8H PRN    Or    ondansetron (ZOFRAN) injection 4 mg  4 mg IntraVENous Q6H PRN    albuterol-ipratropium (DUO-NEB) 2.5 MG-0.5 MG/3 ML  3 mL Nebulization Q4H PRN    arformoteroL (BROVANA) neb solution 15 mcg  15 mcg Nebulization BID RT    And    budesonide (PULMICORT) 500 mcg/2 ml nebulizer suspension  500 mcg Nebulization BID RT    gabapentin (NEURONTIN) capsule 400 mg  400 mg Oral TID    levothyroxine (SYNTHROID) tablet 137 mcg  137 mcg Oral ACB       Care Plan discussed with:  Patient/Family, Nurse, and     Darcy Angeles MD  Internal Medicine  Date of Service: 1/21/2023

## 2023-01-22 VITALS
TEMPERATURE: 99.1 F | HEART RATE: 83 BPM | OXYGEN SATURATION: 92 % | SYSTOLIC BLOOD PRESSURE: 128 MMHG | RESPIRATION RATE: 16 BRPM | WEIGHT: 183.6 LBS | BODY MASS INDEX: 36.05 KG/M2 | DIASTOLIC BLOOD PRESSURE: 82 MMHG | HEIGHT: 60 IN

## 2023-01-22 LAB
ANION GAP SERPL CALC-SCNC: 6 MMOL/L (ref 5–15)
BUN SERPL-MCNC: 35 MG/DL (ref 6–20)
BUN/CREAT SERPL: 45 (ref 12–20)
CALCIUM SERPL-MCNC: 9.1 MG/DL (ref 8.5–10.1)
CHLORIDE SERPL-SCNC: 95 MMOL/L (ref 97–108)
CO2 SERPL-SCNC: 32 MMOL/L (ref 21–32)
CREAT SERPL-MCNC: 0.77 MG/DL (ref 0.55–1.02)
ERYTHROCYTE [DISTWIDTH] IN BLOOD BY AUTOMATED COUNT: 14.5 % (ref 11.5–14.5)
GLUCOSE SERPL-MCNC: 128 MG/DL (ref 65–100)
HCT VFR BLD AUTO: 41.2 % (ref 35–47)
HGB BLD-MCNC: 13.1 G/DL (ref 11.5–16)
MCH RBC QN AUTO: 29.9 PG (ref 26–34)
MCHC RBC AUTO-ENTMCNC: 31.8 G/DL (ref 30–36.5)
MCV RBC AUTO: 94.1 FL (ref 80–99)
NRBC # BLD: 0 K/UL (ref 0–0.01)
NRBC BLD-RTO: 0 PER 100 WBC
PLATELET # BLD AUTO: 253 K/UL (ref 150–400)
PMV BLD AUTO: 9.7 FL (ref 8.9–12.9)
POTASSIUM SERPL-SCNC: 4.2 MMOL/L (ref 3.5–5.1)
RBC # BLD AUTO: 4.38 M/UL (ref 3.8–5.2)
SODIUM SERPL-SCNC: 133 MMOL/L (ref 136–145)
WBC # BLD AUTO: 11.6 K/UL (ref 3.6–11)

## 2023-01-22 PROCEDURE — 74011250637 HC RX REV CODE- 250/637: Performed by: FAMILY MEDICINE

## 2023-01-22 PROCEDURE — 94640 AIRWAY INHALATION TREATMENT: CPT

## 2023-01-22 PROCEDURE — 85027 COMPLETE CBC AUTOMATED: CPT

## 2023-01-22 PROCEDURE — 74011250637 HC RX REV CODE- 250/637: Performed by: HOSPITALIST

## 2023-01-22 PROCEDURE — 74011000250 HC RX REV CODE- 250: Performed by: HOSPITALIST

## 2023-01-22 PROCEDURE — 74011250636 HC RX REV CODE- 250/636: Performed by: INTERNAL MEDICINE

## 2023-01-22 PROCEDURE — 80048 BASIC METABOLIC PNL TOTAL CA: CPT

## 2023-01-22 PROCEDURE — 36415 COLL VENOUS BLD VENIPUNCTURE: CPT

## 2023-01-22 PROCEDURE — 74011250637 HC RX REV CODE- 250/637: Performed by: INTERNAL MEDICINE

## 2023-01-22 PROCEDURE — 74011636637 HC RX REV CODE- 636/637: Performed by: HOSPITALIST

## 2023-01-22 RX ORDER — FUROSEMIDE 40 MG/1
40 TABLET ORAL DAILY
Qty: 7 TABLET | Refills: 0 | Status: ON HOLD | OUTPATIENT
Start: 2023-01-22 | End: 2023-01-29

## 2023-01-22 RX ORDER — ENOXAPARIN SODIUM 100 MG/ML
40 INJECTION SUBCUTANEOUS EVERY 24 HOURS
Status: DISCONTINUED | OUTPATIENT
Start: 2023-01-22 | End: 2023-01-22 | Stop reason: HOSPADM

## 2023-01-22 RX ORDER — CARVEDILOL 12.5 MG/1
12.5 TABLET ORAL 2 TIMES DAILY WITH MEALS
Qty: 14 TABLET | Refills: 0 | Status: SHIPPED | OUTPATIENT
Start: 2023-01-22 | End: 2023-01-27

## 2023-01-22 RX ORDER — FUROSEMIDE 40 MG/1
40 TABLET ORAL DAILY
Status: DISCONTINUED | OUTPATIENT
Start: 2023-01-22 | End: 2023-01-22 | Stop reason: HOSPADM

## 2023-01-22 RX ADMIN — CARVEDILOL 12.5 MG: 12.5 TABLET, FILM COATED ORAL at 07:20

## 2023-01-22 RX ADMIN — FUROSEMIDE 40 MG: 40 TABLET ORAL at 08:35

## 2023-01-22 RX ADMIN — PREDNISONE 40 MG: 20 TABLET ORAL at 08:35

## 2023-01-22 RX ADMIN — GABAPENTIN 400 MG: 400 CAPSULE ORAL at 08:35

## 2023-01-22 RX ADMIN — BUPROPION HYDROCHLORIDE 150 MG: 150 TABLET, EXTENDED RELEASE ORAL at 08:35

## 2023-01-22 RX ADMIN — PANTOPRAZOLE SODIUM 40 MG: 40 TABLET, DELAYED RELEASE ORAL at 07:20

## 2023-01-22 RX ADMIN — POLYETHYLENE GLYCOL 3350 8.5 G: 17 POWDER, FOR SOLUTION ORAL at 08:35

## 2023-01-22 RX ADMIN — IPRATROPIUM BROMIDE AND ALBUTEROL SULFATE 3 ML: 2.5; .5 SOLUTION RESPIRATORY (INHALATION) at 07:45

## 2023-01-22 RX ADMIN — Medication 1000 UNITS: at 08:35

## 2023-01-22 RX ADMIN — LEVOTHYROXINE SODIUM 137 MCG: 0.11 TABLET ORAL at 07:20

## 2023-01-22 RX ADMIN — HYDROCODONE BITARTRATE AND ACETAMINOPHEN 1 TABLET: 5; 325 TABLET ORAL at 10:47

## 2023-01-22 RX ADMIN — ENOXAPARIN SODIUM 40 MG: 100 INJECTION SUBCUTANEOUS at 07:20

## 2023-01-22 RX ADMIN — ARFORMOTEROL TARTRATE 15 MCG: 15 SOLUTION RESPIRATORY (INHALATION) at 07:47

## 2023-01-22 RX ADMIN — GUAIFENESIN 1200 MG: 600 TABLET, EXTENDED RELEASE ORAL at 08:35

## 2023-01-22 RX ADMIN — BUDESONIDE 500 MCG: 0.5 INHALANT RESPIRATORY (INHALATION) at 07:46

## 2023-01-22 RX ADMIN — BACLOFEN 5 MG: 10 TABLET ORAL at 08:34

## 2023-01-22 NOTE — PROGRESS NOTES
0730: Bedside shift change report given to [id number] Shireen Noriega, RN (oncoming nurse) by Jovan Stokes RN (offgoing nurse). Report included the following information SBAR, MAR, and Recent Results. Charting and patient care of Tree Lopes by [facility], RN from 0730 to 1500 was supervised and reviewed by this RN.

## 2023-01-22 NOTE — PROGRESS NOTES
Transition of Care    RUR: 16%    Our [facility] was called. A message was left to have Jennifer call me back regarding the return of Mrs. Shadia Beal to the facility. Jennifer called back. Mrs. Shadia Beal may return to [facility] today. Ly De La Garza requested that the discharge summary be faxed to her at [phone]. She requested to be called once the transportation was arranged. The physican was messaged. Mrs. Shadia Beal was seen. She was informed that she could return to [facility] today. She was agreeable for discharge. She requested a non-emergent ambulance transport. She is bed bound and requires a andry lift to transfer at the facility. She was informed that if she receives a bill for the ambulance transport, she would need to appeal the ambulance charge denial to Medicare. She was going to inform her daughter of the discharge. JOSE [facility]FAEL was called. A 2:00 pm ambulance was arranged. The discharge packet was initiated. The discharge summary was faxed to [facility]. Mrs. Shadia Beal was informed of the discharge time. Jennifer at [facility] was called. She had received the faxed discharge summary. Mrs. Shadia Beal would be going to Room 219. The number to call report is [phone].     Signed By: Alexei Donald LCSW     January 22, 2023

## 2023-01-22 NOTE — PROGRESS NOTES
Cardiology Progress Note                                        Admit Date: 1/17/2023    Assessment/Plan:     CHF;acute diastolic HF; will decrease Lasix some as she is becoming hyponatremic  CAD:mild and asymptomatic  Diastolic dysfunction; tolerated increase of Coreg yesterday  Debility; still bed-ridden most of time; will resume Lovenox; needs PT    Tree Lopes is a 76 y.o. female with     PROBLEM LIST:  Patient Active Problem List    Diagnosis Date Noted    Acute diastolic heart failure ([address] 01/20/2023    Tachycardia 01/18/2023    Acute chest pain 01/18/2023    NSTEMI (non-ST elevated myocardial infarction) ([address] 01/18/2023    Thoracic compression fracture ([address] 01/18/2023    Acute respiratory failure with hypoxia ([address] 01/18/2023    Unspecified fracture of sternum, initial encounter for closed fracture 01/18/2023    Weakness of right leg 12/29/2021    TIA (transient ischemic attack) 12/25/2021    CVA (cerebral vascular accident) ([address] 12/25/2021    Orthostatic hypotension 12/01/2021    Weakness 12/01/2021    UTI (urinary tract infection) 12/01/2021    LOREN (acute kidney injury) ([address] 12/01/2021    Goals of care, counseling/discussion     Other chronic pain     Cough     Impaired mobility     Status asthmaticus 02/08/2021    Moderate persistent asthma with acute exacerbation 02/08/2021    IBS (irritable bowel syndrome) 07/08/2011    HTN (hypertension) 07/08/2011    Asthma 07/08/2011    Hypothyroidism 07/08/2011    Syncope 07/07/2011    Dehydration 07/07/2011         Subjective:     Tree Lopes reports none.     Visit Vitals  BP (!) 136/97   Pulse 74   Temp 98.2 °F (36.8 °C)   Resp 11   Ht 5' (1.524 m)   Wt 83.3 kg (183 lb 9.6 oz)   SpO2 92%   BMI 35.86 kg/m²       Intake/Output Summary (Last 24 hours) at 1/22/2023 6083  Last data filed at 1/22/2023 0600  Gross per 24 hour   Intake 720 ml   Output 2100 ml   Net -1380 ml       Objective:      Physical Exam:  HEENT: Perrla, EOMI  Neck: No JVD,  No thyroidmegaly  Resp: no rales  CV: RRR s1s2 No murmur no s3  Abd:Soft, Nontender  Ext: 1+ edema  Neuro: Alert and oriented; Nonfocal  Skin: Warm, Dry, Intact  Pulses: 2+ DP/PT/Rad      Telemetry: normal sinus rhythm    Current Facility-Administered Medications   Medication Dose Route Frequency    enoxaparin (LOVENOX) injection 40 mg  40 mg SubCUTAneous Q24H    furosemide (LASIX) tablet 40 mg  40 mg Oral DAILY    sodium chloride (NS) flush 5-40 mL  5-40 mL IntraVENous Q8H    sodium chloride (NS) flush 5-40 mL  5-40 mL IntraVENous PRN    carvediloL (COREG) tablet 12.5 mg  12.5 mg Oral BID WITH MEALS    baclofen (LIORESAL) tablet 5 mg  5 mg Oral BID    baclofen (LIORESAL) tablet 10 mg  10 mg Oral BID PRN    traMADoL (ULTRAM) tablet 25 mg  25 mg Oral Q4H PRN    HYDROcodone-acetaminophen (NORCO) 5-325 mg per tablet 1 Tablet  1 Tablet Oral Q4H PRN    guaiFENesin ER (MUCINEX) tablet 1,200 mg  1,200 mg Oral Q12H    albuterol-ipratropium (DUO-NEB) 2.5 MG-0.5 MG/3 ML  3 mL Nebulization BID RT    predniSONE (DELTASONE) tablet 40 mg  40 mg Oral DAILY WITH BREAKFAST    melatonin tablet 3 mg  3 mg Oral QHS PRN    buPROPion SR (WELLBUTRIN SR) tablet 150 mg  150 mg Oral Q12H    cholecalciferol (VITAMIN D3) (1000 Units /25 mcg) tablet 1,000 Units  1,000 Units Oral DAILY    pantoprazole (PROTONIX) tablet 40 mg  40 mg Oral ACB    latanoprost (XALATAN) 0.005 % ophthalmic solution 1 Drop  1 Drop Both Eyes QHS    montelukast (SINGULAIR) tablet 10 mg  10 mg Oral QHS    polyethylene glycol (MIRALAX) packet 8.5 g  8.5 g Oral DAILY    sodium chloride (NS) flush 5-40 mL  5-40 mL IntraVENous Q8H    sodium chloride (NS) flush 5-40 mL  5-40 mL IntraVENous PRN    acetaminophen (TYLENOL) tablet 650 mg  650 mg Oral Q6H PRN    Or    acetaminophen (TYLENOL) suppository 650 mg  650 mg Rectal Q6H PRN    polyethylene glycol (MIRALAX) packet 17 g  17 g Oral DAILY PRN    ondansetron (ZOFRAN ODT) tablet 4 mg  4 mg Oral Q8H PRN    Or    ondansetron (ZOFRAN) injection 4 mg  4 mg IntraVENous Q6H PRN    albuterol-ipratropium (DUO-NEB) 2.5 MG-0.5 MG/3 ML  3 mL Nebulization Q4H PRN    arformoteroL (BROVANA) neb solution 15 mcg  15 mcg Nebulization BID RT    And    budesonide (PULMICORT) 500 mcg/2 ml nebulizer suspension  500 mcg Nebulization BID RT    gabapentin (NEURONTIN) capsule 400 mg  400 mg Oral TID    levothyroxine (SYNTHROID) tablet 137 mcg  137 mcg Oral ACB         Data Review:   Labs:    Recent Results (from the past 24 hour(s))   CBC W/O DIFF    Collection Time: 01/22/23  3:25 AM   Result Value Ref Range    WBC 11.6 (H) 3.6 - 11.0 K/uL    RBC 4.38 3.80 - 5.20 M/uL    HGB 13.1 11.5 - 16.0 g/dL    HCT 41.2 35.0 - 47.0 %    MCV 94.1 80.0 - 99.0 FL    MCH 29.9 26.0 - 34.0 PG    MCHC 31.8 30.0 - 36.5 g/dL    RDW 14.5 11.5 - 14.5 %    PLATELET 674 081 - 364 K/uL    MPV 9.7 8.9 - 12.9 FL    NRBC 0.0 0  WBC    ABSOLUTE NRBC 0.00 0.00 - 3.17 K/uL   METABOLIC PANEL, BASIC    Collection Time: 01/22/23  3:25 AM   Result Value Ref Range    Sodium 133 (L) 136 - 145 mmol/L    Potassium 4.2 3.5 - 5.1 mmol/L    Chloride 95 (L) 97 - 108 mmol/L    CO2 32 21 - 32 mmol/L    Anion gap 6 5 - 15 mmol/L    Glucose 128 (H) 65 - 100 mg/dL    BUN 35 (H) 6 - 20 MG/DL    Creatinine 0.77 0.55 - 1.02 MG/DL    BUN/Creatinine ratio 45 (H) 12 - 20      eGFR >60 >60 ml/min/1.73m2    Calcium 9.1 8.5 - 10.1 MG/DL

## 2023-01-22 NOTE — PROGRESS NOTES
0730: Bedside shift change report given to [address] [place] [address], RNs (oncoming nurse) by Lilia Kunz RN (offgoing nurse). Report included the following information SBAR.        3042: I have reviewed discharge instructions with the patient. The patient verbalized understanding. Discharge medications reviewed with patient and appropriate educational materials and side effects teaching were provided. 1435: TRANSFER - OUT REPORT:    Verbal report given to [facility], RN (name) on Nicole Mon  being transferred to [address] [place] [address] (unit) for routine progression of care       Report consisted of patients Situation, Background, Assessment and   Recommendations(SBAR). Information from the following report(s) SBAR was reviewed with the receiving nurse. Lines:       Opportunity for questions and clarification was provided. Problem: Pressure Injury - Risk of  Goal: *Prevention of pressure injury  Description: Document Vaughn Scale and appropriate interventions in the flowsheet. Outcome: Progressing Towards Goal  Note: Pressure Injury Interventions: Activity Interventions: Pressure redistribution bed/mattress(bed type)    Mobility Interventions: Turn and reposition approx. every two hours(pillow and wedges)    Nutrition Interventions: Document food/fluid/supplement intake, Offer support with meals,snacks and hydration    Friction and Shear Interventions: Apply protective barrier, creams and emollients, Minimize layers                Problem: Patient Education: Go to Patient Education Activity  Goal: Patient/Family Education  Outcome: Progressing Towards Goal     Problem: Falls - Risk of  Goal: *Absence of Falls  Description: Document Leonardo Fall Risk and appropriate interventions in the flowsheet.   Outcome: Progressing Towards Goal  Note: Fall Risk Interventions:            Medication Interventions: Teach patient to arise slowly, Patient to call before getting OOB, Bed/chair exit alarm, Evaluate medications/consider consulting pharmacy, Utilize gait belt for transfers/ambulation    Elimination Interventions: Bed/chair exit alarm, Call light in reach, Toileting schedule/hourly rounds, Toilet paper/wipes in reach, Patient to call for help with toileting needs              Problem: Patient Education: Go to Patient Education Activity  Goal: Patient/Family Education  Outcome: Progressing Towards Goal     Problem: Pain  Goal: *Control of Pain  Outcome: Progressing Towards Goal  Goal: *PALLIATIVE CARE:  Alleviation of Pain  Outcome: Progressing Towards Goal     Problem: Patient Education: Go to Patient Education Activity  Goal: Patient/Family Education  Outcome: Progressing Towards Goal

## 2023-01-22 NOTE — PROGRESS NOTES
Verbal bedside shift change report given to Rogelio Judge (oncoming nurse) by Maik Sharpe (offgoing nurse). Report included the following information SBAR, Kardex, MAR, and Cardiac Rhythm  .       0730 Report to Grecia Melendez

## 2023-01-22 NOTE — DISCHARGE SUMMARY
Discharge Summary     Patient: Daniel Weston MRN: [id number]  SSN: [id number]    YOB: 1945  Age: 76 y.o.   Sex: female       Admit Date: 1/17/2023    Discharge Date: 1/22/2023      Admission Diagnoses: NSTEMI (non-ST elevated myocardial infarction) [facility]) [I21.4]  Acute respiratory failure with hypoxia (HCC) [J96.01]  Acute chest pain [R07.9]  Tachycardia [R00.0]  Thoracic compression fracture ([facility] [S22.000A]  Unspecified fracture of sternum, initial encounter for closed fracture [S22.20XA]    Discharge Diagnoses:   Acute respiratory failure w/ hypoxia  Acute asthma exacerbation  Acute diastolic heart failure  NSTEMI related to demand ischemia; ACS ruled out    Problem List as of 1/22/2023 Date Reviewed: 12/4/2021            Codes Class Noted - Resolved    Acute diastolic heart failure ([facility] ICD-10-CM: I50.31  ICD-9-CM: 428.31  1/20/2023 - Present        Tachycardia ICD-10-CM: R00.0  ICD-9-CM: 785.0  1/18/2023 - Present        Acute chest pain ICD-10-CM: R07.9  ICD-9-CM: 786.50  1/18/2023 - Present        NSTEMI (non-ST elevated myocardial infarction) ([facility] ICD-10-CM: I21.4  ICD-9-CM: 410.70  1/18/2023 - Present        Thoracic compression fracture ([facility] ICD-10-CM: S22.000A  ICD-9-CM: 805.2  1/18/2023 - Present        Acute respiratory failure with hypoxia (HCC) ICD-10-CM: J96.01  ICD-9-CM: 518.81  1/18/2023 - Present        Unspecified fracture of sternum, initial encounter for closed fracture ICD-10-CM: S22.20XA  ICD-9-CM: 807.2  1/18/2023 - Present        Weakness of right leg ICD-10-CM: R29.898  ICD-9-CM: 729.89  12/29/2021 - Present        TIA (transient ischemic attack) ICD-10-CM: G45.9  ICD-9-CM: 435.9  12/25/2021 - Present        CVA (cerebral vascular accident) [facility]) ICD-10-CM: I63.9  ICD-9-CM: 434.91  12/25/2021 - Present        Orthostatic hypotension ICD-10-CM: I95.1  ICD-9-CM: 458.0  12/1/2021 - Present        Weakness ICD-10-CM: R53.1  ICD-9-CM: 780.79  12/1/2021 - Present        UTI (urinary tract infection) ICD-10-CM: N39.0  ICD-9-CM: 599.0  12/1/2021 - Present        LOREN (acute kidney injury) ([facility] ICD-10-CM: N17.9  ICD-9-CM: 584.9  12/1/2021 - Present        Goals of care, counseling/discussion ICD-10-CM: Z71.89  ICD-9-CM: V65.49  Unknown - Present        Other chronic pain ICD-10-CM: G89.29  ICD-9-CM: 338.29  Unknown - Present        Cough ICD-10-CM: R05.9  ICD-9-CM: 786.2  Unknown - Present        Impaired mobility ICD-10-CM: Z74.09  ICD-9-CM: 799.89  Unknown - Present        Status asthmaticus ICD-10-CM: J45.902  ICD-9-CM: 493.91  2/8/2021 - Present        Moderate persistent asthma with acute exacerbation ICD-10-CM: J45.41  ICD-9-CM: 493.92  2/8/2021 - Present        IBS (irritable bowel syndrome) (Chronic) ICD-10-CM: K58.9  ICD-9-CM: 564.1  7/8/2011 - Present        HTN (hypertension) (Chronic) ICD-10-CM: I10  ICD-9-CM: 401.9  7/8/2011 - Present        Asthma (Chronic) ICD-10-CM: J45.909  ICD-9-CM: 493.90  7/8/2011 - Present        Hypothyroidism ICD-10-CM: E03.9  ICD-9-CM: 244.9  7/8/2011 - Present        Syncope ICD-10-CM: R55  ICD-9-CM: 780.2  7/7/2011 - Present        Dehydration ICD-10-CM: E86.0  ICD-9-CM: 276.51  7/7/2011 - Present            Discharge Condition: Stable    Hospital Course: 76 y.o lady w/ asthma, HTN, RA, who presented with shortness of breath. She was found to have an NSTEMI based on a significantly elevated troponin. She underwent LHC which showed mild disease only. It was determined this was related to demand ischemia from respiratory failure. NSTEMI: due to demand ischemia in the setting of respiratory failure, not ACS. LHC 1/20 with mild disease.      Acute respiratory failure w/ hypoxia:   -off bipap and on NC ; at baseline wears 3L PRN which is what she is now using  -suspected component of acute diastolic CHF; now resolved on PO diuretics  -echo - low-normal LVEF 45-50%  -follow-up w/ Dr. Marty Macias     Acute asthma exacerbation:  -resolved  -wean systemic steroids  -tolerating carvedilol without bronchospasm     History of sternal and thoracic vertebral compression fractures. Functional paraplegia and neurogenic bladder w/ chronic goyal catheter. HTN  RA   Fibromyalgia  Obesity    Consults: Cardiology    Significant Diagnostic Studies: see above    Disposition: long term care facility    S: no acute events overnight, has back pain which is unchanged, breathing is at baseline, no n/v/d. Requesting 1 week worth of meds sent to her regular pharmacy as it takes her LTAC several days to fill meds; this has been ordered. Visit Vitals  /73 (BP 1 Location: Left upper arm, BP Patient Position: At rest)   Pulse 73   Temp 97.7 °F (36.5 °C)   Resp 12   Ht 5' (1.524 m)   Wt 83.3 kg (183 lb 9.6 oz)   SpO2 92%   BMI 35.86 kg/m²     NAD  RRR  Lungs w/ minimal b/l rhonchi, normal work of breathing  Paraplegia    Discharge Medications:   Current Discharge Medication List        START taking these medications    Details   carvediloL (COREG) 12.5 mg tablet Take 1 Tablet by mouth two (2) times daily (with meals) for 7 days. Qty: 14 Tablet, Refills: 0      furosemide (LASIX) 40 mg tablet Take 1 Tablet by mouth daily for 7 days. Qty: 7 Tablet, Refills: 0      guaiFENesin ER (MUCINEX) 1,200 mg Ta12 ER tablet Take 1 Tablet by mouth every twelve (12) hours for 7 days. Qty: 14 Tablet, Refills: 0           CONTINUE these medications which have NOT CHANGED    Details   predniSONE (DELTASONE) 10 mg tablet Take 20 mg by mouth daily. !! baclofen (LIORESAL) 10 mg tablet Take 5 mg by mouth two (2) times a day. !! baclofen (LIORESAL) 10 mg tablet Take 10 mg by mouth two (2) times daily as needed for Muscle Spasm(s). HYDROcodone-acetaminophen (NORCO) 5-325 mg per tablet 1 Tablet every six (6) hours as needed for Pain. !! albuterol (PROVENTIL VENTOLIN) 2.5 mg /3 mL (0.083 %) nebu 2.5 mg by Nebulization route every two (2) hours as needed for Wheezing.  Eosinophilic asthma      !! albuterol (PROVENTIL VENTOLIN) 2.5 mg /3 mL (0.083 %) nebu 2.5 mg by Nebulization route four (4) times daily. Indications: chronic obstructive pulmonary disease      aspirin delayed-release 81 mg tablet Take 81 mg by mouth daily. buPROPion SR (WELLBUTRIN SR) 150 mg SR tablet Take 300 mg by mouth daily. gabapentin (NEURONTIN) 400 mg capsule Take 400 mg by mouth three (3) times daily. levothyroxine (SYNTHROID) 137 mcg tablet Take 137 mcg by mouth Daily (before breakfast). honey (MediHoney, honey,) 100 % topical paste Apply  to affected area daily. Topical, once daily, wound care      simethicone (GAS-X) 125 mg chewable tablet Take 125 mg by mouth every six (6) hours as needed for Flatulence. tiotropium bromide (SPIRIVA RESPIMAT) 2.5 mcg/actuation inhaler Take 2 Puffs by inhalation every evening. traZODone (DESYREL) 50 mg tablet Take 25 mg by mouth every other day. QHS- attempted dose reduction      nystatin (MYCOSTATIN) topical cream Apply  to affected area two (2) times a day. Abdominal folds      acetaminophen (TYLENOL) 500 mg tablet Take 1,000 mg by mouth three (3) times daily. miconazole (MICOTIN) 2 % topical powder Apply  to affected area two (2) times a day. Qty: 1 Each, Refills: 0      loratadine (CLARITIN) 10 mg tablet Take 10 mg by mouth daily. latanoprost (XALATAN) 0.005 % ophthalmic solution Administer 1 Drop to both eyes nightly. multivitamin (ONE A DAY) tablet Take 1 Tablet by mouth daily. montelukast (SINGULAIR) 10 mg tablet Take 10 mg by mouth At bedtime. albuterol (PROVENTIL HFA, VENTOLIN HFA, PROAIR HFA) 90 mcg/actuation inhaler Take 2 Puffs by inhalation every four (4) hours as needed. cholecalciferol (VITAMIN D3) (1000 Units /25 mcg) tablet Take 1,000 Units by mouth daily. polyethylene glycol (MIRALAX) 17 gram packet Take 8.5 g by mouth daily.       lansoprazole (PREVACID SOLUTAB) 30 mg disintegrating tablet Take 30 mg by mouth daily. budesonide-formoteroL (SYMBICORT) 160-4.5 mcg/actuation HFAA Take 2 Puffs by inhalation two (2) times a day. cycloSPORINE (RESTASIS) 0.05 % dpet Administer 1 Drop to both eyes two (2) times a day. traMADoL (ULTRAM) 50 mg tablet Take 25 mg by mouth every four (4) hours as needed. !! - Potential duplicate medications found. Please discuss with provider. STOP taking these medications       BUPROPION HCL (WELLBUTRIN XL PO) Comments:   Reason for Stopping: Follow-up Information       Follow up With Specialties Details Why Contact Info    Jose E Marion MD Cardio Vascular Surgery, Cardiovascular Disease Physician, Interventional Cardiology Physician Go on 1/26/2023 at 10:45 am on 1/26/23 [address] [facility],[address] [facility],[address]  [address] [place] [address], [facility], [address] [phone]            Signed By: Zach Hill MD     January 22, 2023      Greater than 30 minutes spent on discharge management.

## 2023-01-23 ENCOUNTER — PATIENT OUTREACH (OUTPATIENT)
Dept: CASE MANAGEMENT | Age: 78
End: 2023-01-23

## 2023-01-23 NOTE — PROGRESS NOTES
Care Transitions Initial Call    Call within 2 business days of discharge: Yes     Patient: Lily Cochran Patient : 1945 MRN: [id number]    Last Discharge [address]       Date Complaint Diagnosis Description Type Department Provider    23 Wheezing; Shortness of Breath NSTEMI (non-ST elevated myocardial infarction) ([facility] . .. ED to Hosp-Admission (Discharged) (ADMIT) [id number] Kaitlin Uribe MD; Gillian Vo... Was this an external facility discharge? No   Challenges to be reviewed by the provider   Additional needs identified to be addressed with provider: no  advance care planning- Patient does not have an ACP or HIPAA on EPIC       Method of communication with provider : none    Discussed COVID-19 related testing which was not done at this time. Test results were not done. Patient informed of results, if available? no     Advance Care Planning:   Does patient have an Advance Directive: not on file. Inpatient Readmission Risk score: Unplanned Readmit Risk Score: 15.5    Was this a readmission? no   Patient stated reason for the admission:     Patients top risk factors for readmission: medical condition-CP, HF, Asthma, HTN, goyal complication, TIA    Interventions to address risk factors: Scheduled appointment with PCP-Patient see's Dr Cheryl Tapia who is Medical Director at [facility], Scheduled appointment with Specialist-Cards Dr Peggy Anderson 23 at 10:45 am , and Assessment and support for treatment adherence and medication management-CP, HF, Asthma, HTN, goyal complication, TIA      Care Transition Nurse (CTN) contacted the  Nurse Wood  by telephone to perform post hospital discharge assessment. Verified name and  with  Nurse Wood  as identifiers. Provided introduction to self, and explanation of the CTN role. CTN reviewed discharge instructions, medical action plan  with  Nurse Wood  who verbalized understanding. Were discharge instructions available to patient? yes. Reviewed appropriate site of care based on symptoms and resources available to patient including: PCP and Specialist.  Nurse Wood  given an opportunity to ask questions and does not have any further questions or concerns at this time. Medication reconciliation was performed with  Nurse Wood , who verbalizes understanding of administration of home medications. Referral to Pharm D needed: no     Home Health/Outpatient orders at discharge: none      Durable Medical Equipment ordered at discharge: None  Was patient discharged with a pulse oximeter? no    Discussed follow-up appointments. If no appointment was previously scheduled, appointment scheduling offered: yes. Is follow up appointment scheduled within 7 days of discharge? yes. [facility] follow up appointment(s): No future appointments. Non-[facility] follow up appointment(s): Dr Tasneem Tolentino 1/26 at 10:45 am.  Patient see's Dr Giuliano Rose Director at [facility] for follow-up call in 3-5 days based on severity of symptoms and risk factors. CTN provided contact information for future needs. Goals Addressed                   This Visit's Progress     Attends follow-up appointments as directed. 01/23/23   Patient see's Dr Giuliano Rose Director at [address] [place] [address], does not have a set appt yet. Patient has an appt with Cards Dr Tasneem Tolentino on 1/26/23 at 10:45am, Nurse Wood notified of this appt. Monitor status of these appt on next call. Mindy Fisher MSN, RN, [facility] / Care Transition Nurse / [phone]        Prevent complications post hospitalization. 01/23/23   Patient is long term patient at [facility]. Called and spoke to patient nurse Nina, medication reconciliation completed. Nina denies any C/P or SOB observed when with patient.   Attend weekly Zoom calls with [facility] on Friday at 8 am.    Mindy Fisher MSN, RN, CCM / Care Transition Nurse / [phone]

## 2023-01-26 ENCOUNTER — APPOINTMENT (OUTPATIENT)
Dept: GENERAL RADIOLOGY | Age: 78
DRG: 391 | End: 2023-01-26
Attending: STUDENT IN AN ORGANIZED HEALTH CARE EDUCATION/TRAINING PROGRAM
Payer: MEDICARE

## 2023-01-26 ENCOUNTER — HOSPITAL ENCOUNTER (INPATIENT)
Age: 78
LOS: 5 days | Discharge: NURSING, REHAB OR LONG-TERM CARE | DRG: 391 | End: 2023-02-01
Attending: STUDENT IN AN ORGANIZED HEALTH CARE EDUCATION/TRAINING PROGRAM | Admitting: FAMILY MEDICINE
Payer: MEDICARE

## 2023-01-26 DIAGNOSIS — R05.3 CHRONIC COUGH: ICD-10-CM

## 2023-01-26 DIAGNOSIS — T17.308A CHOKING, INITIAL ENCOUNTER: ICD-10-CM

## 2023-01-26 DIAGNOSIS — R13.10 DYSPHAGIA, UNSPECIFIED TYPE: Primary | ICD-10-CM

## 2023-01-26 LAB
ANION GAP SERPL CALC-SCNC: 8 MMOL/L (ref 5–15)
BASOPHILS # BLD: 0 K/UL (ref 0–0.1)
BASOPHILS NFR BLD: 0 % (ref 0–1)
BNP SERPL-MCNC: 294 PG/ML
BUN SERPL-MCNC: 28 MG/DL (ref 6–20)
BUN/CREAT SERPL: 29 (ref 12–20)
CALCIUM SERPL-MCNC: 9 MG/DL (ref 8.5–10.1)
CHLORIDE SERPL-SCNC: 96 MMOL/L (ref 97–108)
CO2 SERPL-SCNC: 32 MMOL/L (ref 21–32)
COMMENT, HOLDF: NORMAL
COMMENT, HOLDF: NORMAL
CREAT SERPL-MCNC: 0.96 MG/DL (ref 0.55–1.02)
DIFFERENTIAL METHOD BLD: ABNORMAL
EOSINOPHIL # BLD: 0 K/UL (ref 0–0.4)
EOSINOPHIL NFR BLD: 0 % (ref 0–7)
ERYTHROCYTE [DISTWIDTH] IN BLOOD BY AUTOMATED COUNT: 14.9 % (ref 11.5–14.5)
GLUCOSE SERPL-MCNC: 134 MG/DL (ref 65–100)
HCT VFR BLD AUTO: 40.5 % (ref 35–47)
HGB BLD-MCNC: 12.8 G/DL (ref 11.5–16)
IMM GRANULOCYTES # BLD AUTO: 0.2 K/UL (ref 0–0.04)
IMM GRANULOCYTES NFR BLD AUTO: 2 % (ref 0–0.5)
LACTATE SERPL-SCNC: 1.8 MMOL/L (ref 0.4–2)
LYMPHOCYTES # BLD: 1.1 K/UL (ref 0.8–3.5)
LYMPHOCYTES NFR BLD: 8 % (ref 12–49)
MCH RBC QN AUTO: 29.4 PG (ref 26–34)
MCHC RBC AUTO-ENTMCNC: 31.6 G/DL (ref 30–36.5)
MCV RBC AUTO: 93.1 FL (ref 80–99)
MONOCYTES # BLD: 0.6 K/UL (ref 0–1)
MONOCYTES NFR BLD: 5 % (ref 5–13)
NEUTS SEG # BLD: 12.3 K/UL (ref 1.8–8)
NEUTS SEG NFR BLD: 85 % (ref 32–75)
NRBC # BLD: 0 K/UL (ref 0–0.01)
NRBC BLD-RTO: 0 PER 100 WBC
PLATELET # BLD AUTO: 318 K/UL (ref 150–400)
PMV BLD AUTO: 10.2 FL (ref 8.9–12.9)
POTASSIUM SERPL-SCNC: 4.2 MMOL/L (ref 3.5–5.1)
RBC # BLD AUTO: 4.35 M/UL (ref 3.8–5.2)
SAMPLES BEING HELD,HOLD: NORMAL
SAMPLES BEING HELD,HOLD: NORMAL
SODIUM SERPL-SCNC: 136 MMOL/L (ref 136–145)
TROPONIN-HIGH SENSITIVITY: 25 NG/L (ref 0–51)
WBC # BLD AUTO: 14.3 K/UL (ref 3.6–11)

## 2023-01-26 PROCEDURE — 84484 ASSAY OF TROPONIN QUANT: CPT

## 2023-01-26 PROCEDURE — 99285 EMERGENCY DEPT VISIT HI MDM: CPT

## 2023-01-26 PROCEDURE — 36415 COLL VENOUS BLD VENIPUNCTURE: CPT

## 2023-01-26 PROCEDURE — 85025 COMPLETE CBC W/AUTO DIFF WBC: CPT

## 2023-01-26 PROCEDURE — 94640 AIRWAY INHALATION TREATMENT: CPT

## 2023-01-26 PROCEDURE — 71045 X-RAY EXAM CHEST 1 VIEW: CPT

## 2023-01-26 PROCEDURE — 80048 BASIC METABOLIC PNL TOTAL CA: CPT

## 2023-01-26 PROCEDURE — 93005 ELECTROCARDIOGRAM TRACING: CPT

## 2023-01-26 PROCEDURE — 83605 ASSAY OF LACTIC ACID: CPT

## 2023-01-26 PROCEDURE — 83880 ASSAY OF NATRIURETIC PEPTIDE: CPT

## 2023-01-27 ENCOUNTER — APPOINTMENT (OUTPATIENT)
Dept: GENERAL RADIOLOGY | Age: 78
DRG: 391 | End: 2023-01-27
Attending: STUDENT IN AN ORGANIZED HEALTH CARE EDUCATION/TRAINING PROGRAM
Payer: MEDICARE

## 2023-01-27 ENCOUNTER — PATIENT OUTREACH (OUTPATIENT)
Dept: CASE MANAGEMENT | Age: 78
End: 2023-01-27

## 2023-01-27 ENCOUNTER — APPOINTMENT (OUTPATIENT)
Dept: CT IMAGING | Age: 78
DRG: 391 | End: 2023-01-27
Attending: STUDENT IN AN ORGANIZED HEALTH CARE EDUCATION/TRAINING PROGRAM
Payer: MEDICARE

## 2023-01-27 PROBLEM — R13.10 DYSPHAGIA: Status: ACTIVE | Noted: 2023-01-27

## 2023-01-27 LAB
ATRIAL RATE: 85 BPM
CALCULATED P AXIS, ECG09: 37 DEGREES
CALCULATED R AXIS, ECG10: -11 DEGREES
CALCULATED T AXIS, ECG11: -16 DEGREES
DIAGNOSIS, 93000: NORMAL
P-R INTERVAL, ECG05: 156 MS
Q-T INTERVAL, ECG07: 380 MS
QRS DURATION, ECG06: 84 MS
QTC CALCULATION (BEZET), ECG08: 452 MS
VENTRICULAR RATE, ECG03: 85 BPM

## 2023-01-27 PROCEDURE — 74011250637 HC RX REV CODE- 250/637: Performed by: FAMILY MEDICINE

## 2023-01-27 PROCEDURE — 92610 EVALUATE SWALLOWING FUNCTION: CPT

## 2023-01-27 PROCEDURE — 74011000250 HC RX REV CODE- 250: Performed by: STUDENT IN AN ORGANIZED HEALTH CARE EDUCATION/TRAINING PROGRAM

## 2023-01-27 PROCEDURE — 94640 AIRWAY INHALATION TREATMENT: CPT

## 2023-01-27 PROCEDURE — 71250 CT THORAX DX C-: CPT

## 2023-01-27 PROCEDURE — 94664 DEMO&/EVAL PT USE INHALER: CPT

## 2023-01-27 PROCEDURE — 65270000046 HC RM TELEMETRY

## 2023-01-27 PROCEDURE — 74011000636 HC RX REV CODE- 636: Performed by: RADIOLOGY

## 2023-01-27 PROCEDURE — 70491 CT SOFT TISSUE NECK W/DYE: CPT

## 2023-01-27 PROCEDURE — 74011250636 HC RX REV CODE- 250/636: Performed by: STUDENT IN AN ORGANIZED HEALTH CARE EDUCATION/TRAINING PROGRAM

## 2023-01-27 PROCEDURE — 74011000250 HC RX REV CODE- 250: Performed by: FAMILY MEDICINE

## 2023-01-27 RX ORDER — FACIAL-BODY WIPES
10 EACH TOPICAL
COMMUNITY

## 2023-01-27 RX ORDER — ALBUTEROL SULFATE 0.83 MG/ML
2.5 SOLUTION RESPIRATORY (INHALATION) 4 TIMES DAILY
Status: DISCONTINUED | OUTPATIENT
Start: 2023-01-27 | End: 2023-01-27

## 2023-01-27 RX ORDER — SODIUM CHLORIDE 0.9 % (FLUSH) 0.9 %
5-40 SYRINGE (ML) INJECTION EVERY 8 HOURS
Status: DISCONTINUED | OUTPATIENT
Start: 2023-01-27 | End: 2023-02-01 | Stop reason: HOSPADM

## 2023-01-27 RX ORDER — SODIUM CHLORIDE 0.9 % (FLUSH) 0.9 %
5-40 SYRINGE (ML) INJECTION AS NEEDED
Status: DISCONTINUED | OUTPATIENT
Start: 2023-01-27 | End: 2023-02-01 | Stop reason: HOSPADM

## 2023-01-27 RX ORDER — CARVEDILOL 6.25 MG/1
6.25 TABLET ORAL 2 TIMES DAILY WITH MEALS
COMMUNITY

## 2023-01-27 RX ORDER — ASPIRIN 81 MG/1
81 TABLET ORAL DAILY
Status: DISCONTINUED | OUTPATIENT
Start: 2023-01-27 | End: 2023-02-01 | Stop reason: HOSPADM

## 2023-01-27 RX ORDER — BUPROPION HYDROCHLORIDE 150 MG/1
300 TABLET, EXTENDED RELEASE ORAL DAILY
Status: DISCONTINUED | OUTPATIENT
Start: 2023-01-27 | End: 2023-02-01 | Stop reason: HOSPADM

## 2023-01-27 RX ORDER — TRAMADOL HYDROCHLORIDE 50 MG/1
25 TABLET ORAL
Status: DISCONTINUED | OUTPATIENT
Start: 2023-01-27 | End: 2023-02-01 | Stop reason: HOSPADM

## 2023-01-27 RX ORDER — POLYETHYLENE GLYCOL 3350 17 G/17G
17 POWDER, FOR SOLUTION ORAL DAILY PRN
Status: DISCONTINUED | OUTPATIENT
Start: 2023-01-27 | End: 2023-02-01 | Stop reason: HOSPADM

## 2023-01-27 RX ORDER — ALBUTEROL SULFATE 0.83 MG/ML
2.5 SOLUTION RESPIRATORY (INHALATION)
Status: DISCONTINUED | OUTPATIENT
Start: 2023-01-27 | End: 2023-01-28

## 2023-01-27 RX ORDER — BUDESONIDE 0.5 MG/2ML
500 INHALANT ORAL
Status: DISCONTINUED | OUTPATIENT
Start: 2023-01-27 | End: 2023-02-01 | Stop reason: HOSPADM

## 2023-01-27 RX ORDER — FUROSEMIDE 40 MG/1
40 TABLET ORAL DAILY
Status: DISCONTINUED | OUTPATIENT
Start: 2023-01-27 | End: 2023-02-01 | Stop reason: HOSPADM

## 2023-01-27 RX ORDER — PANTOPRAZOLE SODIUM 40 MG/1
40 TABLET, DELAYED RELEASE ORAL
Status: DISCONTINUED | OUTPATIENT
Start: 2023-01-27 | End: 2023-02-01 | Stop reason: HOSPADM

## 2023-01-27 RX ORDER — MORPHINE SULFATE 2 MG/ML
1 INJECTION, SOLUTION INTRAMUSCULAR; INTRAVENOUS
Status: DISCONTINUED | OUTPATIENT
Start: 2023-01-27 | End: 2023-02-01 | Stop reason: HOSPADM

## 2023-01-27 RX ORDER — HYDROCODONE BITARTRATE AND ACETAMINOPHEN 5; 325 MG/1; MG/1
1 TABLET ORAL
Status: DISCONTINUED | OUTPATIENT
Start: 2023-01-27 | End: 2023-01-27 | Stop reason: SDUPTHER

## 2023-01-27 RX ORDER — POTASSIUM CHLORIDE 750 MG/1
10 TABLET, FILM COATED, EXTENDED RELEASE ORAL DAILY
COMMUNITY

## 2023-01-27 RX ORDER — ACETAMINOPHEN 325 MG/1
650 TABLET ORAL
Status: DISCONTINUED | OUTPATIENT
Start: 2023-01-27 | End: 2023-02-01 | Stop reason: HOSPADM

## 2023-01-27 RX ORDER — BACLOFEN 10 MG/1
5 TABLET ORAL 2 TIMES DAILY
Status: DISCONTINUED | OUTPATIENT
Start: 2023-01-27 | End: 2023-02-01 | Stop reason: HOSPADM

## 2023-01-27 RX ORDER — NAPROXEN 500 MG/1
500 TABLET ORAL 2 TIMES DAILY
COMMUNITY

## 2023-01-27 RX ORDER — GABAPENTIN 400 MG/1
400 CAPSULE ORAL 3 TIMES DAILY
Status: DISCONTINUED | OUTPATIENT
Start: 2023-01-27 | End: 2023-02-01 | Stop reason: HOSPADM

## 2023-01-27 RX ORDER — ALBUTEROL SULFATE 0.83 MG/ML
2.5 SOLUTION RESPIRATORY (INHALATION)
Status: DISCONTINUED | OUTPATIENT
Start: 2023-01-27 | End: 2023-02-01 | Stop reason: HOSPADM

## 2023-01-27 RX ORDER — IPRATROPIUM BROMIDE 0.5 MG/2.5ML
0.5 SOLUTION RESPIRATORY (INHALATION)
Status: DISCONTINUED | OUTPATIENT
Start: 2023-01-27 | End: 2023-02-01 | Stop reason: HOSPADM

## 2023-01-27 RX ORDER — LIDOCAINE 4 G/100G
2 PATCH TOPICAL EVERY 24 HOURS
Status: DISCONTINUED | OUTPATIENT
Start: 2023-01-27 | End: 2023-02-01 | Stop reason: HOSPADM

## 2023-01-27 RX ORDER — ACETAMINOPHEN 650 MG/1
650 SUPPOSITORY RECTAL
Status: DISCONTINUED | OUTPATIENT
Start: 2023-01-27 | End: 2023-02-01 | Stop reason: HOSPADM

## 2023-01-27 RX ORDER — CETIRIZINE HYDROCHLORIDE 10 MG/1
10 TABLET ORAL DAILY
Status: DISCONTINUED | OUTPATIENT
Start: 2023-01-27 | End: 2023-02-01 | Stop reason: HOSPADM

## 2023-01-27 RX ORDER — PREDNISONE 20 MG/1
20 TABLET ORAL DAILY
Status: DISCONTINUED | OUTPATIENT
Start: 2023-01-27 | End: 2023-02-01 | Stop reason: HOSPADM

## 2023-01-27 RX ORDER — ONDANSETRON 2 MG/ML
4 INJECTION INTRAMUSCULAR; INTRAVENOUS
Status: DISCONTINUED | OUTPATIENT
Start: 2023-01-27 | End: 2023-02-01 | Stop reason: HOSPADM

## 2023-01-27 RX ORDER — ARFORMOTEROL TARTRATE 15 UG/2ML
15 SOLUTION RESPIRATORY (INHALATION)
Status: DISCONTINUED | OUTPATIENT
Start: 2023-01-27 | End: 2023-02-01 | Stop reason: HOSPADM

## 2023-01-27 RX ORDER — BUDESONIDE AND FORMOTEROL FUMARATE DIHYDRATE 160; 4.5 UG/1; UG/1
2 AEROSOL RESPIRATORY (INHALATION) 2 TIMES DAILY
Status: DISCONTINUED | OUTPATIENT
Start: 2023-01-27 | End: 2023-01-27 | Stop reason: CLARIF

## 2023-01-27 RX ORDER — LATANOPROST 50 UG/ML
1 SOLUTION/ DROPS OPHTHALMIC
Status: DISCONTINUED | OUTPATIENT
Start: 2023-01-27 | End: 2023-02-01 | Stop reason: HOSPADM

## 2023-01-27 RX ORDER — MONTELUKAST SODIUM 10 MG/1
10 TABLET ORAL
Status: DISCONTINUED | OUTPATIENT
Start: 2023-01-27 | End: 2023-02-01 | Stop reason: HOSPADM

## 2023-01-27 RX ORDER — CARVEDILOL 12.5 MG/1
12.5 TABLET ORAL 2 TIMES DAILY WITH MEALS
Status: DISCONTINUED | OUTPATIENT
Start: 2023-01-27 | End: 2023-02-01 | Stop reason: HOSPADM

## 2023-01-27 RX ORDER — ONDANSETRON 4 MG/1
4 TABLET, ORALLY DISINTEGRATING ORAL
Status: DISCONTINUED | OUTPATIENT
Start: 2023-01-27 | End: 2023-02-01 | Stop reason: HOSPADM

## 2023-01-27 RX ADMIN — Medication 10 ML: at 07:27

## 2023-01-27 RX ADMIN — IPRATROPIUM BROMIDE 0.5 MG: 0.5 SOLUTION RESPIRATORY (INHALATION) at 21:22

## 2023-01-27 RX ADMIN — BACLOFEN 5 MG: 10 TABLET ORAL at 21:58

## 2023-01-27 RX ADMIN — IOPAMIDOL 100 ML: 612 INJECTION, SOLUTION INTRAVENOUS at 00:45

## 2023-01-27 RX ADMIN — IPRATROPIUM BROMIDE 0.5 MG: 0.5 SOLUTION RESPIRATORY (INHALATION) at 09:04

## 2023-01-27 RX ADMIN — GABAPENTIN 400 MG: 400 CAPSULE ORAL at 21:58

## 2023-01-27 RX ADMIN — ALBUTEROL SULFATE 2.5 MG: 2.5 SOLUTION RESPIRATORY (INHALATION) at 09:04

## 2023-01-27 RX ADMIN — MORPHINE SULFATE 1 MG: 2 INJECTION, SOLUTION INTRAMUSCULAR; INTRAVENOUS at 13:32

## 2023-01-27 RX ADMIN — Medication 10 ML: at 22:05

## 2023-01-27 RX ADMIN — LATANOPROST 1 DROP: 50 SOLUTION OPHTHALMIC at 22:09

## 2023-01-27 RX ADMIN — BUDESONIDE 500 MCG: 0.5 INHALANT RESPIRATORY (INHALATION) at 21:22

## 2023-01-27 RX ADMIN — IPRATROPIUM BROMIDE 0.5 MG: 0.5 SOLUTION RESPIRATORY (INHALATION) at 15:10

## 2023-01-27 RX ADMIN — MONTELUKAST 10 MG: 10 TABLET, FILM COATED ORAL at 21:58

## 2023-01-27 RX ADMIN — ARFORMOTEROL TARTRATE 15 MCG: 15 SOLUTION RESPIRATORY (INHALATION) at 21:22

## 2023-01-27 RX ADMIN — ARFORMOTEROL TARTRATE 15 MCG: 15 SOLUTION RESPIRATORY (INHALATION) at 09:04

## 2023-01-27 RX ADMIN — MORPHINE SULFATE 1 MG: 2 INJECTION, SOLUTION INTRAMUSCULAR; INTRAVENOUS at 18:30

## 2023-01-27 RX ADMIN — TRAMADOL HYDROCHLORIDE 25 MG: 50 TABLET ORAL at 22:04

## 2023-01-27 RX ADMIN — ALBUTEROL SULFATE 2.5 MG: 2.5 SOLUTION RESPIRATORY (INHALATION) at 11:18

## 2023-01-27 RX ADMIN — ALBUTEROL SULFATE 2.5 MG: 2.5 SOLUTION RESPIRATORY (INHALATION) at 21:23

## 2023-01-27 RX ADMIN — BUDESONIDE 500 MCG: 0.5 INHALANT RESPIRATORY (INHALATION) at 09:04

## 2023-01-27 NOTE — PROGRESS NOTES
[address] [facility] Adult  Hospitalist Group                                                                                          Hospitalist Progress Note  Des Phoenix MD  Answering service: [phone] OR [phone] from in house phone        Date of Service:  2023  NAME:  David Jacinto  :  1945  MRN:  [id number]       Admission Summary:   HPI: \"Nancy Tomasa Leventhal is a 76 y.o. female with pmhx HFpEF, asthma, depression, GERD, HTN, hyopthyroidism, and RA who presents with shortness of breath, and ?dysphagia. She was previously hospitalized from  to  for type II NSTEMI with acute respiratory hypoxic failure. States that since discharge from the hospital, when she eats, she developed acute onset shortness of breath, and difficulty swallowing. It is unclear if the precipitating event is difficulty swallowing, or shortness of breath. Per last hospital note, she does wear 3 L of oxygen nasal cannula chronically     In the ED, VSS. Labs show WBC 14.3, and probnp is now normal. CXR, and CT neck are negative. \"      Interval history / Subjective:   Patient seen examined at bedside earlier. Endorses difficulty with swallowing solid food, daughter was also present at bedside     Assessment & Plan: #HFpEF  #chronic hypoxic respiratory failure   -pt.  With spo2 97% on baseline 3Lnc  -probnp improved  -continue  lasix PO 40mg, and carvedilol     #dysphagia  -speech consult to evaluate for aspiration > dysphagia diet  -aspiration precautions  -Ordered barium swallow  -consult GI eval pending      #Asthma  -continue home symbicort,, singulair, tiotropium and prednisone     #hypothyroidism  -continue home levothyroxine     #GERD  -continue PPI     #Fibromyalgia  #depression  -continue baclofen, gabapentin, and wellbutrin     #glaucoma  -continue xalatan        Code status: full   Prophylaxis: [address] Noriega discussed with: Patient/family, nurse, case management  Anticipated Disposition: Greater than 48 hours, snf when ready   Inpatient  Cardiac monitoring: [facility] Problems  Date Reviewed: 12/4/2021            Codes Class Noted POA    Dysphagia ICD-10-CM: R13.10  ICD-9-CM: 787.20  1/27/2023 Unknown           Social Determinants of Health     Tobacco Use: Medium Risk    Smoking Tobacco Use: Former    Smokeless Tobacco Use: Never    Passive Exposure: Not on file   Alcohol Use: Not on file   Financial Resource Strain: Not on file   Food Insecurity: Not on file   Transportation Needs: Not on file   Physical Activity: Not on file   Stress: Not on file   Social Connections: Not on file   Intimate Partner Violence: Not on file   Depression: Not at risk    PHQ-2 Score: 0   Housing Stability: Not on file       Review of Systems:   A comprehensive review of systems was negative except for that written in the HPI. Vital Signs:    Last 24hrs VS reviewed since prior progress note. Most recent are:  Visit Vitals  /80   Pulse 96   Temp 98 °F (36.7 °C)   Resp 16   SpO2 95%         Intake/Output Summary (Last 24 hours) at 1/27/2023 1638  Last data filed at 1/27/2023 1042  Gross per 24 hour   Intake --   Output 1900 ml   Net -1900 ml        Physical Examination:     I had a face to face encounter with this patient and independently examined them on 1/27/2023 as outlined below:          Constitutional:  No acute distress, cooperative, pleasant    ENT:  Oral mucosa moist, oropharynx benign. Resp:  Mild expiratory wheeze. No wheezing/rhonchi/rales. No accessory muscle use. CV:  Regular rhythm, normal rate, no murmurs, gallops, rubs    GI:  Soft, non distended, non tender. normoactive bowel sounds, no hepatosplenomegaly     Musculoskeletal:  No edema, warm, 2+ pulses throughout    Neurologic:  Moves all extremities.   AAOx3, CN II-XII reviewed            Data Review:    Review and/or order of clinical lab test  Review and/or order of tests in the radiology section of CPT  Review and/or order of tests in the medicine section of CPT    I have independently reviewed and interpreted patient's lab and all other diagnostic data    Labs:     Recent Labs     01/26/23 2156   WBC 14.3*   HGB 12.8   HCT 40.5        Recent Labs     01/26/23 2156      K 4.2   CL 96*   CO2 32   BUN 28*   CREA 0.96   *   CA 9.0     No results for input(s): ALT, AP, TBIL, TBILI, TP, ALB, GLOB, GGT, AML, LPSE in the last 72 hours. No lab exists for component: SGOT, GPT, AMYP, HLPSE  No results for input(s): INR, PTP, APTT, INREXT in the last 72 hours. No results for input(s): FE, TIBC, PSAT, FERR in the last 72 hours. No results found for: FOL, RBCF   No results for input(s): PH, PCO2, PO2 in the last 72 hours. No results for input(s): CPK, CKNDX, TROIQ in the last 72 hours.     No lab exists for component: CPKMB  No results found for: CHOL, CHOLX, CHLST, CHOLV, HDL, HDLP, LDL, LDLC, DLDLP, TGLX, TRIGL, TRIGP, CHHD, CHHDX  Lab Results   Component Value Date/Time    Glucose (POC) 202 (H) 01/19/2023 04:20 PM    Glucose (POC) 112 12/25/2021 12:31 PM    Glucose (POC) 126 (H) 02/14/2021 04:29 PM    Glucose (POC) 103 (H) 02/14/2021 11:04 AM    Glucose (POC) 72 02/14/2021 06:15 AM     Lab Results   Component Value Date/Time    Color DARK YELLOW 11/30/2021 10:45 PM    Appearance TURBID (A) 11/30/2021 10:45 PM    Specific gravity 1.020 11/30/2021 10:45 PM    Specific gravity 1.020 02/08/2021 06:45 PM    pH (UA) 5.0 11/30/2021 10:45 PM    Protein Negative 11/30/2021 10:45 PM    Glucose Negative 11/30/2021 10:45 PM    Ketone Negative 11/30/2021 10:45 PM    Bilirubin Negative 11/30/2021 10:45 PM    Urobilinogen 0.2 11/30/2021 10:45 PM    Nitrites Negative 11/30/2021 10:45 PM    Leukocyte Esterase Negative 11/30/2021 10:45 PM    Epithelial cells MANY (A) 11/30/2021 10:45 PM    Bacteria 1+ (A) 11/30/2021 10:45 PM    WBC 10-20 11/30/2021 10:45 PM    RBC 5-10 11/30/2021 10:45 PM       Notes reviewed from all clinical/nonclinical/nursing services involved in patient's clinical care. Care coordination discussions were held with appropriate clinical/nonclinical/ nursing providers based on care coordination needs. Patients current active Medications were reviewed, considered, added and adjusted based on the clinical condition today. Home Medications were reconciled to the best of my ability given all available resources at the time of admission. Route is PO if not otherwise noted.       Medications Reviewed:     Current Facility-Administered Medications   Medication Dose Route Frequency    sodium chloride (NS) flush 5-40 mL  5-40 mL IntraVENous Q8H    sodium chloride (NS) flush 5-40 mL  5-40 mL IntraVENous PRN    acetaminophen (TYLENOL) tablet 650 mg  650 mg Oral Q6H PRN    Or    acetaminophen (TYLENOL) suppository 650 mg  650 mg Rectal Q6H PRN    polyethylene glycol (MIRALAX) packet 17 g  17 g Oral DAILY PRN    ondansetron (ZOFRAN ODT) tablet 4 mg  4 mg Oral Q8H PRN    Or    ondansetron (ZOFRAN) injection 4 mg  4 mg IntraVENous Q6H PRN    carvediloL (COREG) tablet 12.5 mg  12.5 mg Oral BID WITH MEALS    furosemide (LASIX) tablet 40 mg  40 mg Oral DAILY    traMADoL (ULTRAM) tablet 25 mg  25 mg Oral Q4H PRN    albuterol (PROVENTIL VENTOLIN) nebulizer solution 2.5 mg  2.5 mg Nebulization Q2H PRN    aspirin delayed-release tablet 81 mg  81 mg Oral DAILY    baclofen (LIORESAL) tablet 5 mg  5 mg Oral BID    buPROPion SR (WELLBUTRIN SR) tablet 300 mg  300 mg Oral DAILY    gabapentin (NEURONTIN) capsule 400 mg  400 mg Oral TID    pantoprazole (PROTONIX) tablet 40 mg  40 mg Oral ACB    latanoprost (XALATAN) 0.005 % ophthalmic solution 1 Drop  1 Drop Both Eyes QHS    levothyroxine (SYNTHROID) tablet 137 mcg  137 mcg Oral ACB    cetirizine (ZYRTEC) tablet 10 mg  10 mg Oral DAILY    montelukast (SINGULAIR) tablet 10 mg  10 mg Oral QHS    predniSONE (DELTASONE) tablet 20 mg  20 mg Oral DAILY    ipratropium (ATROVENT) 0.02 % nebulizer solution 0.5 mg  0.5 mg Nebulization TID RT    arformoteroL (BROVANA) neb solution 15 mcg  15 mcg Nebulization BID RT    And    budesonide (PULMICORT) 500 mcg/2 ml nebulizer suspension  500 mcg Nebulization BID RT    albuterol (PROVENTIL VENTOLIN) nebulizer solution 2.5 mg  2.5 mg Nebulization QID RT    lidocaine 4 % patch 2 Patch  2 Patch TransDERmal Q24H    morphine injection 1 mg  1 mg IntraVENous Q4H PRN     Current Outpatient Medications   Medication Sig    therapeutic multivitamin-minerals (THERAGRAN-M) tablet Take 1 Tablet by mouth daily. naproxen (NAPROSYN) 500 mg tablet Take 500 mg by mouth two (2) times a day. potassium chloride SR (KLOR-CON 10) 10 mEq tablet Take 10 mEq by mouth daily. bisacodyL (DULCOLAX) 10 mg supp Insert 10 mg into rectum daily as needed for Constipation. sodium phosphate (FLEET'S) 19-7 gram/118 mL enema Insert 1 Enema into rectum daily as needed. carvediloL (COREG) 6.25 mg tablet Take 6.25 mg by mouth two (2) times daily (with meals). furosemide (LASIX) 40 mg tablet Take 1 Tablet by mouth daily for 7 days. guaiFENesin ER (MUCINEX) 1,200 mg Ta12 ER tablet Take 1 Tablet by mouth every twelve (12) hours for 7 days. predniSONE (DELTASONE) 10 mg tablet Take 20 mg by mouth daily. baclofen 5 mg tab Take 5 mg by mouth every twelve (12) hours. baclofen (LIORESAL) 10 mg tablet Take 10 mg by mouth two (2) times daily as needed for Muscle Spasm(s). HYDROcodone-acetaminophen (NORCO) 5-325 mg per tablet 1 Tablet every four (4) hours as needed for Pain. albuterol (PROVENTIL VENTOLIN) 2.5 mg /3 mL (0.083 %) nebu 2.5 mg by Nebulization route every two (2) hours as needed for Wheezing. Eosinophilic asthma    albuterol (PROVENTIL VENTOLIN) 2.5 mg /3 mL (0.083 %) nebu 2.5 mg by Nebulization route four (4) times daily. Indications: chronic obstructive pulmonary disease    aspirin 81 mg chewable tablet Take 81 mg by mouth daily.     buPROPion SR (WELLBUTRIN SR) 150 mg SR tablet Take 300 mg by mouth daily. gabapentin (NEURONTIN) 400 mg capsule Take 400 mg by mouth three (3) times daily. levothyroxine (SYNTHROID) 137 mcg tablet Take 137 mcg by mouth Daily (before breakfast). honey (MediHoney, honey,) 100 % topical paste Apply  to affected area daily. Topical, once daily, wound care    tiotropium bromide (SPIRIVA RESPIMAT) 2.5 mcg/actuation inhaler Take 2 Puffs by inhalation every evening. acetaminophen (TYLENOL) 500 mg tablet Take 1,000 mg by mouth three (3) times daily. loratadine (CLARITIN) 10 mg tablet Take 10 mg by mouth daily. latanoprost (XALATAN) 0.005 % ophthalmic solution Administer 1 Drop to both eyes nightly. montelukast (SINGULAIR) 10 mg tablet Take 10 mg by mouth At bedtime. albuterol (PROVENTIL HFA, VENTOLIN HFA, PROAIR HFA) 90 mcg/actuation inhaler Take 2 Puffs by inhalation every four (4) hours as needed. cholecalciferol (VITAMIN D3) (1000 Units /25 mcg) tablet Take 1,000 Units by mouth daily. polyethylene glycol (MIRALAX) 17 gram packet Take 17 g by mouth daily as needed. lansoprazole (PREVACID SOLUTAB) 30 mg disintegrating tablet Take 30 mg by mouth daily. budesonide-formoteroL (SYMBICORT) 160-4.5 mcg/actuation HFAA Take 2 Puffs by inhalation two (2) times a day.     cycloSPORINE (RESTASIS) 0.05 % dpet Administer 1 Drop to both eyes two (2) times a day.     ______________________________________________________________________  EXPECTED LENGTH OF STAY: - - -  ACTUAL LENGTH OF STAY:          0                 Trevor Bright MD

## 2023-01-27 NOTE — ED NOTES
TRANSFER - OUT REPORT:    Verbal report given to [facility], RN(name) on Glen Schwartz  being transferred to (unit) for routine progression of care. Report consisted of patients Situation, Background, Assessment and   Recommendations(SBAR). Information from the following report(s) SBAR, Kardex, ED Summary, and Cardiac Rhythm SR  was reviewed with the receiving nurse. Lines:   Peripheral IV 01/26/23 Right Forearm (Active)   Site Assessment Clean, dry, & intact 01/26/23 2156   Phlebitis Assessment 0 01/26/23 2156   Infiltration Assessment 0 01/26/23 2156   Dressing Status Clean, dry, & intact 01/26/23 2156   Dressing Type Transparent 01/26/23 2156   Hub Color/Line Status Pink 01/26/23 2156        Opportunity for questions and clarification was provided.       Patient transported with:   [employer]

## 2023-01-27 NOTE — ED NOTES
Assumed care of pt at this time. Pt a+o. Remains on 2l NC satting at 95%. Pt using suction ad sarah to control secretions. Denies any chest pain or sob att.

## 2023-01-27 NOTE — ED TRIAGE NOTES
Pt presents to ED with worsening SOB since 1800. Recently discharged from [facility] for NSTEMI and CHF. Pt prescribed lasix but c/o increased fluid and distention in abdomen. Pt non-ambulatory, denies edema to extremities. Pt 92%  on RA on EMS arrival, states pt \"wears O2 as needed\". Pt arrives on 2L NC w/ sats 94-95%.

## 2023-01-27 NOTE — ED NOTES
Reached out to hospitalist regarding scheduled morning meds and concerns for choking/ aspiration. Hospitalist to place SLP consult.

## 2023-01-27 NOTE — H&P
[facility] Adult  Hospitalist Group  History and Physical    Date of Service:  1/27/2023  Primary Care Provider: Vincenzo So MD  Source of information: The patient and Chart review    Chief Complaint: Shortness of Breath (Worsening SOB starting at 1800. Admitted x2wks ago for NSTEMI and dx w/ CHF. Denies excess fluid in extremities but c/o abd distention.)      History of Presenting Illness:   Bc Monaco is a 76 y.o. female with pmhx HFpEF, asthma, depression, GERD, HTN, hyopthyroidism, and RA who presents with shortness of breath, and ?dysphagia. She was previously hospitalized from January 17 to January 22 for type II NSTEMI with acute respiratory hypoxic failure. States that since discharge from the hospital, when she eats, she developed acute onset shortness of breath, and difficulty swallowing. It is unclear if the precipitating event is difficulty swallowing, or shortness of breath. Per last hospital note, she does wear 3 L of oxygen nasal cannula chronically    In the ED, VSS. Labs show WBC 14.3, and probnp is now normal. CXR, and CT neck are negative. REVIEW OF SYSTEMS:  A comprehensive review of systems was negative except for that written in the History of Present Illness. Past Medical History:   Diagnosis Date    Acute kidney failure (HCC)     Asthma     Depression     Fibromyalgia     GERD (gastroesophageal reflux disease)     GERD (gastroesophageal reflux disease)     Hypertension     Hypothyroid     Insomnia     Menopause 1995    Neurogenic bladder     Rheumatoid arteritis (HCC)     Rotator cuff tear       Past Surgical History:   Procedure Laterality Date    HX CHOLECYSTECTOMY      HX GYN      HX ORTHOPAEDIC      HX TONSIL AND ADENOIDECTOMY       Prior to Admission medications    Medication Sig Start Date End Date Taking? Authorizing Provider   carvediloL (COREG) 12.5 mg tablet Take 1 Tablet by mouth two (2) times daily (with meals) for 7 days.  1/22/23 1/29/23  Laura Uribe MD   furosemide (LASIX) 40 mg tablet Take 1 Tablet by mouth daily for 7 days. 1/22/23 1/29/23  Laura Uribe MD   guaiFENesin ER (MUCINEX) 1,200 mg Ta12 ER tablet Take 1 Tablet by mouth every twelve (12) hours for 7 days. 1/22/23 1/29/23  Laura Uribe MD   predniSONE (DELTASONE) 10 mg tablet Take 20 mg by mouth daily. Other, MD Marlyn   baclofen (LIORESAL) 10 mg tablet Take 5 mg by mouth two (2) times a day. OtherMarlyn MD   baclofen (LIORESAL) 10 mg tablet Take 10 mg by mouth two (2) times daily as needed for Muscle Spasm(s). OtherMarlyn MD   traMADoL (ULTRAM) 50 mg tablet Take 25 mg by mouth every four (4) hours as needed. 12/23/22   Marlyn Linn MD   HYDROcodone-acetaminophen (NORCO) 5-325 mg per tablet 1 Tablet every six (6) hours as needed for Pain. 12/24/22   OtherMarlyn MD   albuterol (PROVENTIL VENTOLIN) 2.5 mg /3 mL (0.083 %) nebu 2.5 mg by Nebulization route every two (2) hours as needed for Wheezing. Eosinophilic asthma    Provider, Historical   albuterol (PROVENTIL VENTOLIN) 2.5 mg /3 mL (0.083 %) nebu 2.5 mg by Nebulization route four (4) times daily. Indications: chronic obstructive pulmonary disease    Provider, Historical   aspirin delayed-release 81 mg tablet Take 81 mg by mouth daily. Provider, Historical   buPROPion SR (WELLBUTRIN SR) 150 mg SR tablet Take 300 mg by mouth daily. Provider, Historical   gabapentin (NEURONTIN) 400 mg capsule Take 400 mg by mouth three (3) times daily. Provider, Historical   levothyroxine (SYNTHROID) 137 mcg tablet Take 137 mcg by mouth Daily (before breakfast). Provider, Historical   honey (MediHoney, honey,) 100 % topical paste Apply  to affected area daily. Topical, once daily, wound care    Provider, Historical   simethicone (GAS-X) 125 mg chewable tablet Take 125 mg by mouth every six (6) hours as needed for Flatulence.   Patient not taking: Reported on 1/23/2023    Provider, Historical   tiotropium bromide (SPIRIVA RESPIMAT) 2.5 mcg/actuation inhaler Take 2 Puffs by inhalation every evening. Provider, Historical   traZODone (DESYREL) 50 mg tablet Take 25 mg by mouth every other day. QHS- attempted dose reduction  Patient not taking: Reported on 1/23/2023 1/17/23 1/31/23  Provider, Historical   nystatin (MYCOSTATIN) topical cream Apply  to affected area two (2) times a day. Abdominal folds  Patient not taking: Reported on 1/23/2023    Provider, Historical   acetaminophen (TYLENOL) 500 mg tablet Take 1,000 mg by mouth three (3) times daily. Provider, Historical   miconazole (MICOTIN) 2 % topical powder Apply  to affected area two (2) times a day. Patient not taking: Reported on 1/23/2023 1/3/22   Tree Ayala MD   loratadine (CLARITIN) 10 mg tablet Take 10 mg by mouth daily. Provider, Historical   latanoprost (XALATAN) 0.005 % ophthalmic solution Administer 1 Drop to both eyes nightly. 9/7/21   Marlyn Linn MD   multivitamin (ONE A DAY) tablet Take 1 Tablet by mouth daily. Marlyn Linn MD   montelukast (SINGULAIR) 10 mg tablet Take 10 mg by mouth At bedtime. Provider, Historical   albuterol (PROVENTIL HFA, VENTOLIN HFA, PROAIR HFA) 90 mcg/actuation inhaler Take 2 Puffs by inhalation every four (4) hours as needed. 7/7/11   Provider, Historical   cholecalciferol (VITAMIN D3) (1000 Units /25 mcg) tablet Take 1,000 Units by mouth daily. Provider, Historical   polyethylene glycol (MIRALAX) 17 gram packet Take 8.5 g by mouth daily. Provider, Historical   lansoprazole (PREVACID SOLUTAB) 30 mg disintegrating tablet Take 30 mg by mouth daily. Marlyn Linn MD   budesonide-formoteroL (SYMBICORT) 160-4.5 mcg/actuation HFAA Take 2 Puffs by inhalation two (2) times a day. Marlyn Linn MD   cycloSPORINE (RESTASIS) 0.05 % dpet Administer 1 Drop to both eyes two (2) times a day. Marlyn Linn MD     Allergies   Allergen Reactions    Pcn [Penicillins] Hives      No family history on file.    Social History:  reports that she has quit smoking. Her smoking use included cigarettes. She has a 15.00 pack-year smoking history. She has never used smokeless tobacco. She reports that she does not drink alcohol and does not use drugs. Social Determinants of Health     Tobacco Use: Medium Risk    Smoking Tobacco Use: Former    Smokeless Tobacco Use: Never    Passive Exposure: Not on file   Alcohol Use: Not on file   Financial Resource Strain: Not on file   Food Insecurity: Not on file   Transportation Needs: Not on file   Physical Activity: Not on file   Stress: Not on file   Social Connections: Not on file   Intimate Partner Violence: Not on file   Depression: Not at risk    PHQ-2 Score: 0   Housing Stability: Not on file        Medications were reconciled to the best of my ability given all available resources at the time of admission. Route is PO if not otherwise noted. Family and social history were personally reviewed, all pertinent and relevant details are outlined as above.     Objective:   Visit Vitals  BP (!) 135/119 (BP 1 Location: Left upper arm, BP Patient Position: At rest)   Pulse 85   Temp 98.2 °F (36.8 °C)   Resp 26   SpO2 98%           PHYSICAL EXAM:   General: Alert x oriented x 3, awake, no acute distress,   HEENT: PEERL, EOMI, moist mucus membranes  Neck: Supple, no JVD, no meningeal signs  Chest: Coarse rhonci left upper lobe  CVS: RRR, S1 S2 heard, no murmurs/rubs/gallops  Abd: Soft, non-tender, non-distended, +bowel sounds   Ext: No clubbing, no cyanosis, no edema  Neuro/Psych: Pleasant mood and affect, CN 2-12 grossly intact, sensory grossly within normal limit, Strength 5/5 in all extremities  Cap refill: Brisk, less than 3 seconds  Pulses: 2+ radial pulses  Skin: Warm, dry, without rashes or lesions    Data Review:   I have independently reviewed and interpreted patient's lab and all other diagnostic data    Abnormal Labs Reviewed   CBC WITH AUTOMATED DIFF - Abnormal; Notable for the following components:       Result Value    WBC 14.3 (*)     RDW 14.9 (*)     NEUTROPHILS 85 (*)     LYMPHOCYTES 8 (*)     IMMATURE GRANULOCYTES 2 (*)     ABS. NEUTROPHILS 12.3 (*)     ABS. IMM. GRANS. 0.2 (*)     All other components within normal limits   METABOLIC PANEL, BASIC - Abnormal; Notable for the following components:    Chloride 96 (*)     Glucose 134 (*)     BUN 28 (*)     BUN/Creatinine ratio 29 (*)     All other components within normal limits       All Micro Results       None            IMAGING:   CT NECK SOFT TISSUE W CONT   Final Result   No acute process or mass lesion is identified. XR CHEST PORT   Final Result   No acute cardiopulmonary disease radiographically. .  . ECG/ECHO:    Results for orders placed or performed during the hospital encounter of 01/17/23   EKG, 12 LEAD, INITIAL   Result Value Ref Range    Ventricular Rate 125 BPM    Atrial Rate 125 BPM    P-R Interval 142 ms    QRS Duration 106 ms    Q-T Interval 328 ms    QTC Calculation (Bezet) 473 ms    Calculated P Axis 59 degrees    Calculated R Axis -33 degrees    Calculated T Axis 93 degrees    Diagnosis       Sinus tachycardia  Left axis deviation  Left ventricular hypertrophy with repolarization abnormality ( R in aVL ,   [place] product , [employer] )  Abnormal ECG  When compared with ECG of 25-DEC-2021 14:53,  Vent. rate has increased BY  54 BPM  Questionable change in QRS duration  Confirmed by Uzair Yu ([id number]) on 1/18/2023 9:59:27 AM            Notes reviewed from all clinical/nonclinical/nursing services involved in patient's clinical care. Care coordination discussions were held with appropriate clinical/nonclinical/ nursing providers based on care coordination needs. Assessment:   Given the patient's current clinical presentation, there is a high level of concern for decompensation if discharged from the emergency department.  Complex decision making was performed, which includes reviewing the patient's available past medical records, laboratory results, and imaging studies. Active Problems:    Dysphagia (1/27/2023)        Plan: #HFpEF  #shortness of breath  -pt. With spo2 97% on baseline 3Lnc  -probnp improved  -continue  lasix PO 40mg, and carvedilol    #dysphagia  -speech consult to evaluate for aspiration  -aspiration precautions  -consult GI    #Asthma  -continue home symbicort,, singulair, tiotropium and prednisone    #hypothyroidism  -continue home levothyroxine    #GERD  -continue PPI    #Fibromyalgia  #depression  -continue baclofen, gabapentin, and wellbutrin    #glaucoma  -continue xalatan      DIET: No diet orders on file   ISOLATION PRECAUTIONS: There are currently no Active Isolations  CODE STATUS: Prior   DVT PROPHYLAXIS: Lovenox  ANTICIPATED DISCHARGE: 24-48 hours. ANTICIPATED DISPOSITION: Home  EMERGENCY CONTACT/SURROGATE DECISION MAKER: elizabeth Adams (child)      Signed By: Leny Landry MD     January 27, 2023         Please note that this dictation may have been completed with Dragon, the computer voice recognition software. Quite often unanticipated grammatical, syntax, homophones, and other interpretive errors are inadvertently transcribed by the computer software. Please disregard these errors. Please excuse any errors that have escaped final proofreading.

## 2023-01-27 NOTE — PROGRESS NOTES
TRANSFER - IN REPORT:    Verbal report received from Kevin Harry (name) on Marifer Rojas  being received from ED (unit) for routine progression of care      Report consisted of patients Situation, Background, Assessment and   Recommendations(SBAR). Information from the following report(s) SBAR, Kardex, ED Summary, Intake/Output, MAR, and Recent Results was reviewed with the receiving nurse. Opportunity for questions and clarification was provided. Assessment completed upon patients arrival to unit and care assumed.

## 2023-01-27 NOTE — ED NOTES
Bedside and Verbal shift change report given to Neena Gomez RN (oncoming nurse) by Talat Guerin RN (offgoing nurse). Report included the following information SBAR, Kardex, ED Summary, [facility] and Recent Results.

## 2023-01-27 NOTE — ED NOTES
Pt moved up in bed, pillows placed behind back for comfort. Godinez bag emptied, 1900 cc straw yellow urine. Further documentation reflected in emar.

## 2023-01-27 NOTE — ED NOTES
2337- Suction set up w/ erick. Patient educated on it's use to help clear her airway of mucus and other secretions.

## 2023-01-27 NOTE — PROGRESS NOTES
SPEECH PATHOLOGY BEDSIDE SWALLOW EVALUATION/DISCHARGE  Patient: Bc Monaco (69 y.o. female)  Date: 1/27/2023  Primary Diagnosis: Dysphagia [R13.10]       Precautions:      ASSESSMENT :  Based on the objective data described below, the patient presents with suspect functional oropharyngeal swallow with all consistencies, consistent with slp swallow eval completed during hospital admission last week (1/20/23). Suspect degree of esophageal dysphagia given reports of \"spasm sensation in chest\" and reports of getting choked on pills. She feels as though when these \"spasms\" occur, it takes her breath away. Patient with history of GERD. Patient with timely mastication of solid. Strong cough x1 on initial bite of cracker but this was not replicated. No overt s/s aspiration with successive straw sips of thin or puree. From an slp standpoint, ok for regular diet/ thin liquids. Note esophagram ordered; however, per radiology, no plans to complete this afternoon given full schedule. Informed Dr. Jag Galvan of no plans for esophagram this date and functional oropharyngeal swallow bedside. MD requesting dysphagia diet. Skilled acute therapy provided by a speech-language pathologist is not indicated at this time. PLAN :  Recommendations:  --ok for regular diet/ thin liquids from slp standpoint; however, MD requesting \"dysphagia diet\" at this time. Placed order for soft and bite sized diet/ thin liquids. Depending on results of testing, consider advancement as appropriate.   -- SLOW rate, alternate liquids/solids, stop with any shortness of breath  -- meds ONE at a time, recommend placing in applesauce for ease  -- STRICT UPRIGHT positioning with PO intake- pull up and reposition in bed if not up to wheelchair which is preferred  -- agree with further GI workup including barium esophagram (already ordered) to further assess for esophageal dysphagia    Discharge Recommendations: None     SUBJECTIVE:   Daughter at bedside reporting patient bedbound at baseline, often eats in bed and not always positioned appropriately for meals    OBJECTIVE:     Past Medical History:   Diagnosis Date    Acute kidney failure (HCC)     Asthma     Depression     Fibromyalgia     GERD (gastroesophageal reflux disease)     GERD (gastroesophageal reflux disease)     Hypertension     Hypothyroid     Insomnia     Menopause 1995    Neurogenic bladder     Rheumatoid arteritis ([facility] [address]     Rotator cuff tear      Past Surgical History:   Procedure Laterality Date    HX CHOLECYSTECTOMY      HX GYN      HX ORTHOPAEDIC      HX TONSIL AND ADENOIDECTOMY       Prior Level of Function/Home Situation:   Home Situation  Patient Expects to be Discharged to[de-identified] Home  Diet prior to admission: regular/thins  Current Diet:  npo with sips of thins    Cognitive and Communication Status:  Neurologic State: Alert  Orientation Level: Appropriate for age  Cognition: Appropriate decision making  Perception: Appears intact  Perseveration: No perseveration noted  Safety/Judgement: Awareness of environment  Oral Assessment:  Oral Assessment  Labial: No impairment  Dentition: Intact  Oral Hygiene: clean, moist  Lingual: No impairment  Velum: Unable to visualize  Mandible: No impairment  P.O. Trials:  Patient Position: up in bed  Vocal quality prior to P.O.: No impairment  Consistency Presented: Thin liquid; Solid;Puree  How Presented: Successive swallows;Straw;Self-fed/presented;Spoon     Bolus Acceptance: No impairment  Bolus Formation/Control: No impairment     Propulsion: No impairment  Oral Residue: None  Initiation of Swallow: No impairment  Laryngeal Elevation: Functional  Aspiration Signs/Symptoms: Strong cough (x1 following cracker)  Pharyngeal Phase Characteristics: No impairment, issues, or problems              Oral Phase Severity: No impairment  Pharyngeal Phase Severity : No impairment  NOMS:   The NOMS functional outcome measure was used to quantify this patient's level of swallowing impairment. Based on the NOMS, the patient was determined to be at level 7 for swallow function     NOMS Swallowing Levels:  Level 1 (CN): NPO  Level 2 (CM): NPO but takes consistency in therapy  Level 3 (CL): Takes less than 50% of nutrition p.o. and continues with nonoral feedings; and/or safe with mod cues; and/or max diet restriction  Level 4 (CK): Safe swallow but needs mod cues; and/or mod diet restriction; and/or still requires some nonoral feeding/supplements  Level 5 (CJ): Safe swallow with min diet restriction; and/or needs min cues  Level 6 (CI): Independent with p.o.; rare cues; usually self cues; may need to avoid some foods or needs extra time  Level 7 ([address] [place] [address]): Independent for all p.o.  DAE. (2003). National Outcomes Measurement System (NOMS): Adult Speech-Language Pathology User's Guide. Pain:  Pain Scale 1: Numeric (0 - 10)  Pain Intensity 1: 5  Pain Location 1: Back  After treatment:   Patient left in no apparent distress in bed, Call bell within reach, Nursing notified, and Caregiver / family present    COMMUNICATION/EDUCATION:       The patient's plan of care including recommendations, planned interventions, and recommended diet changes were discussed with: Registered nurse.      Thank you for this referral.  Rip Allen M.S., CCC-SLP  Time Calculation: 20 mins

## 2023-01-27 NOTE — ED PROVIDER NOTES
Patient is a 64-year-old female presented to ED with acute shortness of breath, chest tightness and coughing/choking when trying to swallow. Patient recent hospitalization for NSTEMI but likely CHF. As she has been taking her medications as prescribed at her facility. They did decrease her carvedilol because of some possible lightheadedness and low blood pressure. Patient a complex medical history and now currently bedbound. Patient has choking with swallowing and has to shift her head different directions to allow swallowing. The history is provided by the patient, medical records and a relative. Shortness of Breath  Associated symptoms include cough. Pertinent negatives include no fever. Past Medical History:   Diagnosis Date    Acute kidney failure (HCC)     Asthma     Depression     Fibromyalgia     GERD (gastroesophageal reflux disease)     GERD (gastroesophageal reflux disease)     Hypertension     Hypothyroid     Insomnia     Menopause 1995    Neurogenic bladder     Rheumatoid arteritis (HCC)     Rotator cuff tear        Past Surgical History:   Procedure Laterality Date    HX CHOLECYSTECTOMY      HX GYN      HX ORTHOPAEDIC      HX TONSIL AND ADENOIDECTOMY           No family history on file.     Social History     Socioeconomic History    Marital status:      Spouse name: Not on file    Number of children: Not on file    Years of education: Not on file    Highest education level: Not on file   Occupational History    Not on file   Tobacco Use    Smoking status: Former     Packs/day: 1.00     Years: 15.00     Pack years: 15.00     Types: Cigarettes    Smokeless tobacco: Never   Substance and Sexual Activity    Alcohol use: No    Drug use: No    Sexual activity: Not on file   Other Topics Concern    Not on file   Social History Narrative    Not on file     Social Determinants of Health     Financial Resource Strain: Not on file   Food Insecurity: Not on file   Transportation Needs: Not on file   Physical Activity: Not on file   Stress: Not on file   Social Connections: Not on file   Intimate Partner Violence: Not on file   Housing Stability: Not on file         ALLERGIES: Pcn [penicillins]    Review of Systems   Constitutional:  Positive for activity change and fatigue. Negative for fever. HENT:  Positive for trouble swallowing. Respiratory:  Positive for cough, choking and shortness of breath. Vitals:    01/26/23 2156 01/26/23 2330   BP: 115/77 (!) 135/119   Pulse: 84 85   Resp: 25 26   Temp: 98.2 °F (36.8 °C)    SpO2: 95% 98%            Physical Exam  Vitals and nursing note reviewed. Constitutional:       Appearance: Normal appearance. HENT:      Head: Normocephalic and atraumatic. Right Ear: External ear normal.      Left Ear: External ear normal.   Eyes:      Conjunctiva/sclera: Conjunctivae normal.   Neck:      Comments: Patient has intermittent episodes of difficulty swallowing liquids. She has to turn her head to allow swallowing. Symptoms ago and worsening for several weeks. Cardiovascular:      Rate and Rhythm: Normal rate and regular rhythm. Pulses: Normal pulses. Heart sounds: Normal heart sounds. Pulmonary:      Effort: Pulmonary effort is normal.      Breath sounds: Normal breath sounds. Chest:      Chest wall: No deformity or tenderness. Abdominal:      Palpations: Abdomen is soft. Tenderness: There is no abdominal tenderness. Musculoskeletal:         General: No deformity or signs of injury. Normal range of motion. Skin:     General: Skin is warm and dry. Coloration: Skin is not jaundiced. Neurological:      General: No focal deficit present. Mental Status: She is alert and oriented to person, place, and time. Psychiatric:         Mood and Affect: Mood normal.         Behavior: Behavior normal.        Medical Decision Making  Amount and/or Complexity of Data Reviewed  Labs: ordered.  Decision-making details documented in ED Course. Radiology: ordered. Risk  Prescription drug management. Decision regarding hospitalization. ED Course as of 01/27/23 0428   u Jan 26, 2023   2303 NT pro-BNP: 294 [AL]   2303 Troponin-High Sensitivity: 25 [AL]   4178 WBC(!): 14.3  Known steroid use. [AL]   Fri Jan 27, 2023   0427 EKG interpretation:   Rhythm: normal sinus rhythm; and regular . Rate (approx.): 85; Axis: normal; Intervals: normal ; ST/T wave: non-specific changes; EKG documented and interpreted by Wanda Jose. Zulema Morgan MD, Emergency Medicine.   [AL]      ED Course User Index  [AL] Sergio Ray MD     LABORATORY RESULTS:  Labs Reviewed   CBC WITH AUTOMATED DIFF - Abnormal; Notable for the following components:       Result Value    WBC 14.3 (*)     RDW 14.9 (*)     NEUTROPHILS 85 (*)     LYMPHOCYTES 8 (*)     IMMATURE GRANULOCYTES 2 (*)     ABS. NEUTROPHILS 12.3 (*)     ABS. IMM. GRANS. 0.2 (*)     All other components within normal limits   METABOLIC PANEL, BASIC - Abnormal; Notable for the following components:    Chloride 96 (*)     Glucose 134 (*)     BUN 28 (*)     BUN/Creatinine ratio 29 (*)     All other components within normal limits   SAMPLES BEING HELD   LACTIC ACID   NT-PRO BNP   TROPONIN-HIGH SENSITIVITY   SAMPLES BEING HELD       IMAGING RESULTS:  CT NECK SOFT TISSUE W CONT   Final Result   No acute process or mass lesion is identified. XR CHEST PORT   Final Result   No acute cardiopulmonary disease radiographically. .  . MEDICATIONS GIVEN:  Medications   iopamidoL (ISOVUE 300) 300 mg iodine /mL (61 %) contrast injection 100 mL (100 mL IntraVENous Given 1/27/23 0045)       Differential diagnosis: Dysphagia, food bolus impaction, choking, esophageal spasm, esophageal stricture, choking, chronic cough, inability tolerate p.o., CHF exacerbation, ACS    ED physician interpretation of imaging: Chest X without focal pneumonia, pneumothorax  ED physician interpretation of EKG: No STEMI.   See my interpretation EKG in ED course above. ED physician interpretation of laboratory results: Lab work generally within normal limits for her age and disease processes. No LOREN. Mild leukocytosis of 14.3. MDM: Patient is a 77-year-old female presented ED with shortness of breath, choking and intermittent difficulty swallowing. Work-up for ACS and CHF unremarkable. No LOREN or significant lab normalities. Mild leukocytosis at 14.3 but no signs of pneumonia on chest x-ray. Patient's breathing is stable now. Patient has episodes where she is unable to swallow and has to turn her neck in a certain direction. CT scan obtained showed no acute obstructions or mass-effect. Based on patient's difficulty tolerating p.o., inability to obtain close swallow study/speech consult/GI consult hospitalization is indicated for further treatment evaluation. DISPOSITION: Admitted    Perfect Serve Consult for Admission  1:44 AM    ED Room Number: ER25/25  Patient Name and age:  Lety Sandoval 76 y.o.  female  Working Diagnosis:   1. Dysphagia, unspecified type    2. Choking, initial encounter    3. Chronic cough        COVID-19 Suspicion:  no  Sepsis present:  no  Reassessment needed: yes  Code Status:  Full Code  Readmission: yes  Isolation Requirements:  no  Recommended Level of Care:  med/surg  Department: [facility] Adult ED - 21     Other: Intermittent dysphagia, difficulty swallowing. Difficulty tolerating p.o. at times. Complex patient with high risk factors for decompensation. Neno Shah.  Pratik Sanz MD      Procedures

## 2023-01-28 LAB
ANION GAP SERPL CALC-SCNC: 5 MMOL/L (ref 5–15)
BASOPHILS # BLD: 0.1 K/UL (ref 0–0.1)
BASOPHILS NFR BLD: 1 % (ref 0–1)
BUN SERPL-MCNC: 23 MG/DL (ref 6–20)
BUN/CREAT SERPL: 28 (ref 12–20)
CALCIUM SERPL-MCNC: 9.2 MG/DL (ref 8.5–10.1)
CHLORIDE SERPL-SCNC: 101 MMOL/L (ref 97–108)
CO2 SERPL-SCNC: 33 MMOL/L (ref 21–32)
CREAT SERPL-MCNC: 0.81 MG/DL (ref 0.55–1.02)
DIFFERENTIAL METHOD BLD: ABNORMAL
EOSINOPHIL # BLD: 0.4 K/UL (ref 0–0.4)
EOSINOPHIL NFR BLD: 3 % (ref 0–7)
ERYTHROCYTE [DISTWIDTH] IN BLOOD BY AUTOMATED COUNT: 15.5 % (ref 11.5–14.5)
GLUCOSE SERPL-MCNC: 109 MG/DL (ref 65–100)
HCT VFR BLD AUTO: 39.3 % (ref 35–47)
HGB BLD-MCNC: 12.5 G/DL (ref 11.5–16)
IMM GRANULOCYTES # BLD AUTO: 0.1 K/UL (ref 0–0.04)
IMM GRANULOCYTES NFR BLD AUTO: 1 % (ref 0–0.5)
LYMPHOCYTES # BLD: 3.1 K/UL (ref 0.8–3.5)
LYMPHOCYTES NFR BLD: 26 % (ref 12–49)
MCH RBC QN AUTO: 29.8 PG (ref 26–34)
MCHC RBC AUTO-ENTMCNC: 31.8 G/DL (ref 30–36.5)
MCV RBC AUTO: 93.8 FL (ref 80–99)
MONOCYTES # BLD: 1 K/UL (ref 0–1)
MONOCYTES NFR BLD: 9 % (ref 5–13)
NEUTS SEG # BLD: 7.1 K/UL (ref 1.8–8)
NEUTS SEG NFR BLD: 60 % (ref 32–75)
NRBC # BLD: 0 K/UL (ref 0–0.01)
NRBC BLD-RTO: 0 PER 100 WBC
PLATELET # BLD AUTO: 262 K/UL (ref 150–400)
PMV BLD AUTO: 9.7 FL (ref 8.9–12.9)
POTASSIUM SERPL-SCNC: 3.9 MMOL/L (ref 3.5–5.1)
RBC # BLD AUTO: 4.19 M/UL (ref 3.8–5.2)
SODIUM SERPL-SCNC: 139 MMOL/L (ref 136–145)
WBC # BLD AUTO: 12 K/UL (ref 3.6–11)

## 2023-01-28 PROCEDURE — 36415 COLL VENOUS BLD VENIPUNCTURE: CPT

## 2023-01-28 PROCEDURE — 77010033678 HC OXYGEN DAILY

## 2023-01-28 PROCEDURE — 74011250637 HC RX REV CODE- 250/637: Performed by: STUDENT IN AN ORGANIZED HEALTH CARE EDUCATION/TRAINING PROGRAM

## 2023-01-28 PROCEDURE — 74011636637 HC RX REV CODE- 636/637: Performed by: FAMILY MEDICINE

## 2023-01-28 PROCEDURE — 74011000250 HC RX REV CODE- 250: Performed by: STUDENT IN AN ORGANIZED HEALTH CARE EDUCATION/TRAINING PROGRAM

## 2023-01-28 PROCEDURE — 94640 AIRWAY INHALATION TREATMENT: CPT

## 2023-01-28 PROCEDURE — 80048 BASIC METABOLIC PNL TOTAL CA: CPT

## 2023-01-28 PROCEDURE — 65270000046 HC RM TELEMETRY

## 2023-01-28 PROCEDURE — 94664 DEMO&/EVAL PT USE INHALER: CPT

## 2023-01-28 PROCEDURE — 74011250637 HC RX REV CODE- 250/637: Performed by: FAMILY MEDICINE

## 2023-01-28 PROCEDURE — 85025 COMPLETE CBC W/AUTO DIFF WBC: CPT

## 2023-01-28 PROCEDURE — 74011000250 HC RX REV CODE- 250: Performed by: FAMILY MEDICINE

## 2023-01-28 RX ORDER — GUAIFENESIN 600 MG/1
1200 TABLET, EXTENDED RELEASE ORAL EVERY 12 HOURS
Status: DISCONTINUED | OUTPATIENT
Start: 2023-01-28 | End: 2023-02-01 | Stop reason: HOSPADM

## 2023-01-28 RX ORDER — ALBUTEROL SULFATE 0.83 MG/ML
2.5 SOLUTION RESPIRATORY (INHALATION)
Status: DISCONTINUED | OUTPATIENT
Start: 2023-01-28 | End: 2023-01-29

## 2023-01-28 RX ORDER — HYDROCODONE BITARTRATE AND ACETAMINOPHEN 5; 325 MG/1; MG/1
1 TABLET ORAL
Status: DISCONTINUED | OUTPATIENT
Start: 2023-01-28 | End: 2023-02-01 | Stop reason: HOSPADM

## 2023-01-28 RX ADMIN — IPRATROPIUM BROMIDE 0.5 MG: 0.5 SOLUTION RESPIRATORY (INHALATION) at 22:40

## 2023-01-28 RX ADMIN — ARFORMOTEROL TARTRATE 15 MCG: 15 SOLUTION RESPIRATORY (INHALATION) at 22:40

## 2023-01-28 RX ADMIN — BACLOFEN 5 MG: 10 TABLET ORAL at 08:44

## 2023-01-28 RX ADMIN — IPRATROPIUM BROMIDE 0.5 MG: 0.5 SOLUTION RESPIRATORY (INHALATION) at 07:31

## 2023-01-28 RX ADMIN — PREDNISONE 20 MG: 20 TABLET ORAL at 08:44

## 2023-01-28 RX ADMIN — ARFORMOTEROL TARTRATE 15 MCG: 15 SOLUTION RESPIRATORY (INHALATION) at 07:31

## 2023-01-28 RX ADMIN — ALBUTEROL SULFATE 2.5 MG: 2.5 SOLUTION RESPIRATORY (INHALATION) at 07:31

## 2023-01-28 RX ADMIN — GUAIFENESIN 1200 MG: 600 TABLET, EXTENDED RELEASE ORAL at 14:12

## 2023-01-28 RX ADMIN — IPRATROPIUM BROMIDE 0.5 MG: 0.5 SOLUTION RESPIRATORY (INHALATION) at 13:03

## 2023-01-28 RX ADMIN — ASPIRIN 81 MG: 81 TABLET, COATED ORAL at 08:43

## 2023-01-28 RX ADMIN — FUROSEMIDE 40 MG: 40 TABLET ORAL at 08:43

## 2023-01-28 RX ADMIN — BUPROPION HYDROCHLORIDE 300 MG: 150 TABLET, EXTENDED RELEASE ORAL at 08:43

## 2023-01-28 RX ADMIN — HYDROCODONE BITARTRATE AND ACETAMINOPHEN 1 TABLET: 5; 325 TABLET ORAL at 17:03

## 2023-01-28 RX ADMIN — TRAMADOL HYDROCHLORIDE 25 MG: 50 TABLET ORAL at 11:38

## 2023-01-28 RX ADMIN — LEVOTHYROXINE SODIUM 137 MCG: 0.11 TABLET ORAL at 07:16

## 2023-01-28 RX ADMIN — BACLOFEN 5 MG: 10 TABLET ORAL at 21:27

## 2023-01-28 RX ADMIN — CARVEDILOL 12.5 MG: 12.5 TABLET, FILM COATED ORAL at 16:39

## 2023-01-28 RX ADMIN — ALBUTEROL SULFATE 2.5 MG: 2.5 SOLUTION RESPIRATORY (INHALATION) at 13:02

## 2023-01-28 RX ADMIN — LATANOPROST 1 DROP: 50 SOLUTION OPHTHALMIC at 21:27

## 2023-01-28 RX ADMIN — PANTOPRAZOLE SODIUM 40 MG: 40 TABLET, DELAYED RELEASE ORAL at 07:16

## 2023-01-28 RX ADMIN — TRAMADOL HYDROCHLORIDE 25 MG: 50 TABLET ORAL at 21:32

## 2023-01-28 RX ADMIN — GABAPENTIN 400 MG: 400 CAPSULE ORAL at 21:27

## 2023-01-28 RX ADMIN — BUDESONIDE 500 MCG: 0.5 INHALANT RESPIRATORY (INHALATION) at 22:40

## 2023-01-28 RX ADMIN — GUAIFENESIN 1200 MG: 600 TABLET, EXTENDED RELEASE ORAL at 21:27

## 2023-01-28 RX ADMIN — BUDESONIDE 500 MCG: 0.5 INHALANT RESPIRATORY (INHALATION) at 07:31

## 2023-01-28 RX ADMIN — MONTELUKAST 10 MG: 10 TABLET, FILM COATED ORAL at 21:27

## 2023-01-28 RX ADMIN — TRAMADOL HYDROCHLORIDE 25 MG: 50 TABLET ORAL at 02:40

## 2023-01-28 RX ADMIN — ALBUTEROL SULFATE 2.5 MG: 2.5 SOLUTION RESPIRATORY (INHALATION) at 22:40

## 2023-01-28 RX ADMIN — Medication 10 ML: at 14:12

## 2023-01-28 RX ADMIN — Medication 10 ML: at 21:27

## 2023-01-28 RX ADMIN — GABAPENTIN 400 MG: 400 CAPSULE ORAL at 08:43

## 2023-01-28 RX ADMIN — CETIRIZINE HYDROCHLORIDE 10 MG: 10 TABLET, FILM COATED ORAL at 08:45

## 2023-01-28 RX ADMIN — GABAPENTIN 400 MG: 400 CAPSULE ORAL at 15:19

## 2023-01-28 RX ADMIN — ACETAMINOPHEN 650 MG: 325 TABLET ORAL at 02:40

## 2023-01-28 NOTE — PROGRESS NOTES
Problem: Pressure Injury - Risk of  Goal: *Prevention of pressure injury  Description: Document Vaughn Scale and appropriate interventions in the flowsheet.   Outcome: Progressing Towards Goal  Note: Pressure Injury Interventions:       Moisture Interventions: Minimize layers, Internal/External urinary devices, Absorbent underpads    Activity Interventions: Assess need for specialty bed, Pressure redistribution bed/mattress(bed type)    Mobility Interventions: HOB 30 degrees or less    Nutrition Interventions: Document food/fluid/supplement intake    Friction and Shear Interventions: HOB 30 degrees or less, Minimize layers                Problem: Patient Education: Go to Patient Education Activity  Goal: Patient/Family Education  Outcome: Progressing Towards Goal     Problem: General Medical Care Plan  Goal: *Vital signs within specified parameters  Outcome: Progressing Towards Goal  Goal: *Labs within defined limits  Outcome: Progressing Towards Goal  Goal: *Absence of infection signs and symptoms  Outcome: Progressing Towards Goal  Goal: *Optimal pain control at patient's stated goal  Outcome: Progressing Towards Goal  Goal: *Skin integrity maintained  Outcome: Progressing Towards Goal  Goal: *Fluid volume balance  Outcome: Progressing Towards Goal  Goal: *Optimize nutritional status  Outcome: Progressing Towards Goal  Goal: *Anxiety reduced or absent  Outcome: Progressing Towards Goal  Goal: *Progressive mobility and function (eg: ADL's)  Outcome: Progressing Towards Goal     Problem: Patient Education: Go to Patient Education Activity  Goal: Patient/Family Education  Outcome: Progressing Towards Goal     Problem: Pain  Goal: *Control of Pain  Outcome: Progressing Towards Goal     Problem: Patient Education: Go to Patient Education Activity  Goal: Patient/Family Education  Outcome: Progressing Towards Goal

## 2023-01-28 NOTE — PROGRESS NOTES
Bedside and Verbal shift change report given to Jack ''VINEET''  (oncoming nurse) by [employer] (offgoing nurse). Report included the following information SBAR, Kardex, MAR, and Recent Results.

## 2023-01-28 NOTE — PROGRESS NOTES
Bedside and Verbal shift change report given to [address] [place] [address] Garteh (oncoming nurse) by Kath Deshpande RN (offgoing nurse). Report included the following information SBAR, Kardex, Intake/Output, and MAR.

## 2023-01-28 NOTE — PROGRESS NOTES
[address] [facility] Adult  Hospitalist Group                                                                                          Hospitalist Progress Note  Rafa Redd MD  Answering service: [phone] or [phone] from in house phone        Date of Service:  2023  NAME:  Linsey Sol  :  1945  MRN:  [id number]       Admission Summary:   HPI: \"Gabriella Marquez is a 76 y.o. female with pmhx HFpEF, asthma, depression, GERD, HTN, hyopthyroidism, and RA who presents with shortness of breath, and ?dysphagia. She was previously hospitalized from  to  for type II NSTEMI with acute respiratory hypoxic failure. States that since discharge from the hospital, when she eats, she developed acute onset shortness of breath, and difficulty swallowing. It is unclear if the precipitating event is difficulty swallowing, or shortness of breath. Per last hospital note, she does wear 3 L of oxygen nasal cannula chronically     In the ED, VSS. Labs show WBC 14.3, and probnp is now normal. CXR, and CT neck are negative. \"      Interval history / Subjective:   Patient seen examined at bedside earlier. Still has back pain, and was requesting mucinex, no other acute complaints      Assessment & Plan:      #dysphagia  -speech consult to evaluate for aspiration > dysphagia diet for now  -aspiration precautions  -Ordered barium swallow > will be done on Monday   -consulted GI, appreciate recs    #HFpEF  #chronic hypoxic respiratory failure   -pt.  With spo2 97% on baseline 3Lnc  -probnp improved  -continue  lasix PO 40mg, and carvedilol        #Asthma  -continue home symbicort,, singulair, tiotropium and prednisone  -nebs prn and scheduled   -mucinex      #hypothyroidism  -continue home levothyroxine     #GERD  -continue PPI     #Fibromyalgia  #depression  -continue baclofen, gabapentin, and wellbutrin     #glaucoma  -continue xalatan        Code status: full   Prophylaxis: [address] discussed with: Patient/family, nurse, case management  Anticipated Disposition: Greater than 48 hours, snf when ready   Inpatient  Cardiac monitoring: xTelemetry      Hospital Problems  Date Reviewed: 12/4/2021            Codes Class Noted POA    Dysphagia ICD-10-CM: R13.10  ICD-9-CM: 787.20  1/27/2023 Unknown         Social Determinants of Health     Tobacco Use: Medium Risk    Smoking Tobacco Use: Former    Smokeless Tobacco Use: Never    Passive Exposure: Not on file   Alcohol Use: Not on file   Financial Resource Strain: Not on file   Food Insecurity: Not on file   Transportation Needs: Not on file   Physical Activity: Not on file   Stress: Not on file   Social Connections: Not on file   Intimate Partner Violence: Not on file   Depression: Not at risk    PHQ-2 Score: 0   Housing Stability: Not on file       Review of Systems:   A comprehensive review of systems was negative except for that written in the HPI. Vital Signs:    Last 24hrs VS reviewed since prior progress note. Most recent are:  Visit Vitals  /66 (BP 1 Location: Left upper arm, BP Patient Position: At rest)   Pulse 94   Temp 97.8 °F (36.6 °C)   Resp 18   Wt 82.1 kg (181 lb)   SpO2 95%   BMI 35.35 kg/m²         Intake/Output Summary (Last 24 hours) at 1/28/2023 1218  Last data filed at 1/28/2023 0530  Gross per 24 hour   Intake --   Output 450 ml   Net -450 ml          Physical Examination:     I had a face to face encounter with this patient and independently examined them on 1/28/2023 as outlined below:          Constitutional:  No acute distress, cooperative, pleasant, obese    ENT:  Oral mucosa moist, oropharynx benign. Resp:  Mild expiratory wheeze. No wheezing/rhonchi/rales. No accessory muscle use. CV:  Regular rhythm, normal rate, no murmurs, gallops, rubs    GI:  Soft, non distended, non tender.  normoactive bowel sounds, no hepatosplenomegaly     Musculoskeletal:  No edema, warm, 2+ pulses throughout    Neurologic:  Moves all extremities. AAOx3, CN II-XII reviewed            Data Review:    Review and/or order of clinical lab test  Review and/or order of tests in the radiology section of CPT  Review and/or order of tests in the medicine section of CPT    I have independently reviewed and interpreted patient's lab and all other diagnostic data    Labs:     Recent Labs     01/28/23 0242 01/26/23 2156   WBC 12.0* 14.3*   HGB 12.5 12.8   HCT 39.3 40.5    318       Recent Labs     01/28/23 0242 01/26/23  2156    136   K 3.9 4.2    96*   CO2 33* 32   BUN 23* 28*   CREA 0.81 0.96   * 134*   CA 9.2 9.0       No results for input(s): ALT, AP, TBIL, TBILI, TP, ALB, GLOB, GGT, AML, LPSE in the last 72 hours. No lab exists for component: SGOT, GPT, AMYP, HLPSE  No results for input(s): INR, PTP, APTT, INREXT, INREXT in the last 72 hours. No results for input(s): FE, TIBC, PSAT, FERR in the last 72 hours. No results found for: FOL, RBCF   No results for input(s): PH, PCO2, PO2 in the last 72 hours. No results for input(s): CPK, CKNDX, TROIQ in the last 72 hours.     No lab exists for component: CPKMB  No results found for: CHOL, CHOLX, CHLST, CHOLV, HDL, HDLP, LDL, LDLC, DLDLP, TGLX, TRIGL, TRIGP, CHHD, CHHDX  Lab Results   Component Value Date/Time    Glucose (POC) 202 (H) 01/19/2023 04:20 PM    Glucose (POC) 112 12/25/2021 12:31 PM    Glucose (POC) 126 (H) 02/14/2021 04:29 PM    Glucose (POC) 103 (H) 02/14/2021 11:04 AM    Glucose (POC) 72 02/14/2021 06:15 AM     Lab Results   Component Value Date/Time    Color DARK YELLOW 11/30/2021 10:45 PM    Appearance TURBID (A) 11/30/2021 10:45 PM    Specific gravity 1.020 11/30/2021 10:45 PM    Specific gravity 1.020 02/08/2021 06:45 PM    pH (UA) 5.0 11/30/2021 10:45 PM    Protein Negative 11/30/2021 10:45 PM    Glucose Negative 11/30/2021 10:45 PM    Ketone Negative 11/30/2021 10:45 PM    Bilirubin Negative 11/30/2021 10:45 PM    Urobilinogen 0.2 11/30/2021 10:45 PM Nitrites Negative 11/30/2021 10:45 PM    Leukocyte Esterase Negative 11/30/2021 10:45 PM    Epithelial cells MANY (A) 11/30/2021 10:45 PM    Bacteria 1+ (A) 11/30/2021 10:45 PM    WBC 10-20 11/30/2021 10:45 PM    RBC 5-10 11/30/2021 10:45 PM       Notes reviewed from all clinical/nonclinical/nursing services involved in patient's clinical care. Care coordination discussions were held with appropriate clinical/nonclinical/ nursing providers based on care coordination needs. Patients current active Medications were reviewed, considered, added and adjusted based on the clinical condition today. Home Medications were reconciled to the best of my ability given all available resources at the time of admission. Route is PO if not otherwise noted.       Medications Reviewed:     Current Facility-Administered Medications   Medication Dose Route Frequency    albuterol (PROVENTIL VENTOLIN) nebulizer solution 2.5 mg  2.5 mg Nebulization Q6H RT    guaiFENesin ER (MUCINEX) tablet 1,200 mg  1,200 mg Oral Q12H    HYDROcodone-acetaminophen (NORCO) 5-325 mg per tablet 1 Tablet  1 Tablet Oral Q4H PRN    sodium chloride (NS) flush 5-40 mL  5-40 mL IntraVENous Q8H    sodium chloride (NS) flush 5-40 mL  5-40 mL IntraVENous PRN    acetaminophen (TYLENOL) tablet 650 mg  650 mg Oral Q6H PRN    Or    acetaminophen (TYLENOL) suppository 650 mg  650 mg Rectal Q6H PRN    polyethylene glycol (MIRALAX) packet 17 g  17 g Oral DAILY PRN    ondansetron (ZOFRAN ODT) tablet 4 mg  4 mg Oral Q8H PRN    Or    ondansetron (ZOFRAN) injection 4 mg  4 mg IntraVENous Q6H PRN    carvediloL (COREG) tablet 12.5 mg  12.5 mg Oral BID WITH MEALS    furosemide (LASIX) tablet 40 mg  40 mg Oral DAILY    traMADoL (ULTRAM) tablet 25 mg  25 mg Oral Q4H PRN    albuterol (PROVENTIL VENTOLIN) nebulizer solution 2.5 mg  2.5 mg Nebulization Q2H PRN    aspirin delayed-release tablet 81 mg  81 mg Oral DAILY    baclofen (LIORESAL) tablet 5 mg  5 mg Oral BID buPROPion SR (WELLBUTRIN SR) tablet 300 mg  300 mg Oral DAILY    gabapentin (NEURONTIN) capsule 400 mg  400 mg Oral TID    pantoprazole (PROTONIX) tablet 40 mg  40 mg Oral ACB    latanoprost (XALATAN) 0.005 % ophthalmic solution 1 Drop  1 Drop Both Eyes QHS    levothyroxine (SYNTHROID) tablet 137 mcg  137 mcg Oral ACB    cetirizine (ZYRTEC) tablet 10 mg  10 mg Oral DAILY    montelukast (SINGULAIR) tablet 10 mg  10 mg Oral QHS    predniSONE (DELTASONE) tablet 20 mg  20 mg Oral DAILY    ipratropium (ATROVENT) 0.02 % nebulizer solution 0.5 mg  0.5 mg Nebulization TID RT    arformoteroL (BROVANA) neb solution 15 mcg  15 mcg Nebulization BID RT    And    budesonide (PULMICORT) 500 mcg/2 ml nebulizer suspension  500 mcg Nebulization BID RT    lidocaine 4 % patch 2 Patch  2 Patch TransDERmal Q24H    morphine injection 1 mg  1 mg IntraVENous Q4H PRN     ______________________________________________________________________  EXPECTED LENGTH OF STAY: - - -  ACTUAL LENGTH OF STAY:          1                 Brennan Tenorio MD

## 2023-01-28 NOTE — PROGRESS NOTES
Problem: Pressure Injury - Risk of  Goal: *Prevention of pressure injury  Description: Document Vaughn Scale and appropriate interventions in the flowsheet.   Outcome: Progressing Towards Goal  Note: Pressure Injury Interventions:       Moisture Interventions: Absorbent underpads    Activity Interventions: Pressure redistribution bed/mattress(bed type)    Mobility Interventions: Pressure redistribution bed/mattress (bed type)    Nutrition Interventions: Document food/fluid/supplement intake    Friction and Shear Interventions: HOB 30 degrees or less

## 2023-01-28 NOTE — CONSULTS
[address] [facility].  [address] [address] [address], [address]  [phone]                     GI CONSULTATION NOTE      NAME:  David Jacinto   :   1945   MRN:   [id number]     Consult Date: 2023     Chief Complaint: dysphagia     History of Present Illness:  Patient is a 76 y.o. F with history of RA, osteopenia, diastolic heart failure  and asthma on home 02 referred by Dr. Delia Roman re: dysphagia. She was recently eating a sandwich and felt it lodge in esophagus. She notes intermittent though rare dysphagia to solids though was scared with recent episode. She is unsure if she chokes or coughs when swallows. She denies frequent GERD. No prior EGD. Neck CT with severe degenerative disease of the cervical spine. PMH:  Past Medical History:   Diagnosis Date    Acute kidney failure (HCC)     Asthma     Depression     Fibromyalgia     GERD (gastroesophageal reflux disease)     GERD (gastroesophageal reflux disease)     Hypertension     Hypothyroid     Insomnia     Menopause     Neurogenic bladder     Rheumatoid arteritis (HCC)     Rotator cuff tear        PSH:  Past Surgical History:   Procedure Laterality Date    HX CHOLECYSTECTOMY      HX GYN      HX ORTHOPAEDIC      HX TONSIL AND ADENOIDECTOMY         Allergies: Allergies   Allergen Reactions    Pcn [Penicillins] Hives       Home Medications:  Prior to Admission Medications   Prescriptions Last Dose Informant Patient Reported? Taking? HYDROcodone-acetaminophen (NORCO) 5-325 mg per tablet   Yes Yes   Si Tablet every four (4) hours as needed for Pain. acetaminophen (TYLENOL) 500 mg tablet   Yes Yes   Sig: Take 1,000 mg by mouth three (3) times daily. albuterol (PROVENTIL HFA, VENTOLIN HFA, PROAIR HFA) 90 mcg/actuation inhaler   Yes Yes   Sig: Take 2 Puffs by inhalation every four (4) hours as needed.    albuterol (PROVENTIL VENTOLIN) 2.5 mg /3 mL (0.083 %) nebu   Yes Yes   Si.5 mg by Nebulization route every two (2) hours as needed for Wheezing. Eosinophilic asthma   albuterol (PROVENTIL VENTOLIN) 2.5 mg /3 mL (0.083 %) nebu   Yes Yes   Si.5 mg by Nebulization route four (4) times daily. Indications: chronic obstructive pulmonary disease   aspirin 81 mg chewable tablet   Yes Yes   Sig: Take 81 mg by mouth daily. baclofen (LIORESAL) 10 mg tablet   Yes Yes   Sig: Take 10 mg by mouth two (2) times daily as needed for Muscle Spasm(s). baclofen 5 mg tab   Yes Yes   Sig: Take 5 mg by mouth every twelve (12) hours. bisacodyL (DULCOLAX) 10 mg supp   Yes Yes   Sig: Insert 10 mg into rectum daily as needed for Constipation. buPROPion SR (WELLBUTRIN SR) 150 mg SR tablet   Yes Yes   Sig: Take 300 mg by mouth daily. budesonide-formoteroL (SYMBICORT) 160-4.5 mcg/actuation HFAA   Yes Yes   Sig: Take 2 Puffs by inhalation two (2) times a day. carvediloL (COREG) 6.25 mg tablet   Yes Yes   Sig: Take 6.25 mg by mouth two (2) times daily (with meals). cholecalciferol (VITAMIN D3) (1000 Units /25 mcg) tablet   Yes Yes   Sig: Take 1,000 Units by mouth daily. cycloSPORINE (RESTASIS) 0.05 % dpet   Yes Yes   Sig: Administer 1 Drop to both eyes two (2) times a day. furosemide (LASIX) 40 mg tablet   No Yes   Sig: Take 1 Tablet by mouth daily for 7 days. gabapentin (NEURONTIN) 400 mg capsule   Yes Yes   Sig: Take 400 mg by mouth three (3) times daily. guaiFENesin ER (MUCINEX) 1,200 mg Ta12 ER tablet   No Yes   Sig: Take 1 Tablet by mouth every twelve (12) hours for 7 days. honey (MediHoney, honey,) 100 % topical paste   Yes Yes   Sig: Apply  to affected area daily. Topical, once daily, wound care   lansoprazole (PREVACID SOLUTAB) 30 mg disintegrating tablet   Yes Yes   Sig: Take 30 mg by mouth daily. latanoprost (XALATAN) 0.005 % ophthalmic solution   Yes Yes   Sig: Administer 1 Drop to both eyes nightly. levothyroxine (SYNTHROID) 137 mcg tablet   Yes Yes   Sig: Take 137 mcg by mouth Daily (before breakfast). loratadine (CLARITIN) 10 mg tablet   Yes Yes   Sig: Take 10 mg by mouth daily. montelukast (SINGULAIR) 10 mg tablet   Yes Yes   Sig: Take 10 mg by mouth At bedtime. naproxen (NAPROSYN) 500 mg tablet   Yes Yes   Sig: Take 500 mg by mouth two (2) times a day. polyethylene glycol (MIRALAX) 17 gram packet   Yes Yes   Sig: Take 17 g by mouth daily as needed. potassium chloride SR (KLOR-CON 10) 10 mEq tablet   Yes Yes   Sig: Take 10 mEq by mouth daily. predniSONE (DELTASONE) 10 mg tablet   Yes Yes   Sig: Take 20 mg by mouth daily. sodium phosphate (FLEET'S) 19-7 gram/118 mL enema   Yes Yes   Sig: Insert 1 Enema into rectum daily as needed. therapeutic multivitamin-minerals (THERAGRAN-M) tablet   Yes Yes   Sig: Take 1 Tablet by mouth daily. tiotropium bromide (SPIRIVA RESPIMAT) 2.5 mcg/actuation inhaler   Yes Yes   Sig: Take 2 Puffs by inhalation every evening.       Facility-Administered Medications: None       Hospital Medications:  Current Facility-Administered Medications   Medication Dose Route Frequency    sodium chloride (NS) flush 5-40 mL  5-40 mL IntraVENous Q8H    sodium chloride (NS) flush 5-40 mL  5-40 mL IntraVENous PRN    acetaminophen (TYLENOL) tablet 650 mg  650 mg Oral Q6H PRN    Or    acetaminophen (TYLENOL) suppository 650 mg  650 mg Rectal Q6H PRN    polyethylene glycol (MIRALAX) packet 17 g  17 g Oral DAILY PRN    ondansetron (ZOFRAN ODT) tablet 4 mg  4 mg Oral Q8H PRN    Or    ondansetron (ZOFRAN) injection 4 mg  4 mg IntraVENous Q6H PRN    carvediloL (COREG) tablet 12.5 mg  12.5 mg Oral BID WITH MEALS    furosemide (LASIX) tablet 40 mg  40 mg Oral DAILY    traMADoL (ULTRAM) tablet 25 mg  25 mg Oral Q4H PRN    albuterol (PROVENTIL VENTOLIN) nebulizer solution 2.5 mg  2.5 mg Nebulization Q2H PRN    aspirin delayed-release tablet 81 mg  81 mg Oral DAILY    baclofen (LIORESAL) tablet 5 mg  5 mg Oral BID    buPROPion SR (WELLBUTRIN SR) tablet 300 mg  300 mg Oral DAILY    gabapentin (NEURONTIN) capsule 400 mg  400 mg Oral TID    pantoprazole (PROTONIX) tablet 40 mg  40 mg Oral ACB    latanoprost (XALATAN) 0.005 % ophthalmic solution 1 Drop  1 Drop Both Eyes QHS    levothyroxine (SYNTHROID) tablet 137 mcg  137 mcg Oral ACB    cetirizine (ZYRTEC) tablet 10 mg  10 mg Oral DAILY    montelukast (SINGULAIR) tablet 10 mg  10 mg Oral QHS    predniSONE (DELTASONE) tablet 20 mg  20 mg Oral DAILY    ipratropium (ATROVENT) 0.02 % nebulizer solution 0.5 mg  0.5 mg Nebulization TID RT    arformoteroL (BROVANA) neb solution 15 mcg  15 mcg Nebulization BID RT    And    budesonide (PULMICORT) 500 mcg/2 ml nebulizer suspension  500 mcg Nebulization BID RT    albuterol (PROVENTIL VENTOLIN) nebulizer solution 2.5 mg  2.5 mg Nebulization QID RT    lidocaine 4 % patch 2 Patch  2 Patch TransDERmal Q24H    morphine injection 1 mg  1 mg IntraVENous Q4H PRN       Social History:  Social History     Tobacco Use    Smoking status: Former     Packs/day: 1.00     Years: 15.00     Pack years: 15.00     Types: Cigarettes    Smokeless tobacco: Never   Substance Use Topics    Alcohol use: No       Family History:  No family history on file.     Review of Systems:  Constitutional: Negative for fever,  chills, or weight loss  Eyes:   Negative for visual changes  ENT:   Negative for sore throat, tongue or lip swelling  Respiratory:  Negative for cough, negative dyspnea  Cards:   Negative for chest pain, palpitations, lower extremity edema  GI:   See HPI  :  Negative for frequency, dysuria  Integument:  Negative for rash and pruritus  Heme:  Negative for easy bruising and epistaxis  Musculoskel: Negative for myalgias,  back pain and muscle weakness  Neuro:  Negative for headaches, dizziness, vertigo  Psych:  Negative for feelings of anxiety, depression      Objective:   Patient Vitals for the past 8 hrs:   BP Temp Pulse Resp SpO2   01/27/23 1811 109/68 98.2 °F (36.8 °C) 86 16 92 %   01/27/23 1511 -- -- -- -- 95 %   01/27/23 1359 121/80 -- 96 16 96 %   01/27/23 1200 -- -- 94 23 96 %   01/27/23 1119 -- -- -- -- 93 %     No intake/output data recorded. PHYSICAL EXAM:  Gen: NAD, alert obese F in bed  HEENT: PEERLA, EOMI, on NC  Neck: supple  Chest: Coarse ant  CV: RRR, no m/g/c/r  Abd: soft, NT, ND, +BS  Skin: no rash or lesions noted  Neuro: Moving all extremities, no gross deficits    Data Review   Recent Labs     01/26/23 2156   WBC 14.3*   HGB 12.8   HCT 40.5        Recent Labs     01/26/23 2156      K 4.2   CL 96*   CO2 32   BUN 28*   CREA 0.96   *   CA 9.0     No results for input(s): AP, TBIL, TP, ALB, GLOB, GGT, AML, LPSE in the last 72 hours. No lab exists for component: SGOT, GPT, AMYP, HLPSE  No results for input(s): INR, PTP, APTT, INREXT in the last 72 hours. Relevant imaging studies reviewed    Prior endoscopic procedures reviewed    Assessment:   76 y. o.F with history of RA, osteopenia, diastolic heart failure  and asthma on home 02 referred by Dr. Karin Nowak re: dysphagia. Neck CT with severe degenerative disease of the cervical spine. She has been seen by speech therapy : \"Based on the objective data described below, the patient presents with suspect functional oropharyngeal swallow with all consistencies, consistent with slp swallow eval completed during hospital admission last week (1/20/23). Suspect degree of esophageal dysphagia given reports of \"spasm sensation in chest\" and reports of getting choked on pills. She feels as though when these \"spasms\" occur, it takes her breath away. Patient with history of GERD. Patient with timely mastication of solid. Strong cough x1 on initial bite of cracker but this was not replicated. No overt s/s aspiration with successive straw sips of thin or puree. \"    Plan:     PPI  Upright with meals  Barium Swallow Monday  Pending these results will discuss role of EGD     Dav Lara MD

## 2023-01-29 LAB
ANION GAP SERPL CALC-SCNC: 3 MMOL/L (ref 5–15)
BASOPHILS # BLD: 0.1 K/UL (ref 0–0.1)
BASOPHILS NFR BLD: 0 % (ref 0–1)
BUN SERPL-MCNC: 22 MG/DL (ref 6–20)
BUN/CREAT SERPL: 24 (ref 12–20)
CALCIUM SERPL-MCNC: 9.2 MG/DL (ref 8.5–10.1)
CHLORIDE SERPL-SCNC: 98 MMOL/L (ref 97–108)
CO2 SERPL-SCNC: 35 MMOL/L (ref 21–32)
CREAT SERPL-MCNC: 0.9 MG/DL (ref 0.55–1.02)
DIFFERENTIAL METHOD BLD: ABNORMAL
EOSINOPHIL # BLD: 0.3 K/UL (ref 0–0.4)
EOSINOPHIL NFR BLD: 2 % (ref 0–7)
ERYTHROCYTE [DISTWIDTH] IN BLOOD BY AUTOMATED COUNT: 15 % (ref 11.5–14.5)
GLUCOSE SERPL-MCNC: 114 MG/DL (ref 65–100)
HCT VFR BLD AUTO: 40.7 % (ref 35–47)
HGB BLD-MCNC: 13 G/DL (ref 11.5–16)
IMM GRANULOCYTES # BLD AUTO: 0.2 K/UL (ref 0–0.04)
IMM GRANULOCYTES NFR BLD AUTO: 2 % (ref 0–0.5)
LYMPHOCYTES # BLD: 2.8 K/UL (ref 0.8–3.5)
LYMPHOCYTES NFR BLD: 25 % (ref 12–49)
MCH RBC QN AUTO: 30.1 PG (ref 26–34)
MCHC RBC AUTO-ENTMCNC: 31.9 G/DL (ref 30–36.5)
MCV RBC AUTO: 94.2 FL (ref 80–99)
MONOCYTES # BLD: 1 K/UL (ref 0–1)
MONOCYTES NFR BLD: 9 % (ref 5–13)
NEUTS SEG # BLD: 7 K/UL (ref 1.8–8)
NEUTS SEG NFR BLD: 62 % (ref 32–75)
NRBC # BLD: 0 K/UL (ref 0–0.01)
NRBC BLD-RTO: 0 PER 100 WBC
PLATELET # BLD AUTO: 280 K/UL (ref 150–400)
PMV BLD AUTO: 9.7 FL (ref 8.9–12.9)
POTASSIUM SERPL-SCNC: 3.7 MMOL/L (ref 3.5–5.1)
RBC # BLD AUTO: 4.32 M/UL (ref 3.8–5.2)
SODIUM SERPL-SCNC: 136 MMOL/L (ref 136–145)
WBC # BLD AUTO: 11.3 K/UL (ref 3.6–11)

## 2023-01-29 PROCEDURE — 94640 AIRWAY INHALATION TREATMENT: CPT

## 2023-01-29 PROCEDURE — 80048 BASIC METABOLIC PNL TOTAL CA: CPT

## 2023-01-29 PROCEDURE — 74011636637 HC RX REV CODE- 636/637: Performed by: FAMILY MEDICINE

## 2023-01-29 PROCEDURE — 74011000250 HC RX REV CODE- 250: Performed by: STUDENT IN AN ORGANIZED HEALTH CARE EDUCATION/TRAINING PROGRAM

## 2023-01-29 PROCEDURE — 74011000250 HC RX REV CODE- 250: Performed by: FAMILY MEDICINE

## 2023-01-29 PROCEDURE — 74011250637 HC RX REV CODE- 250/637: Performed by: STUDENT IN AN ORGANIZED HEALTH CARE EDUCATION/TRAINING PROGRAM

## 2023-01-29 PROCEDURE — 36415 COLL VENOUS BLD VENIPUNCTURE: CPT

## 2023-01-29 PROCEDURE — 74011250637 HC RX REV CODE- 250/637: Performed by: FAMILY MEDICINE

## 2023-01-29 PROCEDURE — 85025 COMPLETE CBC W/AUTO DIFF WBC: CPT

## 2023-01-29 PROCEDURE — 65270000046 HC RM TELEMETRY

## 2023-01-29 RX ORDER — ALBUTEROL SULFATE 0.83 MG/ML
2.5 SOLUTION RESPIRATORY (INHALATION)
Status: DISCONTINUED | OUTPATIENT
Start: 2023-01-30 | End: 2023-02-01 | Stop reason: HOSPADM

## 2023-01-29 RX ADMIN — ARFORMOTEROL TARTRATE 15 MCG: 15 SOLUTION RESPIRATORY (INHALATION) at 07:15

## 2023-01-29 RX ADMIN — CARVEDILOL 12.5 MG: 12.5 TABLET, FILM COATED ORAL at 09:45

## 2023-01-29 RX ADMIN — CETIRIZINE HYDROCHLORIDE 10 MG: 10 TABLET, FILM COATED ORAL at 09:45

## 2023-01-29 RX ADMIN — BUDESONIDE 500 MCG: 0.5 INHALANT RESPIRATORY (INHALATION) at 07:16

## 2023-01-29 RX ADMIN — GUAIFENESIN 1200 MG: 600 TABLET, EXTENDED RELEASE ORAL at 09:46

## 2023-01-29 RX ADMIN — BUDESONIDE 500 MCG: 0.5 INHALANT RESPIRATORY (INHALATION) at 20:52

## 2023-01-29 RX ADMIN — ARFORMOTEROL TARTRATE 15 MCG: 15 SOLUTION RESPIRATORY (INHALATION) at 20:52

## 2023-01-29 RX ADMIN — PREDNISONE 20 MG: 20 TABLET ORAL at 09:46

## 2023-01-29 RX ADMIN — Medication 10 ML: at 22:00

## 2023-01-29 RX ADMIN — ALBUTEROL SULFATE 2.5 MG: 2.5 SOLUTION RESPIRATORY (INHALATION) at 20:52

## 2023-01-29 RX ADMIN — GABAPENTIN 400 MG: 400 CAPSULE ORAL at 21:56

## 2023-01-29 RX ADMIN — GUAIFENESIN 1200 MG: 600 TABLET, EXTENDED RELEASE ORAL at 21:56

## 2023-01-29 RX ADMIN — ASPIRIN 81 MG: 81 TABLET, COATED ORAL at 09:44

## 2023-01-29 RX ADMIN — BUPROPION HYDROCHLORIDE 300 MG: 150 TABLET, EXTENDED RELEASE ORAL at 09:45

## 2023-01-29 RX ADMIN — FUROSEMIDE 40 MG: 40 TABLET ORAL at 09:45

## 2023-01-29 RX ADMIN — ALBUTEROL SULFATE 2.5 MG: 2.5 SOLUTION RESPIRATORY (INHALATION) at 13:08

## 2023-01-29 RX ADMIN — LEVOTHYROXINE SODIUM 137 MCG: 0.11 TABLET ORAL at 07:29

## 2023-01-29 RX ADMIN — LATANOPROST 1 DROP: 50 SOLUTION OPHTHALMIC at 22:05

## 2023-01-29 RX ADMIN — HYDROCODONE BITARTRATE AND ACETAMINOPHEN 1 TABLET: 5; 325 TABLET ORAL at 21:56

## 2023-01-29 RX ADMIN — BACLOFEN 5 MG: 10 TABLET ORAL at 09:44

## 2023-01-29 RX ADMIN — IPRATROPIUM BROMIDE 0.5 MG: 0.5 SOLUTION RESPIRATORY (INHALATION) at 13:08

## 2023-01-29 RX ADMIN — IPRATROPIUM BROMIDE 0.5 MG: 0.5 SOLUTION RESPIRATORY (INHALATION) at 20:52

## 2023-01-29 RX ADMIN — ALBUTEROL SULFATE 2.5 MG: 2.5 SOLUTION RESPIRATORY (INHALATION) at 02:36

## 2023-01-29 RX ADMIN — HYDROCODONE BITARTRATE AND ACETAMINOPHEN 1 TABLET: 5; 325 TABLET ORAL at 09:51

## 2023-01-29 RX ADMIN — GABAPENTIN 400 MG: 400 CAPSULE ORAL at 17:00

## 2023-01-29 RX ADMIN — PANTOPRAZOLE SODIUM 40 MG: 40 TABLET, DELAYED RELEASE ORAL at 07:29

## 2023-01-29 RX ADMIN — CARVEDILOL 12.5 MG: 12.5 TABLET, FILM COATED ORAL at 17:00

## 2023-01-29 RX ADMIN — TRAMADOL HYDROCHLORIDE 25 MG: 50 TABLET ORAL at 16:59

## 2023-01-29 RX ADMIN — BACLOFEN 5 MG: 10 TABLET ORAL at 21:56

## 2023-01-29 RX ADMIN — GABAPENTIN 400 MG: 400 CAPSULE ORAL at 09:46

## 2023-01-29 RX ADMIN — ALBUTEROL SULFATE 2.5 MG: 2.5 SOLUTION RESPIRATORY (INHALATION) at 17:59

## 2023-01-29 RX ADMIN — IPRATROPIUM BROMIDE 0.5 MG: 0.5 SOLUTION RESPIRATORY (INHALATION) at 07:16

## 2023-01-29 RX ADMIN — MONTELUKAST 10 MG: 10 TABLET, FILM COATED ORAL at 21:56

## 2023-01-29 RX ADMIN — ALBUTEROL SULFATE 2.5 MG: 2.5 SOLUTION RESPIRATORY (INHALATION) at 07:15

## 2023-01-29 NOTE — PROGRESS NOTES
19:09 Patient's daughter at bedside and went over her medications list.  Restasis  was not listed would like placed on her Mar.  Perfect served doctor to add    [address] or Facility: [facility] From: Rachel Handy RE: Stewartmateo Baldwin 1945 RM: [phone] Can you please put the patient's Restasis (eye drops on her Mar) Cyclosporine 0.05% dpet; 1 drop 2 times daily.  Her daughter is at the bedside who is an NP, and went over her med list. Thanks Need Callback: [address], RN

## 2023-01-29 NOTE — PROGRESS NOTES
Occupational Therapy Screening:  Services are not indicated at this time. [facility] based on results obtained during the nursing admission assessment. The patients chart was reviewed and the patient is not appropriate for a skilled therapy evaluation at this time. Please consult occupational therapy if any therapy needs arise. Thank you.     Sol Mendoza OT

## 2023-01-29 NOTE — PROGRESS NOTES
[address] [facility] Adult  Hospitalist Group                                                                                          Hospitalist Progress Note  Abiola Alarcon MD  Answering service: [phone] from in house phone        Date of Service:  2023  NAME:  Lance Acevedo  :  1945  MRN:  [id number]       Admission Summary:   HPI: \"Gabriella Hauser is a 76 y.o. female with pmhx HFpEF, asthma, depression, GERD, HTN, hyopthyroidism, and RA who presents with shortness of breath, and ?dysphagia. She was previously hospitalized from  to  for type II NSTEMI with acute respiratory hypoxic failure. States that since discharge from the hospital, when she eats, she developed acute onset shortness of breath, and difficulty swallowing. It is unclear if the precipitating event is difficulty swallowing, or shortness of breath. Per last hospital note, she does wear 3 L of oxygen nasal cannula chronically     In the ED, VSS. Labs show WBC 14.3, and probnp is now normal. CXR, and CT neck are negative. \"      Interval history / Subjective:   Patient seen examined at bedside earlier, daughter at bedside. Still has back pain but improved with meds, daughter was present at bedside feeding patient ice cream     Assessment & Plan:      #dysphagia  -speech consult to evaluate for aspiration > will switch to easy to chew   -aspiration precautions  -Ordered barium swallow > will be done   -ppi   -consulted GI, appreciate recs    #HFpEF  #chronic hypoxic respiratory failure   -pt.  With spo2 97% on baseline 3Lnc  -probnp improved  -continue  lasix PO 40mg, and carvedilol        #Asthma  -continue home symbicort,, singulair, tiotropium and prednisone  -nebs prn and scheduled   -mucinex      #hypothyroidism  -continue home levothyroxine     #GERD  -continue PPI     #Fibromyalgia  #depression  -continue baclofen, gabapentin, and wellbutrin     #glaucoma  -continue xalatan Code status: full   Prophylaxis: scd  Care Plan discussed with: Patient/family, nurse, case management  Anticipated Disposition: 24-48 hours, snf when ready   Inpatient  Cardiac monitoring: xTelemetry      Hospital Problems  Date Reviewed: 12/4/2021            Codes Class Noted POA    Dysphagia ICD-10-CM: R13.10  ICD-9-CM: 787.20  1/27/2023 Unknown         Social Determinants of Health     Tobacco Use: Medium Risk    Smoking Tobacco Use: Former    Smokeless Tobacco Use: Never    Passive Exposure: Not on file   Alcohol Use: Not on file   Financial Resource Strain: Not on file   Food Insecurity: Not on file   Transportation Needs: Not on file   Physical Activity: Not on file   Stress: Not on file   Social Connections: Not on file   Intimate Partner Violence: Not on file   Depression: Not at risk    PHQ-2 Score: 0   Housing Stability: Not on file       Review of Systems:   A comprehensive review of systems was negative except for that written in the HPI. Vital Signs:    Last 24hrs VS reviewed since prior progress note. Most recent are:  Visit Vitals  /87 (BP 1 Location: Left upper arm, BP Patient Position: At rest)   Pulse 85   Temp 97.6 °F (36.4 °C)   Resp 18   Wt 82.1 kg (181 lb)   SpO2 92%   BMI 35.35 kg/m²         Intake/Output Summary (Last 24 hours) at 1/29/2023 1226  Last data filed at 1/29/2023 1045  Gross per 24 hour   Intake 240 ml   Output 2450 ml   Net -2210 ml          Physical Examination:     I had a face to face encounter with this patient and independently examined them on 1/29/2023 as outlined below:          Constitutional:  No acute distress, cooperative, pleasant, obese    ENT:  Oral mucosa moist, oropharynx benign. Resp:  Mild expiratory wheeze. No wheezing/rhonchi/rales. No accessory muscle use. CV:  Regular rhythm, normal rate, no murmurs, gallops, rubs    GI:  Soft, non distended, non tender.  normoactive bowel sounds, no hepatosplenomegaly     Musculoskeletal:  No edema, warm, 2+ pulses throughout    Neurologic:  Moves all extremities. AAOx3, CN II-XII reviewed            Data Review:    Review and/or order of clinical lab test  Review and/or order of tests in the radiology section of CPT  Review and/or order of tests in the medicine section of CPT    I have independently reviewed and interpreted patient's lab and all other diagnostic data    Labs:     Recent Labs     01/29/23 0442 01/28/23  0242   WBC 11.3* 12.0*   HGB 13.0 12.5   HCT 40.7 39.3    262       Recent Labs     01/29/23 0442 01/28/23 0242 01/26/23  2156    139 136   K 3.7 3.9 4.2   CL 98 101 96*   CO2 35* 33* 32   BUN 22* 23* 28*   CREA 0.90 0.81 0.96   * 109* 134*   CA 9.2 9.2 9.0       No results for input(s): ALT, AP, TBIL, TBILI, TP, ALB, GLOB, GGT, AML, LPSE in the last 72 hours. No lab exists for component: SGOT, GPT, AMYP, HLPSE  No results for input(s): INR, PTP, APTT, INREXT, INREXT in the last 72 hours. No results for input(s): FE, TIBC, PSAT, FERR in the last 72 hours. No results found for: FOL, RBCF   No results for input(s): PH, PCO2, PO2 in the last 72 hours. No results for input(s): CPK, CKNDX, TROIQ in the last 72 hours.     No lab exists for component: CPKMB  No results found for: CHOL, CHOLX, CHLST, CHOLV, HDL, HDLP, LDL, LDLC, DLDLP, TGLX, TRIGL, TRIGP, CHHD, CHHDX  Lab Results   Component Value Date/Time    Glucose (POC) 202 (H) 01/19/2023 04:20 PM    Glucose (POC) 112 12/25/2021 12:31 PM    Glucose (POC) 126 (H) 02/14/2021 04:29 PM    Glucose (POC) 103 (H) 02/14/2021 11:04 AM    Glucose (POC) 72 02/14/2021 06:15 AM     Lab Results   Component Value Date/Time    Color DARK YELLOW 11/30/2021 10:45 PM    Appearance TURBID (A) 11/30/2021 10:45 PM    Specific gravity 1.020 11/30/2021 10:45 PM    Specific gravity 1.020 02/08/2021 06:45 PM    pH (UA) 5.0 11/30/2021 10:45 PM    Protein Negative 11/30/2021 10:45 PM    Glucose Negative 11/30/2021 10:45 PM    Ketone Negative 11/30/2021 10:45 PM    Bilirubin Negative 11/30/2021 10:45 PM    Urobilinogen 0.2 11/30/2021 10:45 PM    Nitrites Negative 11/30/2021 10:45 PM    Leukocyte Esterase Negative 11/30/2021 10:45 PM    Epithelial cells MANY (A) 11/30/2021 10:45 PM    Bacteria 1+ (A) 11/30/2021 10:45 PM    WBC 10-20 11/30/2021 10:45 PM    RBC 5-10 11/30/2021 10:45 PM       Notes reviewed from all clinical/nonclinical/nursing services involved in patient's clinical care. Care coordination discussions were held with appropriate clinical/nonclinical/ nursing providers based on care coordination needs. Patients current active Medications were reviewed, considered, added and adjusted based on the clinical condition today. Home Medications were reconciled to the best of my ability given all available resources at the time of admission. Route is PO if not otherwise noted.       Medications Reviewed:     Current Facility-Administered Medications   Medication Dose Route Frequency    albuterol (PROVENTIL VENTOLIN) nebulizer solution 2.5 mg  2.5 mg Nebulization Q6H RT    guaiFENesin ER (MUCINEX) tablet 1,200 mg  1,200 mg Oral Q12H    HYDROcodone-acetaminophen (NORCO) 5-325 mg per tablet 1 Tablet  1 Tablet Oral Q4H PRN    sodium chloride (NS) flush 5-40 mL  5-40 mL IntraVENous Q8H    sodium chloride (NS) flush 5-40 mL  5-40 mL IntraVENous PRN    acetaminophen (TYLENOL) tablet 650 mg  650 mg Oral Q6H PRN    Or    acetaminophen (TYLENOL) suppository 650 mg  650 mg Rectal Q6H PRN    polyethylene glycol (MIRALAX) packet 17 g  17 g Oral DAILY PRN    ondansetron (ZOFRAN ODT) tablet 4 mg  4 mg Oral Q8H PRN    Or    ondansetron (ZOFRAN) injection 4 mg  4 mg IntraVENous Q6H PRN    carvediloL (COREG) tablet 12.5 mg  12.5 mg Oral BID WITH MEALS    furosemide (LASIX) tablet 40 mg  40 mg Oral DAILY    traMADoL (ULTRAM) tablet 25 mg  25 mg Oral Q4H PRN    albuterol (PROVENTIL VENTOLIN) nebulizer solution 2.5 mg  2.5 mg Nebulization Q2H PRN    aspirin delayed-release tablet 81 mg  81 mg Oral DAILY    baclofen (LIORESAL) tablet 5 mg  5 mg Oral BID    buPROPion SR (WELLBUTRIN SR) tablet 300 mg  300 mg Oral DAILY    gabapentin (NEURONTIN) capsule 400 mg  400 mg Oral TID    pantoprazole (PROTONIX) tablet 40 mg  40 mg Oral ACB    latanoprost (XALATAN) 0.005 % ophthalmic solution 1 Drop  1 Drop Both Eyes QHS    levothyroxine (SYNTHROID) tablet 137 mcg  137 mcg Oral ACB    cetirizine (ZYRTEC) tablet 10 mg  10 mg Oral DAILY    montelukast (SINGULAIR) tablet 10 mg  10 mg Oral QHS    predniSONE (DELTASONE) tablet 20 mg  20 mg Oral DAILY    ipratropium (ATROVENT) 0.02 % nebulizer solution 0.5 mg  0.5 mg Nebulization TID RT    arformoteroL (BROVANA) neb solution 15 mcg  15 mcg Nebulization BID RT    And    budesonide (PULMICORT) 500 mcg/2 ml nebulizer suspension  500 mcg Nebulization BID RT    lidocaine 4 % patch 2 Patch  2 Patch TransDERmal Q24H    morphine injection 1 mg  1 mg IntraVENous Q4H PRN     ______________________________________________________________________  EXPECTED LENGTH OF STAY: - - -  ACTUAL LENGTH OF STAY:          2                 Trevor Bright MD

## 2023-01-30 ENCOUNTER — APPOINTMENT (OUTPATIENT)
Dept: GENERAL RADIOLOGY | Age: 78
DRG: 391 | End: 2023-01-30
Attending: STUDENT IN AN ORGANIZED HEALTH CARE EDUCATION/TRAINING PROGRAM
Payer: MEDICARE

## 2023-01-30 LAB
ANION GAP SERPL CALC-SCNC: 4 MMOL/L (ref 5–15)
BASOPHILS # BLD: 0.1 K/UL (ref 0–0.1)
BASOPHILS NFR BLD: 0 % (ref 0–1)
BUN SERPL-MCNC: 18 MG/DL (ref 6–20)
BUN/CREAT SERPL: 24 (ref 12–20)
CALCIUM SERPL-MCNC: 9.2 MG/DL (ref 8.5–10.1)
CHLORIDE SERPL-SCNC: 97 MMOL/L (ref 97–108)
CO2 SERPL-SCNC: 29 MMOL/L (ref 21–32)
CREAT SERPL-MCNC: 0.74 MG/DL (ref 0.55–1.02)
DIFFERENTIAL METHOD BLD: ABNORMAL
EOSINOPHIL # BLD: 0.4 K/UL (ref 0–0.4)
EOSINOPHIL NFR BLD: 3 % (ref 0–7)
ERYTHROCYTE [DISTWIDTH] IN BLOOD BY AUTOMATED COUNT: 14.9 % (ref 11.5–14.5)
GLUCOSE SERPL-MCNC: 101 MG/DL (ref 65–100)
HCT VFR BLD AUTO: 41.8 % (ref 35–47)
HGB BLD-MCNC: 12.8 G/DL (ref 11.5–16)
IMM GRANULOCYTES # BLD AUTO: 0.2 K/UL (ref 0–0.04)
IMM GRANULOCYTES NFR BLD AUTO: 1 % (ref 0–0.5)
LYMPHOCYTES # BLD: 3.2 K/UL (ref 0.8–3.5)
LYMPHOCYTES NFR BLD: 24 % (ref 12–49)
MCH RBC QN AUTO: 29.5 PG (ref 26–34)
MCHC RBC AUTO-ENTMCNC: 30.6 G/DL (ref 30–36.5)
MCV RBC AUTO: 96.3 FL (ref 80–99)
MONOCYTES # BLD: 1.1 K/UL (ref 0–1)
MONOCYTES NFR BLD: 8 % (ref 5–13)
NEUTS SEG # BLD: 8.6 K/UL (ref 1.8–8)
NEUTS SEG NFR BLD: 64 % (ref 32–75)
NRBC # BLD: 0 K/UL (ref 0–0.01)
NRBC BLD-RTO: 0 PER 100 WBC
PLATELET # BLD AUTO: 292 K/UL (ref 150–400)
PMV BLD AUTO: 9.4 FL (ref 8.9–12.9)
POTASSIUM SERPL-SCNC: 4.5 MMOL/L (ref 3.5–5.1)
RBC # BLD AUTO: 4.34 M/UL (ref 3.8–5.2)
SODIUM SERPL-SCNC: 130 MMOL/L (ref 136–145)
WBC # BLD AUTO: 13.4 K/UL (ref 3.6–11)

## 2023-01-30 PROCEDURE — 94640 AIRWAY INHALATION TREATMENT: CPT

## 2023-01-30 PROCEDURE — 74011250637 HC RX REV CODE- 250/637: Performed by: STUDENT IN AN ORGANIZED HEALTH CARE EDUCATION/TRAINING PROGRAM

## 2023-01-30 PROCEDURE — 65270000046 HC RM TELEMETRY

## 2023-01-30 PROCEDURE — 74011250637 HC RX REV CODE- 250/637: Performed by: FAMILY MEDICINE

## 2023-01-30 PROCEDURE — 85025 COMPLETE CBC W/AUTO DIFF WBC: CPT

## 2023-01-30 PROCEDURE — 74011000250 HC RX REV CODE- 250: Performed by: STUDENT IN AN ORGANIZED HEALTH CARE EDUCATION/TRAINING PROGRAM

## 2023-01-30 PROCEDURE — 36415 COLL VENOUS BLD VENIPUNCTURE: CPT

## 2023-01-30 PROCEDURE — 80048 BASIC METABOLIC PNL TOTAL CA: CPT

## 2023-01-30 PROCEDURE — 74011000250 HC RX REV CODE- 250: Performed by: INTERNAL MEDICINE

## 2023-01-30 PROCEDURE — 74220 X-RAY XM ESOPHAGUS 1CNTRST: CPT

## 2023-01-30 PROCEDURE — 74011000250 HC RX REV CODE- 250: Performed by: FAMILY MEDICINE

## 2023-01-30 PROCEDURE — 74011636637 HC RX REV CODE- 636/637: Performed by: FAMILY MEDICINE

## 2023-01-30 RX ADMIN — Medication 10 ML: at 06:26

## 2023-01-30 RX ADMIN — IPRATROPIUM BROMIDE 0.5 MG: 0.5 SOLUTION RESPIRATORY (INHALATION) at 20:46

## 2023-01-30 RX ADMIN — BUDESONIDE 500 MCG: 0.5 INHALANT RESPIRATORY (INHALATION) at 07:29

## 2023-01-30 RX ADMIN — PREDNISONE 20 MG: 20 TABLET ORAL at 09:46

## 2023-01-30 RX ADMIN — LEVOTHYROXINE SODIUM 137 MCG: 0.11 TABLET ORAL at 06:25

## 2023-01-30 RX ADMIN — BACLOFEN 5 MG: 10 TABLET ORAL at 20:57

## 2023-01-30 RX ADMIN — BUDESONIDE 500 MCG: 0.5 INHALANT RESPIRATORY (INHALATION) at 20:45

## 2023-01-30 RX ADMIN — FUROSEMIDE 40 MG: 40 TABLET ORAL at 09:46

## 2023-01-30 RX ADMIN — CARVEDILOL 12.5 MG: 12.5 TABLET, FILM COATED ORAL at 09:46

## 2023-01-30 RX ADMIN — CARVEDILOL 12.5 MG: 12.5 TABLET, FILM COATED ORAL at 18:46

## 2023-01-30 RX ADMIN — IPRATROPIUM BROMIDE 0.5 MG: 0.5 SOLUTION RESPIRATORY (INHALATION) at 07:29

## 2023-01-30 RX ADMIN — ARFORMOTEROL TARTRATE 15 MCG: 15 SOLUTION RESPIRATORY (INHALATION) at 07:29

## 2023-01-30 RX ADMIN — GUAIFENESIN 1200 MG: 600 TABLET, EXTENDED RELEASE ORAL at 09:47

## 2023-01-30 RX ADMIN — ALBUTEROL SULFATE 2.5 MG: 2.5 SOLUTION RESPIRATORY (INHALATION) at 20:45

## 2023-01-30 RX ADMIN — ASPIRIN 81 MG: 81 TABLET, COATED ORAL at 09:46

## 2023-01-30 RX ADMIN — ALBUTEROL SULFATE 2.5 MG: 2.5 SOLUTION RESPIRATORY (INHALATION) at 07:29

## 2023-01-30 RX ADMIN — GABAPENTIN 400 MG: 400 CAPSULE ORAL at 09:47

## 2023-01-30 RX ADMIN — HYDROCODONE BITARTRATE AND ACETAMINOPHEN 1 TABLET: 5; 325 TABLET ORAL at 10:38

## 2023-01-30 RX ADMIN — Medication 10 ML: at 20:58

## 2023-01-30 RX ADMIN — PANTOPRAZOLE SODIUM 40 MG: 40 TABLET, DELAYED RELEASE ORAL at 09:46

## 2023-01-30 RX ADMIN — HYDROCODONE BITARTRATE AND ACETAMINOPHEN 1 TABLET: 5; 325 TABLET ORAL at 21:07

## 2023-01-30 RX ADMIN — GUAIFENESIN 1200 MG: 600 TABLET, EXTENDED RELEASE ORAL at 20:57

## 2023-01-30 RX ADMIN — BUPROPION HYDROCHLORIDE 300 MG: 150 TABLET, EXTENDED RELEASE ORAL at 09:46

## 2023-01-30 RX ADMIN — CETIRIZINE HYDROCHLORIDE 10 MG: 10 TABLET, FILM COATED ORAL at 09:46

## 2023-01-30 RX ADMIN — MONTELUKAST 10 MG: 10 TABLET, FILM COATED ORAL at 20:58

## 2023-01-30 RX ADMIN — LATANOPROST 1 DROP: 50 SOLUTION OPHTHALMIC at 22:00

## 2023-01-30 RX ADMIN — GABAPENTIN 400 MG: 400 CAPSULE ORAL at 18:46

## 2023-01-30 RX ADMIN — ARFORMOTEROL TARTRATE 15 MCG: 15 SOLUTION RESPIRATORY (INHALATION) at 20:45

## 2023-01-30 RX ADMIN — ALBUTEROL SULFATE 2.5 MG: 2.5 SOLUTION RESPIRATORY (INHALATION) at 15:21

## 2023-01-30 RX ADMIN — BACLOFEN 5 MG: 10 TABLET ORAL at 09:46

## 2023-01-30 RX ADMIN — GABAPENTIN 400 MG: 400 CAPSULE ORAL at 21:55

## 2023-01-30 RX ADMIN — IPRATROPIUM BROMIDE 0.5 MG: 0.5 SOLUTION RESPIRATORY (INHALATION) at 15:21

## 2023-01-30 NOTE — PROGRESS NOTES
[address] [place] [address] Dr Discharge Note    *The information contained in this note was received during a weekly care coordination call with the post acute facility*      Patient was discharged from  [facility] ([facility] on 1/26/23 to [facility].    PCP: MD PJ Mills appointment: No future appointments. PAC-UM Team will sign off at this time. Medications were not reconciled and general patient assessment was not completed during this skilled nursing facility outreach.      Arnie MGN, RN  PAC-UM Team

## 2023-01-30 NOTE — PROGRESS NOTES
Bedside and Verbal shift change report given to Laurie (oncoming nurse) by Hodan Cho (offgoing nurse). Report included the following information SBAR, Kardex, ED Summary, Procedure Summary, Intake/Output, MAR, and Recent Results.

## 2023-01-30 NOTE — PROGRESS NOTES
Problem: Pressure Injury - Risk of  Goal: *Prevention of pressure injury  Description: Document Vaughn Scale and appropriate interventions in the flowsheet.   Outcome: Progressing Towards Goal  Note: Pressure Injury Interventions:  Sensory Interventions: Keep linens dry and wrinkle-free    Moisture Interventions: Absorbent underpads    Activity Interventions: Increase time out of bed    Mobility Interventions: HOB 30 degrees or less    Nutrition Interventions: Document food/fluid/supplement intake    Friction and Shear Interventions: HOB 30 degrees or less

## 2023-01-30 NOTE — PROGRESS NOTES
[address] [facility] Adult  Hospitalist Group                                                                                          Hospitalist Progress Note  Rafa Redd MD  Answering service: [phone] from in house phone        Date of Service:  2023  NAME:  Linsey Sol  :  1945  MRN:  [id number]       Admission Summary:   HPI: \"Gabriella Marquez is a 76 y.o. female with pmhx HFpEF, asthma, depression, GERD, HTN, hyopthyroidism, and RA who presents with shortness of breath, and ?dysphagia. She was previously hospitalized from  to  for type II NSTEMI with acute respiratory hypoxic failure. States that since discharge from the hospital, when she eats, she developed acute onset shortness of breath, and difficulty swallowing. It is unclear if the precipitating event is difficulty swallowing, or shortness of breath. Per last hospital note, she does wear 3 L of oxygen nasal cannula chronically     In the ED, VSS. Labs show WBC 14.3, and probnp is now normal. CXR, and CT neck are negative. \"      Interval history / Subjective:   Patient seen examined at bedside earlier, pending barium swallow, no acute complaints      Assessment & Plan:      #dysphagia  -speech consult to evaluate for aspiration > will switch to easy to chew   -aspiration precautions  -Ordered barium swallow > will be done   -ppi   -consulted GI, appreciate recs    #HFpEF  #chronic hypoxic respiratory failure   -pt.  With spo2 97% on baseline 3Lnc  -probnp improved  -continue  lasix PO 40mg, and carvedilol        #Asthma  -continue home symbicort,, singulair, tiotropium and prednisone  -nebs prn and scheduled   -mucinex      #hypothyroidism  -continue home levothyroxine     #GERD  -continue PPI     #Fibromyalgia  #depression  -continue baclofen, gabapentin, and wellbutrin     #glaucoma  -continue xalatan        Code status: full   Prophylaxis: [address] discussed with: Patient/family, nurse, case management  Anticipated Disposition: 24-48 hours pending results of barium swallow, snf when ready   Inpatient  Cardiac monitoring: xTelemetry      Hospital Problems  Date Reviewed: 12/4/2021            Codes Class Noted POA    Dysphagia ICD-10-CM: R13.10  ICD-9-CM: 787.20  1/27/2023 Unknown         Social Determinants of Health     Tobacco Use: Medium Risk    Smoking Tobacco Use: Former    Smokeless Tobacco Use: Never    Passive Exposure: Not on file   Alcohol Use: Not on file   Financial Resource Strain: Not on file   Food Insecurity: Not on file   Transportation Needs: Not on file   Physical Activity: Not on file   Stress: Not on file   Social Connections: Not on file   Intimate Partner Violence: Not on file   Depression: Not at risk    PHQ-2 Score: 0   Housing Stability: Not on file       Review of Systems:   A comprehensive review of systems was negative except for that written in the HPI. Vital Signs:    Last 24hrs VS reviewed since prior progress note. Most recent are:  Visit Vitals  BP 96/67 (BP 1 Location: Left upper arm, BP Patient Position: Lying)   Pulse 75   Temp 97.7 °F (36.5 °C)   Resp 18   Wt 82.1 kg (181 lb)   SpO2 96%   BMI 35.35 kg/m²         Intake/Output Summary (Last 24 hours) at 1/30/2023 1308  Last data filed at 1/30/2023 0631  Gross per 24 hour   Intake --   Output 1450 ml   Net -1450 ml          Physical Examination:     I had a face to face encounter with this patient and independently examined them on 1/30/2023 as outlined below:          Constitutional:  No acute distress, cooperative, pleasant, obese    ENT:  Oral mucosa moist, oropharynx benign. Resp:  Mild expiratory wheeze. No wheezing/rhonchi/rales. No accessory muscle use. CV:  Regular rhythm, normal rate, no murmurs, gallops, rubs    GI:  Soft, non distended, non tender.  normoactive bowel sounds, no hepatosplenomegaly     Musculoskeletal:  No edema, warm, 2+ pulses throughout    Neurologic:  Moves all extremities. AAOx3, CN II-XII reviewed            Data Review:    Review and/or order of clinical lab test  Review and/or order of tests in the radiology section of CPT  Review and/or order of tests in the medicine section of CPT    I have independently reviewed and interpreted patient's lab and all other diagnostic data    Labs:     Recent Labs     01/30/23  0355 01/29/23  0442   WBC 13.4* 11.3*   HGB 12.8 13.0   HCT 41.8 40.7    280       Recent Labs     01/30/23  0355 01/29/23 0442 01/28/23  0242   * 136 139   K 4.5 3.7 3.9   CL 97 98 101   CO2 29 35* 33*   BUN 18 22* 23*   CREA 0.74 0.90 0.81   * 114* 109*   CA 9.2 9.2 9.2       No results for input(s): ALT, AP, TBIL, TBILI, TP, ALB, GLOB, GGT, AML, LPSE in the last 72 hours. No lab exists for component: SGOT, GPT, AMYP, HLPSE  No results for input(s): INR, PTP, APTT, INREXT, INREXT in the last 72 hours. No results for input(s): FE, TIBC, PSAT, FERR in the last 72 hours. No results found for: FOL, RBCF   No results for input(s): PH, PCO2, PO2 in the last 72 hours. No results for input(s): CPK, CKNDX, TROIQ in the last 72 hours.     No lab exists for component: CPKMB  No results found for: CHOL, CHOLX, CHLST, CHOLV, HDL, HDLP, LDL, LDLC, DLDLP, TGLX, TRIGL, TRIGP, CHHD, CHHDX  Lab Results   Component Value Date/Time    Glucose (POC) 202 (H) 01/19/2023 04:20 PM    Glucose (POC) 112 12/25/2021 12:31 PM    Glucose (POC) 126 (H) 02/14/2021 04:29 PM    Glucose (POC) 103 (H) 02/14/2021 11:04 AM    Glucose (POC) 72 02/14/2021 06:15 AM     Lab Results   Component Value Date/Time    Color DARK YELLOW 11/30/2021 10:45 PM    Appearance TURBID (A) 11/30/2021 10:45 PM    Specific gravity 1.020 11/30/2021 10:45 PM    Specific gravity 1.020 02/08/2021 06:45 PM    pH (UA) 5.0 11/30/2021 10:45 PM    Protein Negative 11/30/2021 10:45 PM    Glucose Negative 11/30/2021 10:45 PM    Ketone Negative 11/30/2021 10:45 PM    Bilirubin Negative 11/30/2021 10:45 PM    Urobilinogen 0.2 11/30/2021 10:45 PM    Nitrites Negative 11/30/2021 10:45 PM    Leukocyte Esterase Negative 11/30/2021 10:45 PM    Epithelial cells MANY (A) 11/30/2021 10:45 PM    Bacteria 1+ (A) 11/30/2021 10:45 PM    WBC 10-20 11/30/2021 10:45 PM    RBC 5-10 11/30/2021 10:45 PM       Notes reviewed from all clinical/nonclinical/nursing services involved in patient's clinical care. Care coordination discussions were held with appropriate clinical/nonclinical/ nursing providers based on care coordination needs. Patients current active Medications were reviewed, considered, added and adjusted based on the clinical condition today. Home Medications were reconciled to the best of my ability given all available resources at the time of admission. Route is PO if not otherwise noted.       Medications Reviewed:     Current Facility-Administered Medications   Medication Dose Route Frequency    albuterol (PROVENTIL VENTOLIN) nebulizer solution 2.5 mg  2.5 mg Nebulization TID RT    guaiFENesin ER (MUCINEX) tablet 1,200 mg  1,200 mg Oral Q12H    HYDROcodone-acetaminophen (NORCO) 5-325 mg per tablet 1 Tablet  1 Tablet Oral Q4H PRN    sodium chloride (NS) flush 5-40 mL  5-40 mL IntraVENous Q8H    sodium chloride (NS) flush 5-40 mL  5-40 mL IntraVENous PRN    acetaminophen (TYLENOL) tablet 650 mg  650 mg Oral Q6H PRN    Or    acetaminophen (TYLENOL) suppository 650 mg  650 mg Rectal Q6H PRN    polyethylene glycol (MIRALAX) packet 17 g  17 g Oral DAILY PRN    ondansetron (ZOFRAN ODT) tablet 4 mg  4 mg Oral Q8H PRN    Or    ondansetron (ZOFRAN) injection 4 mg  4 mg IntraVENous Q6H PRN    carvediloL (COREG) tablet 12.5 mg  12.5 mg Oral BID WITH MEALS    furosemide (LASIX) tablet 40 mg  40 mg Oral DAILY    traMADoL (ULTRAM) tablet 25 mg  25 mg Oral Q4H PRN    albuterol (PROVENTIL VENTOLIN) nebulizer solution 2.5 mg  2.5 mg Nebulization Q2H PRN    aspirin delayed-release tablet 81 mg  81 mg Oral DAILY    baclofen (LIORESAL) tablet 5 mg  5 mg Oral BID    buPROPion SR (WELLBUTRIN SR) tablet 300 mg  300 mg Oral DAILY    gabapentin (NEURONTIN) capsule 400 mg  400 mg Oral TID    pantoprazole (PROTONIX) tablet 40 mg  40 mg Oral ACB    latanoprost (XALATAN) 0.005 % ophthalmic solution 1 Drop  1 Drop Both Eyes QHS    levothyroxine (SYNTHROID) tablet 137 mcg  137 mcg Oral ACB    cetirizine (ZYRTEC) tablet 10 mg  10 mg Oral DAILY    montelukast (SINGULAIR) tablet 10 mg  10 mg Oral QHS    predniSONE (DELTASONE) tablet 20 mg  20 mg Oral DAILY    ipratropium (ATROVENT) 0.02 % nebulizer solution 0.5 mg  0.5 mg Nebulization TID RT    arformoteroL (BROVANA) neb solution 15 mcg  15 mcg Nebulization BID RT    And    budesonide (PULMICORT) 500 mcg/2 ml nebulizer suspension  500 mcg Nebulization BID RT    lidocaine 4 % patch 2 Patch  2 Patch TransDERmal Q24H    morphine injection 1 mg  1 mg IntraVENous Q4H PRN     ______________________________________________________________________  EXPECTED LENGTH OF STAY: - - -  ACTUAL LENGTH OF STAY:          3                 Vanessa Ruelas MD

## 2023-01-30 NOTE — PROGRESS NOTES
End of Shift Note    Bedside shift change report given to Nj Agarwal  (oncoming nurse) by Margarita Black RN (offgoing nurse). Report included the following information SBAR, Kardex, Procedure Summary, Intake/Output, MAR, and Recent Results    Shift worked:  0730 - 0800     Shift summary and any significant changes:     No new changes     Concerns for physician to address:  No new concerns     Zone phone for oncoming shift:   [phone]       Activity:  Activity Level: Bed Rest  Number times ambulated in hallways past shift: 0  Number of times OOB to chair past shift: 0    Cardiac:   Cardiac Monitoring: Yes      Cardiac Rhythm: Sinus Rhythm    Access:  Current line(s): PIV     Genitourinary:   Urinary status: goyal    Respiratory:   O2 Device: Nasal cannula  Chronic home O2 use?: NO  Incentive spirometer at bedside: NO       GI:  Last Bowel Movement Date: 01/28/23  Current diet:  DIET NPO  DIET ONE TIME MESSAGE  DIET ONE TIME MESSAGE  Passing flatus: YES  Tolerating current diet: YES       Pain Management:   Patient states pain is manageable on current regimen: YES    Skin:  Vaughn Score: 13  Interventions: internal/external urinary devices    Patient Safety:  Fall Score:  Total Score: 2  Interventions: pt to call before getting OOB       Length of Stay:  Expected LOS: - - -  Actual LOS: 3      Margarita Black RN

## 2023-01-30 NOTE — PROGRESS NOTES
Problem: Pressure Injury - Risk of  Goal: *Prevention of pressure injury  Description: Document Vaughn Scale and appropriate interventions in the flowsheet.   Outcome: Progressing Towards Goal  Note: Pressure Injury Interventions:  Sensory Interventions: Keep linens dry and wrinkle-free, Float heels, Minimize linen layers    Moisture Interventions: Minimize layers, Limit adult briefs    Activity Interventions: Increase time out of bed, Pressure redistribution bed/mattress(bed type)    Mobility Interventions: HOB 30 degrees or less    Nutrition Interventions: Document food/fluid/supplement intake, Offer support with meals,snacks and hydration    Friction and Shear Interventions: HOB 30 degrees or less                Problem: General Medical Care Plan  Goal: *Vital signs within specified parameters  Outcome: Progressing Towards Goal  Goal: *Labs within defined limits  Outcome: Progressing Towards Goal  Goal: *Absence of infection signs and symptoms  Outcome: Progressing Towards Goal  Goal: *Optimal pain control at patient's stated goal  Outcome: Progressing Towards Goal  Goal: *Skin integrity maintained  Outcome: Progressing Towards Goal  Goal: *Fluid volume balance  Outcome: Progressing Towards Goal

## 2023-01-30 NOTE — PROGRESS NOTES
[facility].  [address] [address] [address], [address]   [phone]                     GI PROGRESS NOTE    Patient Name: Ina Marte      : 1945      MRN: [id number]  Admit Date: 2023  Today's Date: 2023  CC: dysphagia     Subjective:     Ms. Shankar Thibodeaux is laying in bed comfortably with RN at bedside. She states she had difficulty with her dinner last night most specifically the rice. She had pain in her chest with SOB. She swallowed her pills without any difficulty. Denies epigastric pain or heartburn. Objective:     Blood pressure 96/67, pulse 75, temperature 97.7 °F (36.5 °C), resp. rate 18, weight 82.1 kg (181 lb), SpO2 96 %. Physical Exam:  General appearance: cooperative, no distress, appears stated age  Skin: Extremities and face reveal no rashes. HEENT: Sclerae anicteric. Cardiovascular: Regular rate and rhythm. No murmurs, gallops, or rubs. Respiratory: Comfortable breathing with no accessory muscle use. GI: Abdomen nondistended, soft, and nontender. Normal active bowel sounds. Rectal: Deferred   Musculoskeletal: edema in BLE   Neurological: . Patient is alert and oriented. Psychiatric:  No anxiety or agitation. Data Review:    Recent Results (from the past 24 hour(s))   CBC WITH AUTOMATED DIFF    Collection Time: 23  3:55 AM   Result Value Ref Range    WBC 13.4 (H) 3.6 - 11.0 K/uL    RBC 4.34 3.80 - 5.20 M/uL    HGB 12.8 11.5 - 16.0 g/dL    HCT 41.8 35.0 - 47.0 %    MCV 96.3 80.0 - 99.0 FL    MCH 29.5 26.0 - 34.0 PG    MCHC 30.6 30.0 - 36.5 g/dL    RDW 14.9 (H) 11.5 - 14.5 %    PLATELET 183 656 - 342 K/uL    MPV 9.4 8.9 - 12.9 FL    NRBC 0.0 0  WBC    ABSOLUTE NRBC 0.00 0.00 - 0.01 K/uL    NEUTROPHILS 64 32 - 75 %    LYMPHOCYTES 24 12 - 49 %    MONOCYTES 8 5 - 13 %    EOSINOPHILS 3 0 - 7 %    BASOPHILS 0 0 - 1 %    IMMATURE GRANULOCYTES 1 (H) 0.0 - 0.5 %    ABS. NEUTROPHILS 8.6 (H) 1.8 - 8.0 K/UL    ABS.  LYMPHOCYTES 3.2 0.8 - 3.5 K/UL    ABS. MONOCYTES 1.1 (H) 0.0 - 1.0 K/UL    ABS. EOSINOPHILS 0.4 0.0 - 0.4 K/UL    ABS. BASOPHILS 0.1 0.0 - 0.1 K/UL    ABS. IMM. GRANS. 0.2 (H) 0.00 - 0.04 K/UL    DF AUTOMATED     METABOLIC PANEL, BASIC    Collection Time: 01/30/23  3:55 AM   Result Value Ref Range    Sodium 130 (L) 136 - 145 mmol/L    Potassium 4.5 3.5 - 5.1 mmol/L    Chloride 97 97 - 108 mmol/L    CO2 29 21 - 32 mmol/L    Anion gap 4 (L) 5 - 15 mmol/L    Glucose 101 (H) 65 - 100 mg/dL    BUN 18 6 - 20 MG/DL    Creatinine 0.74 0.55 - 1.02 MG/DL    BUN/Creatinine ratio 24 (H) 12 - 20      eGFR >60 >60 ml/min/1.73m2    Calcium 9.2 8.5 - 10.1 MG/DL         Assessment / Plan :     Ms. Adarsh Bingham was referred to our service for dysphagia. She describes feeling of \"spasm sensation\" in her chest, when this occurs it takes her breath away. SLP 1/20 \"Based on the objective data described below, the patient presents with suspect functional oropharyngeal swallow with all consistencies. Patient with history of GERD. Patient with timely mastication of solid. Strong cough x1 on initial bite of cracker but this was not replicated. No overt s/s aspiration with successive straw sips of thin or puree. \"     - Barium swallow study- tortuous esophagus and no obvious stricture. Consider empiric treatment with fluconazole and will do EGD. Patient is on prednisone and steroid inhaler for asthma, her symptoms could indicate esophageal candida.   - Continue PPI   - Continue easy to chew diet  - Supportive care per hospitalist        Patient C/New [address] Problem List:   Active Problems:    Dysphagia (1/27/2023)      Danny Chamberlain NP    Time Spent with Patient: 27  I have personally reviewed the history and independently examined the patient. I have reviewed the chart and agree with the documentation recorded by the Mid Level Provider, including the assessment, treatment plan, and disposition. ASSESSMENT AND PLAN:  She has dysphagia and GERD.  She has been on Prednisone. Empirically start Fluconazole and would do EGD. She may esophageal dysmotility.      James Zarate MD

## 2023-01-30 NOTE — PROGRESS NOTES
Physical Therapy Screening:  Services are not indicated at this time. [facility] based on results obtained during the nursing admission assessment. The patients chart was reviewed and the patient is not appropriate for a skilled therapy evaluation at this time. Patient lives in [address] [place] [address] and is andry lifted to a w/c every other day. Please consult physical therapy if any therapy needs arise. Thank you.     Karma Villanueva, PT

## 2023-01-30 NOTE — PROGRESS NOTES
Transition of Care Plan  RUR- 16% Moderate Risk  DISPOSITION: The disposition plan is to transition to [address] [facility] [address] of [address] [phone] - accepted patient. F/U with PCP/Specialist    Transport: [facility] - phone [phone], scheduled for    Patient admitted from [facility], [address] [facility] and will return once medically stable for discharge. A referral was not submitted via all script. At 10:39am -  submitted referral to [address], so coordinator could follow. At 12:36pm - CM spoke with attending who reports that after barrum swallow if it comes back normal, patient can transition back to facility today. At 12:37pm - CM spoke with  at facility who is working in place of admin coordinator who reports that patient can come back to facility. Latest time patient could admit would be 6:00pm. CM provided this information to attending, spoke with nurse, procedure to take place today at 1:00pm. Attending reports to follow up with CM to coordinate if patient will be able to dc back to LTC, [address] today.     YEISON Pat, LTSS, LMHP-e  Available in Perfect Serve

## 2023-01-31 ENCOUNTER — ANESTHESIA EVENT (OUTPATIENT)
Dept: ENDOSCOPY | Age: 78
DRG: 391 | End: 2023-01-31
Payer: MEDICARE

## 2023-01-31 ENCOUNTER — ANESTHESIA (OUTPATIENT)
Dept: ENDOSCOPY | Age: 78
DRG: 391 | End: 2023-01-31
Payer: MEDICARE

## 2023-01-31 LAB
ANION GAP SERPL CALC-SCNC: 4 MMOL/L (ref 5–15)
BASOPHILS # BLD: 0.1 K/UL (ref 0–0.1)
BASOPHILS NFR BLD: 1 % (ref 0–1)
BUN SERPL-MCNC: 16 MG/DL (ref 6–20)
BUN/CREAT SERPL: 21 (ref 12–20)
CALCIUM SERPL-MCNC: 9.2 MG/DL (ref 8.5–10.1)
CHLORIDE SERPL-SCNC: 96 MMOL/L (ref 97–108)
CO2 SERPL-SCNC: 38 MMOL/L (ref 21–32)
CREAT SERPL-MCNC: 0.76 MG/DL (ref 0.55–1.02)
DIFFERENTIAL METHOD BLD: ABNORMAL
EOSINOPHIL # BLD: 0.3 K/UL (ref 0–0.4)
EOSINOPHIL NFR BLD: 2 % (ref 0–7)
ERYTHROCYTE [DISTWIDTH] IN BLOOD BY AUTOMATED COUNT: 14.9 % (ref 11.5–14.5)
GLUCOSE SERPL-MCNC: 104 MG/DL (ref 65–100)
HCT VFR BLD AUTO: 40.3 % (ref 35–47)
HGB BLD-MCNC: 12.5 G/DL (ref 11.5–16)
IMM GRANULOCYTES # BLD AUTO: 0.2 K/UL (ref 0–0.04)
IMM GRANULOCYTES NFR BLD AUTO: 1 % (ref 0–0.5)
LYMPHOCYTES # BLD: 4.1 K/UL (ref 0.8–3.5)
LYMPHOCYTES NFR BLD: 27 % (ref 12–49)
MCH RBC QN AUTO: 29.8 PG (ref 26–34)
MCHC RBC AUTO-ENTMCNC: 31 G/DL (ref 30–36.5)
MCV RBC AUTO: 96.2 FL (ref 80–99)
MONOCYTES # BLD: 1.2 K/UL (ref 0–1)
MONOCYTES NFR BLD: 8 % (ref 5–13)
NEUTS SEG # BLD: 9.4 K/UL (ref 1.8–8)
NEUTS SEG NFR BLD: 61 % (ref 32–75)
NRBC # BLD: 0 K/UL (ref 0–0.01)
NRBC BLD-RTO: 0 PER 100 WBC
PLATELET # BLD AUTO: 262 K/UL (ref 150–400)
PMV BLD AUTO: 9.5 FL (ref 8.9–12.9)
POTASSIUM SERPL-SCNC: 3.8 MMOL/L (ref 3.5–5.1)
RBC # BLD AUTO: 4.19 M/UL (ref 3.8–5.2)
SODIUM SERPL-SCNC: 138 MMOL/L (ref 136–145)
WBC # BLD AUTO: 15.2 K/UL (ref 3.6–11)

## 2023-01-31 PROCEDURE — 77030021593 HC FCPS BIOP ENDOSC BSC -A: Performed by: SPECIALIST

## 2023-01-31 PROCEDURE — 74011250637 HC RX REV CODE- 250/637

## 2023-01-31 PROCEDURE — 0DB78ZX EXCISION OF STOMACH, PYLORUS, VIA NATURAL OR ARTIFICIAL OPENING ENDOSCOPIC, DIAGNOSTIC: ICD-10-PCS | Performed by: SPECIALIST

## 2023-01-31 PROCEDURE — 74011250637 HC RX REV CODE- 250/637: Performed by: STUDENT IN AN ORGANIZED HEALTH CARE EDUCATION/TRAINING PROGRAM

## 2023-01-31 PROCEDURE — 2709999900 HC NON-CHARGEABLE SUPPLY: Performed by: SPECIALIST

## 2023-01-31 PROCEDURE — 77030039825 HC MSK NSL PAP SUPERNO2VA VYRM -B: Performed by: ANESTHESIOLOGY

## 2023-01-31 PROCEDURE — 74011000250 HC RX REV CODE- 250: Performed by: INTERNAL MEDICINE

## 2023-01-31 PROCEDURE — 74011000250 HC RX REV CODE- 250: Performed by: SPECIALIST

## 2023-01-31 PROCEDURE — 74011250637 HC RX REV CODE- 250/637: Performed by: FAMILY MEDICINE

## 2023-01-31 PROCEDURE — 74011250636 HC RX REV CODE- 250/636: Performed by: NURSE ANESTHETIST, CERTIFIED REGISTERED

## 2023-01-31 PROCEDURE — 88312 SPECIAL STAINS GROUP 1: CPT

## 2023-01-31 PROCEDURE — 94640 AIRWAY INHALATION TREATMENT: CPT

## 2023-01-31 PROCEDURE — 88305 TISSUE EXAM BY PATHOLOGIST: CPT

## 2023-01-31 PROCEDURE — 80048 BASIC METABOLIC PNL TOTAL CA: CPT

## 2023-01-31 PROCEDURE — 76060000031 HC ANESTHESIA FIRST 0.5 HR: Performed by: SPECIALIST

## 2023-01-31 PROCEDURE — 77010033678 HC OXYGEN DAILY

## 2023-01-31 PROCEDURE — 74011000250 HC RX REV CODE- 250: Performed by: FAMILY MEDICINE

## 2023-01-31 PROCEDURE — 36415 COLL VENOUS BLD VENIPUNCTURE: CPT

## 2023-01-31 PROCEDURE — 65270000046 HC RM TELEMETRY

## 2023-01-31 PROCEDURE — 85025 COMPLETE CBC W/AUTO DIFF WBC: CPT

## 2023-01-31 PROCEDURE — 74011000250 HC RX REV CODE- 250: Performed by: STUDENT IN AN ORGANIZED HEALTH CARE EDUCATION/TRAINING PROGRAM

## 2023-01-31 PROCEDURE — 74011636637 HC RX REV CODE- 636/637: Performed by: FAMILY MEDICINE

## 2023-01-31 PROCEDURE — 0D758ZZ DILATION OF ESOPHAGUS, VIA NATURAL OR ARTIFICIAL OPENING ENDOSCOPIC: ICD-10-PCS | Performed by: SPECIALIST

## 2023-01-31 PROCEDURE — 77030020268 HC MISC GENERAL SUPPLY: Performed by: SPECIALIST

## 2023-01-31 PROCEDURE — 76040000019: Performed by: SPECIALIST

## 2023-01-31 PROCEDURE — 74011000250 HC RX REV CODE- 250: Performed by: NURSE ANESTHETIST, CERTIFIED REGISTERED

## 2023-01-31 PROCEDURE — 0DB58ZX EXCISION OF ESOPHAGUS, VIA NATURAL OR ARTIFICIAL OPENING ENDOSCOPIC, DIAGNOSTIC: ICD-10-PCS | Performed by: SPECIALIST

## 2023-01-31 RX ORDER — FLUMAZENIL 0.1 MG/ML
0.2 INJECTION INTRAVENOUS
Status: DISCONTINUED | OUTPATIENT
Start: 2023-01-31 | End: 2023-01-31 | Stop reason: HOSPADM

## 2023-01-31 RX ORDER — SODIUM CHLORIDE 9 MG/ML
50 INJECTION, SOLUTION INTRAVENOUS CONTINUOUS
Status: DISPENSED | OUTPATIENT
Start: 2023-01-31 | End: 2023-01-31

## 2023-01-31 RX ORDER — DEXTROMETHORPHAN/PSEUDOEPHED 2.5-7.5/.8
1.2 DROPS ORAL
Status: DISCONTINUED | OUTPATIENT
Start: 2023-01-31 | End: 2023-01-31 | Stop reason: HOSPADM

## 2023-01-31 RX ORDER — CALCIUM CARBONATE 200(500)MG
200 TABLET,CHEWABLE ORAL
Status: DISCONTINUED | OUTPATIENT
Start: 2023-01-31 | End: 2023-02-01 | Stop reason: HOSPADM

## 2023-01-31 RX ORDER — SODIUM CHLORIDE 0.9 % (FLUSH) 0.9 %
5-40 SYRINGE (ML) INJECTION EVERY 8 HOURS
Status: DISCONTINUED | OUTPATIENT
Start: 2023-01-31 | End: 2023-02-01 | Stop reason: HOSPADM

## 2023-01-31 RX ORDER — SODIUM CHLORIDE 0.9 % (FLUSH) 0.9 %
5-40 SYRINGE (ML) INJECTION AS NEEDED
Status: DISCONTINUED | OUTPATIENT
Start: 2023-01-31 | End: 2023-02-01 | Stop reason: HOSPADM

## 2023-01-31 RX ORDER — NALOXONE HYDROCHLORIDE 0.4 MG/ML
0.4 INJECTION, SOLUTION INTRAMUSCULAR; INTRAVENOUS; SUBCUTANEOUS
Status: DISCONTINUED | OUTPATIENT
Start: 2023-01-31 | End: 2023-01-31 | Stop reason: HOSPADM

## 2023-01-31 RX ORDER — SODIUM CHLORIDE 9 MG/ML
INJECTION, SOLUTION INTRAVENOUS
Status: DISCONTINUED | OUTPATIENT
Start: 2023-01-31 | End: 2023-01-31 | Stop reason: HOSPADM

## 2023-01-31 RX ORDER — LIDOCAINE HYDROCHLORIDE 20 MG/ML
INJECTION, SOLUTION EPIDURAL; INFILTRATION; INTRACAUDAL; PERINEURAL AS NEEDED
Status: DISCONTINUED | OUTPATIENT
Start: 2023-01-31 | End: 2023-01-31 | Stop reason: HOSPADM

## 2023-01-31 RX ORDER — EPINEPHRINE 0.1 MG/ML
1 INJECTION INTRACARDIAC; INTRAVENOUS
Status: DISCONTINUED | OUTPATIENT
Start: 2023-01-31 | End: 2023-01-31 | Stop reason: HOSPADM

## 2023-01-31 RX ORDER — ATROPINE SULFATE 0.1 MG/ML
0.5 INJECTION INTRAVENOUS
Status: DISCONTINUED | OUTPATIENT
Start: 2023-01-31 | End: 2023-01-31 | Stop reason: HOSPADM

## 2023-01-31 RX ORDER — PROPOFOL 10 MG/ML
INJECTION, EMULSION INTRAVENOUS AS NEEDED
Status: DISCONTINUED | OUTPATIENT
Start: 2023-01-31 | End: 2023-01-31 | Stop reason: HOSPADM

## 2023-01-31 RX ADMIN — GUAIFENESIN 1200 MG: 600 TABLET, EXTENDED RELEASE ORAL at 09:32

## 2023-01-31 RX ADMIN — IPRATROPIUM BROMIDE 0.5 MG: 0.5 SOLUTION RESPIRATORY (INHALATION) at 07:42

## 2023-01-31 RX ADMIN — HYDROCODONE BITARTRATE AND ACETAMINOPHEN 1 TABLET: 5; 325 TABLET ORAL at 21:13

## 2023-01-31 RX ADMIN — BUPROPION HYDROCHLORIDE 300 MG: 150 TABLET, EXTENDED RELEASE ORAL at 09:32

## 2023-01-31 RX ADMIN — BUDESONIDE 500 MCG: 0.5 INHALANT RESPIRATORY (INHALATION) at 07:42

## 2023-01-31 RX ADMIN — IPRATROPIUM BROMIDE 0.5 MG: 0.5 SOLUTION RESPIRATORY (INHALATION) at 16:20

## 2023-01-31 RX ADMIN — Medication 10 ML: at 05:38

## 2023-01-31 RX ADMIN — Medication 10 ML: at 14:00

## 2023-01-31 RX ADMIN — ALBUTEROL SULFATE 2.5 MG: 2.5 SOLUTION RESPIRATORY (INHALATION) at 16:20

## 2023-01-31 RX ADMIN — IPRATROPIUM BROMIDE 0.5 MG: 0.5 SOLUTION RESPIRATORY (INHALATION) at 21:17

## 2023-01-31 RX ADMIN — BUDESONIDE 500 MCG: 0.5 INHALANT RESPIRATORY (INHALATION) at 21:17

## 2023-01-31 RX ADMIN — FUROSEMIDE 40 MG: 40 TABLET ORAL at 17:54

## 2023-01-31 RX ADMIN — MONTELUKAST 10 MG: 10 TABLET, FILM COATED ORAL at 21:14

## 2023-01-31 RX ADMIN — PROPOFOL 30 MG: 10 INJECTION, EMULSION INTRAVENOUS at 15:22

## 2023-01-31 RX ADMIN — GABAPENTIN 400 MG: 400 CAPSULE ORAL at 17:50

## 2023-01-31 RX ADMIN — ALBUTEROL SULFATE 2.5 MG: 2.5 SOLUTION RESPIRATORY (INHALATION) at 07:42

## 2023-01-31 RX ADMIN — CALCIUM CARBONATE (ANTACID) CHEW TAB 500 MG 200 MG: 500 CHEW TAB at 21:13

## 2023-01-31 RX ADMIN — GABAPENTIN 400 MG: 400 CAPSULE ORAL at 21:14

## 2023-01-31 RX ADMIN — ALBUTEROL SULFATE 2.5 MG: 2.5 SOLUTION RESPIRATORY (INHALATION) at 21:17

## 2023-01-31 RX ADMIN — CARVEDILOL 12.5 MG: 12.5 TABLET, FILM COATED ORAL at 17:50

## 2023-01-31 RX ADMIN — GUAIFENESIN 1200 MG: 600 TABLET, EXTENDED RELEASE ORAL at 21:14

## 2023-01-31 RX ADMIN — ARFORMOTEROL TARTRATE 15 MCG: 15 SOLUTION RESPIRATORY (INHALATION) at 21:17

## 2023-01-31 RX ADMIN — PROPOFOL 50 MG: 10 INJECTION, EMULSION INTRAVENOUS at 15:19

## 2023-01-31 RX ADMIN — LIDOCAINE HYDROCHLORIDE 60 MG: 20 INJECTION, SOLUTION EPIDURAL; INFILTRATION; INTRACAUDAL; PERINEURAL at 15:18

## 2023-01-31 RX ADMIN — ASPIRIN 81 MG: 81 TABLET, COATED ORAL at 09:32

## 2023-01-31 RX ADMIN — GABAPENTIN 400 MG: 400 CAPSULE ORAL at 09:32

## 2023-01-31 RX ADMIN — SODIUM CHLORIDE: 900 INJECTION, SOLUTION INTRAVENOUS at 15:02

## 2023-01-31 RX ADMIN — SODIUM CHLORIDE, PRESERVATIVE FREE 10 ML: 5 INJECTION INTRAVENOUS at 21:15

## 2023-01-31 RX ADMIN — BACLOFEN 5 MG: 10 TABLET ORAL at 21:14

## 2023-01-31 RX ADMIN — PREDNISONE 20 MG: 20 TABLET ORAL at 09:32

## 2023-01-31 RX ADMIN — BACLOFEN 5 MG: 10 TABLET ORAL at 07:35

## 2023-01-31 RX ADMIN — LATANOPROST 1 DROP: 50 SOLUTION OPHTHALMIC at 21:24

## 2023-01-31 RX ADMIN — HYDROCODONE BITARTRATE AND ACETAMINOPHEN 1 TABLET: 5; 325 TABLET ORAL at 07:12

## 2023-01-31 RX ADMIN — ARFORMOTEROL TARTRATE 15 MCG: 15 SOLUTION RESPIRATORY (INHALATION) at 07:42

## 2023-01-31 NOTE — ACP (ADVANCE CARE PLANNING)
Advance Care Planning     Advance Care Planning (ACP) Physician/NP/PA Conversation      Date of Conversation: 1/26/2023  Conducted with: Patient with Decision Making Capacity    Healthcare Decision Maker:     Primary Decision Maker: Oralia Nelson - Christiano - [phone]  Click here to complete [employer] including selection of the Healthcare Decision Maker Relationship (ie \"Primary\")    Today we documented Decision Maker(s) consistent with Legal Next of Kin hierarchy. Care Preferences:    Hospitalization: \"If your health worsens and it becomes clear that your chance of recovery is unlikely, what would be your preference regarding hospitalization? \"  The patient would prefer hospitalization. Ventilation: \"If you were unable to breathe on your own and your chance of recovery was unlikely, what would be your preference about the use of a ventilator (breathing machine) if it was available to you? \"   The patient would desire the use of a ventilator. Resuscitation: \"In the event your heart stopped as a result of an underlying serious health condition, would you want attempts to be made to restart your heart, or would you prefer a natural death? \"   Yes, attempt to resuscitate.     Additional topics discussed: treatment goals, benefit/burden of treatment options, ventilation preferences, hospitalization preferences, and resuscitation preferences    Conversation Outcomes / Follow-Up Plan:   ACP complete - no further action today  Reviewed DNR/DNI and patient elects Full Code (Attempt Resuscitation)     Length of Voluntary ACP Conversation in minutes:  16 minutes    Hannah Santacruz MD

## 2023-01-31 NOTE — ROUTINE PROCESS
TRANSFER - IN REPORT:    Verbal report received from valente rn(name) on Lance Dom  being received from endo(unit) for routine post - op      Report consisted of patients Situation, Background, Assessment and   Recommendations(SBAR). Information from the following report(s) Procedure Summary was reviewed with the receiving nurse. Opportunity for questions and clarification was provided. Assessment completed upon patients arrival to unit and care assumed.

## 2023-01-31 NOTE — PROGRESS NOTES
[facility]  Hospitalist Group                                                                                          Hospitalist Progress Note  Bernard Dye MD  Answering service: [phone] OR [phone] from in house phone        Date of Service:  2023  NAME:  Tamiko Zabala  :  1945  MRN:  [id number]       Admission Summary:   HPI: \"Gabriella Parsons is a 76 y.o. female with pmhx HFpEF, asthma, depression, GERD, HTN, hyopthyroidism, and RA who presents with shortness of breath, and ?dysphagia. She was previously hospitalized from  to  for type II NSTEMI with acute respiratory hypoxic failure. States that since discharge from the hospital, when she eats, she developed acute onset shortness of breath, and difficulty swallowing. It is unclear if the precipitating event is difficulty swallowing, or shortness of breath. Per last hospital note, she does wear 3 L of oxygen nasal cannula chronically     In the ED, VSS. Labs show WBC 14.3, and probnp is now normal. CXR, and CT neck are negative. \"      Interval history / Subjective:   Patient seen examined at bedside earlier, EGD planned for today     Assessment & Plan:      #dysphagia  -speech consult to evaluate for aspiration > will switch to easy to chew   -could also be Candida esophagitis patient has risk factors asthma/prednisone usage  -aspiration precautions  -Barium swallow done  tortuous esophagus noted  - EGD planned for    -ppi   -consulted GI, appreciate recs    #HFpEF  #chronic hypoxic respiratory failure   -pt.  With spo2 97% on baseline 3Lnc  -probnp improved  -continue  lasix PO 40mg, and carvedilol        #Asthma  -continue home symbicort,, singulair, tiotropium and prednisone  -nebs prn and scheduled   -mucinex      #hypothyroidism  -continue home levothyroxine     #GERD  -continue PPI     #Fibromyalgia  #depression  -continue baclofen, gabapentin, and wellbutrin     #glaucoma  -continue xalatan        Code status: full   Prophylaxis: scd  Care Plan discussed with: Patient/family, nurse, case management  Anticipated Disposition: 24-48 hours pending results of egd, snf when ready   Inpatient  Cardiac monitoring: xTelemetry      Hospital Problems  Date Reviewed: 12/4/2021            Codes Class Noted POA    Dysphagia ICD-10-CM: R13.10  ICD-9-CM: 787.20  1/27/2023 Unknown         Social Determinants of Health     Tobacco Use: Medium Risk    Smoking Tobacco Use: Former    Smokeless Tobacco Use: Never    Passive Exposure: Not on file   Alcohol Use: Not on file   Financial Resource Strain: Not on file   Food Insecurity: Not on file   Transportation Needs: Not on file   Physical Activity: Not on file   Stress: Not on file   Social Connections: Not on file   Intimate Partner Violence: Not on file   Depression: Not at risk    PHQ-2 Score: 0   Housing Stability: Not on file       Review of Systems:   A comprehensive review of systems was negative except for that written in the HPI. Vital Signs:    Last 24hrs VS reviewed since prior progress note. Most recent are:  Visit Vitals  BP (!) 141/77   Pulse 78   Temp 97.8 °F (36.6 °C)   Resp 14   Wt 88.8 kg (195 lb 12.8 oz)   SpO2 98%   BMI 38.24 kg/m²         Intake/Output Summary (Last 24 hours) at 1/31/2023 1435  Last data filed at 1/31/2023 0541  Gross per 24 hour   Intake --   Output 1250 ml   Net -1250 ml          Physical Examination:     I had a face to face encounter with this patient and independently examined them on 1/31/2023 as outlined below:          Constitutional:  No acute distress, cooperative, pleasant, obese    ENT:  Oral mucosa moist, oropharynx benign. Resp:  Mild expiratory wheeze. No wheezing/rhonchi/rales. No accessory muscle use. CV:  Regular rhythm, normal rate, no murmurs, gallops, rubs    GI:  Soft, non distended, non tender.  normoactive bowel sounds, no hepatosplenomegaly     Musculoskeletal:  No edema, warm, 2+ pulses throughout    Neurologic:  Moves all extremities. AAOx3, CN II-XII reviewed            Data Review:    Review and/or order of clinical lab test  Review and/or order of tests in the radiology section of CPT  Review and/or order of tests in the medicine section of CPT    I have independently reviewed and interpreted patient's lab and all other diagnostic data    Labs:     Recent Labs     01/31/23 0357 01/30/23  0355   WBC 15.2* 13.4*   HGB 12.5 12.8   HCT 40.3 41.8    292       Recent Labs     01/31/23  0357 01/30/23  0355 01/29/23  0442    130* 136   K 3.8 4.5 3.7   CL 96* 97 98   CO2 38* 29 35*   BUN 16 18 22*   CREA 0.76 0.74 0.90   * 101* 114*   CA 9.2 9.2 9.2       No results for input(s): ALT, AP, TBIL, TBILI, TP, ALB, GLOB, GGT, AML, LPSE in the last 72 hours. No lab exists for component: SGOT, GPT, AMYP, HLPSE  No results for input(s): INR, PTP, APTT, INREXT, INREXT in the last 72 hours. No results for input(s): FE, TIBC, PSAT, FERR in the last 72 hours. No results found for: FOL, RBCF   No results for input(s): PH, PCO2, PO2 in the last 72 hours. No results for input(s): CPK, CKNDX, TROIQ in the last 72 hours.     No lab exists for component: CPKMB  No results found for: CHOL, CHOLX, CHLST, CHOLV, HDL, HDLP, LDL, LDLC, DLDLP, TGLX, TRIGL, TRIGP, CHHD, CHHDX  Lab Results   Component Value Date/Time    Glucose (POC) 202 (H) 01/19/2023 04:20 PM    Glucose (POC) 112 12/25/2021 12:31 PM    Glucose (POC) 126 (H) 02/14/2021 04:29 PM    Glucose (POC) 103 (H) 02/14/2021 11:04 AM    Glucose (POC) 72 02/14/2021 06:15 AM     Lab Results   Component Value Date/Time    Color DARK YELLOW 11/30/2021 10:45 PM    Appearance TURBID (A) 11/30/2021 10:45 PM    Specific gravity 1.020 11/30/2021 10:45 PM    Specific gravity 1.020 02/08/2021 06:45 PM    pH (UA) 5.0 11/30/2021 10:45 PM    Protein Negative 11/30/2021 10:45 PM    Glucose Negative 11/30/2021 10:45 PM    Ketone Negative 11/30/2021 10:45 PM Bilirubin Negative 11/30/2021 10:45 PM    Urobilinogen 0.2 11/30/2021 10:45 PM    Nitrites Negative 11/30/2021 10:45 PM    Leukocyte Esterase Negative 11/30/2021 10:45 PM    Epithelial cells MANY (A) 11/30/2021 10:45 PM    Bacteria 1+ (A) 11/30/2021 10:45 PM    WBC 10-20 11/30/2021 10:45 PM    RBC 5-10 11/30/2021 10:45 PM       Notes reviewed from all clinical/nonclinical/nursing services involved in patient's clinical care. Care coordination discussions were held with appropriate clinical/nonclinical/ nursing providers based on care coordination needs. Patients current active Medications were reviewed, considered, added and adjusted based on the clinical condition today. Home Medications were reconciled to the best of my ability given all available resources at the time of admission. Route is PO if not otherwise noted.       Medications Reviewed:     Current Facility-Administered Medications   Medication Dose Route Frequency    0.9% sodium chloride infusion  50 mL/hr IntraVENous CONTINUOUS    sodium chloride (NS) flush 5-40 mL  5-40 mL IntraVENous Q8H    sodium chloride (NS) flush 5-40 mL  5-40 mL IntraVENous PRN    naloxone (NARCAN) injection 0.4 mg  0.4 mg IntraVENous Multiple    flumazeniL (ROMAZICON) 0.1 mg/mL injection 0.2 mg  0.2 mg IntraVENous Multiple    simethicone (MYLICON) 51LE/4.1XB oral drops 80 mg  1.2 mL Oral Multiple    atropine injection 0.5 mg  0.5 mg IntraVENous ONCE PRN    EPINEPHrine (ADRENALIN) 0.1 mg/mL syringe 1 mg  1 mg Endoscopically ONCE PRN    albuterol (PROVENTIL VENTOLIN) nebulizer solution 2.5 mg  2.5 mg Nebulization TID RT    guaiFENesin ER (MUCINEX) tablet 1,200 mg  1,200 mg Oral Q12H    HYDROcodone-acetaminophen (NORCO) 5-325 mg per tablet 1 Tablet  1 Tablet Oral Q4H PRN    sodium chloride (NS) flush 5-40 mL  5-40 mL IntraVENous Q8H    sodium chloride (NS) flush 5-40 mL  5-40 mL IntraVENous PRN    acetaminophen (TYLENOL) tablet 650 mg  650 mg Oral Q6H PRN    Or acetaminophen (TYLENOL) suppository 650 mg  650 mg Rectal Q6H PRN    polyethylene glycol (MIRALAX) packet 17 g  17 g Oral DAILY PRN    ondansetron (ZOFRAN ODT) tablet 4 mg  4 mg Oral Q8H PRN    Or    ondansetron (ZOFRAN) injection 4 mg  4 mg IntraVENous Q6H PRN    carvediloL (COREG) tablet 12.5 mg  12.5 mg Oral BID WITH MEALS    furosemide (LASIX) tablet 40 mg  40 mg Oral DAILY    traMADoL (ULTRAM) tablet 25 mg  25 mg Oral Q4H PRN    albuterol (PROVENTIL VENTOLIN) nebulizer solution 2.5 mg  2.5 mg Nebulization Q2H PRN    aspirin delayed-release tablet 81 mg  81 mg Oral DAILY    baclofen (LIORESAL) tablet 5 mg  5 mg Oral BID    buPROPion SR (WELLBUTRIN SR) tablet 300 mg  300 mg Oral DAILY    gabapentin (NEURONTIN) capsule 400 mg  400 mg Oral TID    pantoprazole (PROTONIX) tablet 40 mg  40 mg Oral ACB    latanoprost (XALATAN) 0.005 % ophthalmic solution 1 Drop  1 Drop Both Eyes QHS    levothyroxine (SYNTHROID) tablet 137 mcg  137 mcg Oral ACB    cetirizine (ZYRTEC) tablet 10 mg  10 mg Oral DAILY    montelukast (SINGULAIR) tablet 10 mg  10 mg Oral QHS    predniSONE (DELTASONE) tablet 20 mg  20 mg Oral DAILY    ipratropium (ATROVENT) 0.02 % nebulizer solution 0.5 mg  0.5 mg Nebulization TID RT    arformoteroL (BROVANA) neb solution 15 mcg  15 mcg Nebulization BID RT    And    budesonide (PULMICORT) 500 mcg/2 ml nebulizer suspension  500 mcg Nebulization BID RT    lidocaine 4 % patch 2 Patch  2 Patch TransDERmal Q24H    morphine injection 1 mg  1 mg IntraVENous Q4H PRN     ______________________________________________________________________  EXPECTED LENGTH OF STAY: 2d 14h  ACTUAL LENGTH OF STAY:          [address] Dre Schultz MD

## 2023-01-31 NOTE — PROGRESS NOTES
Josee Vasquez  1945  [id number]    Situation:  Verbal report received from: Rafael Sawyer RN  Procedure: Procedure(s):  ESOPHAGOGASTRODUODENOSCOPY (EGD)  ESOPHAGOGASTRODUODENAL (EGD) BIOPSY  ESOPHAGEAL DILATION    Background:    Preoperative diagnosis: Dysphagia  Postoperative diagnosis: 1. - Dysphagia  2. - Antral Erythema    :  Dr. Ethan Renee  Assistant(s): Endoscopy Technician-1: Dillon Villa  Endoscopy RN-1: Jazmyn Clark RN    Specimens:   ID Type Source Tests Collected by Time Destination   1 : Gastric Antrum Biopsies Preservative Stomach, Antrum  Wing Sacha MD 1/31/2023 1526 Pathology   2 : Esophagus Biopsies Preservative Esophagus  Wing Sacha MD 1/31/2023 1526 Pathology     H. Pylori  no    Assessment:  Intra-procedure medications     Intravenous fluids: NS@ KVO     Vital signs stable     Abdominal assessment: round and soft     Recommendation:  Discharge patient per MD order.   Return to floor- inpt 217

## 2023-01-31 NOTE — PROGRESS NOTES
Bedside and Verbal shift change report given to [address] [place] [address] Gareth (oncoming nurse) by Melissa Pineda (offgoing nurse). Report included the following information SBAR, Kardex, Intake/Output, and MAR.

## 2023-01-31 NOTE — PROGRESS NOTES
Transition of Care Plan  RUR- 16% Moderate Risk  DISPOSITION: The disposition plan is to transition to [address] [place] [address] of [facility] [address] [phone] - accepted patient. F/U with PCP/Specialist    Transport: [facility] - phone [phone], scheduled for    Patient admitted from [facility], [address] [place] [address] and will return once medically stable for discharge. Yesterday, CM did not hear back from attending, regarding patients discharge. CM messaged admin coordinator via all script to provide the above update. Once patient is medically cleared for discharge, CM to assist with arranging transport for patient to return to [facility], [address] [place] [address].     YEISON Diamond, LTSS, LMHP-e  Available in Perfect Serve

## 2023-01-31 NOTE — PROGRESS NOTES
2000 Received report from Carmelina De Oliveira and [facility]. 2105 Norco given for back pain per patient request.  2200 Complete chlorhexidine bed bath provided, tolerated well.  0400 Labs drawn. 0710 Norco given for back pain per patient request.  0730 Bedside and Verbal shift change report given to Mehdi Lee (oncoming nurse) by Mariela Reyna (offgoing nurse). Report included the following information SBAR, Kardex, Intake/Output, MAR, and Recent Results.

## 2023-01-31 NOTE — PROGRESS NOTES
TRANSFER - OUT REPORT:    Verbal report given to Bianca(name) on Sarbjit Rueda  being transferred to [address] [place] [address](unit) for routine progression of care       Report consisted of patients Situation, Background, Assessment and   Recommendations(SBAR). Information from the following report(s) SBAR was reviewed with the receiving nurse. Lines:   Peripheral IV 01/26/23 Right Forearm (Active)   Site Assessment Clean, dry, & intact 01/31/23 0930   Phlebitis Assessment 0 01/31/23 0930   Infiltration Assessment 0 01/31/23 0930   Dressing Status Clean, dry, & intact 01/31/23 0930   Dressing Type Transparent;Tape 01/31/23 0930   Hub Color/Line Status Pink;Capped 01/31/23 0930   Action Taken Open ports on tubing capped 01/31/23 0930   Alcohol Cap Used Yes 01/31/23 0930        Opportunity for questions and clarification was provided.       Patient transported with:

## 2023-01-31 NOTE — PROCEDURES
[address]  [address]                 NAME:  Chiqui Ling   :   [birth year]3   MRN:   [id number]     Date/Time:  2023 3:29 PM    Esophagogastroduodenoscopy (EGD) Procedure Note    :  Fabrice Clark MD    Staff: Endoscopy Technician-1: Be Pablo  Endoscopy RN-1: Aixa Hartmann RN     Referring Provider:  Maday Luciano MD    Anethesia/Sedation:  MAC anesthesia Propofol    Preoperative diagnosis: Dysphagia    Postoperative diagnosis: 1. - Dysphagia  2. - Antral Erythema    Procedure Details     After infom consent was obtained for the procedure, with all risks and benefits of procedure explained the patient was taken to the endoscopy suite and placed in the left lateral decubitus position. Following sequential administration of sedation as per above, the [id number] gastroscope was inserted into the mouth and advanced under direct vision to second portion of the duodenum. A careful inspection was made as the gastroscope was withdrawn, including a retroflexed view of the proximal stomach; findings and interventions are described below. Findings:  Esophagus:Poor esophageal motility, food/medicine residue seen in distal esophagus, moderate hiatal hernia noted. Random biopsies done. Esophagus dilated with 46 F wire guided Savary dilator. Stomach:Patchy antral erythema, biopsies done  Duodenum/jejunum:normal      Therapies:  as above    Specimens: antral bx, random esophagus bx           EBL: None    Complications:   None; patient tolerated the procedure well. Impression:    See Postoperative diagnosis above    Recommendations:  -Await pathology. , -dysphagia diet with thin liquids, advance diet per speech therapy recommendations      Fabrice Clark MD

## 2023-01-31 NOTE — PROGRESS NOTES
Problem: Pressure Injury - Risk of  Goal: *Prevention of pressure injury  Description: Document Vaughn Scale and appropriate interventions in the flowsheet.   Outcome: Progressing Towards Goal  Note: Pressure Injury Interventions:  Sensory Interventions: Assess changes in LOC    Moisture Interventions: Absorbent underpads    Activity Interventions: Pressure redistribution bed/mattress(bed type)    Mobility Interventions: Pressure redistribution bed/mattress (bed type)    Nutrition Interventions: Document food/fluid/supplement intake    Friction and Shear Interventions: HOB 30 degrees or less

## 2023-01-31 NOTE — ANESTHESIA POSTPROCEDURE EVALUATION
Procedure(s):  ESOPHAGOGASTRODUODENOSCOPY (EGD)  ESOPHAGOGASTRODUODENAL (EGD) BIOPSY  ESOPHAGEAL DILATION. MAC    Anesthesia Post Evaluation        Patient participation: complete - patient participated  Level of consciousness: awake  Pain management: adequate  Airway patency: patent  Anesthetic complications: no  Cardiovascular status: hemodynamically stable  Respiratory status: acceptable  Hydration status: acceptable  Comments: The patient is ready for PACU discharge. Holly Castrejon DO                   Post anesthesia nausea and vomiting:  controlled      INITIAL Post-op Vital signs:   Vitals Value Taken Time   /103 01/31/23 1545   Temp     Pulse 81 01/31/23 1548   Resp 18 01/31/23 1548   SpO2 96 % 01/31/23 1548   Vitals shown include unvalidated device data.

## 2023-01-31 NOTE — ANESTHESIA PREPROCEDURE EVALUATION
Relevant Problems   No relevant active problems       Anesthetic History   No history of anesthetic complications            Review of Systems / Medical History  Patient summary reviewed, nursing notes reviewed and pertinent labs reviewed    Pulmonary  Within defined limits          Asthma        Neuro/Psych   Within defined limits    CVA  TIA     Cardiovascular  Within defined limits  Hypertension      CHF    CAD         GI/Hepatic/Renal  Within defined limits   GERD           Endo/Other      Hypothyroidism  Morbid obesity and arthritis     Other Findings              Physical Exam    Airway  Mallampati: II  TM Distance: > 6 cm  Neck ROM: normal range of motion   Mouth opening: Normal     Cardiovascular  Regular rate and rhythm,  S1 and S2 normal,  no murmur, click, rub, or gallop             Dental  No notable dental hx       Pulmonary  Breath sounds clear to auscultation               Abdominal  GI exam deferred       Other Findings            Anesthetic Plan    ASA: 3  Anesthesia type: MAC            Anesthetic plan and risks discussed with: Patient

## 2023-02-01 VITALS
WEIGHT: 196.21 LBS | BODY MASS INDEX: 38.32 KG/M2 | HEART RATE: 82 BPM | SYSTOLIC BLOOD PRESSURE: 90 MMHG | DIASTOLIC BLOOD PRESSURE: 61 MMHG | TEMPERATURE: 97.8 F | OXYGEN SATURATION: 94 % | RESPIRATION RATE: 20 BRPM

## 2023-02-01 LAB
ANION GAP SERPL CALC-SCNC: 3 MMOL/L (ref 5–15)
BASOPHILS # BLD: 0.1 K/UL (ref 0–0.1)
BASOPHILS NFR BLD: 1 % (ref 0–1)
BUN SERPL-MCNC: 18 MG/DL (ref 6–20)
BUN/CREAT SERPL: 22 (ref 12–20)
CALCIUM SERPL-MCNC: 10 MG/DL (ref 8.5–10.1)
CHLORIDE SERPL-SCNC: 96 MMOL/L (ref 97–108)
CO2 SERPL-SCNC: 37 MMOL/L (ref 21–32)
CREAT SERPL-MCNC: 0.81 MG/DL (ref 0.55–1.02)
DIFFERENTIAL METHOD BLD: ABNORMAL
EOSINOPHIL # BLD: 0.2 K/UL (ref 0–0.4)
EOSINOPHIL NFR BLD: 2 % (ref 0–7)
ERYTHROCYTE [DISTWIDTH] IN BLOOD BY AUTOMATED COUNT: 15 % (ref 11.5–14.5)
GLUCOSE SERPL-MCNC: 96 MG/DL (ref 65–100)
HCT VFR BLD AUTO: 44.6 % (ref 35–47)
HGB BLD-MCNC: 14 G/DL (ref 11.5–16)
IMM GRANULOCYTES # BLD AUTO: 0.1 K/UL (ref 0–0.04)
IMM GRANULOCYTES NFR BLD AUTO: 1 % (ref 0–0.5)
LYMPHOCYTES # BLD: 4.4 K/UL (ref 0.8–3.5)
LYMPHOCYTES NFR BLD: 31 % (ref 12–49)
MCH RBC QN AUTO: 30.1 PG (ref 26–34)
MCHC RBC AUTO-ENTMCNC: 31.4 G/DL (ref 30–36.5)
MCV RBC AUTO: 95.9 FL (ref 80–99)
MONOCYTES # BLD: 1.1 K/UL (ref 0–1)
MONOCYTES NFR BLD: 8 % (ref 5–13)
NEUTS SEG # BLD: 8 K/UL (ref 1.8–8)
NEUTS SEG NFR BLD: 57 % (ref 32–75)
NRBC # BLD: 0 K/UL (ref 0–0.01)
NRBC BLD-RTO: 0 PER 100 WBC
PLATELET # BLD AUTO: 293 K/UL (ref 150–400)
PMV BLD AUTO: 9.8 FL (ref 8.9–12.9)
POTASSIUM SERPL-SCNC: 3.5 MMOL/L (ref 3.5–5.1)
RBC # BLD AUTO: 4.65 M/UL (ref 3.8–5.2)
SODIUM SERPL-SCNC: 136 MMOL/L (ref 136–145)
WBC # BLD AUTO: 13.9 K/UL (ref 3.6–11)

## 2023-02-01 PROCEDURE — 36415 COLL VENOUS BLD VENIPUNCTURE: CPT

## 2023-02-01 PROCEDURE — 77010033678 HC OXYGEN DAILY

## 2023-02-01 PROCEDURE — 74011250637 HC RX REV CODE- 250/637: Performed by: STUDENT IN AN ORGANIZED HEALTH CARE EDUCATION/TRAINING PROGRAM

## 2023-02-01 PROCEDURE — 92610 EVALUATE SWALLOWING FUNCTION: CPT

## 2023-02-01 PROCEDURE — 94640 AIRWAY INHALATION TREATMENT: CPT

## 2023-02-01 PROCEDURE — 85025 COMPLETE CBC W/AUTO DIFF WBC: CPT

## 2023-02-01 PROCEDURE — 74011000250 HC RX REV CODE- 250: Performed by: INTERNAL MEDICINE

## 2023-02-01 PROCEDURE — 74011000250 HC RX REV CODE- 250: Performed by: STUDENT IN AN ORGANIZED HEALTH CARE EDUCATION/TRAINING PROGRAM

## 2023-02-01 PROCEDURE — 80048 BASIC METABOLIC PNL TOTAL CA: CPT

## 2023-02-01 PROCEDURE — 74011250637 HC RX REV CODE- 250/637: Performed by: FAMILY MEDICINE

## 2023-02-01 PROCEDURE — 94760 N-INVAS EAR/PLS OXIMETRY 1: CPT

## 2023-02-01 PROCEDURE — 74011636637 HC RX REV CODE- 636/637: Performed by: FAMILY MEDICINE

## 2023-02-01 PROCEDURE — 94761 N-INVAS EAR/PLS OXIMETRY MLT: CPT

## 2023-02-01 RX ADMIN — IPRATROPIUM BROMIDE 0.5 MG: 0.5 SOLUTION RESPIRATORY (INHALATION) at 07:20

## 2023-02-01 RX ADMIN — ALBUTEROL SULFATE 2.5 MG: 2.5 SOLUTION RESPIRATORY (INHALATION) at 07:20

## 2023-02-01 RX ADMIN — PANTOPRAZOLE SODIUM 40 MG: 40 TABLET, DELAYED RELEASE ORAL at 06:30

## 2023-02-01 RX ADMIN — BACLOFEN 5 MG: 10 TABLET ORAL at 09:56

## 2023-02-01 RX ADMIN — BUDESONIDE 500 MCG: 0.5 INHALANT RESPIRATORY (INHALATION) at 07:20

## 2023-02-01 RX ADMIN — GUAIFENESIN 1200 MG: 600 TABLET, EXTENDED RELEASE ORAL at 09:56

## 2023-02-01 RX ADMIN — ASPIRIN 81 MG: 81 TABLET, COATED ORAL at 09:56

## 2023-02-01 RX ADMIN — BUPROPION HYDROCHLORIDE 300 MG: 150 TABLET, EXTENDED RELEASE ORAL at 09:56

## 2023-02-01 RX ADMIN — HYDROCODONE BITARTRATE AND ACETAMINOPHEN 1 TABLET: 5; 325 TABLET ORAL at 06:15

## 2023-02-01 RX ADMIN — GABAPENTIN 400 MG: 400 CAPSULE ORAL at 09:56

## 2023-02-01 RX ADMIN — LEVOTHYROXINE SODIUM 137 MCG: 0.11 TABLET ORAL at 06:30

## 2023-02-01 RX ADMIN — PREDNISONE 20 MG: 20 TABLET ORAL at 09:56

## 2023-02-01 RX ADMIN — ARFORMOTEROL TARTRATE 15 MCG: 15 SOLUTION RESPIRATORY (INHALATION) at 07:20

## 2023-02-01 RX ADMIN — CETIRIZINE HYDROCHLORIDE 10 MG: 10 TABLET, FILM COATED ORAL at 09:55

## 2023-02-01 NOTE — PROGRESS NOTES
SPEECH PATHOLOGY BEDSIDE SWALLOW EVALUATION/DISCHARGE  Patient: José Miguel Breen (80 y.o. female)  Date: 2/1/2023  Primary Diagnosis: Dysphagia [R13.10]  Procedure(s) (LRB):  ESOPHAGOGASTRODUODENOSCOPY (EGD) (N/A)  ESOPHAGOGASTRODUODENAL (EGD) BIOPSY (N/A)  ESOPHAGEAL DILATION (N/A) 1 Day Post-Op   Precautions:        ASSESSMENT :  Esophageal education complete. SLP has been recommend regular diet/thin liquids from an oropharyngeal standpoint and this SLP is in agreement with these recommendations with esophageal precautions. Would not thicken liquids (although pt can have her milkshakes) because this can be more dangerous for a patient with esophgeal dysphagia. Handout left with patient. Skilled acute therapy provided by a speech-language pathologist is not indicated at this time. PLAN :  Recommendations:  --regular diet/thin liquids  --esophageal precautions  --handout provided to patient  --do not give thickened liquids as this will make things more challenging    Discharge Recommendations: To Be Determined     SUBJECTIVE:   Patient stated, \"How can I safely eat a burger? .     OBJECTIVE:     Past Medical History:   Diagnosis Date    Acute kidney failure (HCC)     Asthma     Depression     Fibromyalgia     GERD (gastroesophageal reflux disease)     GERD (gastroesophageal reflux disease)     Hypertension     Hypothyroid     Insomnia     Menopause 1995    Neurogenic bladder     Rheumatoid arteritis (HCC)     Rotator cuff tear      Past Surgical History:   Procedure Laterality Date    HX CHOLECYSTECTOMY      HX GYN      HX ORTHOPAEDIC      HX TONSIL AND ADENOIDECTOMY       Prior Level of Function/Home Situation:   Home Situation  Home Environment: Skilled nursing facility  One/Two Story Residence: Two story  Living Alone: No  Support Systems: Child(baylee), Other Family Member(s)  Patient Expects to be Discharged to[de-identified] Skilled nursing facility  Current DME Used/Available at Home: Wheelchair  Diet prior to admission: regular diet/thin liquids  Current Diet:  soft/bite sized diet/thin liquids   Cognitive and Communication Status:  Neurologic State: Alert, Appropriate for age  Orientation Level: Oriented X4  Cognition: Follows commands  Perception: Appears intact  Perseveration: No perseveration noted  Safety/Judgement: Awareness of environment  NOMS:   The NOMS functional outcome measure was used to quantify this patient's level of swallowing impairment. Based on the NOMS, the patient was determined to be at level 7 for swallow function     NOMS Swallowing Levels:  Level 1 (CN): NPO  Level 2 (CM): NPO but takes consistency in therapy  Level 3 (CL): Takes less than 50% of nutrition p.o. and continues with nonoral feedings; and/or safe with mod cues; and/or max diet restriction  Level 4 (CK): Safe swallow but needs mod cues; and/or mod diet restriction; and/or still requires some nonoral feeding/supplements  Level 5 (CJ): Safe swallow with min diet restriction; and/or needs min cues  Level 6 (CI): Independent with p.o.; rare cues; usually self cues; may need to avoid some foods or needs extra time  Level 7 ([address] [place] [address]): Independent for all p.o.  DAE. (2003). National Outcomes Measurement System (NOMS): Adult Speech-Language Pathology User's Guide. Pain:  Pain Scale 1: Numeric (0 - 10)  Pain Intensity 1: 0     After treatment:   Call bell within reach and Nursing notified    COMMUNICATION/EDUCATION:       The patient's plan of care including recommendations, planned interventions, and recommended diet changes were discussed with: Registered nurse.      Thank you for this referral.  LAMONT Mitchell  Time Calculation: 20 mins

## 2023-02-01 NOTE — PROGRESS NOTES
Transition of Care Plan  RUR- 16% Moderate Risk  DISPOSITION: The disposition plan is to transition to [facility] of [address] [phone] - accepted patient. F/U with PCP/Specialist    Transport: [facility] - [phone], scheduled for 10:45am    Patient admitted from [facility], [address] [place] [address] and will return once medically stable for discharge. Per attending, patient is medically stable for discharge today. At 9:53am - CM spoke with assigned nurse who reports that patient will transition to the [address] [place] [address] on 3N. CM spoke with admin coordinator - Maral Juárez who reports and confirms that patient can return [facility] today. Per admissions coordinator, DC summary will need to be faxed prior to patient coming to facility today. CM perfect served attending, encouraged to upload DC Summary, CM encouraged to fax to facility. Fax number: [phone]    At 10:00am - MARVEL spoke w/ assigned nurse who reports that SLP is now working with patient and encouraged CM to re-schedule transport for 10:45am. CM contacted [facility] to arranged a 10:45am transport. CM also provided this update to [address] [place] [address] coordinator via all script. Patient will be picked up from room 217. At 10:20am - CM faxed dc summary to [address] [place] [address] coordinator at facility. CM also completed paper work needed for transport. CM to complete SA Form, TL to sign and place with Madeleine Hollins. Call to [facility] [phone].  Room Number: 787  Transportation: Hospital to Home - scheduled for 10:45am  AVS has been updated. Transition of Care Plan to SNF/Rehab    SNF/Rehab Transition:  Patient has been accepted to [facility], [address] [place] [address] and meets criteria for admission. Patient will transported by [facility] and expected to leave at 12:00pm.    Communication to Patient/Family:  Met with patient (identified care giver) and they are agreeable to the transition plan.     Communication to SNF/Rehab:  Bedside RN, has been notified to update the transition plan to the facility. Discharge information has been updated on the AVS.       Nursing Please include all hard scripts for controlled substances, med rec and dc summary, and AVS in packet. Reviewed and confirmed with facility, [facility], [address] [place] [address], can manage the patient care needs for the following:     SNF/Rehab Transition:  Hector Syed with (X) only those applicable:    Medication:  [x]  Medications will be available at the facility  []  IV Antibiotics  []  Controlled Substance - hard copy to be sent with patient   []  Weekly Labs   Documents:  [x] Hard RX  [x] MAR  [x] Kardex  [x] AVS  []Transfer Summary  [x]Discharge   Equipment:  []  CPAP/BiPAP  []  Wound Vacuum  []  Godinez or Urinary Device  []  PICC/Central Line  []  Nebulizer  []  Ventilator   Treatment:  []Isolation (for MRSA, VRE, etc.)  []Surgical Drain Management  []Tracheostomy Care  []Dressing Changes  []Dialysis with transportation and chair time. []PEG Care  []Oxygen  []Daily Weights for Heart Failure   Dietary:  []Any diet limitations  []Tube Feedings   []Total Parenteral Management (TPN)   Eligible for Medicaid Long Term Services and Supports  Yes:  [] Eligible for medical assistance or will become eligible within 180 days and UAI completed. [] Provider/Patient and/or support system has requested screening. [] UAI copy provided to patient or responsible party. [] UAI unavailable at discharge will send once processed to SNF provider. [] UAI unavailable at discharged mailed to patient  No:   [] Private pay and is not financially eligible for Medicaid within the next 180 days. [] Reside out-of-state.   [] A residents of a state owned/operated facility that is licensed  by [place] [address] [employer] or [facility]  [] Enrollment in [facility] [address] [facility] hospice services  [] [address]  [] Patient /Family declines to have screening completed or provide financial information for screening     Financial Resources:  Medicaid    [] Initiated and application pending   [] Full coverage     Advanced Care Plan:  []Surrogate Decision Maker of Care  [x]POA  [x]Communicated Code Status (\"Full\")      Medicare pt has received, reviewed, and signed 2nd IM letter informing them of their right to appeal the discharge. Signed copy has been placed on pt bedside chart.  CM also documented in document list.    YEISON Frankel, LTKIEL, LMHP-e  Available in [address] [place] [address]

## 2023-02-01 NOTE — PROGRESS NOTES
0000: Bedside shift change report given to Von Downing  (oncoming nurse) by Lane Goldstein  (offgoing nurse). Report included the following information SBAR, Kardex, Intake/Output, and MAR    0730: Bedside shift change report given to Bossman (oncoming nurse) by Von Downing  (offgoing nurse). Report included the following information SBAR, Kardex, Intake/Output, MAR, and Accordion. Linda Garza

## 2023-02-01 NOTE — DISCHARGE SUMMARY
Discharge Summary       PATIENT ID: Alex Sandhu  MRN: [id number]   YOB: 1945    DATE OF ADMISSION: 1/26/2023  9:36 PM    DATE OF DISCHARGE: 02/01/23    PRIMARY CARE PROVIDER: Valdo Child MD     ATTENDING PHYSICIAN: Donavan Pepper MD   DISCHARGING PROVIDER: Donavan Pepper MD    To contact this individual call [phone] and ask the  to page. If unavailable ask to be transferred the Adult Hospitalist Department. CONSULTATIONS: IP CONSULT TO HOSPITALIST  IP CONSULT TO GASTROENTEROLOGY    PROCEDURES/SURGERIES: Procedure(s):  ESOPHAGOGASTRODUODENOSCOPY (EGD)  ESOPHAGOGASTRODUODENAL (EGD) BIOPSY  ESOPHAGEAL DILATION    ADMITTING DIAGNOSES & HOSPITAL COURSE:   HPI: \"Gabriella Burton is a 76 y.o. female with pmhx HFpEF, asthma, depression, GERD, HTN, hyopthyroidism, and RA who presents with shortness of breath, and ?dysphagia. She was previously hospitalized from January 17 to January 22 for type II NSTEMI with acute respiratory hypoxic failure. States that since discharge from the hospital, when she eats, she developed acute onset shortness of breath, and difficulty swallowing. It is unclear if the precipitating event is difficulty swallowing, or shortness of breath. Per last hospital note, she does wear 3 L of oxygen nasal cannula chronically     In the ED, VSS. Labs show WBC 14.3, and probnp is now normal. CXR, and CT neck are negative. \"           DISCHARGE DIAGNOSES / PLAN:      #dysphagia  -speech consult to evaluate for aspiration >re-evaluated today, recommend regular diet with thin liquid, esophageal education provided  -Barium swallow done 1/30 tortuous esophagus noted  - EGD 1/31 with moderate hiatal hernia, random biospies done and esophageal dilation done, patient is tolerating diet   -path pending can fu op   -ppi   -consulted GI, appreciate recs      #HFpEF  #chronic hypoxic respiratory failure   -pt.  With spo2 97% on baseline 3Lnc  -probnp improved  -continue lasix PO 40mg, and carvedilol     #Asthma  -continue home symbicort,, singulair, tiotropium and prednisone  -nebs prn and scheduled   -mucinex      #hypothyroidism  -continue home levothyroxine     #GERD  -continue PPI     #Fibromyalgia  #depression  -continue baclofen, gabapentin, and wellbutrin     #glaucoma  -continue xalatan    FU op w/ PCP, gi, pulm prn     I called daughter and left voicemail to call back         Code status: full   Prophylaxis: [address] discussed with: Patient/family, nurse, case management  Anticipated Disposition: dc back to snf  Cardiac monitoring: xTelemetry            FOLLOW UP APPOINTMENTS:    Follow-up Information       Follow up With Specialties Details Why Contact Info    Félix Pickard MD Gastroenterology Call in 1 day(s)  [address] [facility] [place]      Hansel Del Rio MD Family Medicine Call in 1 day(s)  [place] [address] [phone]      [facility]  Call in 1 day(s) fu for general asthma [address]: rpt cbc and bmp 3-5 days     DIET: regular diet with thin liquids     ACTIVITY: Activity as tolerated      DISCHARGE MEDICATIONS:  Current Discharge Medication List        CONTINUE these medications which have NOT CHANGED    Details   therapeutic multivitamin-minerals (THERAGRAN-M) tablet Take 1 Tablet by mouth daily. naproxen (NAPROSYN) 500 mg tablet Take 500 mg by mouth two (2) times a day. potassium chloride SR (KLOR-CON 10) 10 mEq tablet Take 10 mEq by mouth daily. bisacodyL (DULCOLAX) 10 mg supp Insert 10 mg into rectum daily as needed for Constipation. sodium phosphate (FLEET'S) 19-7 gram/118 mL enema Insert 1 Enema into rectum daily as needed. carvediloL (COREG) 6.25 mg tablet Take 6.25 mg by mouth two (2) times daily (with meals).       predniSONE (DELTASONE) 10 mg tablet Take 20 mg by mouth daily. !! baclofen 5 mg tab Take 5 mg by mouth every twelve (12) hours. !! baclofen (LIORESAL) 10 mg tablet Take 10 mg by mouth two (2) times daily as needed for Muscle Spasm(s). HYDROcodone-acetaminophen (NORCO) 5-325 mg per tablet 1 Tablet every four (4) hours as needed for Pain. !! albuterol (PROVENTIL VENTOLIN) 2.5 mg /3 mL (0.083 %) nebu 2.5 mg by Nebulization route every two (2) hours as needed for Wheezing. Eosinophilic asthma      !! albuterol (PROVENTIL VENTOLIN) 2.5 mg /3 mL (0.083 %) nebu 2.5 mg by Nebulization route four (4) times daily. Indications: chronic obstructive pulmonary disease      aspirin 81 mg chewable tablet Take 81 mg by mouth daily. buPROPion SR (WELLBUTRIN SR) 150 mg SR tablet Take 300 mg by mouth daily. gabapentin (NEURONTIN) 400 mg capsule Take 400 mg by mouth three (3) times daily. levothyroxine (SYNTHROID) 137 mcg tablet Take 137 mcg by mouth Daily (before breakfast). honey (MediHoney, honey,) 100 % topical paste Apply  to affected area daily. Topical, once daily, wound care      tiotropium bromide (SPIRIVA RESPIMAT) 2.5 mcg/actuation inhaler Take 2 Puffs by inhalation every evening. acetaminophen (TYLENOL) 500 mg tablet Take 1,000 mg by mouth three (3) times daily. loratadine (CLARITIN) 10 mg tablet Take 10 mg by mouth daily. latanoprost (XALATAN) 0.005 % ophthalmic solution Administer 1 Drop to both eyes nightly. montelukast (SINGULAIR) 10 mg tablet Take 10 mg by mouth At bedtime. albuterol (PROVENTIL HFA, VENTOLIN HFA, PROAIR HFA) 90 mcg/actuation inhaler Take 2 Puffs by inhalation every four (4) hours as needed. cholecalciferol (VITAMIN D3) (1000 Units /25 mcg) tablet Take 1,000 Units by mouth daily. polyethylene glycol (MIRALAX) 17 gram packet Take 17 g by mouth daily as needed. lansoprazole (PREVACID SOLUTAB) 30 mg disintegrating tablet Take 30 mg by mouth daily.       budesonide-formoteroL (SYMBICORT) 160-4.5 mcg/actuation HFAA Take 2 Puffs by inhalation two (2) times a day. cycloSPORINE (RESTASIS) 0.05 % dpet Administer 1 Drop to both eyes two (2) times a day. !! - Potential duplicate medications found. Please discuss with provider. NOTIFY YOUR PHYSICIAN FOR ANY OF THE FOLLOWING:   Fever over 101 degrees for 24 hours. Chest pain, shortness of breath, fever, chills, nausea, vomiting, diarrhea, change in mentation, falling, weakness, bleeding. Severe pain or pain not relieved by medications. Or, any other signs or symptoms that you may have questions about.     DISPOSITION:    Home With:   OT  PT  HH  RN      x Long term SNF/Inpatient Rehab    Independent/assisted living    Hospice    Other:       PATIENT CONDITION AT DISCHARGE:     Functional status    Poor    x Deconditioned     Independent      Cognition    x Lucid     Forgetful     Dementia      Catheters/lines (plus indication)    Godinez     PICC     PEG    x None      Code status   x  Full code     DNR      PHYSICAL EXAMINATION AT DISCHARGE:    General : alert x 3, awake, no acute distress, obese   HEENT: PEERL, EOMI, moist mucus membrane, TM clear  Neck: supple, no JVD, no meningeal signs  Chest: Clear to auscultation bilaterally   CVS: S1 S2 heard, Capillary refill less than 2 seconds  Abd: soft/ Non tender, non distended, BS physiological,   Ext: no clubbing, no cyanosis, no edema, brisk 2+ DP pulses  Neuro/Psych: pleasant mood and affect, CN 2-12 grossly intact, sensory grossly within normal limit, Strength 5/5 in all extremities, DTR 1+ x 4  Skin: warm     CHRONIC MEDICAL DIAGNOSES:  Problem List as of 2/1/2023 Date Reviewed: 12/4/2021            Codes Class Noted - Resolved    Dysphagia ICD-10-CM: R13.10  ICD-9-CM: 787.20  1/27/2023 - Present        Acute diastolic heart failure ([facility] [address] ICD-10-CM: I50.31  ICD-9-CM: 428.31  1/20/2023 - Present        Tachycardia ICD-10-CM: R00.0  ICD-9-CM: 785.0  1/18/2023 - Present Acute chest pain ICD-10-CM: R07.9  ICD-9-CM: 786.50  1/18/2023 - Present        NSTEMI (non-ST elevated myocardial infarction) [facility]) ICD-10-CM: I21.4  ICD-9-CM: 410.70  1/18/2023 - Present        Thoracic compression fracture (HCC) ICD-10-CM: S22.000A  ICD-9-CM: 805.2  1/18/2023 - Present        Acute respiratory failure with hypoxia (HCC) ICD-10-CM: J96.01  ICD-9-CM: 518.81  1/18/2023 - Present        Unspecified fracture of sternum, initial encounter for closed fracture ICD-10-CM: S22.20XA  ICD-9-CM: 807.2  1/18/2023 - Present        Weakness of right leg ICD-10-CM: R29.898  ICD-9-CM: 729.89  12/29/2021 - Present        TIA (transient ischemic attack) ICD-10-CM: G45.9  ICD-9-CM: 435.9  12/25/2021 - Present        CVA (cerebral vascular accident) [facility]) ICD-10-CM: I63.9  ICD-9-CM: 434.91  12/25/2021 - Present        Orthostatic hypotension ICD-10-CM: I95.1  ICD-9-CM: 458.0  12/1/2021 - Present        Weakness ICD-10-CM: R53.1  ICD-9-CM: 780.79  12/1/2021 - Present        UTI (urinary tract infection) ICD-10-CM: N39.0  ICD-9-CM: 599.0  12/1/2021 - Present        LOREN (acute kidney injury) ([facility] [address] ICD-10-CM: N17.9  ICD-9-CM: 584.9  12/1/2021 - Present        Goals of care, counseling/discussion ICD-10-CM: Z71.89  ICD-9-CM: V65.49  Unknown - Present        Other chronic pain ICD-10-CM: G89.29  ICD-9-CM: 338.29  Unknown - Present        Cough ICD-10-CM: R05.9  ICD-9-CM: 786.2  Unknown - Present        Impaired mobility ICD-10-CM: Z74.09  ICD-9-CM: 799.89  Unknown - Present        Status asthmaticus ICD-10-CM: J45.902  ICD-9-CM: 493.91  2/8/2021 - Present        Moderate persistent asthma with acute exacerbation ICD-10-CM: J45.41  ICD-9-CM: 493.92  2/8/2021 - Present        IBS (irritable bowel syndrome) (Chronic) ICD-10-CM: K58.9  ICD-9-CM: 564.1  7/8/2011 - Present        HTN (hypertension) (Chronic) ICD-10-CM: I10  ICD-9-CM: 401.9  7/8/2011 - Present        Asthma (Chronic) ICD-10-CM: J45.909  ICD-9-CM: 493.90 7/8/2011 - Present        Hypothyroidism ICD-10-CM: E03.9  ICD-9-CM: 244.9  7/8/2011 - Present        Syncope ICD-10-CM: R55  ICD-9-CM: 780.2  7/7/2011 - Present        Dehydration ICD-10-CM: E86.0  ICD-9-CM: 276.51  7/7/2011 - Present           Greater than 31 minutes were spent with the patient on counseling and coordination of care    Signed:   Trevor Bright MD  2/1/2023  10:24 AM

## 2023-02-01 NOTE — PROGRESS NOTES
Discharge order was placed this morning at 0800. Patient's vital signs were checked and found to be stable. Morning meds were given, patient refused morning coreg and lasix stating \"it makes me feel dizzy\". Patient stable and ready for discharge. I have reviewed discharge instructions with the patient. The patient verbalized understanding.

## 2023-02-01 NOTE — PROGRESS NOTES
Bedside and Verbal shift change report given to [facility] Lazarus Servin (oncoming nurse) by Shivam Seo (offgoing nurse). Report included the following information SBAR, Kardex, Intake/Output, and MAR.

## 2023-02-01 NOTE — ROUTINE PROCESS
Verbal shift change report given to connor rn (oncoming nurse) by Leslie jordan rn (offgoing nurse). Report included the following information SBAR and Kardex.

## 2023-02-02 ENCOUNTER — PATIENT OUTREACH (OUTPATIENT)
Dept: CASE MANAGEMENT | Age: 78
End: 2023-02-02

## 2023-02-03 ENCOUNTER — PATIENT OUTREACH (OUTPATIENT)
Dept: CASE MANAGEMENT | Age: 78
End: 2023-02-03

## 2023-02-10 ENCOUNTER — PATIENT OUTREACH (OUTPATIENT)
Dept: CASE MANAGEMENT | Age: 78
End: 2023-02-10

## 2023-02-10 NOTE — PROGRESS NOTES
Care Transitions Follow Up Call    Patient Current Location: [address] [place] [address] to be reviewed by the provider   Additional needs identified to be addressed with provider: no  none       Method of communication with provider : none    Care Transition Nurse (CTN) contacted the  [facility] Team via Zoom  to follow up. Verified name and  with  [facility]  as identifiers. Addressed changes since last contact: none  CTN reviewed discharge instructions, medical action plan flags with  Team  and discussed any barriers to care. [address] follow up appointment(s): No future appointments. Non-[facility] follow up appointment(s): Cards- Dr Sherri Sanabria- Patient's daughter is to schedule this. CTN provided contact information for future needs. Plan for follow-up call in 5-7 days based on severity of symptoms and risk factors. Goals Addressed                   This Visit's Progress     Attends follow-up appointments as directed. On track     23   Patient see's Dr Maryanne Zhang Director at [address], does not have a set appt yet. Patient has an appt with Sary Sanabria on 23 at 10:45am, Nurse Nina notified of this appt. Monitor status of these appt on next call. Carmen Paulino MSN, RN, CCM / Care Transition Nurse / [phone]     23 Patient readmitted to [facility] -23 with Dysphagia  Patient to be seen by Dr Lisa Garcia today. [facility] needs to reschedule Dr Sherri Sanabria appt. Monitor status of these appt on next call. Carmen Paulino MSN, RN, CCM / Care Transition Nurse / [phone]   Per Our [facility] Team patient daughter is to make an appt with Dr Sherri Sanabria, they are going to reach out to her and see when that appt is. Patient see's Dr Lisa Garcia at [address] as needed. Monitor status of appt with Dr Sherri Sanabria on next call. Carmen Paulino MSN, RN, CCM / Care Transition Nurse / [phone]          Prevent complications post hospitalization.    On track     23   Patient is long term patient at [address]  Called and spoke to patient nurse Nina, medication reconciliation completed. Nina denies any C/P or SOB observed when with patient. Attend weekly Zoom calls with [facility] on Friday at 8 am.    Jerel JOSHUA, RN, CCM / Care Transition Nurse / [phone]     02/02/23 Patient readmitted 1/26-2/2/23  Medication reconciliation completed with [facility]. He had question along with PA about Coreg should it be 12.5 or 6.25 mg they are reaching out to Cards. Jerel JOSHUA, RN, CCM / Care Transition Nurse / [phone]      02/03/23   Patient remains on Coreg 6.25 mg BID, have not been able to contact Cards to verify if 12.5 or 6.25 mg. Patient is on Regular thin liquid diet. Monitor for C/P, dysphagia, SOB, cough, pedal edema and PO intake. Jerel JOSHUA, RN, CCM / Care Transition Nurse / [phone]     02/10/23   Our [facility], denies any C/P, SOB or cough. Patient has been seen by Speech Therapy for continues difficulty swallowing dry food. They are going to order a \"Food Study\". Patient remains bed ridden and is in long term care at [facility]. Monitor C/P, SOB, cough, how is she swallowing when eating, did she have \"food Study\"?      Jerel JOSHUA, RN, CCM / Care Transition Nurse / [phone]

## 2023-02-17 ENCOUNTER — PATIENT OUTREACH (OUTPATIENT)
Dept: CASE MANAGEMENT | Age: 78
End: 2023-02-17

## 2023-02-17 NOTE — PROGRESS NOTES
Goals Addressed                   This Visit's Progress     Attends follow-up appointments as directed. On track     01/23/23   Patient see's Dr Dionna Morocho Director at [address], does not have a set appt yet. Patient has an appt with Sary Goncalves on 1/26/23 at 10:45am, Nurse Nina notified of this appt. Monitor status of these appt on next call. Negrita JOSHUA, RN, CCM / Care Transition Nurse / [phone]     02/03/23 Patient readmitted to [facility] [place] 1/26-2/2/23 with Dysphagia  Patient to be seen by Dr Marko Mitchell today. [facility] needs to reschedule Dr Rebecca Goncalves appt. Monitor status of these appt on next call. Negrita JOSHUA, RN, CCM / Care Transition Nurse / [phone]   Per Our [facility] Team patient daughter is to make an appt with Dr Rebecca Goncalves, they are going to reach out to her and see when that appt is. Patient see's Dr Marko Mitchell at [address] as needed. Monitor status of appt with Dr Rebecca Goncalves on next call. Negrita JOSHUA, RN, CCM / Care Transition Nurse / [phone]     02/17/23   Called and spoke to patient daughter Maribeth Coleman, they have not made an appt with Cards-Dr Marsh, her mother was to also make an appt with GI and Pulm but she does not think she did make them. Patient is also to have an appt with dentist, date unknown. Monitor status of Cards, Pulm, GI and dental appts on next call. Negrita JOSHUA, RN, CCM / Care Transition Nurse / [phone]            Prevent complications post hospitalization. On track     01/23/23   Patient is long term patient at [facility]. Called and spoke to patient nurse Nina, medication reconciliation completed. Nina denies any C/P or SOB observed when with patient. Attend weekly Zoom calls with [facility] on Friday at 8 am.    Negrita JOSHUA, RN, CCM / Care Transition Nurse / [phone]     02/02/23 Patient readmitted 1/26-2/2/23  Medication reconciliation completed with [facility].   He had question along with PA about Coreg should it be 12.5 or 6.25 mg they are reaching out to Cards. Morteza JOSHUA, RN, CCM / Care Transition Nurse / [phone]      02/03/23   Patient remains on Coreg 6.25 mg BID, have not been able to contact Cards to verify if 12.5 or 6.25 mg. Patient is on Regular thin liquid diet. Monitor for C/P, dysphagia, SOB, cough, pedal edema and PO intake. Morteza JOSHUA, RN, [facility] / Care Transition Nurse / [phone]     02/10/23   Our [facility], denies any C/P, SOB or cough. Patient has been seen by Speech Therapy for continues difficulty swallowing dry food. They are going to order a \"Food Study\". Patient remains bed ridden and is in long term care at [facility]. Monitor C/P, SOB, cough, how is she swallowing when eating, did she have \"food Study\"? Morteza JOSHUA, RN, [facility] / Care Transition Nurse / [phone]     02/17/23         Patient is currently in skilled nursing due to her swallowing and AMI DX, the D/C date of March 2-8 is back to long term care but still in same bed. Patient will be going to dentist via [address]. Patient had her feeding study done yesterday. Continues to feel \"full\" when she is eating. No C/P, SOB or cough.    Monitor PO intake, SOB, cough, C/P, results of swallow test.  Morteza JOSHUA, RN, CCM / Care Transition Nurse / [phone]

## 2023-02-20 DIAGNOSIS — I21.4 ACUTE MYOCARDIAL INFARCTION, SUBENDOCARDIAL INFARCTION, INITIAL EPISODE OF CARE (HCC): ICD-10-CM

## 2023-02-24 ENCOUNTER — PATIENT OUTREACH (OUTPATIENT)
Dept: CASE MANAGEMENT | Age: 78
End: 2023-02-24

## 2023-02-24 NOTE — PROGRESS NOTES
Goals Addressed                   This Visit's Progress     Attends follow-up appointments as directed. On track     01/23/23   Patient see's Dr Soledad Sylvester Director at [address], does not have a set appt yet. Patient has an appt with Sary Steele on 1/26/23 at 10:45am, Nurse Nina notified of this appt. Monitor status of these appt on next call. Harold Closs MSN, RN, CCM / Care Transition Nurse / [phone]     02/03/23 Patient readmitted to [facility] 1/26-2/2/23 with Dysphagia  Patient to be seen by Dr Rita Wheat today. [facility] needs to reschedule Dr Jonah Steele appt. Monitor status of these appt on next call. Harold Closs MSN, RN, CCM / Care Transition Nurse / [phone]   Per Our [facility] Team patient daughter is to make an appt with Dr Jonah Steele, they are going to reach out to her and see when that appt is. Patient see's Dr Rita Wheat at [address] as needed. Monitor status of appt with Dr Jonah Steele on next call. Harold Closs MSN, RN, CCM / Care Transition Nurse / [phone]     02/17/23   Called and spoke to patient daughter Refugio Duncan, they have not made an appt with Cards-Dr Marsh, her mother was to also make an appt with GI and Pulm but she does not think she did make them. Patient is also to have an appt with dentist, date unknown. Monitor status of Cards, Pulm, GI and dental appts on next call. Harold Closs MSN, RN, CCM / Care Transition Nurse / [phone]     02/24/23   Per Kristi patient did have her dental appt this week. Kristi was not aware that patient was to make Pulm and GI appt and daughter is to make Cards appt. They are going to reach out to patient and see what is scheduled. Monitor status of Pulm, Cards and GI appt on next time. Harold Closs MSN, RN, CCM / Care Transition Nurse / [phone]             Prevent complications post hospitalization. On track     01/23/23   Patient is long term patient at [facility].   Called and spoke to patient nurse Steph Schultz, medication reconciliation completed. Nina denies any C/P or SOB observed when with patient. Attend weekly Zoom calls with [facility] on Friday at 8 am.    Stacy JOSHUA, RN, CCM / Care Transition Nurse / [phone]     02/02/23 Patient readmitted 1/26-2/2/23  Medication reconciliation completed with [facility]. He had question along with PA about Coreg should it be 12.5 or 6.25 mg they are reaching out to Cards. Stacy JOSHUA, RN, CCM / Care Transition Nurse / [phone]      02/03/23   Patient remains on Coreg 6.25 mg BID, have not been able to contact Cards to verify if 12.5 or 6.25 mg. Patient is on Regular thin liquid diet. Monitor for C/P, dysphagia, SOB, cough, pedal edema and PO intake. Stacy JOSHUA RN, CCM / Care Transition Nurse / [phone]     02/10/23   Our [facility], denies any C/P, SOB or cough. Patient has been seen by Speech Therapy for continues difficulty swallowing dry food. They are going to order a \"Food Study\". Patient remains bed ridden and is in long term care at [facility]. Monitor C/P, SOB, cough, how is she swallowing when eating, did she have \"food Study\"? Stacy JOSHUA, RN, CCM / Care Transition Nurse / [phone]     02/17/23         Patient is currently in skilled nursing due to her swallowing and AMI DX, the D/C date of March 2-8 is back to long term care but still in same bed. Patient will be going to dentist via [address]. Patient had her feeding study done yesterday. Continues to feel \"full\" when she is eating. No C/P, SOB or cough. Monitor PO intake, SOB, cough, C/P, results of swallow test.  Stacy JOSHUA, RN, CCM / Care Transition Nurse / [phone]     02/24/23   Attended Zoom call with [address] [place] [address] Drive and PT. Did get results of swallow study her issue is all in her esophagus, patient declines to change her diet. Did have an episode of where she choked on a dumpling. No noted C/P or SOB.   Patient is not bed bound, can get up with andry lift to W/C for a few hours. Working with PT on core strength. Remains in [address] at [facility]. Monitor SOB, cough, choking, activity tolerance, PO intake, still in skilled care?      Norma Xiao MSN, RN, CCM / Care Transition Nurse / [phone]

## 2023-03-03 ENCOUNTER — PATIENT OUTREACH (OUTPATIENT)
Dept: CASE MANAGEMENT | Age: 78
End: 2023-03-03

## 2023-03-03 NOTE — PROGRESS NOTES
Goals Addressed                   This Visit's Progress     Attends follow-up appointments as directed. On track     01/23/23   Patient see's Dr Raelyn Kanner Director at [address], does not have a set appt yet. Patient has an appt with Sary Corrales on 1/26/23 at 10:45am, Nurse Nina notified of this appt. Monitor status of these appt on next call. Lamine JOSHUA, RN, [facility] / Care Transition Nurse / [phone]     02/03/23 Patient readmitted to [facility] [place] 1/26-2/2/23 with Dysphagia  Patient to be seen by Dr Deidra Logan today. [facility] needs to reschedule Dr Gregory Corrales appt. Monitor status of these appt on next call. Lamine JOSHUA, RN, CCM / Care Transition Nurse / [phone]   Per Our [facility] Team patient daughter is to make an appt with Dr Gregory Corrales, they are going to reach out to her and see when that appt is. Patient see's Dr Deidra Logan at [address] as needed. Monitor status of appt with Dr Gregory Corrales on next call. Lamine JOSHUA, RN, [facility] / Care Transition Nurse / [phone]     02/17/23   Called and spoke to patient daughter Jeannette Gruber, they have not made an appt with Cards-Dr Marsh, her mother was to also make an appt with GI and Pulm but she does not think she did make them. Patient is also to have an appt with dentist, date unknown. Monitor status of Cards, Pulm, GI and dental appts on next call. Lamine JOSHUA, RN, CCM / Care Transition Nurse / [phone]     02/24/23   Per Kristi patient did have her dental appt this week. Kristi was not aware that patient was to make Pulm and GI appt and daughter is to make Cards appt. They are going to reach out to patient and see what is scheduled. Monitor status of Pulm, Cards and GI appt on next time. Lamine JOSHUA, RN, CCM / Care Transition Nurse / [phone]      03/03/23   Patient has an appt with Cardiology 4/13/23 scheduled. Patient has declined to make an appt with GI. Patient is scheduling another Dental appt.   [facility] is discussing with patient Pulm appt. Monitor status of dental, Pulm, Cards and when did she last see PCP? Dee JOSHUA, RN, CCM / Care Transition Nurse / [phone]             Prevent complications post hospitalization. On track     01/23/23   Patient is long term patient at [facility]. Called and spoke to patient nurse Nina, medication reconciliation completed. Nina denies any C/P or SOB observed when with patient. Attend weekly Zoom calls with [facility] on Friday at 8 am.    Dee JOSHUA, RN, CCM / Care Transition Nurse / [phone]     02/02/23 Patient readmitted 1/26-2/2/23  Medication reconciliation completed with [facility]. He had question along with PA about Coreg should it be 12.5 or 6.25 mg they are reaching out to Cards. Dee JOSHUA, RN, CCM / Care Transition Nurse / [phone]      02/03/23   Patient remains on Coreg 6.25 mg BID, have not been able to contact Cards to verify if 12.5 or 6.25 mg. Patient is on Regular thin liquid diet. Monitor for C/P, dysphagia, SOB, cough, pedal edema and PO intake. Dee JOSHUA, RN, CCM / Care Transition Nurse / [phone]     02/10/23   Our [facility], denies any C/P, SOB or cough. Patient has been seen by Speech Therapy for continues difficulty swallowing dry food. They are going to order a \"Food Study\". Patient remains bed ridden and is in long term care at [facility]. Monitor C/P, SOB, cough, how is she swallowing when eating, did she have \"food Study\"? Dee JOSHUA, RN, CCM / Care Transition Nurse / [phone]     02/17/23         Patient is currently in skilled nursing due to her swallowing and AMI DX, the D/C date of March 2-8 is back to long term care but still in same bed. Patient will be going to dentist via [address]. Patient had her feeding study done yesterday. Continues to feel \"full\" when she is eating. No C/P, SOB or cough.    Monitor PO intake, SOB, cough, C/P, results of swallow test.  Kaelyn JOSHUA, RN, CCM / Care Transition Nurse / [phone]     02/24/23   Attended Zoom call with [address] [place] [address] and PT. Did get results of swallow study her issue is all in her esophagus, patient declines to change her diet. Did have an episode of where she choked on a dumpling. No noted C/P or SOB. Patient is not bed bound, can get up with andry lift to W/C for a few hours. Working with PT on core strength. Remains in [address] [place] [address] at [facility]. Monitor SOB, cough, choking, activity tolerance, PO intake, still in skilled care? Kaelyn JOSHUA, RN, CCM / Care Transition Nurse / [phone]     03/03/23 Attended Zoom Meeting with [address] [place] [address] and PT  Patient has not had any more C/P or SOB. Was taking too large bites of food, now staff are cutting her food smaller. Patient declines softer or pureed diet. Transfer back to long term care on 3/13/23, will still be in same room. PT working with getting her to strengthen core so she can sit on W/C longer. Monitor if any C/P or SOB, choking? How is she doing eating? Still moving to long term on 3/13/23? How is she doing with PT, able to sit longer in chair?     Kaelyn JOSHUA, RN, CCM / Care Transition Nurse / [phone]

## 2023-03-08 ENCOUNTER — APPOINTMENT (OUTPATIENT)
Dept: GENERAL RADIOLOGY | Age: 78
End: 2023-03-08
Attending: STUDENT IN AN ORGANIZED HEALTH CARE EDUCATION/TRAINING PROGRAM
Payer: MEDICARE

## 2023-03-08 ENCOUNTER — HOSPITAL ENCOUNTER (EMERGENCY)
Age: 78
Discharge: NURSING, REHAB OR LONG-TERM CARE | End: 2023-03-08
Attending: STUDENT IN AN ORGANIZED HEALTH CARE EDUCATION/TRAINING PROGRAM
Payer: MEDICARE

## 2023-03-08 VITALS
HEART RATE: 98 BPM | OXYGEN SATURATION: 91 % | DIASTOLIC BLOOD PRESSURE: 89 MMHG | TEMPERATURE: 98.5 F | SYSTOLIC BLOOD PRESSURE: 129 MMHG | RESPIRATION RATE: 22 BRPM

## 2023-03-08 DIAGNOSIS — J45.901 ASTHMA WITH ACUTE EXACERBATION, UNSPECIFIED ASTHMA SEVERITY, UNSPECIFIED WHETHER PERSISTENT: Primary | ICD-10-CM

## 2023-03-08 LAB
ALBUMIN SERPL-MCNC: 3.2 G/DL (ref 3.5–5)
ALBUMIN/GLOB SERPL: 1 (ref 1.1–2.2)
ALP SERPL-CCNC: 66 U/L (ref 45–117)
ALT SERPL-CCNC: 19 U/L (ref 12–78)
ANION GAP SERPL CALC-SCNC: 2 MMOL/L (ref 5–15)
AST SERPL-CCNC: 26 U/L (ref 15–37)
BASOPHILS # BLD: 0 K/UL (ref 0–0.1)
BASOPHILS NFR BLD: 0 % (ref 0–1)
BILIRUB SERPL-MCNC: 0.4 MG/DL (ref 0.2–1)
BNP SERPL-MCNC: 88 PG/ML
BUN SERPL-MCNC: 16 MG/DL (ref 6–20)
BUN/CREAT SERPL: 21 (ref 12–20)
CALCIUM SERPL-MCNC: 9.6 MG/DL (ref 8.5–10.1)
CHLORIDE SERPL-SCNC: 98 MMOL/L (ref 97–108)
CO2 SERPL-SCNC: 34 MMOL/L (ref 21–32)
COMMENT, HOLDF: NORMAL
CREAT SERPL-MCNC: 0.78 MG/DL (ref 0.55–1.02)
DIFFERENTIAL METHOD BLD: ABNORMAL
EOSINOPHIL # BLD: 0.1 K/UL (ref 0–0.4)
EOSINOPHIL NFR BLD: 1 % (ref 0–7)
ERYTHROCYTE [DISTWIDTH] IN BLOOD BY AUTOMATED COUNT: 15.6 % (ref 11.5–14.5)
GLOBULIN SER CALC-MCNC: 3.2 G/DL (ref 2–4)
GLUCOSE SERPL-MCNC: 95 MG/DL (ref 65–100)
HCT VFR BLD AUTO: 45.5 % (ref 35–47)
HGB BLD-MCNC: 14 G/DL (ref 11.5–16)
IMM GRANULOCYTES # BLD AUTO: 0 K/UL (ref 0–0.04)
IMM GRANULOCYTES NFR BLD AUTO: 0 % (ref 0–0.5)
LACTATE SERPL-SCNC: 1.4 MMOL/L (ref 0.4–2)
LYMPHOCYTES # BLD: 2.4 K/UL (ref 0.8–3.5)
LYMPHOCYTES NFR BLD: 22 % (ref 12–49)
MCH RBC QN AUTO: 30.8 PG (ref 26–34)
MCHC RBC AUTO-ENTMCNC: 30.8 G/DL (ref 30–36.5)
MCV RBC AUTO: 100.2 FL (ref 80–99)
MONOCYTES # BLD: 0.7 K/UL (ref 0–1)
MONOCYTES NFR BLD: 6 % (ref 5–13)
NEUTS SEG # BLD: 7.9 K/UL (ref 1.8–8)
NEUTS SEG NFR BLD: 71 % (ref 32–75)
NRBC # BLD: 0 K/UL (ref 0–0.01)
NRBC BLD-RTO: 0 PER 100 WBC
PLATELET # BLD AUTO: 318 K/UL (ref 150–400)
PMV BLD AUTO: 9.9 FL (ref 8.9–12.9)
POTASSIUM SERPL-SCNC: 4.1 MMOL/L (ref 3.5–5.1)
PROT SERPL-MCNC: 6.4 G/DL (ref 6.4–8.2)
RBC # BLD AUTO: 4.54 M/UL (ref 3.8–5.2)
RBC MORPH BLD: ABNORMAL
RBC MORPH BLD: ABNORMAL
SAMPLES BEING HELD,HOLD: NORMAL
SODIUM SERPL-SCNC: 134 MMOL/L (ref 136–145)
TROPONIN I SERPL HS-MCNC: 5 NG/L (ref 0–37)
WBC # BLD AUTO: 11.1 K/UL (ref 3.6–11)

## 2023-03-08 PROCEDURE — 84484 ASSAY OF TROPONIN QUANT: CPT

## 2023-03-08 PROCEDURE — 99284 EMERGENCY DEPT VISIT MOD MDM: CPT

## 2023-03-08 PROCEDURE — 83605 ASSAY OF LACTIC ACID: CPT

## 2023-03-08 PROCEDURE — 74011250636 HC RX REV CODE- 250/636: Performed by: STUDENT IN AN ORGANIZED HEALTH CARE EDUCATION/TRAINING PROGRAM

## 2023-03-08 PROCEDURE — 83880 ASSAY OF NATRIURETIC PEPTIDE: CPT

## 2023-03-08 PROCEDURE — 71045 X-RAY EXAM CHEST 1 VIEW: CPT

## 2023-03-08 PROCEDURE — 85025 COMPLETE CBC W/AUTO DIFF WBC: CPT

## 2023-03-08 PROCEDURE — 96374 THER/PROPH/DIAG INJ IV PUSH: CPT

## 2023-03-08 PROCEDURE — 36415 COLL VENOUS BLD VENIPUNCTURE: CPT

## 2023-03-08 PROCEDURE — 80053 COMPREHEN METABOLIC PANEL: CPT

## 2023-03-08 RX ORDER — PREDNISONE 20 MG/1
40 TABLET ORAL DAILY
Qty: 10 TABLET | Refills: 0 | Status: SHIPPED | OUTPATIENT
Start: 2023-03-08 | End: 2023-03-13

## 2023-03-08 RX ADMIN — METHYLPREDNISOLONE SODIUM SUCCINATE 125 MG: 125 INJECTION, POWDER, FOR SOLUTION INTRAMUSCULAR; INTRAVENOUS at 03:07

## 2023-03-08 NOTE — ED NOTES
Patient daughter unable to transport her back to facility due to lack of ambulation and weakness. [facility] was contacted. ETA of 8:15.

## 2023-03-08 NOTE — ED PROVIDER NOTES
HPI     Date of Service:  3/8/2023    Patient:  Bola Ayala    Chief Complaint:  Shortness of Breath       HPI:  Bola Ayala is a 76 y.o.  female with a past medical history of asthma, hypertension, hypothyroidism, HFpEF on baseline 3 L nasal cannula oxygen, GERD who presents to the emergency department for evaluation of shortness of breath. Patient reports feeling short of breath for the last several hours. Patient was given a breathing treatment at her nursing facility and second breathing treatment in route with EMS and she is endorsing some improvement of her symptoms. No associated chest pain. She has some chronic cough which is unchanged from baseline. No other known recent illness. Of note, patient was most recently hospitalized 1/26 - 2/01 for shortness of breath and difficulty swallowing. Patient with extensive work-up for her difficulty swallowing with multiple swallow evaluations, barium swallow study and EGD. Barium swallow showed a torturous esophagus and EGD showed some mild narrowing therefore dilation performed. Patient discharged home on PPI. Patient reports continued intermittent episodes of difficulty swallowing, states sometimes she will be fine to drink water but other times feels like it makes her feel short of breath and cannot swallow. Past Medical History:   Diagnosis Date    Acute kidney failure (HCC)     Asthma     Depression     Fibromyalgia     GERD (gastroesophageal reflux disease)     GERD (gastroesophageal reflux disease)     Hypertension     Hypothyroid     Insomnia     Menopause 1995    Neurogenic bladder     Rheumatoid arteritis (HCC)     Rotator cuff tear        Past Surgical History:   Procedure Laterality Date    HX CHOLECYSTECTOMY      HX GYN      HX ORTHOPAEDIC      HX TONSIL AND ADENOIDECTOMY           No family history on file.     Social History     Socioeconomic History    Marital status:      Spouse name: Not on file    Number of children: Not on file    Years of education: Not on file    Highest education level: Not on file   Occupational History    Not on file   Tobacco Use    Smoking status: Former     Packs/day: 1.00     Years: 15.00     Pack years: 15.00     Types: Cigarettes    Smokeless tobacco: Never   Substance and Sexual Activity    Alcohol use: No    Drug use: No    Sexual activity: Not on file   Other Topics Concern    Not on file   Social History Narrative    Not on file     Social Determinants of Health     Financial Resource Strain: Not on file   Food Insecurity: Not on file   Transportation Needs: Not on file   Physical Activity: Not on file   Stress: Not on file   Social Connections: Not on file   Intimate Partner Violence: Not on file   Housing Stability: Not on file         ALLERGIES: Pcn [penicillins]    Review of Systems   Constitutional:  Negative for chills and fever. HENT:  Negative for congestion and rhinorrhea. Eyes:  Negative for discharge and redness. Respiratory:  Positive for cough and shortness of breath. Cardiovascular:  Negative for chest pain. Gastrointestinal:  Negative for abdominal pain, diarrhea, nausea and vomiting. Neurological:  Negative for speech difficulty. Psychiatric/Behavioral:  Negative for agitation and confusion. There were no vitals filed for this visit. Physical Exam  Vitals and nursing note reviewed. Constitutional:       General: She is not in acute distress. Appearance: Normal appearance. She is obese. Comments: Chronically ill-appearing   HENT:      Head: Normocephalic. Eyes:      Extraocular Movements: Extraocular movements intact. Conjunctiva/sclera: Conjunctivae normal.   Cardiovascular:      Rate and Rhythm: Normal rate. Pulses: Normal pulses. Pulmonary:      Effort: Pulmonary effort is normal. No respiratory distress. Breath sounds: Normal air entry. Wheezing present. Abdominal:      Palpations: Abdomen is soft. Tenderness:  There is no abdominal tenderness. Musculoskeletal:         General: Normal range of motion. Right lower leg: Edema present. Left lower leg: Edema present. Skin:     General: Skin is warm and dry. Capillary Refill: Capillary refill takes less than 2 seconds. Neurological:      General: No focal deficit present. Mental Status: She is alert and oriented to person, place, and time. Psychiatric:         Mood and Affect: Mood normal.         Behavior: Behavior normal.        Medical Decision Making      DECISION MAKING:  Radha Jeronimo is a 76 y.o. female who comes in as above. On arrival patient is afebrile and vital signs are stable. She is on more oxygen from her baseline, endorses feeling improvement of her shortness of breath after second breathing treatment therefore will attempt to wean back down to her normal 2 to 3 L nasal cannula oxygen. On lung auscultation she does have some scattered wheezing but is moving air well. Exam is otherwise unremarkable. Differential diagnosis includes, but not limited to, asthma exacerbation, pneumonia, CHF exacerbation, ACS. IV Solu-Medrol ordered for asthma exacerbation. ECG on arrival is without any acute ischemic changes. CBC without significant leukocytosis or anemia. Mild hyponatremia at 134. Electrolytes stable. Kidney function within normal limits. proBNP is not elevated and high-sensitivity troponin within normal limits. No lactic acid elevation. Chest x-ray is without any acute cardiopulmonary process including pneumonia or fluid overload. On multiple reexamination she has been stable back on her normal nasal cannula oxygen at 2 L. Patient shortness of breath has improved. Discussed plan for treatment for suspected asthma exacerbation with increasing her steroids to 40 mg daily for 5 days and then back to her 20 mg.   Daughter is concerned about patient's intermittent issues with swallowing, given she has had extensive work-up for this most recently on her last hospitalization do not feel any further emergent work-up indicated and they were encouraged on follow-up with GI as well as occupational therapy at her facility. They were also instructed to follow-up with her pulmonologist for ER follow-up visit. Strict ER return precautions discussed. She verbalized understanding and patient will be discharged back to her facility. Amount and/or Complexity of Data Reviewed  Labs: ordered. Decision-making details documented in ED Course. Radiology: ordered. Decision-making details documented in ED Course. ECG/medicine tests: ordered and independent interpretation performed. Decision-making details documented in ED Course. Risk  Prescription drug management. ED Course as of 03/09/23 0340   Wed Mar 08, 2023   0257 EKG, 12 LEAD, INITIAL  ECG at 1:55 AM, interpreted by me: Normal sinus rhythm, rate 88 bpm.  Normal axis. Normal intervals. No ST elevations. [SH]      ED Course User Index  [SH] Cheryl Devlin,        Procedures    LABS:  Recent Results (from the past 27 hour(s))   LACTIC ACID    Collection Time: 03/08/23  2:09 AM   Result Value Ref Range    Lactic acid 1.4 0.4 - 2.0 MMOL/L   TROPONIN-HIGH SENSITIVITY    Collection Time: 03/08/23  2:09 AM   Result Value Ref Range    Troponin-High Sensitivity 5 0 - 37 ng/L   CBC WITH AUTOMATED DIFF    Collection Time: 03/08/23  2:09 AM   Result Value Ref Range    WBC 11.1 (H) 3.6 - 11.0 K/uL    RBC 4.54 3.80 - 5.20 M/uL    HGB 14.0 11.5 - 16.0 g/dL    HCT 45.5 35.0 - 47.0 %    .2 (H) 80.0 - 99.0 FL    MCH 30.8 26.0 - 34.0 PG    MCHC 30.8 30.0 - 36.5 g/dL    RDW 15.6 (H) 11.5 - 14.5 %    PLATELET 577 111 - 904 K/uL    MPV 9.9 8.9 - 12.9 FL    NRBC 0.0 0  WBC    ABSOLUTE NRBC 0.00 0.00 - 0.01 K/uL    NEUTROPHILS 71 32 - 75 %    LYMPHOCYTES 22 12 - 49 %    MONOCYTES 6 5 - 13 %    EOSINOPHILS 1 0 - 7 %    BASOPHILS 0 0 - 1 %    IMMATURE GRANULOCYTES 0 0.0 - 0.5 %    ABS. NEUTROPHILS 7.9 1.8 - 8.0 K/UL    ABS. LYMPHOCYTES 2.4 0.8 - 3.5 K/UL    ABS. MONOCYTES 0.7 0.0 - 1.0 K/UL    ABS. EOSINOPHILS 0.1 0.0 - 0.4 K/UL    ABS. BASOPHILS 0.0 0.0 - 0.1 K/UL    ABS. IMM. GRANS. 0.0 0.00 - 0.04 K/UL    DF SMEAR SCANNED      RBC COMMENTS ANISOCYTOSIS  1+        RBC COMMENTS MACROCYTOSIS  1+       SAMPLES BEING HELD    Collection Time: 03/08/23  2:09 AM   Result Value Ref Range    SAMPLES BEING HELD 1RED,1BLUE     COMMENT        Add-on orders for these samples will be processed based on acceptable specimen integrity and analyte stability, which may vary by analyte. METABOLIC PANEL, COMPREHENSIVE    Collection Time: 03/08/23  2:09 AM   Result Value Ref Range    Sodium 134 (L) 136 - 145 mmol/L    Potassium 4.1 3.5 - 5.1 mmol/L    Chloride 98 97 - 108 mmol/L    CO2 34 (H) 21 - 32 mmol/L    Anion gap 2 (L) 5 - 15 mmol/L    Glucose 95 65 - 100 mg/dL    BUN 16 6 - 20 MG/DL    Creatinine 0.78 0.55 - 1.02 MG/DL    BUN/Creatinine ratio 21 (H) 12 - 20      eGFR >60 >60 ml/min/1.73m2    Calcium 9.6 8.5 - 10.1 MG/DL    Bilirubin, total 0.4 0.2 - 1.0 MG/DL    ALT (SGPT) 19 12 - 78 U/L    AST (SGOT) 26 15 - 37 U/L    Alk. phosphatase 66 45 - 117 U/L    Protein, total 6.4 6.4 - 8.2 g/dL    Albumin 3.2 (L) 3.5 - 5.0 g/dL    Globulin 3.2 2.0 - 4.0 g/dL    A-G Ratio 1.0 (L) 1.1 - 2.2     NT-PRO BNP    Collection Time: 03/08/23  2:09 AM   Result Value Ref Range    NT pro-BNP 88 <450 PG/ML        IMAGING:  XR CHEST PORT   Final Result      No acute process. Medications During Visit:  Medications   methylPREDNISolone (PF) (Solu-MEDROL) injection 125 mg (125 mg IntraVENous Given 3/8/23 0090)         IMPRESSION:  1.  Asthma with acute exacerbation, unspecified asthma severity, unspecified whether persistent        DISPOSITION:  Discharged      Discharge Medication List as of 3/8/2023  6:46 AM           Follow-up Information       Follow up With Specialties Details Why Contact Info    Gerardo Donnelly Ruth Ann Pichardo MD Family Medicine Schedule an appointment as soon as possible for a visit   [place] [id number]  [phone]      [address] [facility] [address] [facility] Emergency Medicine  If symptoms worsen [address] [facility]  [phone]    your pulmonologist  Schedule an appointment as soon as possible for a visit                 The patient is asked to follow-up with their primary care provider in the next several days. They are to call tomorrow for an appointment. The patient is asked to return promptly for any increased concerns or worsening of symptoms. They can return to this emergency department or any other emergency department.       Sangita Figueroa, DO

## 2023-03-08 NOTE — ED TRIAGE NOTES
Patient arrived via EMS from [address] w/ c/o SOB x 3 hours. Patient was given the first dual neb at the facility and another in route to hospital. Patient wear 2L @ B/L. Patient states she is feeling after treatment.      Hx: MI, COPD, CHF, COVID

## 2023-03-08 NOTE — DISCHARGE INSTRUCTIONS
Please increase your daily prednisone dose to 40mg daily for 5 days then return back to daily 20mg. Follow-up with your pulmonologist for re-evaluation.

## 2023-03-10 ENCOUNTER — PATIENT OUTREACH (OUTPATIENT)
Dept: CASE MANAGEMENT | Age: 78
End: 2023-03-10

## 2023-03-10 NOTE — PROGRESS NOTES
Goals Addressed                   This Visit's Progress     Attends follow-up appointments as directed. On track     01/23/23   Patient see's Dr GUERIN [facility] Director at [address], does not have a set appt yet. Patient has an appt with Sary Hale on 1/26/23 at 10:45am, Nurse Nina notified of this appt. Monitor status of these appt on next call. Steffen JOSHUA, RN, [facility] / Care Transition Nurse / [phone]     02/03/23 Patient readmitted to [facility] [place] 1/26-2/2/23 with Dysphagia  Patient to be seen by Dr Kim Phillips today. Our [facility] needs to reschedule Dr Chuck Hale appt. Monitor status of these appt on next call. Steffen JOSHUA, RN, CCM / Care Transition Nurse / [phone]   Per Our [facility] Team patient daughter is to make an appt with Dr Chuck Hale, they are going to reach out to her and see when that appt is. Patient see's Dr Kim Phillips at [address] as needed. Monitor status of appt with Dr Chuck Hale on next call. Steffen JOSHUA, RN, [facility] / Care Transition Nurse / [phone]     02/17/23   Called and spoke to patient daughter Nelsy Jaffe, they have not made an appt with Cards-Dr Marsh, her mother was to also make an appt with GI and Pulm but she does not think she did make them. Patient is also to have an appt with dentist, date unknown. Monitor status of Cards, Pulm, GI and dental appts on next call. Steffen JOSHUA, RN, CCM / Care Transition Nurse / [phone]     02/24/23   Per Kristi patient did have her dental appt this week. Kristi was not aware that patient was to make Pulm and GI appt and daughter is to make Cards appt. They are going to reach out to patient and see what is scheduled. Monitor status of Pulm, Cards and GI appt on next time. Steffen JOSHUA, RN, CCM / Care Transition Nurse / [phone]      03/03/23   Patient has an appt with Cardiology 4/13/23 scheduled. Patient has declined to make an appt with GI. Patient is scheduling another Dental appt.   [facility] is discussing with patient Pulm appt. Monitor status of dental, Pulm, Cards and when did she last see PCP? Thai JOSHUA, RN, CCM / Care Transition Nurse / [phone]      03/10/23   Patient still has her appt scheduled with Cards on 4/13/23. ED recommended that patient F/U with GI and Pulm. Nursing to talk with daughter about GI and Pulm appts. No mention of dental appts on this call. Monitor dental, Cards, Pulm and ? GI appt on next call. Thai JOSHUA, RN, CCM / Care Transition Nurse / [phone]                Prevent complications post hospitalization. On track     01/23/23   Patient is long term patient at [facility]. Called and spoke to patient nurse Nina, medication reconciliation completed. Nina denies any C/P or SOB observed when with patient. Attend weekly Zoom calls with [facility] on Friday at 8 am.    Thai JOSHUA, RN, CCM / Care Transition Nurse / [phone]     02/02/23 Patient readmitted 1/26-2/2/23  Medication reconciliation completed with MUNA. He had question along with PA about Coreg should it be 12.5 or 6.25 mg they are reaching out to Cards. Thai JOSHUA, RN, CCM / Care Transition Nurse / [phone]      02/03/23   Patient remains on Coreg 6.25 mg BID, have not been able to contact Cards to verify if 12.5 or 6.25 mg. Patient is on Regular thin liquid diet. Monitor for C/P, dysphagia, SOB, cough, pedal edema and PO intake. Thai JOSHUA, RN, CCM / Care Transition Nurse / [phone]     02/10/23   Our [facility], denies any C/P, SOB or cough. Patient has been seen by Speech Therapy for continues difficulty swallowing dry food. They are going to order a \"Food Study\". Patient remains bed ridden and is in long term care at [facility]. Monitor C/P, SOB, cough, how is she swallowing when eating, did she have \"food Study\"?      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 Patient will be going to dentist via [address]. Patient had her feeding study done yesterday. Continues to feel \"full\" when she is eating. No C/P, SOB or cough. Monitor PO intake, SOB, cough, C/P, results of swallow test.  Sandy JOSHUA, RN, CCM / Care Transition Nurse / [phone]     02/24/23   Attended Zoom call with [address] [place] [address] Drive and PT. Did get results of swallow study her issue is all in her esophagus, patient declines to change her diet. Did have an episode of where she choked on a dumpling. No noted C/P or SOB. Patient is not bed bound, can get up with andry lift to W/C for a few hours. Working with PT on core strength. Remains in [address] [place] [address] Drive at [facility]. Monitor SOB, cough, choking, activity tolerance, PO intake, still in skilled care? Sandy JOSHUA, RN, CCM / Care Transition Nurse / [phone]     03/03/23 Attended Zoom Meeting with [address] [place] [address] Drive and PT  Patient has not had any more C/P or SOB. Was taking too large bites of food, now staff are cutting her food smaller. Patient declines softer or pureed diet. Transfer back to long term care on 3/13/23, will still be in same room. PT working with getting her to strengthen core so she can sit on W/C longer. Monitor if any C/P or SOB, choking? How is she doing eating? Still moving to long term on 3/13/23? How is she doing with PT, able to sit longer in chair? Sandy JOSHUA, RN, CCM / Care Transition Nurse / [phone]     03/10/23   Patient to [facility] [place] ED on 3/8/23 for SOB, was given nebulized treatment and then patient daughter called 911. Patient given another nebulized treatment in route and her steroid was increased to 40 mg for 5 days. Per Nursing Staff patient not having any SOB, cough or C/P currently.   Patient last day in skilled care will be 3/14, then back to long term. Continue to work with patient to increased core strength so that she can sit in W/C longer periods of time. Patient has been seen by Speech and they have evaluated and can offer education only at this time. Patient continues to wear O2 at 2/L. Monitor for C/P, SOB, cough, done with increased steroid? Back to long term care now? , how is she doing with PT, eating, swallowing?      Nils Lindsay MSN, RN, CCM / Care Transition Nurse / [phone]

## 2023-03-17 ENCOUNTER — PATIENT OUTREACH (OUTPATIENT)
Dept: CASE MANAGEMENT | Age: 78
End: 2023-03-17

## 2023-03-17 NOTE — PROGRESS NOTES
Goals Addressed                   This Visit's Progress     Attends follow-up appointments as directed. On track     01/23/23   Patient see's Dr Gina Israel Director at [address], does not have a set appt yet. Patient has an appt with Sary Valdes on 1/26/23 at 10:45am, Nurse Nina notified of this appt. Monitor status of these appt on next call. Harry JOSHUA, RN, CCM / Care Transition Nurse / [phone]     02/03/23 Patient readmitted to [facility] [place] 1/26-2/2/23 with Dysphagia  Patient to be seen by Dr Melissa Phillips today. Our [facility] [place] needs to reschedule Dr Melissa Valdes appt. Monitor status of these appt on next call. Harry JOSHUA, RN, CCM / Care Transition Nurse / [phone]   Per Our [facility] Team patient daughter is to make an appt with Dr Melissa Valdes, they are going to reach out to her and see when that appt is. Patient see's Dr Melissa Phillips at [address] as needed. Monitor status of appt with Dr Melissa Valdes on next call. Harry JOSHUA, RN, [facility] / Care Transition Nurse / [phone]     02/17/23   Called and spoke to patient daughter Vero Marvin, they have not made an appt with Cards-Dr Marsh, her mother was to also make an appt with GI and Pulm but she does not think she did make them. Patient is also to have an appt with dentist, date unknown. Monitor status of Cards, Pulm, GI and dental appts on next call. Harry JOSHUA, RN, CCM / Care Transition Nurse / [phone]     02/24/23   Per [facility] patient did have her dental appt this week. [facility] was not aware that patient was to make Pulm and GI appt and daughter is to make Cards appt. They are going to reach out to patient and see what is scheduled. Monitor status of Pulm, Cards and GI appt on next time. [place] MSN, RN, CCM / Care Transition Nurse / [phone]      03/03/23   Patient has an appt with Cardiology 4/13/23 scheduled. Patient has declined to make an appt with GI. Patient is scheduling another Dental appt.   [facility] is discussing with patient Pulm appt. Monitor status of dental, Pulm, Cards and when did she last see PCP? Major JOSHUA, RN, CCM / Care Transition Nurse / [phone]      03/10/23   Patient still has her appt scheduled with Cards on 4/13/23. ED recommended that patient F/U with GI and Pulm. Nursing to talk with daughter about GI and Pulm appts. No mention of dental appts on this call. Monitor dental, Cards, Pulm and ? GI appt on next call. Major JOSHUA, RN, CCM / Care Transition Nurse / [phone]     03/17/23   Patient last seen by PCP Dr Nataly Juan on 3/9/23. Patient has an appt with Cards on 4/13/23. Patient CXL dental appt and did not reschedule, declines appt with Gi and they are uncertain of Pulm appt. Monitor status of Cards, Dental and Pulm appt on next call. Major JOSHUA, RN, [facility] / Care Transition Nurse / [phone]                Prevent complications post hospitalization. On track     01/23/23   Patient is long term patient at [facility]. Called and spoke to patient nurse Nina, medication reconciliation completed. Nina denies any C/P or SOB observed when with patient. Attend weekly Zoom calls with [facility] on Friday at 8 am.    Major JOSHUA, RN, CCM / Care Transition Nurse / [phone]     02/02/23 Patient readmitted 1/26-2/2/23  Medication reconciliation completed with [facility]. He had question along with PA about Coreg should it be 12.5 or 6.25 mg they are reaching out to Cards. Major JOSHUA, RN, CCM / Care Transition Nurse / [phone]      02/03/23   Patient remains on Coreg 6.25 mg BID, have not been able to contact Cards to verify if 12.5 or 6.25 mg. Patient is on Regular thin liquid diet. Monitor for C/P, dysphagia, SOB, cough, pedal edema and PO intake. Major JOSHUA, RN, CCM / Care Transition Nurse / [phone]     02/10/23   Our [facility], denies any C/P, SOB or cough.   Patient has been seen by Speech Therapy for continues difficulty swallowing dry food. They are going to order a \"Food Study\". Patient remains bed ridden and is in long term care at [facility]. Monitor C/P, SOB, cough, how is she swallowing when eating, did she have \"food Study\"? Pratima JOSHUA, RN, CCM / Care Transition Nurse / [phone]     02/17/23         Patient is currently in skilled nursing due to her swallowing and AMI DX, the D/C date of March 2-8 is back to long term care but still in same bed. Patient will be going to dentist via [employer]. Patient had her feeding study done yesterday. Continues to feel \"full\" when she is eating. No C/P, SOB or cough. Monitor PO intake, SOB, cough, C/P, results of swallow test.  Pratima JOSHUA, RN, CCM / Care Transition Nurse / [phone]     02/24/23   Attended Zoom call with [address] [place] [address] Drive and PT. Did get results of swallow study her issue is all in her esophagus, patient declines to change her diet. Did have an episode of where she choked on a dumpling. No noted C/P or SOB. Patient is not bed bound, can get up with andry lift to W/C for a few hours. Working with PT on core strength. Remains in [address] [place] [address] at [facility]. Monitor SOB, cough, choking, activity tolerance, PO intake, still in skilled care? Pratima JOSHUA, RN, CCM / Care Transition Nurse / [phone]     03/03/23 Attended Zoom Meeting with [address] [place] [address] and PT  Patient has not had any more C/P or SOB. Was taking too large bites of food, now staff are cutting her food smaller. Patient declines softer or pureed diet. Transfer back to long term care on 3/13/23, will still be in same room. PT working with getting her to strengthen core so she can sit on W/C longer. Monitor if any C/P or SOB, choking? How is she doing eating? Still moving to long term on 3/13/23? How is she doing with PT, able to sit longer in chair?     Pratima JOSHUA, RN, CCM / Care Transition Nurse / [phone]     03/10/23 Patient to [facility] ED on 3/8/23 for SOB, was given nebulized treatment and then patient daughter called 911. Patient given another nebulized treatment in route and her steroid was increased to 40 mg for 5 days. Per Nursing Staff patient not having any SOB, cough or C/P currently. Patient last day in skilled care will be 3/14, then back to long term. Continue to work with patient to increased core strength so that she can sit in W/C longer periods of time. Patient has been seen by Speech and they have evaluated and can offer education only at this time. Patient continues to wear O2 at 2/L. Monitor for C/P, SOB, cough, done with increased steroid? Back to long term care now? , how is she doing with PT, eating, swallowing? Brayden Fajardo MSN, RN, CCM / Care Transition Nurse / [phone]     03/17/23   Patient is back to long term care as of 3/14/23. Is back to her baseline with PT. Patient has daily weights and last was 180 lbs. Patient is tolerating her pureed diet, does not like the pureed diet and has not asked to advance diet either. Done with increased prednisone post ED  visit on 3/8/23. Not noticed any C/P, SOB or cough. Monitor SOB, cough, C/P, how pureed diet is going, activity tolerance and daily weight.     Brayden Fajardo MSN, RN, CCM / Care Transition Nurse / [phone]

## 2023-03-24 ENCOUNTER — PATIENT OUTREACH (OUTPATIENT)
Dept: CASE MANAGEMENT | Age: 78
End: 2023-03-24

## 2023-03-24 NOTE — PROGRESS NOTES
Goals Addressed                   This Visit's Progress     Attends follow-up appointments as directed. On track     01/23/23   Patient see's Dr Myra Prescott Director at [address], does not have a set appt yet. Patient has an appt with Sary Jaffe on 1/26/23 at 10:45am, Nurse Nina notified of this appt. Monitor status of these appt on next call. Parish JOSHUA, RN, CCM / Care Transition Nurse / [phone]     02/03/23 Patient readmitted to [facility] [place] 1/26-2/2/23 with Dysphagia  Patient to be seen by Dr Rebecca Thomas today. Our [facility] needs to reschedule Dr Mike Jaffe appt. Monitor status of these appt on next call. Parish JOSHUA, RN, [facility] / Care Transition Nurse / [phone]   Per Our [facility] Team patient daughter is to make an appt with Dr Mike Jaffe, they are going to reach out to her and see when that appt is. Patient see's Dr Rebecca Thomas at [address] as needed. Monitor status of appt with Dr Mike Jaffe on next call. Parish JOSHUA, RN, CCM / Care Transition Nurse / [phone]     02/17/23   Called and spoke to patient daughter Ben Barajas, they have not made an appt with Cards-Dr Marsh, her mother was to also make an appt with GI and Pulm but she does not think she did make them. Patient is also to have an appt with dentist, date unknown. Monitor status of Cards, Pulm, GI and dental appts on next call. Parish JOSHUA, RN, CCM / Care Transition Nurse / [phone]     02/24/23   Per [facility] patient did have her dental appt this week. Kristi was not aware that patient was to make Pulm and GI appt and daughter is to make Cards appt. They are going to reach out to patient and see what is scheduled. Monitor status of Pulm, Cards and GI appt on next time. Parish JOSHUA, RN, CCM / Care Transition Nurse / [phone]      03/03/23   Patient has an appt with Cardiology 4/13/23 scheduled. Patient has declined to make an appt with GI. Patient is scheduling another Dental appt.   [facility] is discussing with patient Pulm appt. Monitor status of dental, Pulm, Cards and when did she last see PCP? Diego JOSHUA, RN, CCM / Care Transition Nurse / [phone]      03/10/23   Patient still has her appt scheduled with Cards on 4/13/23. ED recommended that patient F/U with GI and Pulm. Nursing to talk with daughter about GI and Pulm appts. No mention of dental appts on this call. Monitor dental, Cards, Pulm and ? GI appt on next call. Diego JOSHUA, RN, CCM / Care Transition Nurse / [phone]     03/17/23   Patient last seen by PCP Dr Ada Madrid on 3/9/23. Patient has an appt with Cards on 4/13/23. Patient CXL dental appt and did not reschedule, declines appt with Gi and they are uncertain of Pulm appt. Monitor status of Cards, Dental and Pulm appt on next call. Diego JOSHUA, RN, CCM / Care Transition Nurse / [phone]     03/24/23   Patient was seen by Rigo Todd a few weeks ago virtually per patient. Patient still has her appt with Cards on 4/13/23 scheduled. Patient now has an appt with GI on 4/22/23 scheduled. Monitor status of Cards and GI appt on next call. Diego JOSHUA, RN, CCM / Care Transition Nurse / [phone]                Prevent complications post hospitalization. On track     01/23/23   Patient is long term patient at [facility]. Called and spoke to patient nurse Nina, medication reconciliation completed. Nina denies any C/P or SOB observed when with patient. Attend weekly Zoom calls with [facility] on Friday at 8 am.    Diego JOSHUA, RN, CCM / Care Transition Nurse / [phone]     02/02/23 Patient readmitted 1/26-2/2/23  Medication reconciliation completed with [facility]. He had question along with PA about Coreg should it be 12.5 or 6.25 mg they are reaching out to Cards.     Diego JOSHUA, RN, CCM / Care Transition Nurse / [phone]      02/03/23   Patient remains on Coreg 6.25 mg BID, have not been able to contact Cards to verify if 12.5 or 6.25 mg. Patient is on Regular thin liquid diet. Monitor for C/P, dysphagia, SOB, cough, pedal edema and PO intake. Mary Marie MSN, RN, CCM / Care Transition Nurse / [phone]     02/10/23   Our [facility], denies any C/P, SOB or cough. Patient has been seen by Speech Therapy for continues difficulty swallowing dry food. They are going to order a \"Food Study\". Patient remains bed ridden and is in long term care at Our [facility]. Monitor C/P, SOB, cough, how is she swallowing when eating, did she have \"food Study\"? Mary JOSHUA, RN, CCM / Care Transition Nurse / [phone]     02/17/23         Patient is currently in skilled nursing due to her swallowing and AMI DX, the D/C date of March 2-8 is back to long term care but still in same bed. Patient will be going to dentist via [employer]. Patient had her feeding study done yesterday. Continues to feel \"full\" when she is eating. No C/P, SOB or cough. Monitor PO intake, SOB, cough, C/P, results of swallow test.  Mary JOSHUA, RN, CCM / Care Transition Nurse / [phone]     02/24/23   Attended Zoom call with [address] [place] [address] Drive and PT. Did get results of swallow study her issue is all in her esophagus, patient declines to change her diet. Did have an episode of where she choked on a dumpling. No noted C/P or SOB. Patient is not bed bound, can get up with andry lift to W/C for a few hours. Working with PT on core strength. Remains in [address] at [facility]. Monitor SOB, cough, choking, activity tolerance, PO intake, still in skilled care? Mary JOSHUA, RN, [facility] / Care Transition Nurse / [phone]     03/03/23 Attended Zoom Meeting with [address] [place] [address] Drive and PT  Patient has not had any more C/P or SOB. Was taking too large bites of food, now staff are cutting her food smaller. Patient declines softer or pureed diet. Transfer back to long term care on 3/13/23, will still be in same room.   PT working with getting her to strengthen core so she can sit on W/C longer. Monitor if any C/P or SOB, choking? How is she doing eating? Still moving to long term on 3/13/23? How is she doing with PT, able to sit longer in chair? Jair Kumar MSN, RN, CCM / Care Transition Nurse / [phone]     03/10/23   Patient to [facility] ED on 3/8/23 for SOB, was given nebulized treatment and then patient daughter called 911. Patient given another nebulized treatment in route and her steroid was increased to 40 mg for 5 days. Per Nursing Staff patient not having any SOB, cough or C/P currently. Patient last day in skilled care will be 3/14, then back to long term. Continue to work with patient to increased core strength so that she can sit in W/C longer periods of time. Patient has been seen by Speech and they have evaluated and can offer education only at this time. Patient continues to wear O2 at 2/L. Monitor for C/P, SOB, cough, done with increased steroid? Back to long term care now? , how is she doing with PT, eating, swallowing? Jair JOSHUA, RN, CCM / Care Transition Nurse / [phone]     03/17/23   Patient is back to long term care as of 3/14/23. Is back to her baseline with PT. Patient has daily weights and last was 180 lbs. Patient is tolerating her pureed diet, does not like the pureed diet and has not asked to advance diet either. Done with increased prednisone post ED  visit on 3/8/23. Not noticed any C/P, SOB or cough. Monitor SOB, cough, C/P, how pureed diet is going, activity tolerance and daily weight. Jair JOSHUA, RN, CCM / Care Transition Nurse / [phone]        03/24/23   Patient remains on her pureed diet, no further choking or coughing episodes. No further C/P either. Patient gets up with [facility] lift to W/C not daily as per patient request.  Last weight was 174.lbs.    Monitor SOB, cough, C/P pureed diet, activity tolerance and current weight on next call.    Lamine Ring MSN, RN, CCM / Care Transition Nurse / [phone]

## 2023-04-19 ENCOUNTER — PATIENT OUTREACH (OUTPATIENT)
Dept: CASE MANAGEMENT | Age: 78
End: 2023-04-19

## 2023-04-19 NOTE — PROGRESS NOTES
Goals Addressed                   This Visit's Progress     Attends follow-up appointments as directed. On track     01/23/23   Patient see's Dr Melvin Ferreira Director at [address], does not have a set appt yet. Patient has an appt with Sary Chapa on 1/26/23 at 10:45am, Nurse Nina notified of this appt. Monitor status of these appt on next call. Roula JOSHUA, RN, [facility] / Care Transition Nurse / [phone]     02/03/23 Patient readmitted to [facility] [place] 1/26-2/2/23 with Dysphagia  Patient to be seen by Dr Rosemarie Mendez today. Our [facility] needs to reschedule Dr Eulogio Chapa appt. Monitor status of these appt on next call. Roula JOSHUA, RN, [facility] / Care Transition Nurse / [phone]   Per Our [facility] Team patient daughter is to make an appt with Dr Eulogio Chapa, they are going to reach out to her and see when that appt is. Patient see's Dr Rosemarie Mendze at [address] as needed. Monitor status of appt with Dr Eulogio Chapa on next call. Roula JOSHUA, RN, [facility] / Care Transition Nurse / [phone]     02/17/23   Called and spoke to patient daughter Deon Kat, they have not made an appt with Cards-Dr Marsh, her mother was to also make an appt with GI and Pulm but she does not think she did make them. Patient is also to have an appt with dentist, date unknown. Monitor status of Cards, Pulm, GI and dental appts on next call. Roula JOSHUA, RN, [facility] / Care Transition Nurse / [phone]     02/24/23   Per Kristi patient did have her dental appt this week. Kristi was not aware that patient was to make Pulm and GI appt and daughter is to make Cards appt. They are going to reach out to patient and see what is scheduled. Monitor status of Pulm, Cards and GI appt on next time. Roula JOSHUA, RN, [facility] / Care Transition Nurse / [phone]      03/03/23   Patient has an appt with Cardiology 4/13/23 scheduled. Patient has declined to make an appt with GI. Patient is scheduling another Dental appt.   [facility] is discussing with patient Pulm appt. Monitor status of dental, Pulm, Cards and when did she last see PCP? Silverio JOSHUA, RN, [facility] / Care Transition Nurse / [phone]      03/10/23   Patient still has her appt scheduled with Cards on 4/13/23. ED recommended that patient F/U with GI and Pulm. Nursing to talk with daughter about GI and Pulm appts. No mention of dental appts on this call. Monitor dental, Cards, Pulm and ? GI appt on next call. Silverio JOSHUA, RN, [facility] / Care Transition Nurse / [phone]     03/17/23   Patient last seen by PCP Dr Irina Aparicio on 3/9/23. Patient has an appt with Cards on 4/13/23. Patient CXL dental appt and did not reschedule, declines appt with Gi and they are uncertain of Pulm appt. Monitor status of Cards, Dental and Pulm appt on next call. Silverio JOSHUA, RN, [facility] / Care Transition Nurse / [phone]     03/24/23   Patient was seen by Samantha Cary a few weeks ago virtually per patient. Patient still has her appt with Cards on 4/13/23 scheduled. Patient now has an appt with GI on 4/22/23 scheduled. Monitor status of Cards and GI appt on next call. Silverio JOSHUA, RN, [facility] / Care Transition Nurse / [phone]     04/19/23   Called and spoke to patient nurse irving Wood, she is not aware of patient appt on 4/13 with Cards or GI on 4/22/23. Monitor status of Cards and GI appt on next call. Silverio JOSHUA, RN, [facility] / Care Transition Nurse / [phone]                Prevent complications post hospitalization. On track     01/23/23   Patient is long term patient at [facility]. Called and spoke to patient nurse Nina, medication reconciliation completed. Nina denies any C/P or SOB observed when with patient. Attend weekly Zoom calls with [facility] on Friday at 8 am.    Silverio JOSHUA, RN, CCM / Care Transition Nurse / [phone]     02/02/23 Patient readmitted 1/26-2/2/23  Medication reconciliation completed with [facility].   He had question along with PA about Coreg should it be 12.5 or 6.25 mg they are reaching out to Cards. Mercedes JOSHUA, RN, CCM / Care Transition Nurse / [phone]      02/03/23   Patient remains on Coreg 6.25 mg BID, have not been able to contact Cards to verify if 12.5 or 6.25 mg. Patient is on Regular thin liquid diet. Monitor for C/P, dysphagia, SOB, cough, pedal edema and PO intake. Mercedes JOSHUA, RN, CCM / Care Transition Nurse / [phone]     02/10/23   [facility], denies any C/P, SOB or cough. Patient has been seen by Speech Therapy for continues difficulty swallowing dry food. They are going to order a \"Food Study\". Patient remains bed ridden and is in long term care at [facility]. Monitor C/P, SOB, cough, how is she swallowing when eating, did she have \"food Study\"? Mercedes JOSHUA, RN, CCM / Care Transition Nurse / [phone]     02/17/23         Patient is currently in skilled nursing due to her swallowing and AMI DX, the D/C date of March 2-8 is back to long term care but still in same bed. Patient will be going to dentist via [address]. Patient had her feeding study done yesterday. Continues to feel \"full\" when she is eating. No C/P, SOB or cough. Monitor PO intake, SOB, cough, C/P, results of swallow test.  Mercedes JOSHUA, RN, CCM / Care Transition Nurse / [phone]     02/24/23   Attended Zoom call with [address] [place] [address] Drive and PT. Did get results of swallow study her issue is all in her esophagus, patient declines to change her diet. Did have an episode of where she choked on a dumpling. No noted C/P or SOB. Patient is not bed bound, can get up with andry lift to W/C for a few hours. Working with PT on core strength. Remains in [address] [place] [address] Drive at [facility]. Monitor SOB, cough, choking, activity tolerance, PO intake, still in skilled care?      Mercedes JOSHUA, RN, CCM / Care Transition Nurse / [phone]     03/03/23 Attended [facility] with [address] [place] [address] Drive and PT  Patient has not had any more C/P or SOB. Was taking too large bites of food, now staff are cutting her food smaller. Patient declines softer or pureed diet. Transfer back to long term care on 3/13/23, will still be in same room. PT working with getting her to strengthen core so she can sit on W/C longer. Monitor if any C/P or SOB, choking? How is she doing eating? Still moving to long term on 3/13/23? How is she doing with PT, able to sit longer in chair? Sandy JOSHUA, RN, CCM / Care Transition Nurse / [phone]     03/10/23   Patient to [facility] ED on 3/8/23 for SOB, was given nebulized treatment and then patient daughter called 911. Patient given another nebulized treatment in route and her steroid was increased to 40 mg for 5 days. Per Nursing Staff patient not having any SOB, cough or C/P currently. Patient last day in skilled care will be 3/14, then back to long term. Continue to work with patient to increased core strength so that she can sit in W/C longer periods of time. Patient has been seen by Speech and they have evaluated and can offer education only at this time. Patient continues to wear O2 at 2/L. Monitor for C/P, SOB, cough, done with increased steroid? Back to long term care now? , how is she doing with PT, eating, swallowing? Sandy JOSHUA, RN, [facility] / Care Transition Nurse / [phone]     03/17/23   Patient is back to long term care as of 3/14/23. Is back to her baseline with PT. Patient has daily weights and last was 180 lbs. Patient is tolerating her pureed diet, does not like the pureed diet and has not asked to advance diet either. Done with increased prednisone post ED  visit on 3/8/23. Not noticed any C/P, SOB or cough. Monitor SOB, cough, C/P, how pureed diet is going, activity tolerance and daily weight.     Sandy JOSHUA, RN, [facility] / Care Transition Nurse / [phone]        03/24/23   Patient remains on her pureed diet, no further choking or coughing episodes. No further C/P either. Patient gets up with [facility] lift to W/C not daily as per patient request.  Last weight was 174.lbs. Monitor SOB, cough, C/P pureed diet, activity tolerance and current weight on next call. Mariel JOSHUA, RN, CCM / Care Transition Nurse / [phone]     04/19/23   Called and spoke to patient nurse today Nina, patient has not had any further SOB, cough or C/P. Remains on pureed diet, weight was 173.4 lbs. Patient is no longer receiving PT. Monitor for SOB, C/P cough, pureed diet, activity tolerance and weight.       Mariel JOSHUA, RN, CCM / Care Transition Nurse / [phone]

## 2023-04-24 ENCOUNTER — PATIENT OUTREACH (OUTPATIENT)
Dept: CASE MANAGEMENT | Age: 78
End: 2023-04-24

## 2023-04-24 NOTE — PROGRESS NOTES
Patient has graduated from the Bundle program on 4/24/23. Patient/family has the ability to self-manage at this time Care management goals have been completed. Patient was not referred to the [facility] team for further management. Goals Addressed                   This Visit's Progress     COMPLETED: Attends follow-up appointments as directed. 01/23/23   Patient see's Dr Giovanny Galvez Director at [address], does not have a set appt yet. Patient has an appt with Sary Abad on 1/26/23 at 10:45am, Nurse Nina notified of this appt. Monitor status of these appt on next call. Dick JOSHUA, RN, [facility] / Care Transition Nurse / [phone]     02/03/23 Patient readmitted to [facility] 1/26-2/2/23 with Dysphagia  Patient to be seen by Dr Sue Ni today. Our [facility] needs to reschedule Dr Esteban Abad appt. Monitor status of these appt on next call. Dick JOSHUA, RN, [facility] / Care Transition Nurse / [phone]   Per Our [facility] Team patient daughter is to make an appt with Dr Esteban Abad, they are going to reach out to her and see when that appt is. Patient see's Dr Sue Ni at [address] as needed. Monitor status of appt with Dr Esteban Abad on next call. Dick JOSHUA, RN, CCM / Care Transition Nurse / [phone]     02/17/23   Called and spoke to patient daughter Eliezer Ayala, they have not made an appt with Cards-Dr Marsh, her mother was to also make an appt with GI and Pulm but she does not think she did make them. Patient is also to have an appt with dentist, date unknown. Monitor status of Cards, Pulm, GI and dental appts on next call. Dick JOSHUA, RN, [facility] / Care Transition Nurse / [phone]     02/24/23   Per Kristi patient did have her dental appt this week. Kristi was not aware that patient was to make Pulm and GI appt and daughter is to make Cards appt. They are going to reach out to patient and see what is scheduled. Monitor status of Pulm, Cards and GI appt on next time.     Dick Montes MSN, RN, [facility] / Care Transition Nurse / [phone]      03/03/23   Patient has an appt with Cardiology 4/13/23 scheduled. Patient has declined to make an appt with GI. Patient is scheduling another Dental appt. [facility] is discussing with patient Pulm appt. Monitor status of dental, Pulm, Cards and when did she last see PCP? Betzaida JOSHUA, RN, [facility] / Care Transition Nurse / [phone]      03/10/23   Patient still has her appt scheduled with Cards on 4/13/23. ED recommended that patient F/U with GI and Pulm. Nursing to talk with daughter about GI and Pulm appts. No mention of dental appts on this call. Monitor dental, Cards, Pulm and ? GI appt on next call. Betzaida JOSHUA, RN, [facility] / Care Transition Nurse / [phone]     03/17/23   Patient last seen by PCP Dr Jt Tolbert on 3/9/23. Patient has an appt with Cards on 4/13/23. Patient CXL dental appt and did not reschedule, declines appt with Gi and they are uncertain of Pulm appt. Monitor status of Cards, Dental and Pulm appt on next call. Betzaida JOSHUA, RN, [facility] / Care Transition Nurse / [phone]     03/24/23   Patient was seen by Gretel Han a few weeks ago virtually per patient. Patient still has her appt with Cards on 4/13/23 scheduled. Patient now has an appt with GI on 4/22/23 scheduled. Monitor status of Cards and GI appt on next call. Betzaida JOSHUA, RN, [facility] / Care Transition Nurse / [phone]     04/19/23   Called and spoke to patient nurse today Nina, she is not aware of patient appt on 4/13 with Cards or GI on 4/22/23. Monitor status of Cards and GI appt on next call. Betzaida JOSHUA, RN, [facility] / Care Transition Nurse / [phone]     04/24/23   Called and spoke to unit Manager -Chau Diez, patient has some of her medicaation changed and she thought this was by Cards put no note that patient did see Cards. Patient did attend her Pulm apt on 4/3/23 as scheduled. Not sure if patient did attend dental appt.   Betzaida Ibarra TRES, RN, CCM / Care Transition Nurse / [phone]                COMPLETED: Prevent complications post hospitalization. 01/23/23   Patient is long term patient at [facility]. Called and spoke to patient nurse Nina, medication reconciliation completed. Nina denies any C/P or SOB observed when with patient. Attend weekly Zoom calls with [facility] on Friday at 8 am.    Norma JOSHUA, RN, CCM / Care Transition Nurse / [phone]     02/02/23 Patient readmitted 1/26-2/2/23  Medication reconciliation completed with [facility]. He had question along with PA about Coreg should it be 12.5 or 6.25 mg they are reaching out to Cards. Norma JOSHUA, RN, CCM / Care Transition Nurse / [phone]      02/03/23   Patient remains on Coreg 6.25 mg BID, have not been able to contact Cards to verify if 12.5 or 6.25 mg. Patient is on Regular thin liquid diet. Monitor for C/P, dysphagia, SOB, cough, pedal edema and PO intake. Norma JOSHUA, RN, CCM / Care Transition Nurse / [phone]     02/10/23   Our [facility], denies any C/P, SOB or cough. Patient has been seen by Speech Therapy for continues difficulty swallowing dry food. They are going to order a \"Food Study\". Patient remains bed ridden and is in long term care at [facility]. Monitor C/P, SOB, cough, how is she swallowing when eating, did she have \"food Study\"? Norma JOSHUA, RN, CCM / Care Transition Nurse / [phone]     02/17/23         Patient is currently in skilled nursing due to her swallowing and AMI DX, the D/C date of March 2-8 is back to long term care but still in same bed. Patient will be going to dentist via [address]. Patient had her feeding study done yesterday. Continues to feel \"full\" when she is eating. No C/P, SOB or cough.    Monitor PO intake, SOB, cough, C/P, results of swallow test.  Norma JOSHUA, RN, CCM / Care Transition Nurse / [phone]     02/24/23   Attended Zoom call with [address] [place] [address] Drive and PT. Did get results of swallow study her issue is all in her esophagus, patient declines to change her diet. Did have an episode of where she choked on a dumpling. No noted C/P or SOB. Patient is not bed bound, can get up with andry lift to W/C for a few hours. Working with PT on core strength. Remains in [address] at [facility]. Monitor SOB, cough, choking, activity tolerance, PO intake, still in skilled care? Alin Carreno MSN, RN, CCM / Care Transition Nurse / [phone]     03/03/23 Attended Zoom Meeting with [address] [place] [address] Drive and PT  Patient has not had any more C/P or SOB. Was taking too large bites of food, now staff are cutting her food smaller. Patient declines softer or pureed diet. Transfer back to long term care on 3/13/23, will still be in same room. PT working with getting her to strengthen core so she can sit on W/C longer. Monitor if any C/P or SOB, choking? How is she doing eating? Still moving to long term on 3/13/23? How is she doing with PT, able to sit longer in chair? Alin JOSHUA, RN, CCM / Care Transition Nurse / [phone]     03/10/23   Patient to [facility] [place] ED on 3/8/23 for SOB, was given nebulized treatment and then patient daughter called 911. Patient given another nebulized treatment in route and her steroid was increased to 40 mg for 5 days. Per Nursing Staff patient not having any SOB, cough or C/P currently. Patient last day in skilled care will be 3/14, then back to long term. Continue to work with patient to increased core strength so that she can sit in W/C longer periods of time. Patient has been seen by Speech and they have evaluated and can offer education only at this time. Patient continues to wear O2 at 2/L. Monitor for C/P, SOB, cough, done with increased steroid? Back to long term care now? , how is she doing with PT, eating, swallowing?      Alin JOSHUA, RN, CCM / Care Transition Nurse / [phone]     03/17/23 Patient is back to long term care as of 3/14/23. Is back to her baseline with PT. Patient has daily weights and last was 180 lbs. Patient is tolerating her pureed diet, does not like the pureed diet and has not asked to advance diet either. Done with increased prednisone post ED  visit on 3/8/23. Not noticed any C/P, SOB or cough. Monitor SOB, cough, C/P, how pureed diet is going, activity tolerance and daily weight. Sherrie JOSHUA, RN, CCM / Care Transition Nurse / [phone]        03/24/23   Patient remains on her pureed diet, no further choking or coughing episodes. No further C/P either. Patient gets up with [facility] lift to W/C not daily as per patient request.  Last weight was 174.lbs. Monitor SOB, cough, C/P pureed diet, activity tolerance and current weight on next call. Sherrie JOSHUA RN, CCM / Care Transition Nurse / [phone]     04/19/23   Called and spoke to patient nurse today Nina, patient has not had any further SOB, cough or C/P. Remains on pureed diet, weight was 173.4 lbs. Patient is no longer receiving PT. Monitor for SOB, C/P cough, pureed diet, activity tolerance and weight. Sherrie JOSHUA RN, CCM / Care Transition Nurse / [phone]     04/24/23   Spoke to Unit Manager Joan Cee, she denies seeing dariela SOB, cough or C/P patient is still eating Pureed diet. Weight today was  177 lbs. Sherrie JOSHUA, RN, CCM / Care Transition Nurse / [phone]                 Patient has Care Transition Nurse's contact information for any further questions, concerns, or needs. Patients upcoming visits:  No future appointments.

## 2024-06-20 NOTE — PROGRESS NOTES
Transition of care plan: TBD pending medical progression. RUR- 18%    CM noted consult to reschedule patient's appointment with [facility]. Patient stated that she would like to schedule the appointment herself, to ensure that she has transportation. Brinda Lara, BSW, MSW Supervisee  S.  [employer] , AC- VASCULAR MEDICINE FOLLOW UP VISIT    CHIEF COMPLAINT:  No chief complaint on file.      SUBJECTIVE:  Zahira Bryant is a pleasant 77 year old female who presents in the [facility] today for follow up of her history of symptomatic lower extremity varicose veins associated with numbness, aching and heaviness. Patient is now s/p 4 sessions of injection sclerotherapy to both legs February - July 2023.    When seen in September 2023, patient reported ongoing neuropathic pain to the bottom of both feet and she also hadsome discomfort behind her left leg. She endorsed that she had relief of her aching and heaviness s/p injection sclerotherapy. Patient also mentioned June hospitalization during which time it was discovered that she had a right carotid artery occlusion; she had no follow up imaging planned following this finding. Patient was on daily aspirin and high dose Lipitor. She also mentioned that she would like a primary care MD here at [facility].     Patient had another ED visit in November for elevated blood pressures, her home medications were adjusted at that visit.  She was taking 2.5 milligrams of amlodipine once per day.  She was taking lisinopril 40 milligrams once per day.    When seen in December 2023 patient denied headaches, acute vision loss, numbness tingling weakness unilaterally, difficulty speaking understanding.  Patient did endorse bilateral leg cramping with activity.  She was able to walk throughout [place] with her daughter however she needed to take several breaks to relieve the cramping.  After she rests for 1-2 minutes she can resume walking.  She would like arterial testing to investigate this further. Carotid US noted chronic right carotid occlusion her left carotid has less than 50 percent stenosis.  She has antegrade vertebral flow bilaterally.  Overall stable compared to prior imaging.    In January 2024, patient endorsed her leg pain had improved significantly with knee injections  recently. ABIs with stress unremarkable without a stress response.     Today patient ***. Carotid US ***.     Review of systems otherwise negative.    ALLERGIES AND MEDICATIONS:  Current Outpatient Medications   Medication Sig    pantoprazole (PROTONIX) 40 MG tablet Take 1 tablet by mouth 2 times daily as needed. Indications: Heartburn    amLODIPine (NORVASC) 10 MG tablet Take 0.5 tablets by mouth daily.    diclofenac-gabapentin-lidocaine 3-6-5 % cream Apply 1-2 grams (pumps) to both feet 3-4 times daily.    COMPRESSION STOCKING 20-30 WEAR DAILY AS DIRECTED    atorvastatin (LIPITOR) 80 MG tablet Take 1 tablet by mouth nightly.    amoxicillin (AMOXIL) 500 MG capsule Take 2,000 mg by mouth as needed (dental appointments).    albuterol 108 (90 Base) MCG/ACT inhaler Inhale 2 puffs into the lungs every 4 hours as needed for Shortness of Breath or Wheezing (wheezing).    pregabalin (LYRICA) 300 MG capsule Take 300 mg by mouth 2 times daily.    Aspirin Low Dose 81 MG EC tablet Take 1 tablet by mouth daily. Do not start before December 5, 2022.    fluticasone (FLONASE) 50 MCG/ACT nasal spray Spray 1 spray in each nostril as needed (allergies).    levothyroxine 25 MCG tablet Take 25 mcg by mouth daily.    Cyanocobalamin (VITAMIN B 12 PO) Take 1 tablet by mouth daily.    lisinopril (ZESTRIL) 40 MG tablet Take 40 mg by mouth daily.     No current facility-administered medications for this visit.       ALLERGIES:   Allergen Reactions    Iodine   (Environmental Or Med) HIVES         PHYSICAL EXAMINATION:  VITAL SIGNS:  There were no vitals taken for this visit.  Physical Exam:  General: NAD (no acute distress), appears stated age  Cardiovascular: RRR (regular, rate and rhythm)  Lungs: Clear to auscultation, no crackles or wheezes  Skin: No rashes, skin warm and dry, well healed incision to her anterior throat noted  Abdomen: + BS (bowel sounds), soft, nondistended  Extremities: BUE/BLE warm without any significant edema or  wounds.  Patient does not have easilypalpable DP/PT pulses today      Neurological Exam:  Mental Status: The patient is oriented to person, place, and time. Language is fluent. Speech is of normal jennifer and character. The speech is nondysarthric.   Cranial Nerves:  I-Not tested.    II-The pupils are 2 mm, equally round and reactive to light.  Visual fields are full to confrontation.     III, IV, VI-The extraocular movements are full in all directions of gaze.  V- Light touch intact and symmetric in the V1, V2, V3 divisions of both trigeminal nerves.   VII-Facial movements are symmetric with normal lip seal and eye closure.   VIII=Hearing intact to voice bilaterally.   IX, X-Palate elevated symmetrically.  There is no palatal myoclonus.    XI-Sternocleidomastoid strength is 5/5 bilaterally with normal shoulder shrug.   XII-Midline tongue protrusion without atrophy or fibrillations.    Motor:  Tone is normal in all four extremities without fasciculations, atrophy, or myoclonus.  There are no involuntary movements.  Muscle Strength:  The strength was 5/5 for deltoid, biceps, triceps, wrist flexors/extensors, intrinsic hand muscles, quadriceps, iliopsoas, tibialis anterior, medial gastrocnemius, and hamstrings bilaterally and symmetrically.  Sensory:  The sensory examination is normal for light touch bilaterally and symmetrically.  There is no sensory loss in the peripheral nerve or nerve root distribution in any extremity.      VASCULAR IMAGING:  ABIs with stress 1/31/2024:  Summary    * There was a prior study dated 04/22/2022.    * RIGHT:    * Normal right DOTTIE at rest.  The right DOTTIE was 1.00 (Previously 0.93).    * Negative physiological study for lower extremity arterial occlusive  disease on the right.    * Mild reduction in the right Toe Brachial Index.    * LEFT:    * Normal left DOTTIE at rest.  The left DOTTIE was 1.01 (Previously 0.89).    * Negative physiologic study for lower extremity arterial occlusive  disease  on the left.    * Mild-moderate reduction in the left Toe Brachial Index.    * Left brachial arm pressure demonstrates a >20 mmHg gradient from the right  brachial pressure, suggestive of left upper extremity arterial obstruction. (* The right brachial systolic pressure was 168 mmHg * The left brachial systolic pressure was 139 mmHg.)    Carotid US on 12/8/2023:  Summary    * There was a prior CTA head and neck dated 6/2/2023.    * There is no significant change compared to prior study.    * Total occlusion of the right internal carotid artery.    * Normal antegrade Doppler flow noted in the right vertebral artery.    * Less than 50% stenosis of left origin-proximal internal carotid artery  with mild atherosclerosis noted.    * Normal antegrade Doppler flow noted in the left vertebral artery.    * Normal triphasic Doppler flow noted in the left subclavian artery    Bilateral lower extremity venous insufficiency ultrasound on 07/19/2021:  There is no prior study immediately available for comparison.  Negative study for acute femoral, popliteal, or below-knee deep venous  thrombosis bilaterally as delineated below.  Negative study for acute superficial venous thrombosis involving the above  knee or below-knee segments of the bilateral great saphenous veins, as well  as the bilateral small saphenous veins. Unable to visualize the left GSV at  the knee.  Dimensions and reflux times of the saphenous veins as delineated below.  Results of the deep vein competency evaluation as delineated below.         Superficial Right Vein Competency Evaluation                              Size                        Reflux         Right Vein                      (mm)        Competency      Time (sec)     GSV - SF Junction               8.1         Normal                         GSV - SF Junction Standing                                                 GSV - Prox Thigh                6.1         Normal                          GSV - Mid Thigh                 4           Normal                         GSV - Dst Thigh                 3.1         Normal                         GSV - Knee                      3.1         Normal                         GSV - Below Knee                3.5         Normal                         AGSV - Prox Thigh                                                          AGSV - Mid Thigh                                                           AGSV - Dst Thigh                                                           SSV - Prox                      3.1         Normal                         SSV - Mid                       2.1         Normal                         SSV - Dist                                                                    Superficial Left Vein Competency Evaluation                              Size                        Reflux         Left Vein                       (mm)        Competency      Time (sec)     GSV - SF Junction               6.7         Normal                         GSV - SF Junction Standing                                                 GSV - Prox Thigh                3.8         Normal                         GSV - Mid Thigh                 3.9         Normal                         GSV - Dst Thigh                 2.8         Normal                         GSV - Knee                                                                 GSV - Below Knee                2           Normal                         AGSV - Prox Thigh                                                          AGSV - Mid Thigh                                                           AGSV - Dst Thigh                                                           SSV - Prox                      2.3         Normal                         SSV - Mid                       2.4         Normal                         SSV - Dist                                                                      Deep Vein Competency  Evaluation                      Right       Right        Left        Left         Vein              Competency  Reflux Time  Competency  Reflux Time  Com Femoral       Normal                   Normal                   Deep Femoral      Normal                   Normal                   Femoral - Prox    Normal                   Normal                   Femoral - Mid     Normal                   Normal                   Femoral - Dst     Normal                   Normal                   Popliteal - Prox  Normal                   Normal                   Popliteal - Dst   Normal                   Normal                   Posterior Tibial  Normal                   Normal                   Peroneal          Normal                   Normal                            ASSESSMENT:    No diagnosis found.          RECOMMENDATIONS:  Right Carotid occlusion  In regards to right carotid artery occlusion: CTA from June 2023 reviewed.   Repeat carotid US today notes chronic right carotid occlusion her left carotid has less than 50 percent stenosis.  She has antegrade vertebral flow bilaterally.  Overall stable compared to prior imaging.  Updated carotid US today ***  F/u with neurology as scheduled on 7/9/2024  Patient is to continue her daily aspirin and high intensity statin therapy. She endorses taking both medications  MRI brain in July 2023 was without acute stroke.    BLE pain with walking  Patient states she can walk for about 5 minutes before she needs to  when she was at [employer].  She is cramping to both legs.  After rest her cramping resolves she is able to resume walking.  ABIs without concerning findings. Leg pain improved significantly after recent knee injections. Suspect arthritic pain not claudication.   In the meantime patient was encouraged to walk as much as possible.    Varicose veins  The patient has symptomatic lower extremity varicose veins associated with pain and swelling.  She is now s/p injection  sclerotherapy (4 sessions to ea leg) from February to July 2023.with improvement in her aching and heaviness.   Recommend patient continue to wear her 20-30 mmHg thigh high compression stockings daily as previously instructed.  Currently she has no venous insufficiency symptoms on exam.    Hypertension:  Suspicion for LUE arterial obstruction given > 20 mmHg gradient on ABIs earlier this year  Pt's blood pressure will be more accurate in the RUE. She should have BPs done on her RUE.  On exam ***  Pt has cardiology appt with Dr. Lawrence on 7/9/2024  Currently taking amlodipine 5 mg daily, lisinopril 40 mg daily.     Patient seen today with help from [facility] video .     All questions answered. Follow up in November as scheduled ***    Supervised by Dr. Clancy.     UMAIR Gibbons  Vascular Nurse Practitioner  [phone] pager

## (undated) DEVICE — Device

## (undated) DEVICE — GUIDEWIRE

## (undated) DEVICE — ANGIO-SEAL VIP VASCULAR CLOSURE DEVICE: Brand: ANGIO-SEAL

## (undated) DEVICE — FCPS RAD JAW 4LC 240CM W/NDL -- BX/40

## (undated) DEVICE — KIT HND CTRL 3 W STPCOCK ROT END 54IN PREM HI PRSS TBNG AT

## (undated) DEVICE — PACK PROCEDURE SURG HRT CATH

## (undated) DEVICE — CATH 5F 100CM JR40 -- DXTERITY

## (undated) DEVICE — HI-TORQUE VERSACORE MODIFIED J GUIDE WIRE SYSTEM 145 CM: Brand: HI-TORQUE VERSACORE

## (undated) DEVICE — CATHETER DIAG 5FR L100CM LUMN ID0.047IN JL3.5 CRV 0 SIDE H

## (undated) DEVICE — WASTE KIT - ST MARY: Brand: MEDLINE INDUSTRIES, INC.

## (undated) DEVICE — KIT MFLD ISOLATN NACL CNTRST PRT TBNG SPIK W/ PRSS TRNSDUC

## (undated) DEVICE — TUBING HYDR IRR --

## (undated) DEVICE — SPECIAL PROCEDURE DRAPE 32" X 34": Brand: SPECIAL PROCEDURE DRAPE

## (undated) DEVICE — ANGIOGRAPHY KIT

## (undated) DEVICE — TR BAND RADIAL ARTERY COMPRESSION DEVICE: Brand: TR BAND

## (undated) DEVICE — GLIDESHEATH SLENDER STAINLESS STEEL KIT: Brand: GLIDESHEATH SLENDER

## (undated) DEVICE — KIT MED IMAG CNTRST AGNT W/ IOPAMIDOL REUSE

## (undated) DEVICE — PROVE COVER: Brand: UNBRANDED